# Patient Record
Sex: MALE | Race: WHITE | NOT HISPANIC OR LATINO
[De-identification: names, ages, dates, MRNs, and addresses within clinical notes are randomized per-mention and may not be internally consistent; named-entity substitution may affect disease eponyms.]

---

## 2020-11-03 ENCOUNTER — RESULT REVIEW (OUTPATIENT)
Age: 75
End: 2020-11-03

## 2022-09-22 PROBLEM — Z00.00 ENCOUNTER FOR PREVENTIVE HEALTH EXAMINATION: Status: ACTIVE | Noted: 2022-09-22

## 2022-09-27 ENCOUNTER — APPOINTMENT (OUTPATIENT)
Dept: COLORECTAL SURGERY | Facility: CLINIC | Age: 77
End: 2022-09-27

## 2022-09-27 DIAGNOSIS — C18.7 MALIGNANT NEOPLASM OF SIGMOID COLON: ICD-10-CM

## 2022-09-27 DIAGNOSIS — E66.01 MORBID (SEVERE) OBESITY DUE TO EXCESS CALORIES: ICD-10-CM

## 2022-09-27 DIAGNOSIS — K51.212: ICD-10-CM

## 2022-09-27 PROCEDURE — 99204 OFFICE O/P NEW MOD 45 MIN: CPT

## 2022-09-27 NOTE — CONSULT LETTER
[Dear  ___] : Dear  [unfilled], [Consult Letter:] : I had the pleasure of evaluating your patient, [unfilled]. [Please see my note below.] : Please see my note below. [Consult Closing:] : Thank you very much for allowing me to participate in the care of this patient.  If you have any questions, please do not hesitate to contact me. [Sincerely,] : Sincerely, [FreeTextEntry3] : Eduin Nguyen M.D. FACS, FASCRS

## 2022-09-27 NOTE — REVIEW OF SYSTEMS
[Feeling Poorly] : feeling poorly [Feeling Tired] : feeling tired [As Noted in HPI] : as noted in HPI [Abdominal Pain] : abdominal pain [Constipation] : constipation [Melena] : melbart [Negative] : Heme/Lymph

## 2022-09-27 NOTE — ASSESSMENT
[FreeTextEntry1] : 77-year-old morbidly obese male with ulcerative colitis and sigmoid colon cancer with lymphadenopathy.  Discussed with the patient his options including total proctocolectomy with ileostomy versus total proctocolectomy with J-pouch reconstruction and diverting ileostomy.  Patient wishes to proceed with J-pouch procedure.  Risk and benefits of the surgery have been discussed which include bleeding, infection, sepsis, multiorgan failure, inadvertent injury including hollow viscus, solid organ, ureter neurovascular and neurological structures, DVT PE, heart attack, stroke, hernias, recurrence of tumor, leak of anastomosis, need for stoma, major changes in bowel habits including increased frequency and incontinence and death.

## 2022-09-27 NOTE — HISTORY OF PRESENT ILLNESS
[FreeTextEntry1] : Consultation requested by Dr. Gen Grant for ulcerative colitis and recent diagnosis of sigmoid colon cancer.  77-year-old male morbidly obese with longstanding history of ulcerative colitis recently underwent colonoscopy due to bowel habit changes and bleeding found to have a near obstructing tumor stricture partial colon obstruction and cancer of the sigmoid colon.  Symptoms of obstruction have been increasingly worsened.

## 2022-09-27 NOTE — DATA REVIEWED
[FreeTextEntry1] : Colonoscopy demonstrates a stricture at 25 cm near obstructing tumor\par Pathology demonstrates adenocarcinoma\par CT scan demonstrates 10 cm strictured area of sigmoid colon, enlarged pericolonic lymph nodes

## 2022-09-27 NOTE — PHYSICAL EXAM
[Abdomen Masses] : No abdominal masses [Abdomen Tenderness] : ~T No ~M abdominal tenderness [JVD] : no jugular venous distention  [Normal Breath Sounds] : Normal breath sounds [Normal Heart Sounds] : normal heart sounds [Normal Rate and Rhythm] : normal rate and rhythm [Alert] : alert [Oriented to Person] : oriented to person [Oriented to Place] : oriented to place [Oriented to Time] : oriented to time [Calm] : calm [de-identified] : Morbidly obese [de-identified] : Looks well in no distress, of stated age. [de-identified] : Moves all 4 extremities appropriately with 5 over 5 strength

## 2022-09-29 ENCOUNTER — RESULT REVIEW (OUTPATIENT)
Age: 77
End: 2022-09-29

## 2022-09-29 ENCOUNTER — OUTPATIENT (OUTPATIENT)
Dept: OUTPATIENT SERVICES | Facility: HOSPITAL | Age: 77
LOS: 1 days | End: 2022-09-29
Payer: MEDICARE

## 2022-09-29 VITALS
HEIGHT: 71 IN | RESPIRATION RATE: 16 BRPM | TEMPERATURE: 98 F | SYSTOLIC BLOOD PRESSURE: 127 MMHG | OXYGEN SATURATION: 96 % | HEART RATE: 73 BPM | DIASTOLIC BLOOD PRESSURE: 71 MMHG | WEIGHT: 278 LBS

## 2022-09-29 DIAGNOSIS — Z01.818 ENCOUNTER FOR OTHER PREPROCEDURAL EXAMINATION: ICD-10-CM

## 2022-09-29 DIAGNOSIS — Z98.890 OTHER SPECIFIED POSTPROCEDURAL STATES: Chronic | ICD-10-CM

## 2022-09-29 DIAGNOSIS — K51.90 ULCERATIVE COLITIS, UNSPECIFIED, WITHOUT COMPLICATIONS: ICD-10-CM

## 2022-09-29 DIAGNOSIS — Z98.49 CATARACT EXTRACTION STATUS, UNSPECIFIED EYE: Chronic | ICD-10-CM

## 2022-09-29 DIAGNOSIS — Z96.653 PRESENCE OF ARTIFICIAL KNEE JOINT, BILATERAL: Chronic | ICD-10-CM

## 2022-09-29 DIAGNOSIS — Z95.0 PRESENCE OF CARDIAC PACEMAKER: Chronic | ICD-10-CM

## 2022-09-29 LAB
A1C WITH ESTIMATED AVERAGE GLUCOSE RESULT: 6.9 % — HIGH (ref 4–5.6)
ALBUMIN SERPL ELPH-MCNC: 3.5 G/DL — SIGNIFICANT CHANGE UP (ref 3.3–5)
ALP SERPL-CCNC: 59 U/L — SIGNIFICANT CHANGE UP (ref 40–120)
ALT FLD-CCNC: 38 U/L — SIGNIFICANT CHANGE UP (ref 12–78)
ANION GAP SERPL CALC-SCNC: 6 MMOL/L — SIGNIFICANT CHANGE UP (ref 5–17)
APTT BLD: 30.4 SEC — SIGNIFICANT CHANGE UP (ref 27.5–35.5)
AST SERPL-CCNC: 19 U/L — SIGNIFICANT CHANGE UP (ref 15–37)
BASOPHILS # BLD AUTO: 0.02 K/UL — SIGNIFICANT CHANGE UP (ref 0–0.2)
BASOPHILS NFR BLD AUTO: 0.4 % — SIGNIFICANT CHANGE UP (ref 0–2)
BILIRUB DIRECT SERPL-MCNC: 0.1 MG/DL — SIGNIFICANT CHANGE UP (ref 0–0.3)
BILIRUB SERPL-MCNC: 0.5 MG/DL — SIGNIFICANT CHANGE UP (ref 0.2–1.2)
BUN SERPL-MCNC: 16 MG/DL — SIGNIFICANT CHANGE UP (ref 7–23)
CALCIUM SERPL-MCNC: 9.5 MG/DL — SIGNIFICANT CHANGE UP (ref 8.5–10.1)
CEA SERPL-MCNC: 4.6 NG/ML — HIGH (ref 0–3.8)
CHLORIDE SERPL-SCNC: 102 MMOL/L — SIGNIFICANT CHANGE UP (ref 96–108)
CO2 SERPL-SCNC: 31 MMOL/L — SIGNIFICANT CHANGE UP (ref 22–31)
CREAT SERPL-MCNC: 0.92 MG/DL — SIGNIFICANT CHANGE UP (ref 0.5–1.3)
EGFR: 87 ML/MIN/1.73M2 — SIGNIFICANT CHANGE UP
EOSINOPHIL # BLD AUTO: 0 K/UL — SIGNIFICANT CHANGE UP (ref 0–0.5)
EOSINOPHIL NFR BLD AUTO: 0 % — SIGNIFICANT CHANGE UP (ref 0–6)
ESTIMATED AVERAGE GLUCOSE: 151 MG/DL — HIGH (ref 68–114)
GLUCOSE SERPL-MCNC: 174 MG/DL — HIGH (ref 70–99)
HCT VFR BLD CALC: 39.4 % — SIGNIFICANT CHANGE UP (ref 39–50)
HGB BLD-MCNC: 12.1 G/DL — LOW (ref 13–17)
IMM GRANULOCYTES NFR BLD AUTO: 1.1 % — HIGH (ref 0–0.9)
INR BLD: 1.06 RATIO — SIGNIFICANT CHANGE UP (ref 0.88–1.16)
LYMPHOCYTES # BLD AUTO: 0.6 K/UL — LOW (ref 1–3.3)
LYMPHOCYTES # BLD AUTO: 13.2 % — SIGNIFICANT CHANGE UP (ref 13–44)
MCHC RBC-ENTMCNC: 27.1 PG — SIGNIFICANT CHANGE UP (ref 27–34)
MCHC RBC-ENTMCNC: 30.7 GM/DL — LOW (ref 32–36)
MCV RBC AUTO: 88.3 FL — SIGNIFICANT CHANGE UP (ref 80–100)
MONOCYTES # BLD AUTO: 0.17 K/UL — SIGNIFICANT CHANGE UP (ref 0–0.9)
MONOCYTES NFR BLD AUTO: 3.7 % — SIGNIFICANT CHANGE UP (ref 2–14)
NEUTROPHILS # BLD AUTO: 3.7 K/UL — SIGNIFICANT CHANGE UP (ref 1.8–7.4)
NEUTROPHILS NFR BLD AUTO: 81.6 % — HIGH (ref 43–77)
PLATELET # BLD AUTO: 193 K/UL — SIGNIFICANT CHANGE UP (ref 150–400)
POTASSIUM SERPL-MCNC: 4.9 MMOL/L — SIGNIFICANT CHANGE UP (ref 3.5–5.3)
POTASSIUM SERPL-SCNC: 4.9 MMOL/L — SIGNIFICANT CHANGE UP (ref 3.5–5.3)
PROT SERPL-MCNC: 7 GM/DL — SIGNIFICANT CHANGE UP (ref 6–8.3)
PROTHROM AB SERPL-ACNC: 12.3 SEC — SIGNIFICANT CHANGE UP (ref 10.5–13.4)
RBC # BLD: 4.46 M/UL — SIGNIFICANT CHANGE UP (ref 4.2–5.8)
RBC # FLD: 19.6 % — HIGH (ref 10.3–14.5)
SODIUM SERPL-SCNC: 139 MMOL/L — SIGNIFICANT CHANGE UP (ref 135–145)
WBC # BLD: 4.54 K/UL — SIGNIFICANT CHANGE UP (ref 3.8–10.5)
WBC # FLD AUTO: 4.54 K/UL — SIGNIFICANT CHANGE UP (ref 3.8–10.5)

## 2022-09-29 PROCEDURE — 80053 COMPREHEN METABOLIC PANEL: CPT

## 2022-09-29 PROCEDURE — 93010 ELECTROCARDIOGRAM REPORT: CPT

## 2022-09-29 PROCEDURE — 99214 OFFICE O/P EST MOD 30 MIN: CPT | Mod: 25

## 2022-09-29 PROCEDURE — 71046 X-RAY EXAM CHEST 2 VIEWS: CPT | Mod: 26

## 2022-09-29 PROCEDURE — 82248 BILIRUBIN DIRECT: CPT

## 2022-09-29 PROCEDURE — 71046 X-RAY EXAM CHEST 2 VIEWS: CPT

## 2022-09-29 PROCEDURE — 85730 THROMBOPLASTIN TIME PARTIAL: CPT

## 2022-09-29 PROCEDURE — 36415 COLL VENOUS BLD VENIPUNCTURE: CPT

## 2022-09-29 PROCEDURE — 86850 RBC ANTIBODY SCREEN: CPT

## 2022-09-29 PROCEDURE — 86920 COMPATIBILITY TEST SPIN: CPT

## 2022-09-29 PROCEDURE — 85025 COMPLETE CBC W/AUTO DIFF WBC: CPT

## 2022-09-29 PROCEDURE — 83036 HEMOGLOBIN GLYCOSYLATED A1C: CPT

## 2022-09-29 PROCEDURE — 86901 BLOOD TYPING SEROLOGIC RH(D): CPT

## 2022-09-29 PROCEDURE — 86900 BLOOD TYPING SEROLOGIC ABO: CPT

## 2022-09-29 PROCEDURE — 93005 ELECTROCARDIOGRAM TRACING: CPT

## 2022-09-29 PROCEDURE — 85610 PROTHROMBIN TIME: CPT

## 2022-09-29 PROCEDURE — 82378 CARCINOEMBRYONIC ANTIGEN: CPT

## 2022-09-29 NOTE — H&P PST ADULT - DENTITION
Hourly rounds completed throughout this shift. Pt resting in bed; denies needs at this time. Will continue to monitor and report to oncoming night shift nurse. denies loose teeth or dentures/normal

## 2022-09-29 NOTE — H&P PST ADULT - NSICDXPASTMEDICALHX_GEN_ALL_CORE_FT
PAST MEDICAL HISTORY:  Atrial fibrillation     BPH (benign prostatic hyperplasia)     Colon cancer     COPD (chronic obstructive pulmonary disease)     COVID-19 virus infection     HLD (hyperlipidemia)     HTN (hypertension)     Obesity     Obstructive sleep apnea     Ulcerative colitis      PAST MEDICAL HISTORY:  Atrial fibrillation     BPH (benign prostatic hyperplasia)     Colon cancer     COPD (chronic obstructive pulmonary disease)     COVID-19 virus infection     History of pneumonia     HLD (hyperlipidemia)     HTN (hypertension)     Obesity     Obstructive sleep apnea     Ulcerative colitis

## 2022-09-29 NOTE — H&P PST ADULT - NSICDXFAMILYHX_GEN_ALL_CORE_FT
FAMILY HISTORY:  Father  Still living? Unknown  Family history of cardiomyopathy, Age at diagnosis: Age Unknown

## 2022-09-29 NOTE — H&P PST ADULT - ASSESSMENT
77 year old male diagnosed with ulcerative colitis and colon cancer; Colon cancer found on routine colonoscopy; Pt said he has occasional blood in stool due to UC denies change in bowel habits; presents to PST for planned open total proctocolectomy with ileal anal j pouch and ileostomy       Plan:  1. PST instructions given ; NPO status/  instructions to be given by ASU   2. Pt instructed to take following meds on day of surgery: spiriva albuterol prn; amlodipine metoprolol prednisone  3. Pt instructed to take routine evening medications unless indicated   4. Stop NSAIDS ( Aspirin Alev Motrin Mobic Diclofenac), herbal supplements , MVI , Vitamin fish oil 7 days prior to surgery  unless   directed by surgeon or cardiologist;   5. Medical Optimization  with Dr Barnhart and cardio Dr Hendricks   6. EZ wash instructions given & mupirocin instructions given  7. Labs EKG CXR as per surgeon request   8. Pt instructed to self quarantine after Covid test   9. Covid Testing scheduled Pt notified and aware  10. Pt denies covid symptoms shortness of breath fever cough       CAPRINI SCORE [CLOT]    AGE RELATED RISK FACTORS                                                       MOBILITY RELATED FACTORS  [ ] Age 41-60 years                                            (1 Point)                  [ ] Bed rest                                                        (1 Point)  [ ] Age: 61-74 years                                           (2 Points)                 [ ] Plaster cast                                                   (2 Points)  [x ] Age= 75 years                                              (3 Points)                 [ ] Bed bound for more than 72 hours                 (2 Points)    DISEASE RELATED RISK FACTORS                                               GENDER SPECIFIC FACTORS  [x ] Edema in the lower extremities                       (1 Point)                  [ ] Pregnancy                                                     (1 Point)  [ ] Varicose veins                                               (1 Point)                  [ ] Post-partum < 6 weeks                                   (1 Point)             [x ] BMI > 25 Kg/m2                                            (1 Point)                  [ ] Hormonal therapy  or oral contraception          (1 Point)                 [ ] Sepsis (in the previous month)                        (1 Point)                  [ ] History of pregnancy complications                 (1 point)  [ ] Pneumonia or serious lung disease                                               [ ] Unexplained or recurrent                     (1 Point)           (in the previous month)                               (1 Point)  [ ] Abnormal pulmonary function test                     (1 Point)                 SURGERY RELATED RISK FACTORS  [ ] Acute myocardial infarction                              (1 Point)                 [ ]  Section                                             (1 Point)  [ ] Congestive heart failure (in the previous month)  (1 Point)               [ ] Minor surgery                                                  (1 Point)   [x ] Inflammatory bowel disease                             (1 Point)                 [ ] Arthroscopic surgery                                        (2 Points)  [ ] Central venous access                                      (2 Points)                [ x] General surgery lasting more than 45 minutes   (2 Points)       [ ] Stroke (in the previous month)                          (5 Points)               [ ] Elective arthroplasty                                         (5 Points)            ( x) presents and past malignancy                           (2 points)                                                                                                         HEMATOLOGY RELATED FACTORS                                                 TRAUMA RELATED RISK FACTORS  [ ] Prior episodes of VTE                                     (3 Points)                 [ ] Fracture of the hip, pelvis, or leg                       (5 Points)  [ ] Positive family history for VTE                         (3 Points)                 [ ] Acute spinal cord injury (in the previous month)  (5 Points)  [ ] Prothrombin 82028 A                                     (3 Points)                 [ ] Paralysis  (less than 1 month)                             (5 Points)  [ ] Factor V Leiden                                             (3 Points)                  [ ] Multiple Trauma within 1 month                        (5 Points)  [ ] Lupus anticoagulants                                     (3 Points)                                                           [ ] Anticardiolipin antibodies                               (3 Points)                                                       [ ] High homocysteine in the blood                      (3 Points)                                             [ ] Other congenital or acquired thrombophilia      (3 Points)                                                [ ] Heparin induced thrombocytopenia                  (3 Points)                                          Total Score [  10       ]    The Caprini score indicates that this patient is at high risk for a VTE event (score 6 or greater). Surgical patients in this group will benefit from both pharmacologic prophylaxis and intermittent compression devices.  The surgical team will determine the balance between VTE risk and bleeding risk, and other clinical considerations

## 2022-09-29 NOTE — H&P PST ADULT - HEALTH CARE MAINTENANCE
Pt denies travel out of [facility] for the past 14 days Pt denies  travel internationally for the past 21 days

## 2022-09-29 NOTE — H&P PST ADULT - NSICDXPASTSURGICALHX_GEN_ALL_CORE_FT
PAST SURGICAL HISTORY:  H/O colonoscopy     H/O cystoscopy urethral stricture    H/O hernia repair     History of cataract surgery     History of hydrocelectomy     History of total knee replacement, bilateral     S/P placement of cardiac pacemaker

## 2022-09-29 NOTE — H&P PST ADULT - HISTORY OF PRESENT ILLNESS
77 year old male diagnosed with ulcerative colitis and colon cancer; Colon cancer found on routine colonoscopy; Pt said he has occasional blood in stool due to UC denies change in bowel habits; presents to PST for planned open total proctocolectomy with ileal anal j pouch and ileostomy

## 2022-09-30 DIAGNOSIS — K51.90 ULCERATIVE COLITIS, UNSPECIFIED, WITHOUT COMPLICATIONS: ICD-10-CM

## 2022-09-30 DIAGNOSIS — Z01.818 ENCOUNTER FOR OTHER PREPROCEDURAL EXAMINATION: ICD-10-CM

## 2022-10-02 RX ORDER — SODIUM CHLORIDE 9 MG/ML
3 INJECTION INTRAMUSCULAR; INTRAVENOUS; SUBCUTANEOUS EVERY 8 HOURS
Refills: 0 | Status: DISCONTINUED | OUTPATIENT
Start: 2022-10-06 | End: 2022-11-02

## 2022-10-03 ENCOUNTER — RX CHANGE (OUTPATIENT)
Age: 77
End: 2022-10-03

## 2022-10-06 ENCOUNTER — INPATIENT (INPATIENT)
Facility: HOSPITAL | Age: 77
LOS: 26 days | Discharge: HOME HEALTH | DRG: 329 | End: 2022-11-02
Attending: INTERNAL MEDICINE | Admitting: COLON & RECTAL SURGERY
Payer: MEDICARE

## 2022-10-06 ENCOUNTER — TRANSCRIPTION ENCOUNTER (OUTPATIENT)
Age: 77
End: 2022-10-06

## 2022-10-06 ENCOUNTER — RESULT REVIEW (OUTPATIENT)
Age: 77
End: 2022-10-06

## 2022-10-06 ENCOUNTER — APPOINTMENT (OUTPATIENT)
Dept: COLORECTAL SURGERY | Facility: HOSPITAL | Age: 77
End: 2022-10-06

## 2022-10-06 VITALS
RESPIRATION RATE: 18 BRPM | HEIGHT: 71 IN | SYSTOLIC BLOOD PRESSURE: 107 MMHG | OXYGEN SATURATION: 95 % | WEIGHT: 278 LBS | HEART RATE: 52 BPM | TEMPERATURE: 97 F | DIASTOLIC BLOOD PRESSURE: 63 MMHG

## 2022-10-06 DIAGNOSIS — Z98.890 OTHER SPECIFIED POSTPROCEDURAL STATES: Chronic | ICD-10-CM

## 2022-10-06 DIAGNOSIS — K51.90 ULCERATIVE COLITIS, UNSPECIFIED, WITHOUT COMPLICATIONS: ICD-10-CM

## 2022-10-06 DIAGNOSIS — Z95.0 PRESENCE OF CARDIAC PACEMAKER: Chronic | ICD-10-CM

## 2022-10-06 DIAGNOSIS — Z98.49 CATARACT EXTRACTION STATUS, UNSPECIFIED EYE: Chronic | ICD-10-CM

## 2022-10-06 DIAGNOSIS — C18.7 MALIGNANT NEOPLASM OF SIGMOID COLON: ICD-10-CM

## 2022-10-06 DIAGNOSIS — Z96.653 PRESENCE OF ARTIFICIAL KNEE JOINT, BILATERAL: Chronic | ICD-10-CM

## 2022-10-06 LAB
ANION GAP SERPL CALC-SCNC: 8 MMOL/L — SIGNIFICANT CHANGE UP (ref 5–17)
BUN SERPL-MCNC: 19 MG/DL — SIGNIFICANT CHANGE UP (ref 7–23)
CALCIUM SERPL-MCNC: 8.4 MG/DL — LOW (ref 8.5–10.1)
CHLORIDE SERPL-SCNC: 105 MMOL/L — SIGNIFICANT CHANGE UP (ref 96–108)
CO2 SERPL-SCNC: 25 MMOL/L — SIGNIFICANT CHANGE UP (ref 22–31)
CREAT SERPL-MCNC: 1.49 MG/DL — HIGH (ref 0.5–1.3)
EGFR: 49 ML/MIN/1.73M2 — LOW
GLUCOSE SERPL-MCNC: 231 MG/DL — HIGH (ref 70–99)
HCT VFR BLD CALC: 40.5 % — SIGNIFICANT CHANGE UP (ref 39–50)
HGB BLD-MCNC: 12.6 G/DL — LOW (ref 13–17)
LACTATE SERPL-SCNC: 2.7 MMOL/L — HIGH (ref 0.7–2)
MCHC RBC-ENTMCNC: 27.3 PG — SIGNIFICANT CHANGE UP (ref 27–34)
MCHC RBC-ENTMCNC: 31.1 GM/DL — LOW (ref 32–36)
MCV RBC AUTO: 87.7 FL — SIGNIFICANT CHANGE UP (ref 80–100)
PLATELET # BLD AUTO: 186 K/UL — SIGNIFICANT CHANGE UP (ref 150–400)
POTASSIUM SERPL-MCNC: 4.7 MMOL/L — SIGNIFICANT CHANGE UP (ref 3.5–5.3)
POTASSIUM SERPL-SCNC: 4.7 MMOL/L — SIGNIFICANT CHANGE UP (ref 3.5–5.3)
RBC # BLD: 4.62 M/UL — SIGNIFICANT CHANGE UP (ref 4.2–5.8)
RBC # FLD: 19.4 % — HIGH (ref 10.3–14.5)
SODIUM SERPL-SCNC: 138 MMOL/L — SIGNIFICANT CHANGE UP (ref 135–145)
WBC # BLD: 10.3 K/UL — SIGNIFICANT CHANGE UP (ref 3.8–10.5)
WBC # FLD AUTO: 10.3 K/UL — SIGNIFICANT CHANGE UP (ref 3.8–10.5)

## 2022-10-06 PROCEDURE — 97530 THERAPEUTIC ACTIVITIES: CPT | Mod: GP

## 2022-10-06 PROCEDURE — 88309 TISSUE EXAM BY PATHOLOGIST: CPT | Mod: 26

## 2022-10-06 PROCEDURE — 94640 AIRWAY INHALATION TREATMENT: CPT

## 2022-10-06 PROCEDURE — C1889: CPT

## 2022-10-06 PROCEDURE — 94660 CPAP INITIATION&MGMT: CPT

## 2022-10-06 PROCEDURE — 94760 N-INVAS EAR/PLS OXIMETRY 1: CPT

## 2022-10-06 PROCEDURE — C1769: CPT

## 2022-10-06 PROCEDURE — 49406 IMAGE CATH FLUID PERI/RETRO: CPT

## 2022-10-06 PROCEDURE — 87040 BLOOD CULTURE FOR BACTERIA: CPT

## 2022-10-06 PROCEDURE — 82607 VITAMIN B-12: CPT

## 2022-10-06 PROCEDURE — 82570 ASSAY OF URINE CREATININE: CPT

## 2022-10-06 PROCEDURE — 80048 BASIC METABOLIC PNL TOTAL CA: CPT

## 2022-10-06 PROCEDURE — 76770 US EXAM ABDO BACK WALL COMP: CPT

## 2022-10-06 PROCEDURE — 85018 HEMOGLOBIN: CPT

## 2022-10-06 PROCEDURE — 84478 ASSAY OF TRIGLYCERIDES: CPT

## 2022-10-06 PROCEDURE — 86901 BLOOD TYPING SEROLOGIC RH(D): CPT

## 2022-10-06 PROCEDURE — 83540 ASSAY OF IRON: CPT

## 2022-10-06 PROCEDURE — 87070 CULTURE OTHR SPECIMN AEROBIC: CPT

## 2022-10-06 PROCEDURE — 84300 ASSAY OF URINE SODIUM: CPT

## 2022-10-06 PROCEDURE — 83935 ASSAY OF URINE OSMOLALITY: CPT

## 2022-10-06 PROCEDURE — C9399: CPT

## 2022-10-06 PROCEDURE — 81001 URINALYSIS AUTO W/SCOPE: CPT

## 2022-10-06 PROCEDURE — 83550 IRON BINDING TEST: CPT

## 2022-10-06 PROCEDURE — 85730 THROMBOPLASTIN TIME PARTIAL: CPT

## 2022-10-06 PROCEDURE — 86850 RBC ANTIBODY SCREEN: CPT

## 2022-10-06 PROCEDURE — 85610 PROTHROMBIN TIME: CPT

## 2022-10-06 PROCEDURE — 84443 ASSAY THYROID STIM HORMONE: CPT

## 2022-10-06 PROCEDURE — 84540 ASSAY OF URINE/UREA-N: CPT

## 2022-10-06 PROCEDURE — 82272 OCCULT BLD FECES 1-3 TESTS: CPT

## 2022-10-06 PROCEDURE — 93306 TTE W/DOPPLER COMPLETE: CPT

## 2022-10-06 PROCEDURE — 88307 TISSUE EXAM BY PATHOLOGIST: CPT | Mod: 26

## 2022-10-06 PROCEDURE — 84156 ASSAY OF PROTEIN URINE: CPT

## 2022-10-06 PROCEDURE — 93005 ELECTROCARDIOGRAM TRACING: CPT

## 2022-10-06 PROCEDURE — 84133 ASSAY OF URINE POTASSIUM: CPT

## 2022-10-06 PROCEDURE — 84484 ASSAY OF TROPONIN QUANT: CPT

## 2022-10-06 PROCEDURE — 88305 TISSUE EXAM BY PATHOLOGIST: CPT | Mod: 26

## 2022-10-06 PROCEDURE — 87635 SARS-COV-2 COVID-19 AMP PRB: CPT

## 2022-10-06 PROCEDURE — 84100 ASSAY OF PHOSPHORUS: CPT

## 2022-10-06 PROCEDURE — 85027 COMPLETE CBC AUTOMATED: CPT

## 2022-10-06 PROCEDURE — C9113: CPT

## 2022-10-06 PROCEDURE — 71045 X-RAY EXAM CHEST 1 VIEW: CPT

## 2022-10-06 PROCEDURE — 71045 X-RAY EXAM CHEST 1 VIEW: CPT | Mod: 26

## 2022-10-06 PROCEDURE — 83880 ASSAY OF NATRIURETIC PEPTIDE: CPT

## 2022-10-06 PROCEDURE — 74176 CT ABD & PELVIS W/O CONTRAST: CPT

## 2022-10-06 PROCEDURE — 82746 ASSAY OF FOLIC ACID SERUM: CPT

## 2022-10-06 PROCEDURE — 36415 COLL VENOUS BLD VENIPUNCTURE: CPT

## 2022-10-06 PROCEDURE — 82306 VITAMIN D 25 HYDROXY: CPT

## 2022-10-06 PROCEDURE — 36430 TRANSFUSION BLD/BLD COMPNT: CPT

## 2022-10-06 PROCEDURE — 82040 ASSAY OF SERUM ALBUMIN: CPT

## 2022-10-06 PROCEDURE — 85025 COMPLETE CBC W/AUTO DIFF WBC: CPT

## 2022-10-06 PROCEDURE — 85014 HEMATOCRIT: CPT

## 2022-10-06 PROCEDURE — 74018 RADEX ABDOMEN 1 VIEW: CPT

## 2022-10-06 PROCEDURE — 97116 GAIT TRAINING THERAPY: CPT | Mod: GP

## 2022-10-06 PROCEDURE — 88305 TISSUE EXAM BY PATHOLOGIST: CPT

## 2022-10-06 PROCEDURE — 86923 COMPATIBILITY TEST ELECTRIC: CPT

## 2022-10-06 PROCEDURE — 82803 BLOOD GASES ANY COMBINATION: CPT

## 2022-10-06 PROCEDURE — 93010 ELECTROCARDIOGRAM REPORT: CPT

## 2022-10-06 PROCEDURE — 82728 ASSAY OF FERRITIN: CPT

## 2022-10-06 PROCEDURE — 88307 TISSUE EXAM BY PATHOLOGIST: CPT

## 2022-10-06 PROCEDURE — 36600 WITHDRAWAL OF ARTERIAL BLOOD: CPT

## 2022-10-06 PROCEDURE — U0003: CPT

## 2022-10-06 PROCEDURE — 83605 ASSAY OF LACTIC ACID: CPT

## 2022-10-06 PROCEDURE — 82550 ASSAY OF CK (CPK): CPT

## 2022-10-06 PROCEDURE — P9016: CPT

## 2022-10-06 PROCEDURE — 86140 C-REACTIVE PROTEIN: CPT

## 2022-10-06 PROCEDURE — C1729: CPT

## 2022-10-06 PROCEDURE — 82962 GLUCOSE BLOOD TEST: CPT

## 2022-10-06 PROCEDURE — 83735 ASSAY OF MAGNESIUM: CPT

## 2022-10-06 PROCEDURE — 80053 COMPREHEN METABOLIC PANEL: CPT

## 2022-10-06 PROCEDURE — U0005: CPT

## 2022-10-06 PROCEDURE — 86900 BLOOD TYPING SEROLOGIC ABO: CPT

## 2022-10-06 PROCEDURE — 80069 RENAL FUNCTION PANEL: CPT

## 2022-10-06 PROCEDURE — 85045 AUTOMATED RETICULOCYTE COUNT: CPT

## 2022-10-06 PROCEDURE — 88309 TISSUE EXAM BY PATHOLOGIST: CPT

## 2022-10-06 PROCEDURE — C1765: CPT

## 2022-10-06 PROCEDURE — 44158 COLECTOMY W/NEO-RECTUM POUCH: CPT

## 2022-10-06 RX ORDER — ATORVASTATIN CALCIUM 80 MG/1
1 TABLET, FILM COATED ORAL
Qty: 0 | Refills: 0 | DISCHARGE

## 2022-10-06 RX ORDER — APIXABAN 2.5 MG/1
1 TABLET, FILM COATED ORAL
Qty: 0 | Refills: 0 | DISCHARGE

## 2022-10-06 RX ORDER — ATORVASTATIN CALCIUM 80 MG/1
20 TABLET, FILM COATED ORAL AT BEDTIME
Refills: 0 | Status: DISCONTINUED | OUTPATIENT
Start: 2022-10-06 | End: 2022-11-02

## 2022-10-06 RX ORDER — HEPARIN SODIUM 5000 [USP'U]/ML
5000 INJECTION INTRAVENOUS; SUBCUTANEOUS EVERY 8 HOURS
Refills: 0 | Status: DISCONTINUED | OUTPATIENT
Start: 2022-10-06 | End: 2022-10-08

## 2022-10-06 RX ORDER — CEFOTETAN DISODIUM 1 G
VIAL (EA) INJECTION
Refills: 0 | Status: DISCONTINUED | OUTPATIENT
Start: 2022-10-06 | End: 2022-10-07

## 2022-10-06 RX ORDER — METOPROLOL TARTRATE 50 MG
25 TABLET ORAL DAILY
Refills: 0 | Status: DISCONTINUED | OUTPATIENT
Start: 2022-10-06 | End: 2022-10-06

## 2022-10-06 RX ORDER — DEXTROSE 50 % IN WATER 50 %
15 SYRINGE (ML) INTRAVENOUS ONCE
Refills: 0 | Status: DISCONTINUED | OUTPATIENT
Start: 2022-10-06 | End: 2022-11-02

## 2022-10-06 RX ORDER — ALVIMOPAN 12 MG/1
12 CAPSULE ORAL ONCE
Refills: 0 | Status: COMPLETED | OUTPATIENT
Start: 2022-10-06 | End: 2022-10-06

## 2022-10-06 RX ORDER — CEFOTETAN DISODIUM 1 G
1 VIAL (EA) INJECTION EVERY 12 HOURS
Refills: 0 | Status: DISCONTINUED | OUTPATIENT
Start: 2022-10-07 | End: 2022-10-07

## 2022-10-06 RX ORDER — INSULIN LISPRO 100/ML
VIAL (ML) SUBCUTANEOUS EVERY 6 HOURS
Refills: 0 | Status: DISCONTINUED | OUTPATIENT
Start: 2022-10-06 | End: 2022-10-10

## 2022-10-06 RX ORDER — NALOXONE HYDROCHLORIDE 4 MG/.1ML
0.1 SPRAY NASAL
Refills: 0 | Status: DISCONTINUED | OUTPATIENT
Start: 2022-10-06 | End: 2022-11-02

## 2022-10-06 RX ORDER — TIOTROPIUM BROMIDE 18 UG/1
2 CAPSULE ORAL; RESPIRATORY (INHALATION)
Qty: 0 | Refills: 0 | DISCHARGE

## 2022-10-06 RX ORDER — TIOTROPIUM BROMIDE 18 UG/1
1 CAPSULE ORAL; RESPIRATORY (INHALATION) DAILY
Refills: 0 | Status: DISCONTINUED | OUTPATIENT
Start: 2022-10-06 | End: 2022-11-02

## 2022-10-06 RX ORDER — DEXTROSE 50 % IN WATER 50 %
25 SYRINGE (ML) INTRAVENOUS ONCE
Refills: 0 | Status: DISCONTINUED | OUTPATIENT
Start: 2022-10-06 | End: 2022-11-02

## 2022-10-06 RX ORDER — CEFOTETAN DISODIUM 1 G
1 VIAL (EA) INJECTION ONCE
Refills: 0 | Status: COMPLETED | OUTPATIENT
Start: 2022-10-06 | End: 2022-10-06

## 2022-10-06 RX ORDER — CEFOTETAN DISODIUM 1 G
2 VIAL (EA) INJECTION EVERY 12 HOURS
Refills: 0 | Status: DISCONTINUED | OUTPATIENT
Start: 2022-10-06 | End: 2022-10-07

## 2022-10-06 RX ORDER — INFLUENZA VIRUS VACCINE 15; 15; 15; 15 UG/.5ML; UG/.5ML; UG/.5ML; UG/.5ML
0.7 SUSPENSION INTRAMUSCULAR ONCE
Refills: 0 | Status: COMPLETED | OUTPATIENT
Start: 2022-10-06 | End: 2022-10-06

## 2022-10-06 RX ORDER — ALBUTEROL 90 UG/1
2 AEROSOL, METERED ORAL EVERY 6 HOURS
Refills: 0 | Status: DISCONTINUED | OUTPATIENT
Start: 2022-10-06 | End: 2022-11-02

## 2022-10-06 RX ORDER — AMLODIPINE BESYLATE 2.5 MG/1
2.5 TABLET ORAL DAILY
Refills: 0 | Status: DISCONTINUED | OUTPATIENT
Start: 2022-10-06 | End: 2022-10-06

## 2022-10-06 RX ORDER — SODIUM CHLORIDE 9 MG/ML
500 INJECTION, SOLUTION INTRAVENOUS ONCE
Refills: 0 | Status: COMPLETED | OUTPATIENT
Start: 2022-10-06 | End: 2022-10-06

## 2022-10-06 RX ORDER — HYDROMORPHONE HYDROCHLORIDE 2 MG/ML
30 INJECTION INTRAMUSCULAR; INTRAVENOUS; SUBCUTANEOUS
Refills: 0 | Status: DISCONTINUED | OUTPATIENT
Start: 2022-10-06 | End: 2022-10-08

## 2022-10-06 RX ORDER — ONDANSETRON 8 MG/1
4 TABLET, FILM COATED ORAL EVERY 6 HOURS
Refills: 0 | Status: DISCONTINUED | OUTPATIENT
Start: 2022-10-06 | End: 2022-10-07

## 2022-10-06 RX ORDER — SODIUM CHLORIDE 9 MG/ML
1000 INJECTION, SOLUTION INTRAVENOUS
Refills: 0 | Status: DISCONTINUED | OUTPATIENT
Start: 2022-10-06 | End: 2022-10-06

## 2022-10-06 RX ORDER — ALBUTEROL 90 UG/1
2 AEROSOL, METERED ORAL
Qty: 0 | Refills: 0 | DISCHARGE

## 2022-10-06 RX ORDER — GLUCAGON INJECTION, SOLUTION 0.5 MG/.1ML
1 INJECTION, SOLUTION SUBCUTANEOUS ONCE
Refills: 0 | Status: DISCONTINUED | OUTPATIENT
Start: 2022-10-06 | End: 2022-11-02

## 2022-10-06 RX ORDER — ONDANSETRON 8 MG/1
4 TABLET, FILM COATED ORAL EVERY 6 HOURS
Refills: 0 | Status: DISCONTINUED | OUTPATIENT
Start: 2022-10-06 | End: 2022-11-02

## 2022-10-06 RX ORDER — SODIUM CHLORIDE 9 MG/ML
1000 INJECTION, SOLUTION INTRAVENOUS
Refills: 0 | Status: DISCONTINUED | OUTPATIENT
Start: 2022-10-06 | End: 2022-10-07

## 2022-10-06 RX ORDER — HYDROCORTISONE 20 MG
100 TABLET ORAL EVERY 8 HOURS
Refills: 0 | Status: DISCONTINUED | OUTPATIENT
Start: 2022-10-06 | End: 2022-10-09

## 2022-10-06 RX ORDER — PHENYLEPHRINE HYDROCHLORIDE 10 MG/ML
0.1 INJECTION INTRAVENOUS
Qty: 40 | Refills: 0 | Status: DISCONTINUED | OUTPATIENT
Start: 2022-10-06 | End: 2022-10-09

## 2022-10-06 RX ORDER — HEPARIN SODIUM 5000 [USP'U]/ML
5000 INJECTION INTRAVENOUS; SUBCUTANEOUS ONCE
Refills: 0 | Status: COMPLETED | OUTPATIENT
Start: 2022-10-06 | End: 2022-10-06

## 2022-10-06 RX ORDER — HYDROMORPHONE HYDROCHLORIDE 2 MG/ML
0.5 INJECTION INTRAMUSCULAR; INTRAVENOUS; SUBCUTANEOUS
Refills: 0 | Status: DISCONTINUED | OUTPATIENT
Start: 2022-10-06 | End: 2022-10-08

## 2022-10-06 RX ORDER — DEXTROSE 50 % IN WATER 50 %
12.5 SYRINGE (ML) INTRAVENOUS ONCE
Refills: 0 | Status: DISCONTINUED | OUTPATIENT
Start: 2022-10-06 | End: 2022-11-02

## 2022-10-06 RX ADMIN — SODIUM CHLORIDE 1000 MILLILITER(S): 9 INJECTION, SOLUTION INTRAVENOUS at 18:17

## 2022-10-06 RX ADMIN — HEPARIN SODIUM 5000 UNIT(S): 5000 INJECTION INTRAVENOUS; SUBCUTANEOUS at 09:12

## 2022-10-06 RX ADMIN — SODIUM CHLORIDE 75 MILLILITER(S): 9 INJECTION, SOLUTION INTRAVENOUS at 15:43

## 2022-10-06 RX ADMIN — ALVIMOPAN 12 MILLIGRAM(S): 12 CAPSULE ORAL at 09:12

## 2022-10-06 RX ADMIN — PHENYLEPHRINE HYDROCHLORIDE 4.73 MICROGRAM(S)/KG/MIN: 10 INJECTION INTRAVENOUS at 15:31

## 2022-10-06 RX ADMIN — HYDROMORPHONE HYDROCHLORIDE 30 MILLILITER(S): 2 INJECTION INTRAMUSCULAR; INTRAVENOUS; SUBCUTANEOUS at 15:41

## 2022-10-06 RX ADMIN — Medication 100 GRAM(S): at 21:42

## 2022-10-06 RX ADMIN — Medication 4: at 23:17

## 2022-10-06 RX ADMIN — Medication 100 MILLIGRAM(S): at 23:16

## 2022-10-06 RX ADMIN — SODIUM CHLORIDE 3 MILLILITER(S): 9 INJECTION INTRAMUSCULAR; INTRAVENOUS; SUBCUTANEOUS at 21:52

## 2022-10-06 RX ADMIN — SODIUM CHLORIDE 1000 MILLILITER(S): 9 INJECTION, SOLUTION INTRAVENOUS at 23:19

## 2022-10-06 NOTE — BRIEF OPERATIVE NOTE - SPECIMENS
ascending, transverse, descending, sigmoid colon, rectum, lymph nodes, terminal ileum, ileoanal donuts

## 2022-10-06 NOTE — PATIENT PROFILE ADULT - FALL HARM RISK - HARM RISK INTERVENTIONS

## 2022-10-06 NOTE — CONSULT NOTE ADULT - ASSESSMENT
A:    77M  HD # 1  FULL CODE    Here for:    1. Post op hypotension  2. Colon CA  3. Hyperglycemia    This patient requires critical care for support of one or more vital organ systems with a high probability of imminent or life threatening deterioration in his/her condition    P:    POD # 0 s/p --->    RCP-IPAA (restorative proctocolectomy with ileal pouch anal anastomosis), creation of diverting ileostomy    Post op hypotension; ?anesthesia v hypovolemia v sepsis (stool spillage noted on op report)    s/p 3200cc IVF in OR  ? Hx of CHF; however per anesthesia sheet, pre op TTE pEF, mild AS; ?maybe Hx HFpEF    IVF  Abx  Pressors  TTE  Cx's, LA  CRS bundle  Entereg per protocol  NPO    POCUS when in ICU to better assess fluid status    Dispo: Accept to critical care under CRS svc. Medical co mgmt per ICU. IVF, abx, cultures, sepsis bundle, CRS bundle.     Discussed with Dr Armstrong  Discussed with surgical team    TOTAL CRITICAL CARE TIME: 38 minutes  (EXCLUSIVE of any non bundled procedures)    Note: This time spent INCLUDES time spent directly as this patient's bedside with evaluation, review of chart including review of laboratory and imaging studies, interpretation of vital signs and cardiac output measurements, any necessary ventilator management, and time spent discussing plan of care with patient and family, including goals of care discussion.

## 2022-10-07 DIAGNOSIS — C18.9 MALIGNANT NEOPLASM OF COLON, UNSPECIFIED: ICD-10-CM

## 2022-10-07 DIAGNOSIS — I25.10 ATHEROSCLEROTIC HEART DISEASE OF NATIVE CORONARY ARTERY WITHOUT ANGINA PECTORIS: ICD-10-CM

## 2022-10-07 DIAGNOSIS — Z95.0 PRESENCE OF CARDIAC PACEMAKER: ICD-10-CM

## 2022-10-07 DIAGNOSIS — J44.9 CHRONIC OBSTRUCTIVE PULMONARY DISEASE, UNSPECIFIED: ICD-10-CM

## 2022-10-07 DIAGNOSIS — I48.91 UNSPECIFIED ATRIAL FIBRILLATION: ICD-10-CM

## 2022-10-07 DIAGNOSIS — I10 ESSENTIAL (PRIMARY) HYPERTENSION: ICD-10-CM

## 2022-10-07 LAB
ALBUMIN SERPL ELPH-MCNC: 2.3 G/DL — LOW (ref 3.3–5)
ALP SERPL-CCNC: 43 U/L — SIGNIFICANT CHANGE UP (ref 40–120)
ALT FLD-CCNC: 30 U/L — SIGNIFICANT CHANGE UP (ref 12–78)
ANION GAP SERPL CALC-SCNC: 5 MMOL/L — SIGNIFICANT CHANGE UP (ref 5–17)
ANION GAP SERPL CALC-SCNC: 7 MMOL/L — SIGNIFICANT CHANGE UP (ref 5–17)
AST SERPL-CCNC: 19 U/L — SIGNIFICANT CHANGE UP (ref 15–37)
BILIRUB SERPL-MCNC: 0.6 MG/DL — SIGNIFICANT CHANGE UP (ref 0.2–1.2)
BUN SERPL-MCNC: 26 MG/DL — HIGH (ref 7–23)
BUN SERPL-MCNC: 28 MG/DL — HIGH (ref 7–23)
CALCIUM SERPL-MCNC: 8 MG/DL — LOW (ref 8.5–10.1)
CALCIUM SERPL-MCNC: 8.1 MG/DL — LOW (ref 8.5–10.1)
CHLORIDE SERPL-SCNC: 105 MMOL/L — SIGNIFICANT CHANGE UP (ref 96–108)
CHLORIDE SERPL-SCNC: 106 MMOL/L — SIGNIFICANT CHANGE UP (ref 96–108)
CO2 SERPL-SCNC: 28 MMOL/L — SIGNIFICANT CHANGE UP (ref 22–31)
CO2 SERPL-SCNC: 29 MMOL/L — SIGNIFICANT CHANGE UP (ref 22–31)
CREAT SERPL-MCNC: 2.36 MG/DL — HIGH (ref 0.5–1.3)
CREAT SERPL-MCNC: 2.62 MG/DL — HIGH (ref 0.5–1.3)
EGFR: 25 ML/MIN/1.73M2 — LOW
EGFR: 28 ML/MIN/1.73M2 — LOW
GLUCOSE SERPL-MCNC: 172 MG/DL — HIGH (ref 70–99)
GLUCOSE SERPL-MCNC: 215 MG/DL — HIGH (ref 70–99)
HCT VFR BLD CALC: 35.6 % — LOW (ref 39–50)
HGB BLD-MCNC: 11 G/DL — LOW (ref 13–17)
MAGNESIUM SERPL-MCNC: 1.6 MG/DL — SIGNIFICANT CHANGE UP (ref 1.6–2.6)
MCHC RBC-ENTMCNC: 27.3 PG — SIGNIFICANT CHANGE UP (ref 27–34)
MCHC RBC-ENTMCNC: 30.9 GM/DL — LOW (ref 32–36)
MCV RBC AUTO: 88.3 FL — SIGNIFICANT CHANGE UP (ref 80–100)
PHOSPHATE SERPL-MCNC: 5.9 MG/DL — HIGH (ref 2.5–4.5)
PLATELET # BLD AUTO: 159 K/UL — SIGNIFICANT CHANGE UP (ref 150–400)
POTASSIUM SERPL-MCNC: 5.3 MMOL/L — SIGNIFICANT CHANGE UP (ref 3.5–5.3)
POTASSIUM SERPL-MCNC: 5.8 MMOL/L — HIGH (ref 3.5–5.3)
POTASSIUM SERPL-SCNC: 5.3 MMOL/L — SIGNIFICANT CHANGE UP (ref 3.5–5.3)
POTASSIUM SERPL-SCNC: 5.8 MMOL/L — HIGH (ref 3.5–5.3)
PROT SERPL-MCNC: 5 GM/DL — LOW (ref 6–8.3)
RBC # BLD: 4.03 M/UL — LOW (ref 4.2–5.8)
RBC # FLD: 19.4 % — HIGH (ref 10.3–14.5)
SODIUM SERPL-SCNC: 140 MMOL/L — SIGNIFICANT CHANGE UP (ref 135–145)
SODIUM SERPL-SCNC: 140 MMOL/L — SIGNIFICANT CHANGE UP (ref 135–145)
WBC # BLD: 11.6 K/UL — HIGH (ref 3.8–10.5)
WBC # FLD AUTO: 11.6 K/UL — HIGH (ref 3.8–10.5)

## 2022-10-07 PROCEDURE — 71045 X-RAY EXAM CHEST 1 VIEW: CPT | Mod: 26

## 2022-10-07 PROCEDURE — 99291 CRITICAL CARE FIRST HOUR: CPT

## 2022-10-07 RX ORDER — PIPERACILLIN AND TAZOBACTAM 4; .5 G/20ML; G/20ML
3.38 INJECTION, POWDER, LYOPHILIZED, FOR SOLUTION INTRAVENOUS ONCE
Refills: 0 | Status: COMPLETED | OUTPATIENT
Start: 2022-10-07 | End: 2022-10-07

## 2022-10-07 RX ORDER — SODIUM CHLORIDE 9 MG/ML
1000 INJECTION INTRAMUSCULAR; INTRAVENOUS; SUBCUTANEOUS
Refills: 0 | Status: DISCONTINUED | OUTPATIENT
Start: 2022-10-07 | End: 2022-10-09

## 2022-10-07 RX ORDER — SODIUM CHLORIDE 9 MG/ML
500 INJECTION INTRAMUSCULAR; INTRAVENOUS; SUBCUTANEOUS ONCE
Refills: 0 | Status: COMPLETED | OUTPATIENT
Start: 2022-10-07 | End: 2022-10-07

## 2022-10-07 RX ORDER — PIPERACILLIN AND TAZOBACTAM 4; .5 G/20ML; G/20ML
3.38 INJECTION, POWDER, LYOPHILIZED, FOR SOLUTION INTRAVENOUS EVERY 8 HOURS
Refills: 0 | Status: DISCONTINUED | OUTPATIENT
Start: 2022-10-08 | End: 2022-10-14

## 2022-10-07 RX ORDER — SODIUM CHLORIDE 5 G/100ML
100 INJECTION, SOLUTION INTRAVENOUS
Refills: 0 | Status: COMPLETED | OUTPATIENT
Start: 2022-10-07 | End: 2022-10-07

## 2022-10-07 RX ORDER — ACETAMINOPHEN 500 MG
1000 TABLET ORAL ONCE
Refills: 0 | Status: COMPLETED | OUTPATIENT
Start: 2022-10-07 | End: 2022-10-07

## 2022-10-07 RX ORDER — SODIUM CHLORIDE 9 MG/ML
1000 INJECTION, SOLUTION INTRAVENOUS
Refills: 0 | Status: DISCONTINUED | OUTPATIENT
Start: 2022-10-07 | End: 2022-10-07

## 2022-10-07 RX ORDER — CHLORHEXIDINE GLUCONATE 213 G/1000ML
1 SOLUTION TOPICAL
Refills: 0 | Status: DISCONTINUED | OUTPATIENT
Start: 2022-10-07 | End: 2022-11-02

## 2022-10-07 RX ORDER — PIPERACILLIN AND TAZOBACTAM 4; .5 G/20ML; G/20ML
3.38 INJECTION, POWDER, LYOPHILIZED, FOR SOLUTION INTRAVENOUS ONCE
Refills: 0 | Status: COMPLETED | OUTPATIENT
Start: 2022-10-08 | End: 2022-10-07

## 2022-10-07 RX ADMIN — SODIUM CHLORIDE 3 MILLILITER(S): 9 INJECTION INTRAMUSCULAR; INTRAVENOUS; SUBCUTANEOUS at 06:00

## 2022-10-07 RX ADMIN — CHLORHEXIDINE GLUCONATE 1 APPLICATION(S): 213 SOLUTION TOPICAL at 10:48

## 2022-10-07 RX ADMIN — SODIUM CHLORIDE 999 MILLILITER(S): 9 INJECTION, SOLUTION INTRAVENOUS at 03:07

## 2022-10-07 RX ADMIN — Medication 2: at 12:04

## 2022-10-07 RX ADMIN — ATORVASTATIN CALCIUM 20 MILLIGRAM(S): 80 TABLET, FILM COATED ORAL at 21:28

## 2022-10-07 RX ADMIN — HEPARIN SODIUM 5000 UNIT(S): 5000 INJECTION INTRAVENOUS; SUBCUTANEOUS at 14:57

## 2022-10-07 RX ADMIN — PIPERACILLIN AND TAZOBACTAM 25 GRAM(S): 4; .5 INJECTION, POWDER, LYOPHILIZED, FOR SOLUTION INTRAVENOUS at 23:21

## 2022-10-07 RX ADMIN — SODIUM CHLORIDE 100 MILLILITER(S): 9 INJECTION INTRAMUSCULAR; INTRAVENOUS; SUBCUTANEOUS at 09:42

## 2022-10-07 RX ADMIN — HEPARIN SODIUM 5000 UNIT(S): 5000 INJECTION INTRAVENOUS; SUBCUTANEOUS at 21:29

## 2022-10-07 RX ADMIN — PIPERACILLIN AND TAZOBACTAM 25 GRAM(S): 4; .5 INJECTION, POWDER, LYOPHILIZED, FOR SOLUTION INTRAVENOUS at 17:05

## 2022-10-07 RX ADMIN — PHENYLEPHRINE HYDROCHLORIDE 4.73 MICROGRAM(S)/KG/MIN: 10 INJECTION INTRAVENOUS at 14:30

## 2022-10-07 RX ADMIN — TIOTROPIUM BROMIDE 1 CAPSULE(S): 18 CAPSULE ORAL; RESPIRATORY (INHALATION) at 11:20

## 2022-10-07 RX ADMIN — Medication 100 GRAM(S): at 07:04

## 2022-10-07 RX ADMIN — Medication 2: at 17:23

## 2022-10-07 RX ADMIN — SODIUM CHLORIDE 100 MILLILITER(S): 9 INJECTION INTRAMUSCULAR; INTRAVENOUS; SUBCUTANEOUS at 15:56

## 2022-10-07 RX ADMIN — HEPARIN SODIUM 5000 UNIT(S): 5000 INJECTION INTRAVENOUS; SUBCUTANEOUS at 06:08

## 2022-10-07 RX ADMIN — SODIUM CHLORIDE 3 MILLILITER(S): 9 INJECTION INTRAMUSCULAR; INTRAVENOUS; SUBCUTANEOUS at 21:30

## 2022-10-07 RX ADMIN — Medication 100 MILLIGRAM(S): at 06:08

## 2022-10-07 RX ADMIN — SODIUM CHLORIDE 75 MILLILITER(S): 9 INJECTION, SOLUTION INTRAVENOUS at 07:04

## 2022-10-07 RX ADMIN — SODIUM CHLORIDE 3 MILLILITER(S): 9 INJECTION INTRAMUSCULAR; INTRAVENOUS; SUBCUTANEOUS at 15:08

## 2022-10-07 RX ADMIN — PHENYLEPHRINE HYDROCHLORIDE 4.73 MICROGRAM(S)/KG/MIN: 10 INJECTION INTRAVENOUS at 21:29

## 2022-10-07 RX ADMIN — SODIUM CHLORIDE 1000 MILLILITER(S): 9 INJECTION INTRAMUSCULAR; INTRAVENOUS; SUBCUTANEOUS at 15:20

## 2022-10-07 RX ADMIN — Medication 100 MILLIGRAM(S): at 21:28

## 2022-10-07 RX ADMIN — Medication 4: at 06:07

## 2022-10-07 RX ADMIN — Medication 100 MILLIGRAM(S): at 14:57

## 2022-10-07 RX ADMIN — SODIUM CHLORIDE 999 MILLILITER(S): 5 INJECTION, SOLUTION INTRAVENOUS at 01:00

## 2022-10-07 RX ADMIN — PIPERACILLIN AND TAZOBACTAM 200 GRAM(S): 4; .5 INJECTION, POWDER, LYOPHILIZED, FOR SOLUTION INTRAVENOUS at 14:44

## 2022-10-07 NOTE — PHYSICAL THERAPY INITIAL EVALUATION ADULT - PERTINENT HX OF CURRENT PROBLEM, REHAB EVAL
pt w/ ulcerative colitis and colon cancer.  POD#1  Open restorative proctocolectomy with IPAA, diverting loop ileostomy, rectal mass noted.    There was fecal spillage peritonitis. long case -5 hrs. Post op;  1 Hypoxemia due to LLL atelectasis. Improved with NIPPPV. now on NRB  2. Septic shock - titrating phenylephrine to a MAP of 65, additional crystalloid given, now off pressors  3. Oliguric JOSE

## 2022-10-07 NOTE — DIETITIAN INITIAL EVALUATION ADULT - OTHER INFO
76yo male with PMH significant for AF, HTN, UC, Colon CA, COPD, obesity; admitted for proctocolectomy with J-pouch creation, creation of diverting loop ileostomy, insertion of biologic implant on 10/6/22.    *pt reports normal wt of 177#.  Current wt per bedscale on 10/7/22 of 253#.  likely inaccurate reported prior wt.  NFPE revealed no muscle/fat wasting.  IBW: 172#.

## 2022-10-07 NOTE — PROGRESS NOTE ADULT - SUBJECTIVE AND OBJECTIVE BOX
HPI: Patient is a 77 male S/P RCP-IPAA (restorative proctocolectomy with ileal pouch anal anastomosis), diverting loop ileostomy.  Patient seen in the ICU.  Patient has been on 100% NRB today for a L sided Pneumonia.  He also had to be resumed on pressors.  Patient remains in JOSE and oliguric.                PAST MEDICAL & SURGICAL HISTORY:  Ulcerative colitis      Colon cancer      Obstructive sleep apnea      Obesity      HTN (hypertension)      Atrial fibrillation      HLD (hyperlipidemia)      BPH (benign prostatic hyperplasia)      COPD (chronic obstructive pulmonary disease)      COVID-19 virus infection      History of pneumonia      H/O hernia repair      History of hydrocelectomy      H/O colonoscopy      H/O cystoscopy  urethral stricture      History of total knee replacement, bilateral      S/P placement of cardiac pacemaker      History of cataract surgery          FAMILY HISTORY:  Family history of cardiomyopathy (Father)        Social Hx:    Allergies    ACE inhibitors (Swelling)    Intolerances            ICU Vital Signs Last 24 Hrs  T(C): 37.3 (07 Oct 2022 14:00), Max: 37.3 (07 Oct 2022 14:00)  T(F): 99.1 (07 Oct 2022 14:00), Max: 99.1 (07 Oct 2022 14:00)  HR: 99 (07 Oct 2022 15:30) (76 - 117)  BP: 124/88 (07 Oct 2022 15:30) (52/36 - 129/98)  BP(mean): 96 (07 Oct 2022 15:30) (39 - 105)  ABP: --  ABP(mean): --  RR: 15 (07 Oct 2022 15:30) (9 - 25)  SpO2: 100% (07 Oct 2022 15:30) (90% - 100%)    O2 Parameters below as of 07 Oct 2022 14:30  Patient On (Oxygen Delivery Method): mask, nonrebreather    O2 Concentration (%): 100            I&O's Summary    06 Oct 2022 07:01  -  07 Oct 2022 07:00  --------------------------------------------------------  IN: 6647 mL / OUT: 595 mL / NET: 6052 mL    07 Oct 2022 07:01  -  07 Oct 2022 15:46  --------------------------------------------------------  IN: 0 mL / OUT: 160 mL / NET: -160 mL                              11.0   11.60 )-----------( 159      ( 07 Oct 2022 05:53 )             35.6       10-07    140  |  106  |  28<H>  ----------------------------<  172<H>  5.3   |  29  |  2.62<H>    Ca    8.0<L>      07 Oct 2022 11:46  Phos  5.9     10-07  Mg     1.6     10-07    TPro  5.0<L>  /  Alb  2.3<L>  /  TBili  0.6  /  DBili  x   /  AST  19  /  ALT  30  /  AlkPhos  43  10-07                    MEDICATIONS  (STANDING):  atorvastatin 20 milliGRAM(s) Oral at bedtime  chlorhexidine 4% Liquid 1 Application(s) Topical <User Schedule>  dextrose 50% Injectable 25 Gram(s) IV Push once  dextrose 50% Injectable 12.5 Gram(s) IV Push once  dextrose 50% Injectable 25 Gram(s) IV Push once  dextrose Oral Gel 15 Gram(s) Oral once  glucagon  Injectable 1 milliGRAM(s) IntraMuscular once  heparin   Injectable 5000 Unit(s) SubCutaneous every 8 hours  hydrocortisone sodium succinate Injectable 100 milliGRAM(s) IV Push every 8 hours  HYDROmorphone PCA (1 mG/mL) 30 milliLiter(s) PCA Continuous PCA Continuous  influenza  Vaccine (HIGH DOSE) 0.7 milliLiter(s) IntraMuscular once  insulin lispro (ADMELOG) corrective regimen sliding scale   SubCutaneous every 6 hours  phenylephrine    Infusion 0.1 MICROgram(s)/kG/Min (4.73 mL/Hr) IV Continuous <Continuous>  piperacillin/tazobactam IVPB.- 3.375 Gram(s) IV Intermittent once  sodium chloride 0.9% Bolus 500 milliLiter(s) IV Bolus once  sodium chloride 0.9% lock flush 3 milliLiter(s) IV Push every 8 hours  sodium chloride 0.9%. 1000 milliLiter(s) (100 mL/Hr) IV Continuous <Continuous>  tiotropium 18 MICROgram(s) Capsule 1 Capsule(s) Inhalation daily    MEDICATIONS  (PRN):  ALBUTerol    90 MICROgram(s) HFA Inhaler 2 Puff(s) Inhalation every 6 hours PRN Shortness of Breath and/or Wheezing  HYDROmorphone PCA (1 mG/mL) Rescue Clinician Bolus 0.5 milliGRAM(s) IV Push every 15 minutes PRN for Pain Scale GREATER THAN 6  naloxone Injectable 0.1 milliGRAM(s) IV Push every 3 minutes PRN For ANY of the following changes in patient status:  A. RR LESS THAN 10 breaths per minute, B. Oxygen saturation LESS THAN 90%, C. Sedation score of 6  ondansetron Injectable 4 milliGRAM(s) IV Push every 6 hours PRN Nausea and/or Vomiting      DVT Prophylaxis: [facility]    Advanced Directives:  Discussed with:    Visit Information: 30 min    ** Time is exclusive of billed procedures and/or teaching and/or routine family updates.

## 2022-10-07 NOTE — SBIRT NOTE ADULT - NSSBIRTUNABLESCROTHER_GEN_A_CORE
SBIRT score=3; pt well under threshold of guidelines for alcohol consumption; no problematic nor near problematic use noted.

## 2022-10-07 NOTE — DIETITIAN INITIAL EVALUATION ADULT - PERTINENT LABORATORY DATA
10-07    140  |  106  |  28<H>  ----------------------------<  172<H>  5.3   |  29  |  2.62<H>    Ca    8.0<L>      07 Oct 2022 11:46  Phos  5.9     10-07  Mg     1.6     10-07    TPro  5.0<L>  /  Alb  2.3<L>  /  TBili  0.6  /  DBili  x   /  AST  19  /  ALT  30  /  AlkPhos  43  10-07  POCT Blood Glucose.: 178 mg/dL (10-07-22 @ 11:45)  A1C with Estimated Average Glucose Result: 6.9 % (09-29-22 @ 14:21)

## 2022-10-07 NOTE — PHYSICAL THERAPY INITIAL EVALUATION ADULT - DIAGNOSIS, PT EVAL
s/p restorative proctocolectomy with IPAA, diverting loop ileostomy, septic shock, peritonitis, rectosigmoid mass

## 2022-10-07 NOTE — PHYSICAL THERAPY INITIAL EVALUATION ADULT - GENERAL OBSERVATIONS, REHAB EVAL
pt rec'd 3/4 lying in bed in ICU, SCDs, angelina, VINNY x 2, pervena, IV, PCA, NRB mask. pt cooperative, c/o feeling "lousy", using PCA, little confused

## 2022-10-07 NOTE — PROGRESS NOTE ADULT - SUBJECTIVE AND OBJECTIVE BOX
Patient seen at the bedside, resting comfortable. Patient denies new complains. Not passing gas, pain well controlled, off pressors, currently on non rebreather mask  No acute events overnight.  Patient denies nausea, vomiting, fever or chills.     Vitals:  T(C): 36.9 (10-07 @ 00:00), Max: 37 (10-06 @ 20:00)  HR: 90 (10-07 @ 05:35) (52 - 112)  BP: 97/61 (10-07 @ 04:00) (62/30 - 129/98)  RR: 16 (10-07 @ 04:00) (9 - 25)  SpO2: 93% (10-07 @ 05:35) (91% - 100%)    10-06 @ 07:01  -  10-07 @ 07:00  --------------------------------------------------------  IN:    Lactated Ringers: 279 mL    Lactated Ringers: 1000 mL    Lactated Ringers Bolus: 1000 mL    Other (mL): 3200 mL    Phenylephrine: 43 mL    sodium chloride 3%: 100 mL  Total IN: 5622 mL    OUT:    Bulb (mL): 0 mL    Bulb (mL): 205 mL    Indwelling Catheter - Urethral (mL): 200 mL    Other (mL): 100 mL  Total OUT: 505 mL    Total NET: 5117 mL    Physical Exam:  General: AAOx3, morbidly obese male, NAD, on non rebreather  Chest: Normal respiratory effort  Heart: RRR  Abdomen: Soft, ND, appropriately tender, midline prevena in place, ostomy patent with liquid content, but no stool, no masses  Neuro/Psych: No localized deficits. Normal speech, normal tone  Skin: Normal, no rashes, no lesions noted.   Extremities: Warm, well perfused, no edema, Pulses intact    10-07 @ 05:53                    11.0  CBC: 11.60>)-------(<159                     35.6                 140 | 105 | 26    CMP:  ----------------------< 215               5.8 | 28 | 2.36                      Ca:8.1  Phos:5.9  M.6               0.6|      |19        LFTs:  ------|43|-----             -|      |-  10-06 @ 16:10                    12.6  CBC: 10.30>)-------(<186                     40.5                 138 | 105 | 19    CMP:  ----------------------< 231               4.7 | 25 | 1.49                      Ca:8.4  Phos:-  Mg:-               -|      |-        LFTs:  ------|-|-----             -|      |-      Current Inpatient Medications:  ALBUTerol    90 MICROgram(s) HFA Inhaler 2 Puff(s) Inhalation every 6 hours PRN  atorvastatin 20 milliGRAM(s) Oral at bedtime  cefoTEtan  IVPB 2 Gram(s) IV Intermittent every 12 hours  cefoTEtan  IVPB      cefoTEtan  IVPB 1 Gram(s) IV Intermittent every 12 hours  chlorhexidine 4% Liquid 1 Application(s) Topical <User Schedule>  dextrose 50% Injectable 25 Gram(s) IV Push once  dextrose 50% Injectable 12.5 Gram(s) IV Push once  dextrose 50% Injectable 25 Gram(s) IV Push once  dextrose Oral Gel 15 Gram(s) Oral once  glucagon  Injectable 1 milliGRAM(s) IntraMuscular once  heparin   Injectable 5000 Unit(s) SubCutaneous every 8 hours  hydrocortisone sodium succinate Injectable 100 milliGRAM(s) IV Push every 8 hours  HYDROmorphone PCA (1 mG/mL) 30 milliLiter(s) PCA Continuous PCA Continuous  HYDROmorphone PCA (1 mG/mL) Rescue Clinician Bolus 0.5 milliGRAM(s) IV Push every 15 minutes PRN  influenza  Vaccine (HIGH DOSE) 0.7 milliLiter(s) IntraMuscular once  insulin lispro (ADMELOG) corrective regimen sliding scale   SubCutaneous every 6 hours  lactated ringers. 1000 milliLiter(s) (999 mL/Hr) IV Continuous <Continuous>  lactated ringers. 1000 milliLiter(s) (75 mL/Hr) IV Continuous <Continuous>  naloxone Injectable 0.1 milliGRAM(s) IV Push every 3 minutes PRN  ondansetron Injectable 4 milliGRAM(s) IV Push every 6 hours PRN  ondansetron Injectable 4 milliGRAM(s) IV Push every 6 hours PRN  phenylephrine    Infusion 0.1 MICROgram(s)/kG/Min (4.73 mL/Hr) IV Continuous <Continuous>  sodium chloride 0.9% lock flush 3 milliLiter(s) IV Push every 8 hours  tiotropium 18 MICROgram(s) Capsule 1 Capsule(s) Inhalation daily

## 2022-10-07 NOTE — PROGRESS NOTE ADULT - SUBJECTIVE AND OBJECTIVE BOX
77hx  a fib on apixaban  HTN HLD obesity FLORA COPD ex smoker  ulcerative colitis and colon cancer.    POD#1 restorative proctocolectomy with IPAA, diverting loop ileostomy rectal mass noted.      There was fecal spillage peritonitis.    Post op;  1 Hpoxemia due to LLL atelectasis   Improved with NIPPPV  2. Septic shock  titrating phenylephrine to a MAP of 65 additional crystalloid given   3. Oliguric JOSE due to ATN    He was given stress steroids as he has been on prednisone for the past few weeks.  Cefotetan which has good coverage for bowel content peritonitis.    BP improving with steroids              77hx  a fib on apixaban  HTN HLD obesity FLORA COPD ex smoker  ulcerative colitis and colon cancer.    POD#1  Open restorative proctocolectomy with IPAA, diverting loop ileostomy rectal mass noted.      There was fecal spillage peritonitis.    Long case 5 hrs  Only 3.2 L of crystalloid in the OR    Post op;  1 Hypoxemia due to LLL atelectasis   Improved with NIPPPV  2. Septic shock  titrating phenylephrine to a MAP of 65 additional crystalloid given   3. Oliguric JOSE due to ATN possible pre renal component.      He was given stress steroids as he has been on prednisone for the past few weeks.  Cefotetan which has good coverage for bowel content peritonitis.    BP improving with steroids   Will give additional fluid     CCT 32

## 2022-10-07 NOTE — PROGRESS NOTE ADULT - ATTENDING COMMENTS
Seen and examined.  Continues on NRB. Echo performed to eval EF and CXR performed to eval COPD.  UOP improving.  Ostomy with small amounts of succus.   Drains with serosang output.  AFVSS  NAD  prevena in place, ostomy healthy, soft, approp tender, moderate distention  Labs reviewed  A/P -   Keep NPO.  NRB per ICU.   Cont IV resuscitation, anticipate insensible losses from long surgery with open abdomen.  Keep alfaro in place.  Abx for stool spillage.

## 2022-10-07 NOTE — DIETITIAN INITIAL EVALUATION ADULT - ADD RECOMMEND
1) if unable to resume PO diet within 5 days, consider PN and re-consult RD for rec's 2) monitor length NPO, advancement/tolerance nutr source 3) vitamin D 25OH level and supplement prn 4) add MVI with minerals daily to ensure 100% RDI met 5) daily wt checks to track/trend changes

## 2022-10-07 NOTE — PHYSICAL THERAPY INITIAL EVALUATION ADULT - ACTIVE RANGE OF MOTION EXAMINATION, REHAB EVAL
except L shld flex at least 90/bilateral upper extremity Active ROM was WFL (within functional limits)/bilateral  lower extremity Active ROM was WFL (within functional limits)

## 2022-10-07 NOTE — PROGRESS NOTE ADULT - ASSESSMENT
77hx  a fib on apixaban  HTN HLD obesity FLORA COPD ex smoker  ulcerative colitis and colon cancer.  POD#1  Open restorative proctocolectomy with IPAA, diverting loop ileostomy rectal mass noted.    There was fecal spillage peritonitis.    Long case 5 hrs  Only 3.2 L of crystalloid in the OR    Post op;  1 Hypoxemia due to LLL atelectasis   Improved with NIPPPV  2. Septic shock  titrating phenylephrine to a MAP of 65 additional crystalloid given, currently off pressors  3. Oliguric JOSE due to ATN possible pre renal component.      Plan:    Pain control  Monitor bowel function   Serial abdominal exams  NPO with gum  Medical management by ICU team    Case discussed with Colorectal team

## 2022-10-07 NOTE — CONSULT NOTE ADULT - ASSESSMENT
10/7/22:  Pt with above history including AF, ASHD and PPM with HTN and hyperlipidemia.  He tolerated his colon resection so far with some SOB.  His pre-op evaluation showed a prior inferior/inferolateral MT with LVEF on echo but no ischemia on nuclear stress test.  PPM appears to be functioning normally and no new cardiac testing indicated at this time.  With his borderline LV and RV dysfunction, would be careful with fluid overload.  COPD also an issue but he appears stable from a cardiac standpoint so far.  Continue supportive, post-op care and ultimately follow up with his outpt cardiologist as needed.  Dr Celine Barrett will be covering me over the weekend if needed.  Will otherwise follow intermittently prn.

## 2022-10-07 NOTE — DIETITIAN INITIAL EVALUATION ADULT - PERTINENT MEDS FT
MEDICATIONS  (STANDING):  atorvastatin 20 milliGRAM(s) Oral at bedtime  cefoTEtan  IVPB      cefoTEtan  IVPB 1 Gram(s) IV Intermittent every 12 hours  chlorhexidine 4% Liquid 1 Application(s) Topical <User Schedule>  dextrose 50% Injectable 25 Gram(s) IV Push once  dextrose 50% Injectable 12.5 Gram(s) IV Push once  dextrose 50% Injectable 25 Gram(s) IV Push once  dextrose Oral Gel 15 Gram(s) Oral once  glucagon  Injectable 1 milliGRAM(s) IntraMuscular once  heparin   Injectable 5000 Unit(s) SubCutaneous every 8 hours  hydrocortisone sodium succinate Injectable 100 milliGRAM(s) IV Push every 8 hours  HYDROmorphone PCA (1 mG/mL) 30 milliLiter(s) PCA Continuous PCA Continuous  influenza  Vaccine (HIGH DOSE) 0.7 milliLiter(s) IntraMuscular once  insulin lispro (ADMELOG) corrective regimen sliding scale   SubCutaneous every 6 hours  phenylephrine    Infusion 0.1 MICROgram(s)/kG/Min (4.73 mL/Hr) IV Continuous <Continuous>  sodium chloride 0.9% lock flush 3 milliLiter(s) IV Push every 8 hours  sodium chloride 0.9%. 1000 milliLiter(s) (100 mL/Hr) IV Continuous <Continuous>  tiotropium 18 MICROgram(s) Capsule 1 Capsule(s) Inhalation daily    MEDICATIONS  (PRN):  ALBUTerol    90 MICROgram(s) HFA Inhaler 2 Puff(s) Inhalation every 6 hours PRN Shortness of Breath and/or Wheezing  HYDROmorphone PCA (1 mG/mL) Rescue Clinician Bolus 0.5 milliGRAM(s) IV Push every 15 minutes PRN for Pain Scale GREATER THAN 6  naloxone Injectable 0.1 milliGRAM(s) IV Push every 3 minutes PRN For ANY of the following changes in patient status:  A. RR LESS THAN 10 breaths per minute, B. Oxygen saturation LESS THAN 90%, C. Sedation score of 6  ondansetron Injectable 4 milliGRAM(s) IV Push every 6 hours PRN Nausea and/or Vomiting

## 2022-10-07 NOTE — PHYSICAL THERAPY INITIAL EVALUATION ADULT - PRECAUTIONS/LIMITATIONS, REHAB EVAL
amb as sujatha order on hold-confirmed w/ nsg hold oob today/fall precautions/oxygen therapy device and L/min/surgical precautions

## 2022-10-07 NOTE — PROGRESS NOTE ADULT - ASSESSMENT
A/P: 77 male S/P RCP-IPAA (restorative proctocolectomy with ileal pouch anal anastomosis), diverting loop ileostomy with Acute Hypoxic Respiratory Failure from a L sided Pneumonia and JOSE likely from ATN  FLORA  Obesity  AFIB  COPD    Plan:  ICU    PULM: L sided pneumonia.  Changed antibiotics to Zosyn to cover aspiration and that will cover intraabdominal spillage  Wean Fio2.  NIV QHD for FLORA.  Right side down for better oxygenation    Cardio: Hypotensive from septic shock.  Wean pressors.  Hold AC at this time.  D/W Surg.  Will reassess on 10/8    Renal: In JOSE likely from ATN.  Continue IVF, Bolus now.  Hyperkalemia improved.  if not improving with hydration will call Renal    GI: NPO    DVT prophylaxis with SQH    D/W Patient and Surgery

## 2022-10-08 LAB
ANION GAP SERPL CALC-SCNC: 4 MMOL/L — LOW (ref 5–17)
APTT BLD: 27.4 SEC — LOW (ref 27.5–35.5)
BUN SERPL-MCNC: 29 MG/DL — HIGH (ref 7–23)
CALCIUM SERPL-MCNC: 8.8 MG/DL — SIGNIFICANT CHANGE UP (ref 8.5–10.1)
CHLORIDE SERPL-SCNC: 108 MMOL/L — SIGNIFICANT CHANGE UP (ref 96–108)
CO2 SERPL-SCNC: 31 MMOL/L — SIGNIFICANT CHANGE UP (ref 22–31)
CREAT SERPL-MCNC: 1.84 MG/DL — HIGH (ref 0.5–1.3)
EGFR: 38 ML/MIN/1.73M2 — LOW
GLUCOSE SERPL-MCNC: 147 MG/DL — HIGH (ref 70–99)
HCT VFR BLD CALC: 30.9 % — LOW (ref 39–50)
HGB BLD-MCNC: 9.6 G/DL — LOW (ref 13–17)
INR BLD: 1.29 RATIO — HIGH (ref 0.88–1.16)
MAGNESIUM SERPL-MCNC: 2.3 MG/DL — SIGNIFICANT CHANGE UP (ref 1.6–2.6)
MCHC RBC-ENTMCNC: 27.7 PG — SIGNIFICANT CHANGE UP (ref 27–34)
MCHC RBC-ENTMCNC: 31.1 GM/DL — LOW (ref 32–36)
MCV RBC AUTO: 89 FL — SIGNIFICANT CHANGE UP (ref 80–100)
PHOSPHATE SERPL-MCNC: 3.5 MG/DL — SIGNIFICANT CHANGE UP (ref 2.5–4.5)
PLATELET # BLD AUTO: 158 K/UL — SIGNIFICANT CHANGE UP (ref 150–400)
POTASSIUM SERPL-MCNC: 4.9 MMOL/L — SIGNIFICANT CHANGE UP (ref 3.5–5.3)
POTASSIUM SERPL-SCNC: 4.9 MMOL/L — SIGNIFICANT CHANGE UP (ref 3.5–5.3)
PROTHROM AB SERPL-ACNC: 15 SEC — HIGH (ref 10.5–13.4)
RBC # BLD: 3.47 M/UL — LOW (ref 4.2–5.8)
RBC # FLD: 19.7 % — HIGH (ref 10.3–14.5)
SODIUM SERPL-SCNC: 143 MMOL/L — SIGNIFICANT CHANGE UP (ref 135–145)
WBC # BLD: 11.09 K/UL — HIGH (ref 3.8–10.5)
WBC # FLD AUTO: 11.09 K/UL — HIGH (ref 3.8–10.5)

## 2022-10-08 PROCEDURE — 99291 CRITICAL CARE FIRST HOUR: CPT

## 2022-10-08 RX ORDER — ACETAMINOPHEN 500 MG
1000 TABLET ORAL ONCE
Refills: 0 | Status: COMPLETED | OUTPATIENT
Start: 2022-10-08 | End: 2022-10-08

## 2022-10-08 RX ORDER — MORPHINE SULFATE 50 MG/1
2 CAPSULE, EXTENDED RELEASE ORAL EVERY 4 HOURS
Refills: 0 | Status: DISCONTINUED | OUTPATIENT
Start: 2022-10-08 | End: 2022-10-15

## 2022-10-08 RX ORDER — HYDROMORPHONE HYDROCHLORIDE 2 MG/ML
0.5 INJECTION INTRAMUSCULAR; INTRAVENOUS; SUBCUTANEOUS EVERY 6 HOURS
Refills: 0 | Status: DISCONTINUED | OUTPATIENT
Start: 2022-10-08 | End: 2022-10-13

## 2022-10-08 RX ORDER — ACETAMINOPHEN 500 MG
1000 TABLET ORAL ONCE
Refills: 0 | Status: DISCONTINUED | OUTPATIENT
Start: 2022-10-08 | End: 2022-11-02

## 2022-10-08 RX ORDER — HEPARIN SODIUM 5000 [USP'U]/ML
INJECTION INTRAVENOUS; SUBCUTANEOUS
Qty: 25000 | Refills: 0 | Status: DISCONTINUED | OUTPATIENT
Start: 2022-10-08 | End: 2022-10-09

## 2022-10-08 RX ADMIN — TIOTROPIUM BROMIDE 1 CAPSULE(S): 18 CAPSULE ORAL; RESPIRATORY (INHALATION) at 10:34

## 2022-10-08 RX ADMIN — HYDROMORPHONE HYDROCHLORIDE 0.5 MILLIGRAM(S): 2 INJECTION INTRAMUSCULAR; INTRAVENOUS; SUBCUTANEOUS at 16:00

## 2022-10-08 RX ADMIN — HEPARIN SODIUM 5000 UNIT(S): 5000 INJECTION INTRAVENOUS; SUBCUTANEOUS at 13:08

## 2022-10-08 RX ADMIN — PIPERACILLIN AND TAZOBACTAM 25 GRAM(S): 4; .5 INJECTION, POWDER, LYOPHILIZED, FOR SOLUTION INTRAVENOUS at 21:33

## 2022-10-08 RX ADMIN — SODIUM CHLORIDE 3 MILLILITER(S): 9 INJECTION INTRAMUSCULAR; INTRAVENOUS; SUBCUTANEOUS at 13:12

## 2022-10-08 RX ADMIN — HYDROMORPHONE HYDROCHLORIDE 0.5 MILLIGRAM(S): 2 INJECTION INTRAMUSCULAR; INTRAVENOUS; SUBCUTANEOUS at 15:43

## 2022-10-08 RX ADMIN — Medication 1000 MILLIGRAM(S): at 00:45

## 2022-10-08 RX ADMIN — Medication 2: at 06:44

## 2022-10-08 RX ADMIN — MORPHINE SULFATE 2 MILLIGRAM(S): 50 CAPSULE, EXTENDED RELEASE ORAL at 12:54

## 2022-10-08 RX ADMIN — HEPARIN SODIUM 2300 UNIT(S)/HR: 5000 INJECTION INTRAVENOUS; SUBCUTANEOUS at 17:24

## 2022-10-08 RX ADMIN — Medication 4: at 17:28

## 2022-10-08 RX ADMIN — SODIUM CHLORIDE 100 MILLILITER(S): 9 INJECTION INTRAMUSCULAR; INTRAVENOUS; SUBCUTANEOUS at 15:44

## 2022-10-08 RX ADMIN — Medication 100 MILLIGRAM(S): at 13:09

## 2022-10-08 RX ADMIN — HEPARIN SODIUM 5000 UNIT(S): 5000 INJECTION INTRAVENOUS; SUBCUTANEOUS at 06:43

## 2022-10-08 RX ADMIN — SODIUM CHLORIDE 3 MILLILITER(S): 9 INJECTION INTRAMUSCULAR; INTRAVENOUS; SUBCUTANEOUS at 21:45

## 2022-10-08 RX ADMIN — Medication 2: at 12:10

## 2022-10-08 RX ADMIN — Medication 400 MILLIGRAM(S): at 00:14

## 2022-10-08 RX ADMIN — Medication 100 MILLIGRAM(S): at 21:33

## 2022-10-08 RX ADMIN — CHLORHEXIDINE GLUCONATE 1 APPLICATION(S): 213 SOLUTION TOPICAL at 06:44

## 2022-10-08 RX ADMIN — Medication 1000 MILLIGRAM(S): at 07:30

## 2022-10-08 RX ADMIN — MORPHINE SULFATE 2 MILLIGRAM(S): 50 CAPSULE, EXTENDED RELEASE ORAL at 13:53

## 2022-10-08 RX ADMIN — PIPERACILLIN AND TAZOBACTAM 25 GRAM(S): 4; .5 INJECTION, POWDER, LYOPHILIZED, FOR SOLUTION INTRAVENOUS at 06:43

## 2022-10-08 RX ADMIN — PIPERACILLIN AND TAZOBACTAM 25 GRAM(S): 4; .5 INJECTION, POWDER, LYOPHILIZED, FOR SOLUTION INTRAVENOUS at 13:08

## 2022-10-08 RX ADMIN — Medication 400 MILLIGRAM(S): at 07:10

## 2022-10-08 RX ADMIN — Medication 100 MILLIGRAM(S): at 06:43

## 2022-10-08 RX ADMIN — SODIUM CHLORIDE 3 MILLILITER(S): 9 INJECTION INTRAMUSCULAR; INTRAVENOUS; SUBCUTANEOUS at 07:51

## 2022-10-08 RX ADMIN — ATORVASTATIN CALCIUM 20 MILLIGRAM(S): 80 TABLET, FILM COATED ORAL at 20:24

## 2022-10-08 NOTE — PROGRESS NOTE ADULT - SUBJECTIVE AND OBJECTIVE BOX
Patient seen at the bedside, resting comfortable. Patient denies new complains. Not passing gas, pain well controlled, off pressors, currently on non rebreather mask  No acute events overnight.  Patient denies nausea, vomiting, fever or chills.     Physical Exam:  General: AAOx3, morbidly obese male, NAD, on non rebreather  Chest: Normal respiratory effort  Heart: RRR  Abdomen: Soft, ND, appropriately tender, midline prevena lost suction, removed, ostomy patent with liquid content, but no stool, no masses  Neuro/Psych: No localized deficits. Normal speech, normal tone  Skin: Normal, no rashes, no lesions noted.   Extremities: Warm, well perfused, no edema, Pulses intact      Vitals:  T(C): 37 (10-08 @ 04:00), Max: 37.3 (10-07 @ 14:00)  HR: 77 (10-08 @ 07:) (66 - 117)  BP: 125/69 (10-08 @ 07:00) (52/36 - 130/68)  RR: 25 (10-08 @ 07:00) (9 - 28)  SpO2: 96% (10-08 @ 07:00) (88% - 100%)    10-07 @ 07:01  -  10-08 @ 07:00  --------------------------------------------------------  IN:    IV PiggyBack: 300 mL    Phenylephrine: 387.8 mL    sodium chloride 0.9%: 3711 mL    Sodium Chloride 0.9% Bolus: 500 mL  Total IN: 4898.8 mL    OUT:    Bulb (mL): 32 mL    Bulb (mL): 22 mL    Ileostomy (mL): 400 mL    Indwelling Catheter - Urethral (mL): 1050 mL  Total OUT: 1504 mL    Total NET: 3394.8 mL        10-08 @ 06:03                    9.6  CBC: 11.09>)-------(<158                     30.9                 143 | 108 | 29    CMP:  ----------------------< 147               4.9 | 31 | 1.84                      Ca:8.8  Phos:3.5  M.3               -|      |-        LFTs:  ------|-|-----             -|      |-  10-07 @ 11:46                    -  CBC: ->)-------(<-                     -                 140 | 106 | 28    CMP:  ----------------------< 172               5.3 | 29 | 2.62                      Ca:8.0  Phos:-  Mg:-               -|      |-        LFTs:  ------|-|-----             -|      |-  10-07 @ 05:53                    11.0  CBC: 11.60>)-------(<159                     35.6                 140 | 105 | 26    CMP:  ----------------------< 215               5.8 | 28 | 2.36                      Ca:8.1  Phos:5.9  M.6               0.6|      |19        LFTs:  ------|43|-----             -|      |-      Culture - Blood (collected 10-06-22 @ 17:26)  Source: .Blood None  Preliminary Report (10-08-22 @ 01:02):    No growth to date.    Culture - Blood (collected 10-06-22 @ 17:25)  Source: .Blood None  Preliminary Report (10-08-22 @ 01:02):    No growth to date.      Current Inpatient Medications:  ALBUTerol    90 MICROgram(s) HFA Inhaler 2 Puff(s) Inhalation every 6 hours PRN  atorvastatin 20 milliGRAM(s) Oral at bedtime  chlorhexidine 4% Liquid 1 Application(s) Topical <User Schedule>  dextrose 50% Injectable 25 Gram(s) IV Push once  dextrose 50% Injectable 12.5 Gram(s) IV Push once  dextrose 50% Injectable 25 Gram(s) IV Push once  dextrose Oral Gel 15 Gram(s) Oral once  glucagon  Injectable 1 milliGRAM(s) IntraMuscular once  heparin   Injectable 5000 Unit(s) SubCutaneous every 8 hours  hydrocortisone sodium succinate Injectable 100 milliGRAM(s) IV Push every 8 hours  HYDROmorphone PCA (1 mG/mL) 30 milliLiter(s) PCA Continuous PCA Continuous  HYDROmorphone PCA (1 mG/mL) Rescue Clinician Bolus 0.5 milliGRAM(s) IV Push every 15 minutes PRN  influenza  Vaccine (HIGH DOSE) 0.7 milliLiter(s) IntraMuscular once  insulin lispro (ADMELOG) corrective regimen sliding scale   SubCutaneous every 6 hours  naloxone Injectable 0.1 milliGRAM(s) IV Push every 3 minutes PRN  ondansetron Injectable 4 milliGRAM(s) IV Push every 6 hours PRN  phenylephrine    Infusion 0.1 MICROgram(s)/kG/Min (4.73 mL/Hr) IV Continuous <Continuous>  piperacillin/tazobactam IVPB.. 3.375 Gram(s) IV Intermittent every 8 hours  sodium chloride 0.9% lock flush 3 milliLiter(s) IV Push every 8 hours  sodium chloride 0.9%. 1000 milliLiter(s) (100 mL/Hr) IV Continuous <Continuous>  tiotropium 18 MICROgram(s) Capsule 1 Capsule(s) Inhalation daily

## 2022-10-08 NOTE — PROGRESS NOTE ADULT - ATTENDING COMMENTS
Patient seen and examined. He is s/p proctocolectomy with IPAA and diverting loop ileostomy for colon cancer in the setting of ulcerative colitis. He is now weaned off pressors. Having ileostomy output but leaking so unclear accurate output. He feels hungry. No fevers or chills. Abdomen soft and protuberant. Expected melania incisional tenderness. Incision clean and intact with staples. Healthy ileostomy, bridge removed. Needs a convex wafer to minimize spillage. Continue antibiotics. Start liquid diet. Ambulate and IS. Plan for alfaro removal tomorrow.

## 2022-10-08 NOTE — PROGRESS NOTE ADULT - ASSESSMENT
77hx  a fib on apixaban  HTN HLD obesity FLORA COPD ex smoker  ulcerative colitis and colon cancer.  POD#2  Open restorative proctocolectomy with IPAA, diverting loop ileostomy rectal mass noted.    There was fecal spillage peritonitis.    Post op;  1 Hypoxemia due to LLL atelectasis   Improved with NIPPPV  2. Septic shock  titrating phenylephrine to a MAP of 65 additional crystalloid given, currently off pressors  3. Oliguric JOSE due to ATN possible pre renal component.      BP dropped top MAP of 55, phenylphrine r/s, MAP 67 in am. , UOP improved approx 50cc/h,m. BIPAP discontinued but has been on non rebreather sat 92%.   Clinically, mild abd tenderness, comfortable. Abd soft, mildly distended, mild tenderness to palpation. Prevena lost suction and ostomy bag leaked inside it, it was removed at 5 am and dry dressing applied. Ostomy is patent, small amount of gas with large amount of succus.  wbc 11.09 from 11.6, H/H 9.6/30.9 from 11/35.6.K 4.9, JOSE improving creat: 1.84 from 2.36.   JP1 2l, JP2 2cc serosang.   Currently on zosyn for L sided pneumonia and intraabd spillage.  Cardiology : c/w supportive care, follow up as outpt.       Plan:    Pain control  Monitor bowel function   Serial abdominal exams  NPO with gum  Medical management by ICU team    Case discussed with Colorectal team

## 2022-10-08 NOTE — PROGRESS NOTE ADULT - ASSESSMENT
A/P: 77 male S/P RCP-IPAA (restorative proctocolectomy with ileal pouch anal anastomosis), diverting loop ileostomy with Acute Hypoxic Respiratory Failure from a L sided Pneumonia and JOSE likely from ATN  FLORA  Obesity  AFIB  COPD    Plan:  ICU    PULM: L sided pneumonia.  Continue Zosyn to cover aspiration and that will cover intraabdominal spillage  Wean Fio2.  NIV QHS for FLORA.      Cardio: Hypotensive from septic shock.  Wean pressors.  Hold AC at this time.  D/W Surg.  Will reassess on 10/8    Renal: In JOSE likely from ATN is improving.  Continue IVF.  Hyperkalemia improved.    GI: NPO    DVT prophylaxis with SQH    D/W Patient and Surgery

## 2022-10-08 NOTE — PROGRESS NOTE ADULT - SUBJECTIVE AND OBJECTIVE BOX
HPI: Patient is a 77 male S/P RCP-IPAA (restorative proctocolectomy with ileal pouch anal anastomosis), diverting loop ileostomy.  Patient seen in the ICU.  Patient has been on 100% NRB today for a L sided Pneumonia.  He also had to be resumed on pressors.  Patient remains in JOSE and oliguric.      10/8: On VM O2, pressors over night         PAST MEDICAL & SURGICAL HISTORY:  Ulcerative colitis      Colon cancer      Obstructive sleep apnea      Obesity      HTN (hypertension)      Atrial fibrillation      HLD (hyperlipidemia)      BPH (benign prostatic hyperplasia)      COPD (chronic obstructive pulmonary disease)      COVID-19 virus infection      History of pneumonia      H/O hernia repair      History of hydrocelectomy      H/O colonoscopy      H/O cystoscopy  urethral stricture      History of total knee replacement, bilateral      S/P placement of cardiac pacemaker      History of cataract surgery          FAMILY HISTORY:  Family history of cardiomyopathy (Father)        Social Hx:    Allergies    ACE inhibitors (Swelling)    Intolerances            ICU Vital Signs Last 24 Hrs  T(C): 37 (08 Oct 2022 04:00), Max: 37.3 (07 Oct 2022 14:00)  T(F): 98.6 (08 Oct 2022 04:00), Max: 99.1 (07 Oct 2022 14:00)  HR: 66 (08 Oct 2022 10:00) (66 - 117)  BP: 95/56 (08 Oct 2022 10:00) (52/36 - 130/68)  BP(mean): 65 (08 Oct 2022 10:00) (39 - 101)  ABP: --  ABP(mean): --  RR: 28 (08 Oct 2022 10:00) (9 - 29)  SpO2: 94% (08 Oct 2022 10:00) (88% - 100%)    O2 Parameters below as of 08 Oct 2022 04:00  Patient On (Oxygen Delivery Method): BiPAP/CPAP    O2 Concentration (%): 60            I&O's Summary    07 Oct 2022 07:01  -  08 Oct 2022 07:00  --------------------------------------------------------  IN: 4898.8 mL / OUT: 1504 mL / NET: 3394.8 mL    08 Oct 2022 07:01  -  08 Oct 2022 13:21  --------------------------------------------------------  IN: 225 mL / OUT: 50 mL / NET: 175 mL                              9.6    11.09 )-----------( 158      ( 08 Oct 2022 06:03 )             30.9       10-08    143  |  108  |  29<H>  ----------------------------<  147<H>  4.9   |  31  |  1.84<H>    Ca    8.8      08 Oct 2022 06:03  Phos  3.5     10-08  Mg     2.3     10-08    TPro  5.0<L>  /  Alb  2.3<L>  /  TBili  0.6  /  DBili  x   /  AST  19  /  ALT  30  /  AlkPhos  43  10-07                    MEDICATIONS  (STANDING):  acetaminophen   IVPB .. 1000 milliGRAM(s) IV Intermittent once  atorvastatin 20 milliGRAM(s) Oral at bedtime  chlorhexidine 4% Liquid 1 Application(s) Topical <User Schedule>  dextrose 50% Injectable 25 Gram(s) IV Push once  dextrose 50% Injectable 12.5 Gram(s) IV Push once  dextrose 50% Injectable 25 Gram(s) IV Push once  dextrose Oral Gel 15 Gram(s) Oral once  glucagon  Injectable 1 milliGRAM(s) IntraMuscular once  heparin   Injectable 5000 Unit(s) SubCutaneous every 8 hours  hydrocortisone sodium succinate Injectable 100 milliGRAM(s) IV Push every 8 hours  influenza  Vaccine (HIGH DOSE) 0.7 milliLiter(s) IntraMuscular once  insulin lispro (ADMELOG) corrective regimen sliding scale   SubCutaneous every 6 hours  phenylephrine    Infusion 0.1 MICROgram(s)/kG/Min (4.73 mL/Hr) IV Continuous <Continuous>  piperacillin/tazobactam IVPB.. 3.375 Gram(s) IV Intermittent every 8 hours  sodium chloride 0.9% lock flush 3 milliLiter(s) IV Push every 8 hours  sodium chloride 0.9%. 1000 milliLiter(s) (100 mL/Hr) IV Continuous <Continuous>  tiotropium 18 MICROgram(s) Capsule 1 Capsule(s) Inhalation daily    MEDICATIONS  (PRN):  ALBUTerol    90 MICROgram(s) HFA Inhaler 2 Puff(s) Inhalation every 6 hours PRN Shortness of Breath and/or Wheezing  HYDROmorphone  Injectable 0.5 milliGRAM(s) IV Push every 6 hours PRN Severe Pain (7 - 10)  morphine  - Injectable 2 milliGRAM(s) IV Push every 4 hours PRN Moderate Pain (4 - 6)  naloxone Injectable 0.1 milliGRAM(s) IV Push every 3 minutes PRN For ANY of the following changes in patient status:  A. RR LESS THAN 10 breaths per minute, B. Oxygen saturation LESS THAN 90%, C. Sedation score of 6  ondansetron Injectable 4 milliGRAM(s) IV Push every 6 hours PRN Nausea and/or Vomiting      DVT Prophylaxis: [facility]    Advanced Directives:  Discussed with:    Visit Information: 30 min    ** Time is exclusive of billed procedures and/or teaching and/or routine family updates.

## 2022-10-09 LAB
ALBUMIN SERPL ELPH-MCNC: 2.1 G/DL — LOW (ref 3.3–5)
ANION GAP SERPL CALC-SCNC: 5 MMOL/L — SIGNIFICANT CHANGE UP (ref 5–17)
APTT BLD: 60 SEC — HIGH (ref 27.5–35.5)
APTT BLD: 60.9 SEC — HIGH (ref 27.5–35.5)
BUN SERPL-MCNC: 29 MG/DL — HIGH (ref 7–23)
CALCIUM SERPL-MCNC: 8.6 MG/DL — SIGNIFICANT CHANGE UP (ref 8.5–10.1)
CHLORIDE SERPL-SCNC: 103 MMOL/L — SIGNIFICANT CHANGE UP (ref 96–108)
CO2 SERPL-SCNC: 32 MMOL/L — HIGH (ref 22–31)
CREAT SERPL-MCNC: 1.44 MG/DL — HIGH (ref 0.5–1.3)
EGFR: 51 ML/MIN/1.73M2 — LOW
GLUCOSE SERPL-MCNC: 180 MG/DL — HIGH (ref 70–99)
HCT VFR BLD CALC: 24 % — LOW (ref 39–50)
HCT VFR BLD CALC: 29.6 % — LOW (ref 39–50)
HCT VFR BLD CALC: 29.7 % — LOW (ref 39–50)
HGB BLD-MCNC: 7.4 G/DL — LOW (ref 13–17)
HGB BLD-MCNC: 8.9 G/DL — LOW (ref 13–17)
HGB BLD-MCNC: 8.9 G/DL — LOW (ref 13–17)
MAGNESIUM SERPL-MCNC: 2.4 MG/DL — SIGNIFICANT CHANGE UP (ref 1.6–2.6)
MCHC RBC-ENTMCNC: 26.7 PG — LOW (ref 27–34)
MCHC RBC-ENTMCNC: 26.8 PG — LOW (ref 27–34)
MCHC RBC-ENTMCNC: 30 GM/DL — LOW (ref 32–36)
MCHC RBC-ENTMCNC: 30.1 GM/DL — LOW (ref 32–36)
MCV RBC AUTO: 89.2 FL — SIGNIFICANT CHANGE UP (ref 80–100)
MCV RBC AUTO: 89.2 FL — SIGNIFICANT CHANGE UP (ref 80–100)
PHOSPHATE SERPL-MCNC: 2.8 MG/DL — SIGNIFICANT CHANGE UP (ref 2.5–4.5)
PLATELET # BLD AUTO: 149 K/UL — LOW (ref 150–400)
PLATELET # BLD AUTO: 161 K/UL — SIGNIFICANT CHANGE UP (ref 150–400)
POTASSIUM SERPL-MCNC: 4.2 MMOL/L — SIGNIFICANT CHANGE UP (ref 3.5–5.3)
POTASSIUM SERPL-SCNC: 4.2 MMOL/L — SIGNIFICANT CHANGE UP (ref 3.5–5.3)
RBC # BLD: 3.32 M/UL — LOW (ref 4.2–5.8)
RBC # BLD: 3.33 M/UL — LOW (ref 4.2–5.8)
RBC # FLD: 19.5 % — HIGH (ref 10.3–14.5)
RBC # FLD: 19.6 % — HIGH (ref 10.3–14.5)
SODIUM SERPL-SCNC: 140 MMOL/L — SIGNIFICANT CHANGE UP (ref 135–145)
WBC # BLD: 11.45 K/UL — HIGH (ref 3.8–10.5)
WBC # BLD: 12.16 K/UL — HIGH (ref 3.8–10.5)
WBC # FLD AUTO: 11.45 K/UL — HIGH (ref 3.8–10.5)
WBC # FLD AUTO: 12.16 K/UL — HIGH (ref 3.8–10.5)

## 2022-10-09 PROCEDURE — 71045 X-RAY EXAM CHEST 1 VIEW: CPT | Mod: 26

## 2022-10-09 PROCEDURE — 99291 CRITICAL CARE FIRST HOUR: CPT

## 2022-10-09 RX ORDER — HYDROCORTISONE 20 MG
50 TABLET ORAL EVERY 8 HOURS
Refills: 0 | Status: DISCONTINUED | OUTPATIENT
Start: 2022-10-09 | End: 2022-10-11

## 2022-10-09 RX ORDER — FUROSEMIDE 40 MG
20 TABLET ORAL ONCE
Refills: 0 | Status: COMPLETED | OUTPATIENT
Start: 2022-10-09 | End: 2022-10-09

## 2022-10-09 RX ORDER — LOPERAMIDE HCL 2 MG
2 TABLET ORAL
Refills: 0 | Status: DISCONTINUED | OUTPATIENT
Start: 2022-10-09 | End: 2022-10-10

## 2022-10-09 RX ORDER — IPRATROPIUM/ALBUTEROL SULFATE 18-103MCG
3 AEROSOL WITH ADAPTER (GRAM) INHALATION ONCE
Refills: 0 | Status: COMPLETED | OUTPATIENT
Start: 2022-10-09 | End: 2022-10-09

## 2022-10-09 RX ORDER — PANTOPRAZOLE SODIUM 20 MG/1
40 TABLET, DELAYED RELEASE ORAL EVERY 12 HOURS
Refills: 0 | Status: DISCONTINUED | OUTPATIENT
Start: 2022-10-09 | End: 2022-10-28

## 2022-10-09 RX ORDER — IPRATROPIUM/ALBUTEROL SULFATE 18-103MCG
3 AEROSOL WITH ADAPTER (GRAM) INHALATION EVERY 6 HOURS
Refills: 0 | Status: DISCONTINUED | OUTPATIENT
Start: 2022-10-09 | End: 2022-11-02

## 2022-10-09 RX ADMIN — HEPARIN SODIUM 2300 UNIT(S)/HR: 5000 INJECTION INTRAVENOUS; SUBCUTANEOUS at 09:13

## 2022-10-09 RX ADMIN — Medication 6: at 17:33

## 2022-10-09 RX ADMIN — Medication 4: at 05:34

## 2022-10-09 RX ADMIN — TIOTROPIUM BROMIDE 1 CAPSULE(S): 18 CAPSULE ORAL; RESPIRATORY (INHALATION) at 08:38

## 2022-10-09 RX ADMIN — HYDROMORPHONE HYDROCHLORIDE 0.5 MILLIGRAM(S): 2 INJECTION INTRAMUSCULAR; INTRAVENOUS; SUBCUTANEOUS at 03:36

## 2022-10-09 RX ADMIN — PIPERACILLIN AND TAZOBACTAM 25 GRAM(S): 4; .5 INJECTION, POWDER, LYOPHILIZED, FOR SOLUTION INTRAVENOUS at 22:37

## 2022-10-09 RX ADMIN — HEPARIN SODIUM 2300 UNIT(S)/HR: 5000 INJECTION INTRAVENOUS; SUBCUTANEOUS at 03:12

## 2022-10-09 RX ADMIN — MORPHINE SULFATE 2 MILLIGRAM(S): 50 CAPSULE, EXTENDED RELEASE ORAL at 05:26

## 2022-10-09 RX ADMIN — HEPARIN SODIUM 2300 UNIT(S)/HR: 5000 INJECTION INTRAVENOUS; SUBCUTANEOUS at 07:08

## 2022-10-09 RX ADMIN — Medication 100 MILLIGRAM(S): at 05:26

## 2022-10-09 RX ADMIN — Medication 50 MILLIGRAM(S): at 22:08

## 2022-10-09 RX ADMIN — HYDROMORPHONE HYDROCHLORIDE 0.5 MILLIGRAM(S): 2 INJECTION INTRAMUSCULAR; INTRAVENOUS; SUBCUTANEOUS at 22:55

## 2022-10-09 RX ADMIN — Medication 4: at 12:30

## 2022-10-09 RX ADMIN — Medication 3 MILLILITER(S): at 03:47

## 2022-10-09 RX ADMIN — SODIUM CHLORIDE 3 MILLILITER(S): 9 INJECTION INTRAMUSCULAR; INTRAVENOUS; SUBCUTANEOUS at 22:00

## 2022-10-09 RX ADMIN — PANTOPRAZOLE SODIUM 40 MILLIGRAM(S): 20 TABLET, DELAYED RELEASE ORAL at 22:07

## 2022-10-09 RX ADMIN — HYDROMORPHONE HYDROCHLORIDE 0.5 MILLIGRAM(S): 2 INJECTION INTRAMUSCULAR; INTRAVENOUS; SUBCUTANEOUS at 22:49

## 2022-10-09 RX ADMIN — HEPARIN SODIUM 2300 UNIT(S)/HR: 5000 INJECTION INTRAVENOUS; SUBCUTANEOUS at 14:27

## 2022-10-09 RX ADMIN — HYDROMORPHONE HYDROCHLORIDE 0.5 MILLIGRAM(S): 2 INJECTION INTRAMUSCULAR; INTRAVENOUS; SUBCUTANEOUS at 14:36

## 2022-10-09 RX ADMIN — HEPARIN SODIUM 2300 UNIT(S)/HR: 5000 INJECTION INTRAVENOUS; SUBCUTANEOUS at 01:14

## 2022-10-09 RX ADMIN — CHLORHEXIDINE GLUCONATE 1 APPLICATION(S): 213 SOLUTION TOPICAL at 05:34

## 2022-10-09 RX ADMIN — SODIUM CHLORIDE 3 MILLILITER(S): 9 INJECTION INTRAMUSCULAR; INTRAVENOUS; SUBCUTANEOUS at 05:13

## 2022-10-09 RX ADMIN — SODIUM CHLORIDE 100 MILLILITER(S): 9 INJECTION INTRAMUSCULAR; INTRAVENOUS; SUBCUTANEOUS at 00:09

## 2022-10-09 RX ADMIN — ONDANSETRON 4 MILLIGRAM(S): 8 TABLET, FILM COATED ORAL at 13:33

## 2022-10-09 RX ADMIN — Medication 20 MILLIGRAM(S): at 23:07

## 2022-10-09 RX ADMIN — MORPHINE SULFATE 2 MILLIGRAM(S): 50 CAPSULE, EXTENDED RELEASE ORAL at 01:13

## 2022-10-09 RX ADMIN — Medication 2 MILLIGRAM(S): at 12:29

## 2022-10-09 RX ADMIN — SODIUM CHLORIDE 3 MILLILITER(S): 9 INJECTION INTRAMUSCULAR; INTRAVENOUS; SUBCUTANEOUS at 14:00

## 2022-10-09 RX ADMIN — MORPHINE SULFATE 2 MILLIGRAM(S): 50 CAPSULE, EXTENDED RELEASE ORAL at 06:24

## 2022-10-09 RX ADMIN — HYDROMORPHONE HYDROCHLORIDE 0.5 MILLIGRAM(S): 2 INJECTION INTRAMUSCULAR; INTRAVENOUS; SUBCUTANEOUS at 03:15

## 2022-10-09 RX ADMIN — PIPERACILLIN AND TAZOBACTAM 25 GRAM(S): 4; .5 INJECTION, POWDER, LYOPHILIZED, FOR SOLUTION INTRAVENOUS at 13:59

## 2022-10-09 RX ADMIN — Medication 50 MILLIGRAM(S): at 13:33

## 2022-10-09 RX ADMIN — PIPERACILLIN AND TAZOBACTAM 25 GRAM(S): 4; .5 INJECTION, POWDER, LYOPHILIZED, FOR SOLUTION INTRAVENOUS at 05:26

## 2022-10-09 RX ADMIN — HYDROMORPHONE HYDROCHLORIDE 0.5 MILLIGRAM(S): 2 INJECTION INTRAMUSCULAR; INTRAVENOUS; SUBCUTANEOUS at 14:05

## 2022-10-09 RX ADMIN — MORPHINE SULFATE 2 MILLIGRAM(S): 50 CAPSULE, EXTENDED RELEASE ORAL at 00:56

## 2022-10-09 RX ADMIN — Medication 20 MILLIGRAM(S): at 09:13

## 2022-10-09 NOTE — PROGRESS NOTE ADULT - SUBJECTIVE AND OBJECTIVE BOX
Patient seen at the bedside, resting comfortable. Patient denies new complains. Passing gas, ostomy functional, pain well controlled, off pressors, currently on non rebreather mask. No acute events overnight. Patient denies nausea, vomiting, fever or chills.     Physical Exam:  General: AAOx3, morbidly obese male, NAD, on non rebreather  Chest: Normal respiratory effort  Heart: RRR  Abdomen: Soft, ND, appropriately tender, ostomy patent.  Neuro/Psych: No localized deficits. Normal speech, normal tone  Skin: Normal, no rashes, no lesions noted.   Extremities: Warm, well perfused, no edema, Pulses intact      MEDICATIONS  (STANDING):  acetaminophen   IVPB .. 1000 milliGRAM(s) IV Intermittent once  atorvastatin 20 milliGRAM(s) Oral at bedtime  chlorhexidine 4% Liquid 1 Application(s) Topical <User Schedule>  dextrose 50% Injectable 25 Gram(s) IV Push once  dextrose 50% Injectable 12.5 Gram(s) IV Push once  dextrose 50% Injectable 25 Gram(s) IV Push once  dextrose Oral Gel 15 Gram(s) Oral once  furosemide   Injectable 20 milliGRAM(s) IV Push once  glucagon  Injectable 1 milliGRAM(s) IntraMuscular once  heparin  Infusion.  Unit(s)/Hr (23 mL/Hr) IV Continuous <Continuous>  hydrocortisone sodium succinate Injectable 50 milliGRAM(s) IV Push every 8 hours  influenza  Vaccine (HIGH DOSE) 0.7 milliLiter(s) IntraMuscular once  insulin lispro (ADMELOG) corrective regimen sliding scale   SubCutaneous every 6 hours  piperacillin/tazobactam IVPB.. 3.375 Gram(s) IV Intermittent every 8 hours  sodium chloride 0.9% lock flush 3 milliLiter(s) IV Push every 8 hours  tiotropium 18 MICROgram(s) Capsule 1 Capsule(s) Inhalation daily    MEDICATIONS  (PRN):  ALBUTerol    90 MICROgram(s) HFA Inhaler 2 Puff(s) Inhalation every 6 hours PRN Shortness of Breath and/or Wheezing  albuterol/ipratropium for Nebulization 3 milliLiter(s) Nebulizer every 6 hours PRN Shortness of Breath and/or Wheezing  HYDROmorphone  Injectable 0.5 milliGRAM(s) IV Push every 6 hours PRN Severe Pain (7 - 10)  morphine  - Injectable 2 milliGRAM(s) IV Push every 4 hours PRN Moderate Pain (4 - 6)  naloxone Injectable 0.1 milliGRAM(s) IV Push every 3 minutes PRN For ANY of the following changes in patient status:  A. RR LESS THAN 10 breaths per minute, B. Oxygen saturation LESS THAN 90%, C. Sedation score of 6  ondansetron Injectable 4 milliGRAM(s) IV Push every 6 hours PRN Nausea and/or Vomiting      PAST MEDICAL & SURGICAL HISTORY:  Ulcerative colitis      Colon cancer      Obstructive sleep apnea      Obesity      HTN (hypertension)      Atrial fibrillation      HLD (hyperlipidemia)      BPH (benign prostatic hyperplasia)      COPD (chronic obstructive pulmonary disease)      COVID-19 virus infection      History of pneumonia      H/O hernia repair      History of hydrocelectomy      H/O colonoscopy      H/O cystoscopy  urethral stricture      History of total knee replacement, bilateral      S/P placement of cardiac pacemaker      History of cataract surgery          Vital Signs Last 24 Hrs  T(C): 36.1 (09 Oct 2022 08:00), Max: 36.8 (08 Oct 2022 21:00)  T(F): 97 (09 Oct 2022 08:00), Max: 98.2 (08 Oct 2022 21:00)  HR: 102 (09 Oct 2022 08:00) (64 - 110)  BP: 102/64 (09 Oct 2022 08:00) (95/56 - 142/65)  BP(mean): 71 (09 Oct 2022 08:00) (60 - 101)  RR: 25 (09 Oct 2022 08:00) (14 - 29)  SpO2: 99% (09 Oct 2022 08:00) (85% - 100%)    Parameters below as of 09 Oct 2022 08:00  Patient On (Oxygen Delivery Method): mask, Venturi    O2 Concentration (%): 50    I&O's Summary    08 Oct 2022 07:01  -  09 Oct 2022 07:00  --------------------------------------------------------  IN: 2366 mL / OUT: 3885 mL / NET: -1519 mL    09 Oct 2022 07:01  -  09 Oct 2022 08:58  --------------------------------------------------------  IN: 0 mL / OUT: 40 mL / NET: -40 mL                              8.9    12.16 )-----------( 161      ( 09 Oct 2022 04:04 )             29.6     10-09    140  |  103  |  29<H>  ----------------------------<  180<H>  4.2   |  32<H>  |  1.44<H>    Ca    8.6      09 Oct 2022 04:04  Phos  2.8     10-09  Mg     2.4     10-09    TPro  x   /  Alb  2.1<L>  /  TBili  x   /  DBili  x   /  AST  x   /  ALT  x   /  AlkPhos  x   10-09          Culture - Blood (collected 10-06-22 @ 17:26)  Source: .Blood None  Preliminary Report (10-08-22 @ 01:02):    No growth to date.    Culture - Blood (collected 10-06-22 @ 17:25)  Source: .Blood None  Preliminary Report (10-08-22 @ 01:02):    No growth to date.             Patient seen at the bedside, resting comfortable. Patient denies new complains. Passing gas, ostomy functional, pain well controlled, off pressors, currently on non rebreather mask. No acute events overnight. Patient denies nausea, vomiting, fever or chills.     Physical Exam:  General: AAOx3, morbidly obese male, NAD, on non rebreather  Chest: Normal respiratory effort  Heart: RRR  Abdomen: Soft, ND, appropriately tender, ostomy patent.  Neuro/Psych: No localized deficits. Normal speech, normal tone  Skin: Normal, no rashes, no lesions noted.   Extremities: Warm, well perfused, no edema, Pulses intact      MEDICATIONS  (STANDING):  acetaminophen   IVPB .. 1000 milliGRAM(s) IV Intermittent once  atorvastatin 20 milliGRAM(s) Oral at bedtime  chlorhexidine 4% Liquid 1 Application(s) Topical <User Schedule>  dextrose 50% Injectable 25 Gram(s) IV Push once  dextrose 50% Injectable 12.5 Gram(s) IV Push once  dextrose 50% Injectable 25 Gram(s) IV Push once  dextrose Oral Gel 15 Gram(s) Oral once  furosemide   Injectable 20 milliGRAM(s) IV Push once  glucagon  Injectable 1 milliGRAM(s) IntraMuscular once  heparin  Infusion.  Unit(s)/Hr (23 mL/Hr) IV Continuous <Continuous>  hydrocortisone sodium succinate Injectable 50 milliGRAM(s) IV Push every 8 hours  influenza  Vaccine (HIGH DOSE) 0.7 milliLiter(s) IntraMuscular once  insulin lispro (ADMELOG) corrective regimen sliding scale   SubCutaneous every 6 hours  piperacillin/tazobactam IVPB.. 3.375 Gram(s) IV Intermittent every 8 hours  sodium chloride 0.9% lock flush 3 milliLiter(s) IV Push every 8 hours  tiotropium 18 MICROgram(s) Capsule 1 Capsule(s) Inhalation daily    MEDICATIONS  (PRN):  ALBUTerol    90 MICROgram(s) HFA Inhaler 2 Puff(s) Inhalation every 6 hours PRN Shortness of Breath and/or Wheezing  albuterol/ipratropium for Nebulization 3 milliLiter(s) Nebulizer every 6 hours PRN Shortness of Breath and/or Wheezing  HYDROmorphone  Injectable 0.5 milliGRAM(s) IV Push every 6 hours PRN Severe Pain (7 - 10)  morphine  - Injectable 2 milliGRAM(s) IV Push every 4 hours PRN Moderate Pain (4 - 6)  naloxone Injectable 0.1 milliGRAM(s) IV Push every 3 minutes PRN For ANY of the following changes in patient status:  A. RR LESS THAN 10 breaths per minute, B. Oxygen saturation LESS THAN 90%, C. Sedation score of 6  ondansetron Injectable 4 milliGRAM(s) IV Push every 6 hours PRN Nausea and/or Vomiting      PAST MEDICAL & SURGICAL HISTORY:  Ulcerative colitis  Colon cancer  Obstructive sleep apnea  Obesity  HTN (hypertension)  Atrial fibrillation  HLD (hyperlipidemia)  BPH (benign prostatic hyperplasia)  COPD (chronic obstructive pulmonary disease)  COVID-19 virus infection  History of pneumonia  H/O hernia repair  History of hydrocelectomy  H/O colonoscopy  H/O cystoscopy  urethral stricture  History of total knee replacement, bilateral  S/P placement of cardiac pacemaker  History of cataract surgery    Vital Signs Last 24 Hrs  T(C): 36.1 (09 Oct 2022 08:00), Max: 36.8 (08 Oct 2022 21:00)  T(F): 97 (09 Oct 2022 08:00), Max: 98.2 (08 Oct 2022 21:00)  HR: 102 (09 Oct 2022 08:00) (64 - 110)  BP: 102/64 (09 Oct 2022 08:00) (95/56 - 142/65)  BP(mean): 71 (09 Oct 2022 08:00) (60 - 101)  RR: 25 (09 Oct 2022 08:00) (14 - 29)  SpO2: 99% (09 Oct 2022 08:00) (85% - 100%)    Parameters below as of 09 Oct 2022 08:00  Patient On (Oxygen Delivery Method): mask, Venturi    O2 Concentration (%): 50    I&O's Summary    08 Oct 2022 07:01  -  09 Oct 2022 07:00  --------------------------------------------------------  IN: 2366 mL / OUT: 3885 mL / NET: -1519 mL    09 Oct 2022 07:01  -  09 Oct 2022 08:58  --------------------------------------------------------  IN: 0 mL / OUT: 40 mL / NET: -40 mL                              8.9    12.16 )-----------( 161      ( 09 Oct 2022 04:04 )             29.6     10-09    140  |  103  |  29<H>  ----------------------------<  180<H>  4.2   |  32<H>  |  1.44<H>    Ca    8.6      09 Oct 2022 04:04  Phos  2.8     10-09  Mg     2.4     10-09    TPro  x   /  Alb  2.1<L>  /  TBili  x   /  DBili  x   /  AST  x   /  ALT  x   /  AlkPhos  x   10-09    Culture - Blood (collected 10-06-22 @ 17:26)  Source: .Blood None  Preliminary Report (10-08-22 @ 01:02):    No growth to date.    Culture - Blood (collected 10-06-22 @ 17:25)  Source: .Blood None  Preliminary Report (10-08-22 @ 01:02):    No growth to date.

## 2022-10-09 NOTE — PROGRESS NOTE ADULT - ASSESSMENT
A/P: 77 male S/P RCP-IPAA (restorative proctocolectomy with ileal pouch anal anastomosis), diverting loop ileostomy with Acute Hypoxic Respiratory Failure from a L sided Pneumonia and JOSE likely from ATN  FLORA  Obesity  AFIB  COPD    Plan:  ICU    PULM: L sided pneumonia.  Continue Zosyn to cover aspiration and that will cover intraabdominal spillage  Wean Fio2.  NIV QHS for FLORA.  Monitor Dark Sputum.  Does not appear to be blood.  Seems like he aspirated but he denies it    Cardio: Hypotensive improved, off pressors, D/C IVF, lasix x 1.  Continue UFH    Renal: In JOSE likely from ATN is improving.  D/C IVF    GI: Clears    DVT prophylaxis with UFH    D/W Patient and Surgery     Transfer to the SDU

## 2022-10-09 NOTE — PROGRESS NOTE ADULT - SUBJECTIVE AND OBJECTIVE BOX
HPI: Patient is a 77 male S/P RCP-IPAA (restorative proctocolectomy with ileal pouch anal anastomosis), diverting loop ileostomy.  Patient seen in the ICU.  Patient has been on 100% NRB today for a L sided Pneumonia.  He also had to be resumed on pressors.  Patient remains in JOSE and oliguric.      10/8: On VM O2, pressors over night  10/9: VM O2/NC O2.  Off Pressors, coughing up dark sputum now, CXR reviewed and L side is improving, he denies vomiting or aspiratirng      PAST MEDICAL & SURGICAL HISTORY:  Ulcerative colitis      Colon cancer      Obstructive sleep apnea      Obesity      HTN (hypertension)      Atrial fibrillation      HLD (hyperlipidemia)      BPH (benign prostatic hyperplasia)      COPD (chronic obstructive pulmonary disease)      COVID-19 virus infection      History of pneumonia      H/O hernia repair      History of hydrocelectomy      H/O colonoscopy      H/O cystoscopy  urethral stricture      History of total knee replacement, bilateral      S/P placement of cardiac pacemaker      History of cataract surgery          FAMILY HISTORY:  Family history of cardiomyopathy (Father)        Social Hx:    Allergies    ACE inhibitors (Swelling)    Intolerances            ICU Vital Signs Last 24 Hrs  T(C): 36.1 (09 Oct 2022 08:00), Max: 36.8 (08 Oct 2022 21:00)  T(F): 97 (09 Oct 2022 08:00), Max: 98.2 (08 Oct 2022 21:00)  HR: 82 (09 Oct 2022 10:00) (64 - 110)  BP: 105/59 (09 Oct 2022 10:00) (97/72 - 142/65)  BP(mean): 67 (09 Oct 2022 10:00) (60 - 101)  ABP: --  ABP(mean): --  RR: 25 (09 Oct 2022 10:00) (14 - 29)  SpO2: 90% (09 Oct 2022 10:00) (85% - 100%)    O2 Parameters below as of 09 Oct 2022 10:00  Patient On (Oxygen Delivery Method): mask, Venturi    O2 Concentration (%): 50            I&O's Summary    08 Oct 2022 07:01  -  09 Oct 2022 07:00  --------------------------------------------------------  IN: 2366 mL / OUT: 3885 mL / NET: -1519 mL    09 Oct 2022 07:01  -  09 Oct 2022 11:00  --------------------------------------------------------  IN: 0 mL / OUT: 40 mL / NET: -40 mL                              8.9    12.16 )-----------( 161      ( 09 Oct 2022 04:04 )             29.6       10-09    140  |  103  |  29<H>  ----------------------------<  180<H>  4.2   |  32<H>  |  1.44<H>    Ca    8.6      09 Oct 2022 04:04  Phos  2.8     10-09  Mg     2.4     10-09    TPro  x   /  Alb  2.1<L>  /  TBili  x   /  DBili  x   /  AST  x   /  ALT  x   /  AlkPhos  x   10-09                    MEDICATIONS  (STANDING):  acetaminophen   IVPB .. 1000 milliGRAM(s) IV Intermittent once  atorvastatin 20 milliGRAM(s) Oral at bedtime  chlorhexidine 4% Liquid 1 Application(s) Topical <User Schedule>  dextrose 50% Injectable 25 Gram(s) IV Push once  dextrose 50% Injectable 12.5 Gram(s) IV Push once  dextrose 50% Injectable 25 Gram(s) IV Push once  dextrose Oral Gel 15 Gram(s) Oral once  glucagon  Injectable 1 milliGRAM(s) IntraMuscular once  heparin  Infusion.  Unit(s)/Hr (23 mL/Hr) IV Continuous <Continuous>  hydrocortisone sodium succinate Injectable 50 milliGRAM(s) IV Push every 8 hours  influenza  Vaccine (HIGH DOSE) 0.7 milliLiter(s) IntraMuscular once  insulin lispro (ADMELOG) corrective regimen sliding scale   SubCutaneous every 6 hours  piperacillin/tazobactam IVPB.. 3.375 Gram(s) IV Intermittent every 8 hours  sodium chloride 0.9% lock flush 3 milliLiter(s) IV Push every 8 hours  tiotropium 18 MICROgram(s) Capsule 1 Capsule(s) Inhalation daily    MEDICATIONS  (PRN):  ALBUTerol    90 MICROgram(s) HFA Inhaler 2 Puff(s) Inhalation every 6 hours PRN Shortness of Breath and/or Wheezing  albuterol/ipratropium for Nebulization 3 milliLiter(s) Nebulizer every 6 hours PRN Shortness of Breath and/or Wheezing  HYDROmorphone  Injectable 0.5 milliGRAM(s) IV Push every 6 hours PRN Severe Pain (7 - 10)  morphine  - Injectable 2 milliGRAM(s) IV Push every 4 hours PRN Moderate Pain (4 - 6)  naloxone Injectable 0.1 milliGRAM(s) IV Push every 3 minutes PRN For ANY of the following changes in patient status:  A. RR LESS THAN 10 breaths per minute, B. Oxygen saturation LESS THAN 90%, C. Sedation score of 6  ondansetron Injectable 4 milliGRAM(s) IV Push every 6 hours PRN Nausea and/or Vomiting      DVT Prophylaxis: UFH    Advanced Directives:  Discussed with:    Visit Information:    ** Time is exclusive of billed procedures and/or teaching and/or routine family updates.

## 2022-10-09 NOTE — PROGRESS NOTE ADULT - ASSESSMENT
77hx  a fib on apixaban  HTN HLD obesity FLORA COPD ex smoker  ulcerative colitis and colon cancer.  POD#2  Open restorative proctocolectomy with IPAA, diverting loop ileostomy rectal mass noted.    There was fecal spillage peritonitis.    Post op;  1 Hypoxemia due to LLL atelectasis   Improved with NIPPPV  2. Septic shock  titrating phenylephrine to a MAP of 65 additional crystalloid given, currently off pressors  3. Oliguric JOSE due to ATN possible pre renal component.      BP dropped top MAP of 55, phenylphrine r/s, MAP 67 in am. , UOP improved approx 50cc/h,m. BIPAP discontinued but has been on non rebreather sat 92%.   Clinically, mild abd tenderness, comfortable. Abd soft, mildly distended, mild tenderness to palpation. Prevena lost suction and ostomy bag leaked inside it, it was removed at 5 am and dry dressing applied. Ostomy is functional.   Currently on zosyn for L sided pneumonia and intraabd spillage.  Cardiology : c/w supportive care, follow up as outpt.       Plan:  Replete ostomy output cautiously   wean off O2  ambulation   physical therapy  Pain control  Monitor bowel function   Serial abdominal exams  ADAT  Medical management by ICU team    Case discussed with Colorectal team  77hx  a fib on apixaban  HTN HLD obesity FLORA COPD ex smoker  ulcerative colitis and colon cancer.  POD#3  Open restorative proctocolectomy with IPAA, diverting loop ileostomy rectal mass noted.    There was fecal spillage peritonitis.    Post op;  1 Hypoxemia due to LLL atelectasis   Improved with NIPPPV  2. Septic shock  titrating phenylephrine to a MAP of 65 additional crystalloid given, currently off pressors  3. Oliguric JOSE due to ATN possible pre renal component.      BP dropped top MAP of 55, phenylphrine r/s, MAP 67 in am. , UOP improved approx 50cc/h,m. BIPAP discontinued but has been on non rebreather sat 92%.   Clinically, mild abd tenderness, comfortable. Abd soft, mildly distended, mild tenderness to palpation. Prevena lost suction and ostomy bag leaked inside it, it was removed at 5 am and dry dressing applied. Ostomy is functional.   Currently on zosyn for L sided pneumonia and intraabd spillage.  Cardiology : c/w supportive care, follow up as outpt.       Plan:  Replete ostomy output cautiously   wean off O2  ambulation   physical therapy  Pain control  Monitor bowel function   Serial abdominal exams  ADAT  Medical management by ICU team    Case discussed with Colorectal team

## 2022-10-09 NOTE — PROGRESS NOTE ADULT - ATTENDING COMMENTS
Patient seen and examined. He has not major complaints today. On non rebreather and has audible crackles. Tolerated liquid diet although not much of an appetite. Ileostomy output high at 2600. Started on heparin gtt yesterday in lieu of Eliquis. No further leaking from ostomy appliance. Incision clean and intact and drains are SS in nature. Will give full liquids and start imodium given stoma output. Needs to do chest PT and IS. Also needs to get out of bed. Can had eliquis instead of heparin gtt from CRS standpoint. Continue antibiotics. Zhao removal. On steroids, ? taper plan

## 2022-10-10 LAB
ANION GAP SERPL CALC-SCNC: 6 MMOL/L — SIGNIFICANT CHANGE UP (ref 5–17)
ANION GAP SERPL CALC-SCNC: 6 MMOL/L — SIGNIFICANT CHANGE UP (ref 5–17)
APTT BLD: 23.1 SEC — LOW (ref 27.5–35.5)
BUN SERPL-MCNC: 77 MG/DL — HIGH (ref 7–23)
BUN SERPL-MCNC: 83 MG/DL — HIGH (ref 7–23)
CALCIUM SERPL-MCNC: 8.5 MG/DL — SIGNIFICANT CHANGE UP (ref 8.5–10.1)
CALCIUM SERPL-MCNC: 8.7 MG/DL — SIGNIFICANT CHANGE UP (ref 8.5–10.1)
CHLORIDE SERPL-SCNC: 103 MMOL/L — SIGNIFICANT CHANGE UP (ref 96–108)
CHLORIDE SERPL-SCNC: 103 MMOL/L — SIGNIFICANT CHANGE UP (ref 96–108)
CO2 SERPL-SCNC: 28 MMOL/L — SIGNIFICANT CHANGE UP (ref 22–31)
CO2 SERPL-SCNC: 29 MMOL/L — SIGNIFICANT CHANGE UP (ref 22–31)
CREAT SERPL-MCNC: 2.39 MG/DL — HIGH (ref 0.5–1.3)
CREAT SERPL-MCNC: 2.54 MG/DL — HIGH (ref 0.5–1.3)
EGFR: 26 ML/MIN/1.73M2 — LOW
EGFR: 28 ML/MIN/1.73M2 — LOW
GLUCOSE SERPL-MCNC: 148 MG/DL — HIGH (ref 70–99)
GLUCOSE SERPL-MCNC: 170 MG/DL — HIGH (ref 70–99)
HCT VFR BLD CALC: 23.3 % — LOW (ref 39–50)
HCT VFR BLD CALC: 26 % — LOW (ref 39–50)
HCT VFR BLD CALC: 26 % — LOW (ref 39–50)
HCT VFR BLD CALC: 26.8 % — LOW (ref 39–50)
HGB BLD-MCNC: 7.1 G/DL — LOW (ref 13–17)
HGB BLD-MCNC: 8.1 G/DL — LOW (ref 13–17)
HGB BLD-MCNC: 8.2 G/DL — LOW (ref 13–17)
HGB BLD-MCNC: 8.4 G/DL — LOW (ref 13–17)
MAGNESIUM SERPL-MCNC: 2.4 MG/DL — SIGNIFICANT CHANGE UP (ref 1.6–2.6)
MCHC RBC-ENTMCNC: 26.7 PG — LOW (ref 27–34)
MCHC RBC-ENTMCNC: 27 PG — SIGNIFICANT CHANGE UP (ref 27–34)
MCHC RBC-ENTMCNC: 27.5 PG — SIGNIFICANT CHANGE UP (ref 27–34)
MCHC RBC-ENTMCNC: 27.9 PG — SIGNIFICANT CHANGE UP (ref 27–34)
MCHC RBC-ENTMCNC: 30.5 GM/DL — LOW (ref 32–36)
MCHC RBC-ENTMCNC: 30.6 GM/DL — LOW (ref 32–36)
MCHC RBC-ENTMCNC: 31.2 GM/DL — LOW (ref 32–36)
MCHC RBC-ENTMCNC: 32.3 GM/DL — SIGNIFICANT CHANGE UP (ref 32–36)
MCV RBC AUTO: 86.4 FL — SIGNIFICANT CHANGE UP (ref 80–100)
MCV RBC AUTO: 86.7 FL — SIGNIFICANT CHANGE UP (ref 80–100)
MCV RBC AUTO: 87.3 FL — SIGNIFICANT CHANGE UP (ref 80–100)
MCV RBC AUTO: 90.3 FL — SIGNIFICANT CHANGE UP (ref 80–100)
PHOSPHATE SERPL-MCNC: 2.8 MG/DL — SIGNIFICANT CHANGE UP (ref 2.5–4.5)
PLATELET # BLD AUTO: 160 K/UL — SIGNIFICANT CHANGE UP (ref 150–400)
PLATELET # BLD AUTO: 163 K/UL — SIGNIFICANT CHANGE UP (ref 150–400)
PLATELET # BLD AUTO: 171 K/UL — SIGNIFICANT CHANGE UP (ref 150–400)
PLATELET # BLD AUTO: 184 K/UL — SIGNIFICANT CHANGE UP (ref 150–400)
POTASSIUM SERPL-MCNC: 4.5 MMOL/L — SIGNIFICANT CHANGE UP (ref 3.5–5.3)
POTASSIUM SERPL-MCNC: 4.6 MMOL/L — SIGNIFICANT CHANGE UP (ref 3.5–5.3)
POTASSIUM SERPL-SCNC: 4.5 MMOL/L — SIGNIFICANT CHANGE UP (ref 3.5–5.3)
POTASSIUM SERPL-SCNC: 4.6 MMOL/L — SIGNIFICANT CHANGE UP (ref 3.5–5.3)
RBC # BLD: 2.58 M/UL — LOW (ref 4.2–5.8)
RBC # BLD: 3 M/UL — LOW (ref 4.2–5.8)
RBC # BLD: 3.01 M/UL — LOW (ref 4.2–5.8)
RBC # BLD: 3.07 M/UL — LOW (ref 4.2–5.8)
RBC # FLD: 18.3 % — HIGH (ref 10.3–14.5)
RBC # FLD: 18.9 % — HIGH (ref 10.3–14.5)
RBC # FLD: 19.3 % — HIGH (ref 10.3–14.5)
RBC # FLD: 19.5 % — HIGH (ref 10.3–14.5)
SODIUM SERPL-SCNC: 137 MMOL/L — SIGNIFICANT CHANGE UP (ref 135–145)
SODIUM SERPL-SCNC: 138 MMOL/L — SIGNIFICANT CHANGE UP (ref 135–145)
WBC # BLD: 10.02 K/UL — SIGNIFICANT CHANGE UP (ref 3.8–10.5)
WBC # BLD: 10.57 K/UL — HIGH (ref 3.8–10.5)
WBC # BLD: 9.05 K/UL — SIGNIFICANT CHANGE UP (ref 3.8–10.5)
WBC # BLD: 9.57 K/UL — SIGNIFICANT CHANGE UP (ref 3.8–10.5)
WBC # FLD AUTO: 10.02 K/UL — SIGNIFICANT CHANGE UP (ref 3.8–10.5)
WBC # FLD AUTO: 10.57 K/UL — HIGH (ref 3.8–10.5)
WBC # FLD AUTO: 9.05 K/UL — SIGNIFICANT CHANGE UP (ref 3.8–10.5)
WBC # FLD AUTO: 9.57 K/UL — SIGNIFICANT CHANGE UP (ref 3.8–10.5)

## 2022-10-10 PROCEDURE — 71045 X-RAY EXAM CHEST 1 VIEW: CPT | Mod: 26

## 2022-10-10 PROCEDURE — 99291 CRITICAL CARE FIRST HOUR: CPT

## 2022-10-10 RX ORDER — SODIUM CHLORIDE 9 MG/ML
1000 INJECTION INTRAMUSCULAR; INTRAVENOUS; SUBCUTANEOUS
Refills: 0 | Status: DISCONTINUED | OUTPATIENT
Start: 2022-10-10 | End: 2022-10-10

## 2022-10-10 RX ORDER — LOPERAMIDE HCL 2 MG
2 TABLET ORAL THREE TIMES A DAY
Refills: 0 | Status: DISCONTINUED | OUTPATIENT
Start: 2022-10-10 | End: 2022-10-11

## 2022-10-10 RX ORDER — BUDESONIDE AND FORMOTEROL FUMARATE DIHYDRATE 160; 4.5 UG/1; UG/1
2 AEROSOL RESPIRATORY (INHALATION)
Refills: 0 | Status: DISCONTINUED | OUTPATIENT
Start: 2022-10-10 | End: 2022-11-02

## 2022-10-10 RX ORDER — ACETAMINOPHEN 500 MG
1000 TABLET ORAL ONCE
Refills: 0 | Status: COMPLETED | OUTPATIENT
Start: 2022-10-10 | End: 2022-10-10

## 2022-10-10 RX ORDER — PSYLLIUM SEED (WITH DEXTROSE)
1 POWDER (GRAM) ORAL DAILY
Refills: 0 | Status: DISCONTINUED | OUTPATIENT
Start: 2022-10-10 | End: 2022-10-12

## 2022-10-10 RX ORDER — SODIUM CHLORIDE 9 MG/ML
1000 INJECTION, SOLUTION INTRAVENOUS
Refills: 0 | Status: DISCONTINUED | OUTPATIENT
Start: 2022-10-10 | End: 2022-10-12

## 2022-10-10 RX ORDER — INSULIN LISPRO 100/ML
VIAL (ML) SUBCUTANEOUS
Refills: 0 | Status: DISCONTINUED | OUTPATIENT
Start: 2022-10-10 | End: 2022-10-29

## 2022-10-10 RX ADMIN — BUDESONIDE AND FORMOTEROL FUMARATE DIHYDRATE 2 PUFF(S): 160; 4.5 AEROSOL RESPIRATORY (INHALATION) at 21:18

## 2022-10-10 RX ADMIN — CHLORHEXIDINE GLUCONATE 1 APPLICATION(S): 213 SOLUTION TOPICAL at 06:04

## 2022-10-10 RX ADMIN — MORPHINE SULFATE 2 MILLIGRAM(S): 50 CAPSULE, EXTENDED RELEASE ORAL at 18:15

## 2022-10-10 RX ADMIN — ATORVASTATIN CALCIUM 20 MILLIGRAM(S): 80 TABLET, FILM COATED ORAL at 22:11

## 2022-10-10 RX ADMIN — PIPERACILLIN AND TAZOBACTAM 25 GRAM(S): 4; .5 INJECTION, POWDER, LYOPHILIZED, FOR SOLUTION INTRAVENOUS at 13:48

## 2022-10-10 RX ADMIN — Medication 2 MILLIGRAM(S): at 13:48

## 2022-10-10 RX ADMIN — ATORVASTATIN CALCIUM 20 MILLIGRAM(S): 80 TABLET, FILM COATED ORAL at 03:03

## 2022-10-10 RX ADMIN — SODIUM CHLORIDE 3 MILLILITER(S): 9 INJECTION INTRAMUSCULAR; INTRAVENOUS; SUBCUTANEOUS at 13:38

## 2022-10-10 RX ADMIN — PIPERACILLIN AND TAZOBACTAM 25 GRAM(S): 4; .5 INJECTION, POWDER, LYOPHILIZED, FOR SOLUTION INTRAVENOUS at 06:03

## 2022-10-10 RX ADMIN — Medication 50 MILLIGRAM(S): at 22:10

## 2022-10-10 RX ADMIN — Medication 400 MILLIGRAM(S): at 07:54

## 2022-10-10 RX ADMIN — Medication 50 MILLIGRAM(S): at 13:48

## 2022-10-10 RX ADMIN — Medication 2: at 22:33

## 2022-10-10 RX ADMIN — ONDANSETRON 4 MILLIGRAM(S): 8 TABLET, FILM COATED ORAL at 13:48

## 2022-10-10 RX ADMIN — Medication 2 MILLIGRAM(S): at 22:11

## 2022-10-10 RX ADMIN — MORPHINE SULFATE 2 MILLIGRAM(S): 50 CAPSULE, EXTENDED RELEASE ORAL at 17:41

## 2022-10-10 RX ADMIN — TIOTROPIUM BROMIDE 1 CAPSULE(S): 18 CAPSULE ORAL; RESPIRATORY (INHALATION) at 11:50

## 2022-10-10 RX ADMIN — Medication 1000 MILLIGRAM(S): at 09:01

## 2022-10-10 RX ADMIN — PANTOPRAZOLE SODIUM 40 MILLIGRAM(S): 20 TABLET, DELAYED RELEASE ORAL at 12:01

## 2022-10-10 RX ADMIN — HYDROMORPHONE HYDROCHLORIDE 0.5 MILLIGRAM(S): 2 INJECTION INTRAMUSCULAR; INTRAVENOUS; SUBCUTANEOUS at 14:34

## 2022-10-10 RX ADMIN — Medication 2 MILLIGRAM(S): at 03:04

## 2022-10-10 RX ADMIN — Medication 1 PACKET(S): at 12:01

## 2022-10-10 RX ADMIN — Medication 2: at 06:02

## 2022-10-10 RX ADMIN — SODIUM CHLORIDE 50 MILLILITER(S): 9 INJECTION, SOLUTION INTRAVENOUS at 15:33

## 2022-10-10 RX ADMIN — Medication 2: at 14:28

## 2022-10-10 RX ADMIN — HYDROMORPHONE HYDROCHLORIDE 0.5 MILLIGRAM(S): 2 INJECTION INTRAMUSCULAR; INTRAVENOUS; SUBCUTANEOUS at 15:08

## 2022-10-10 RX ADMIN — Medication 4: at 01:23

## 2022-10-10 RX ADMIN — Medication 50 MILLIGRAM(S): at 06:03

## 2022-10-10 RX ADMIN — PIPERACILLIN AND TAZOBACTAM 25 GRAM(S): 4; .5 INJECTION, POWDER, LYOPHILIZED, FOR SOLUTION INTRAVENOUS at 22:10

## 2022-10-10 RX ADMIN — SODIUM CHLORIDE 3 MILLILITER(S): 9 INJECTION INTRAMUSCULAR; INTRAVENOUS; SUBCUTANEOUS at 09:01

## 2022-10-10 RX ADMIN — Medication 2: at 17:42

## 2022-10-10 RX ADMIN — PANTOPRAZOLE SODIUM 40 MILLIGRAM(S): 20 TABLET, DELAYED RELEASE ORAL at 22:14

## 2022-10-10 NOTE — PROGRESS NOTE ADULT - SUBJECTIVE AND OBJECTIVE BOX
Patient seen at the bedside, resting comfortable on nonrebreather oxygen. Patient received 2U PRBC overnight for profuse melena. Pt also has been having high output ileostomy, started on Imodium daily yesterday. Passing gas, ostomy functional, pain controlled, off pressors, currently on non rebreather mask. No further acute events overnight. Patient denies nausea, vomiting, fever or chills.     MEDICATIONS  (STANDING):  acetaminophen   IVPB .. 1000 milliGRAM(s) IV Intermittent once  atorvastatin 20 milliGRAM(s) Oral at bedtime  budesonide 160 MICROgram(s)/formoterol 4.5 MICROgram(s) Inhaler 2 Puff(s) Inhalation two times a day  chlorhexidine 4% Liquid 1 Application(s) Topical <User Schedule>  dextrose 50% Injectable 25 Gram(s) IV Push once  dextrose 50% Injectable 12.5 Gram(s) IV Push once  dextrose 50% Injectable 25 Gram(s) IV Push once  dextrose Oral Gel 15 Gram(s) Oral once  glucagon  Injectable 1 milliGRAM(s) IntraMuscular once  hydrocortisone sodium succinate Injectable 50 milliGRAM(s) IV Push every 8 hours  influenza  Vaccine (HIGH DOSE) 0.7 milliLiter(s) IntraMuscular once  insulin lispro (ADMELOG) corrective regimen sliding scale   SubCutaneous every 6 hours  loperamide 2 milliGRAM(s) Oral three times a day  pantoprazole  Injectable 40 milliGRAM(s) IV Push every 12 hours  piperacillin/tazobactam IVPB.. 3.375 Gram(s) IV Intermittent every 8 hours  psyllium Powder 1 Packet(s) Oral daily  sodium chloride 0.9% lock flush 3 milliLiter(s) IV Push every 8 hours  tiotropium 18 MICROgram(s) Capsule 1 Capsule(s) Inhalation daily    MEDICATIONS  (PRN):  ALBUTerol    90 MICROgram(s) HFA Inhaler 2 Puff(s) Inhalation every 6 hours PRN Shortness of Breath and/or Wheezing  albuterol/ipratropium for Nebulization 3 milliLiter(s) Nebulizer every 6 hours PRN Shortness of Breath and/or Wheezing  HYDROmorphone  Injectable 0.5 milliGRAM(s) IV Push every 6 hours PRN Severe Pain (7 - 10)  morphine  - Injectable 2 milliGRAM(s) IV Push every 4 hours PRN Moderate Pain (4 - 6)  naloxone Injectable 0.1 milliGRAM(s) IV Push every 3 minutes PRN For ANY of the following changes in patient status:  A. RR LESS THAN 10 breaths per minute, B. Oxygen saturation LESS THAN 90%, C. Sedation score of 6  ondansetron Injectable 4 milliGRAM(s) IV Push every 6 hours PRN Nausea and/or Vomiting      Vital Signs Last 24 Hrs  T(C): 37.2 (10 Oct 2022 06:00), Max: 37.7 (10 Oct 2022 02:45)  T(F): 98.9 (10 Oct 2022 06:00), Max: 99.8 (10 Oct 2022 02:45)  HR: 89 (10 Oct 2022 09:00) (60 - 115)  BP: 97/47 (10 Oct 2022 09:00) (65/31 - 149/77)  BP(mean): 56 (10 Oct 2022 09:00) (37 - 93)  RR: 17 (10 Oct 2022 09:00) (15 - 28)  SpO2: 94% (10 Oct 2022 09:00) (84% - 100%)    Parameters below as of 10 Oct 2022 08:00  Patient On (Oxygen Delivery Method): mask, Venturi    O2 Concentration (%): 50    Physical Exam:  General: AAOx3, morbidly obese male, NAD, on non rebreather  Chest: Normal respiratory effort  Heart: RRR  Abdomen: Soft, ND, appropriately tender, ostomy patent.  Neuro/Psych: No localized deficits. Normal speech, normal tone  Skin: Normal, no rashes, no lesions noted.   Extremities: Warm, well perfused, no edema, Pulses intact    I&O's Detail    09 Oct 2022 07:01  -  10 Oct 2022 07:00  --------------------------------------------------------  IN:    IV PiggyBack: 200 mL    Oral Fluid: 360 mL    PRBCs (Packed Red Blood Cells): 578 mL  Total IN: 1138 mL    OUT:    Bulb (mL): 45 mL    Bulb (mL): 140 mL    Emesis (mL): 100 mL    Ileostomy (mL): 400 mL    Indwelling Catheter - Urethral (mL): 850 mL    Intermittent Catheterization - Urethral (mL): 50 mL  Total OUT: 1585 mL    Total NET: -447 mL      LABS:                        8.1    9.05  )-----------( 171      ( 10 Oct 2022 06:46 )             26.0     10 Oct 2022 06:46    138    |  103    |  77     ----------------------------<  148    4.6     |  29     |  2.54     Ca    8.5        10 Oct 2022 06:46  Phos  2.8       10 Oct 2022 06:46  Mg     2.4       10 Oct 2022 06:46      PT/INR - ( 08 Oct 2022 16:46 )   PT: 15.0 sec;   INR: 1.29 ratio         PTT - ( 10 Oct 2022 06:46 )  PTT:23.1 sec

## 2022-10-10 NOTE — PROGRESS NOTE ADULT - NS ATTEND AMEND GEN_ALL_CORE FT
Agree with above     S/p proctocolectomy, ileoanal anastomosis, loop ileostomy 10/6   L sided pna, peritonitis from fecal spillage   JOSE from ATN   Acute anemia    Plan:   Off iv heparin   Received PRBC 10/9  High ileostomy output, worsening Cr - add IVF x 12 hrs   Continue Zosyn   OOB   Nocturnal NIV   Clears per surgery   AAOx3  Abd soft, mild RLQ tenderness   2 VINNY's on L, ileostomy on R     Family updated at bedside Agree with above     S/p proctocolectomy, ileoanal anastomosis, loop ileostomy 10/6   L sided pna, peritonitis from fecal spillage   JOSE from ATN   Acute anemia    Plan:   Off iv heparin   PRBC 10/10  High ileostomy output, worsening Cr - add IVF x 12 hrs   Continue Zosyn   OOB   Nocturnal NIV   Clears per surgery   AAOx3  Abd soft, mild RLQ tenderness   2 VINNY's on L, ileostomy on R     Family updated at bedside

## 2022-10-10 NOTE — PROGRESS NOTE ADULT - SUBJECTIVE AND OBJECTIVE BOX
Patient is a 77y old  Male who presents with a chief complaint of Malignant neoplasm of sigmoid colon  Ulcerative colitis without complications  (07 Oct 2022 12:41)    BRIEF HOSPITAL COURSE: 78yo male with PMHx Afib on eliquis, HTN, HLD, COPD, FLORA, obestity, ulcerative colitis, recent dx colon cancer presents for elective total proctocolectomy wiht ileal pouch anal anastamosis.      s/p Open restorative proctocolectomy with ileal pouch anal anastamosis and diverting loop ileostomy   Intra op  EBL 250cc, received 3200cc IVF  There was humberto fecal spillage from transverse colon    Hospital course c/b:  septic shock requiring pressors  LLL PNA suspected aspiration event  JOSE      10/10 Pt was on full liquids yesterday but has emesis, made NPO. now adv to clears. also with drop in Hgb 8.9 -> 7.1. ileostomy appearing coffee ground? 2u prbc ordered by surgery.    PAST MEDICAL & SURGICAL HISTORY:  Ulcerative colitis  Colon cancer  Obstructive sleep apnea  Obesity  HTN (hypertension)  Atrial fibrillation  HLD (hyperlipidemia)  BPH (benign prostatic hyperplasia)  COPD (chronic obstructive pulmonary disease)  COVID-19 virus infection  History of pneumonia  H/O hernia repair  History of hydrocelectomy  H/O colonoscopy  H/O cystoscopy  urethral stricture  History of total knee replacement, bilateral  S/P placement of cardiac pacemaker  History of cataract surgery      Medications:  piperacillin/tazobactam IVPB.. 3.375 Gram(s) IV Intermittent every 8 hours  ALBUTerol    90 MICROgram(s) HFA Inhaler 2 Puff(s) Inhalation every 6 hours PRN  albuterol/ipratropium for Nebulization 3 milliLiter(s) Nebulizer every 6 hours PRN  budesonide 160 MICROgram(s)/formoterol 4.5 MICROgram(s) Inhaler 2 Puff(s) Inhalation two times a day  tiotropium 18 MICROgram(s) Capsule 1 Capsule(s) Inhalation daily  acetaminophen   IVPB .. 1000 milliGRAM(s) IV Intermittent once  HYDROmorphone  Injectable 0.5 milliGRAM(s) IV Push every 6 hours PRN  morphine  - Injectable 2 milliGRAM(s) IV Push every 4 hours PRN  ondansetron Injectable 4 milliGRAM(s) IV Push every 6 hours PRN  loperamide 2 milliGRAM(s) Oral three times a day  pantoprazole  Injectable 40 milliGRAM(s) IV Push every 12 hours  psyllium Powder 1 Packet(s) Oral daily  atorvastatin 20 milliGRAM(s) Oral at bedtime  dextrose 50% Injectable 25 Gram(s) IV Push once  dextrose 50% Injectable 12.5 Gram(s) IV Push once  dextrose 50% Injectable 25 Gram(s) IV Push once  dextrose Oral Gel 15 Gram(s) Oral once  glucagon  Injectable 1 milliGRAM(s) IntraMuscular once  hydrocortisone sodium succinate Injectable 50 milliGRAM(s) IV Push every 8 hours  insulin lispro (ADMELOG) corrective regimen sliding scale   SubCutaneous every 6 hours  sodium chloride 0.9% lock flush 3 milliLiter(s) IV Push every 8 hours  influenza  Vaccine (HIGH DOSE) 0.7 milliLiter(s) IntraMuscular once  chlorhexidine 4% Liquid 1 Application(s) Topical <User Schedule>  naloxone Injectable 0.1 milliGRAM(s) IV Push every 3 minutes PRN      ICU Vital Signs Last 24 Hrs  T(C): 37.2 (10 Oct 2022 06:00), Max: 37.7 (10 Oct 2022 02:45)  T(F): 98.9 (10 Oct 2022 06:00), Max: 99.8 (10 Oct 2022 02:45)  HR: 89 (10 Oct 2022 09:00) (60 - 115)  BP: 97/47 (10 Oct 2022 09:00) (65/31 - 149/77)  BP(mean): 56 (10 Oct 2022 09:00) (37 - 93)  RR: 17 (10 Oct 2022 09:00) (15 - 28)  SpO2: 94% (10 Oct 2022 09:00) (84% - 100%)    O2 Parameters below as of 10 Oct 2022 08:00  Patient On (Oxygen Delivery Method): mask, Venturi    O2 Concentration (%): 50      I&O's Detail    09 Oct 2022 07:01  -  10 Oct 2022 07:00  --------------------------------------------------------  IN:    IV PiggyBack: 200 mL    Oral Fluid: 360 mL    PRBCs (Packed Red Blood Cells): 578 mL  Total IN: 1138 mL    OUT:    Bulb (mL): 45 mL    Bulb (mL): 140 mL    Emesis (mL): 100 mL    Ileostomy (mL): 400 mL    Indwelling Catheter - Urethral (mL): 850 mL    Intermittent Catheterization - Urethral (mL): 50 mL  Total OUT: 1585 mL    Total NET: -447 mL      LABS:                        8.1    9.05  )-----------( 171      ( 10 Oct 2022 06:46 )             26.0     10-10    138  |  103  |  77<H>  ----------------------------<  148<H>  4.6   |  29  |  2.54<H>    Ca    8.5      10 Oct 2022 06:46  Phos  2.8     10-10  Mg     2.4     10-10    TPro  x   /  Alb  2.1<L>  /  TBili  x   /  DBili  x   /  AST  x   /  ALT  x   /  AlkPhos  x   10-09          CAPILLARY BLOOD GLUCOSE      POCT Blood Glucose.: 158 mg/dL (10 Oct 2022 05:50)    PT/INR - ( 08 Oct 2022 16:46 )   PT: 15.0 sec;   INR: 1.29 ratio         PTT - ( 10 Oct 2022 06:46 )  PTT:23.1 sec    CULTURES:  Culture Results:   No growth to date. (10-06 @ 17:26)  Culture Results:   No growth to date. (10-06 @ 17:25)      Physical Examination:    General: No acute distress.      HEENT: Pupils equal, reactive to light.  Symmetric.    PULM: Clear to auscultation bilaterally, no significant sputum production    NECK: Supple, no lymphadenopathy, trachea midline    CVS: Regular rate and rhythm, no murmurs, rubs, or gallops    ABD: Soft, nondistended, nontender, normoactive bowel sounds, no masses    EXT: No edema, nontender    SKIN: Warm and well perfused, no rashes noted.    NEURO: Alert, oriented, interactive, nonfocal    DEVICES:     RADIOLOGY: ***    CRITICAL CARE TIME SPENT: ***   Patient is a 77y old  Male who presents with a chief complaint of Malignant neoplasm of sigmoid colon  Ulcerative colitis without complications  (07 Oct 2022 12:41)    BRIEF HOSPITAL COURSE: 76yo male with PMHx Afib on eliquis, HTN, HLD, COPD, FLORA, obestity, ulcerative colitis, recent dx colon cancer presents for elective total proctocolectomy wiht ileal pouch anal anastamosis.      s/p Open restorative proctocolectomy with ileal pouch anal anastamosis and diverting loop ileostomy   Intra op  humberto fecal spillage from transverse colon,   EBL 250cc, received 3200cc IVF  There was    Hospital course c/b:  septic shock requiring pressors  LLL PNA suspected aspiration event  JOSE    10/10 Pt was on full liquids yesterday but has emesis, made NPO. now adv to clears. also with drop in Hgb 8.9 -> 7.1. ileostomy appearing coffee ground? 2u prbc ordered by surgery. states SOB has improved, abd pain well controlled on current regimen. pt only c/o mild frontal HA.    PAST MEDICAL & SURGICAL HISTORY:  Ulcerative colitis  Colon cancer  Obstructive sleep apnea  Obesity  HTN (hypertension)  Atrial fibrillation  HLD (hyperlipidemia)  BPH (benign prostatic hyperplasia)  COPD (chronic obstructive pulmonary disease)  COVID-19 virus infection  History of pneumonia  H/O hernia repair  History of hydrocelectomy  H/O colonoscopy  H/O cystoscopy  urethral stricture  History of total knee replacement, bilateral  S/P placement of cardiac pacemaker  History of cataract surgery      Medications:  piperacillin/tazobactam IVPB.. 3.375 Gram(s) IV Intermittent every 8 hours  ALBUTerol    90 MICROgram(s) HFA Inhaler 2 Puff(s) Inhalation every 6 hours PRN  albuterol/ipratropium for Nebulization 3 milliLiter(s) Nebulizer every 6 hours PRN  budesonide 160 MICROgram(s)/formoterol 4.5 MICROgram(s) Inhaler 2 Puff(s) Inhalation two times a day  tiotropium 18 MICROgram(s) Capsule 1 Capsule(s) Inhalation daily  acetaminophen   IVPB .. 1000 milliGRAM(s) IV Intermittent once  HYDROmorphone  Injectable 0.5 milliGRAM(s) IV Push every 6 hours PRN  morphine  - Injectable 2 milliGRAM(s) IV Push every 4 hours PRN  ondansetron Injectable 4 milliGRAM(s) IV Push every 6 hours PRN  loperamide 2 milliGRAM(s) Oral three times a day  pantoprazole  Injectable 40 milliGRAM(s) IV Push every 12 hours  psyllium Powder 1 Packet(s) Oral daily  atorvastatin 20 milliGRAM(s) Oral at bedtime  dextrose 50% Injectable 25 Gram(s) IV Push once  dextrose 50% Injectable 12.5 Gram(s) IV Push once  dextrose 50% Injectable 25 Gram(s) IV Push once  dextrose Oral Gel 15 Gram(s) Oral once  glucagon  Injectable 1 milliGRAM(s) IntraMuscular once  hydrocortisone sodium succinate Injectable 50 milliGRAM(s) IV Push every 8 hours  insulin lispro (ADMELOG) corrective regimen sliding scale   SubCutaneous every 6 hours  sodium chloride 0.9% lock flush 3 milliLiter(s) IV Push every 8 hours  influenza  Vaccine (HIGH DOSE) 0.7 milliLiter(s) IntraMuscular once  chlorhexidine 4% Liquid 1 Application(s) Topical <User Schedule>  naloxone Injectable 0.1 milliGRAM(s) IV Push every 3 minutes PRN      ICU Vital Signs Last 24 Hrs  T(C): 37.2 (10 Oct 2022 06:00), Max: 37.7 (10 Oct 2022 02:45)  T(F): 98.9 (10 Oct 2022 06:00), Max: 99.8 (10 Oct 2022 02:45)  HR: 89 (10 Oct 2022 09:00) (60 - 115)  BP: 97/47 (10 Oct 2022 09:00) (65/31 - 149/77)  BP(mean): 56 (10 Oct 2022 09:00) (37 - 93)  RR: 17 (10 Oct 2022 09:00) (15 - 28)  SpO2: 94% (10 Oct 2022 09:00) (84% - 100%)    O2 Parameters below as of 10 Oct 2022 08:00  Patient On (Oxygen Delivery Method): mask, Venturi    O2 Concentration (%): 50      I&O's Detail    09 Oct 2022 07:01  -  10 Oct 2022 07:00  --------------------------------------------------------  IN:    IV PiggyBack: 200 mL    Oral Fluid: 360 mL    PRBCs (Packed Red Blood Cells): 578 mL  Total IN: 1138 mL    OUT:    Bulb (mL): 45 mL    Bulb (mL): 140 mL    Emesis (mL): 100 mL    Ileostomy (mL): 400 mL    Indwelling Catheter - Urethral (mL): 850 mL    Intermittent Catheterization - Urethral (mL): 50 mL  Total OUT: 1585 mL    Total NET: -447 mL      LABS:                        8.1    9.05  )-----------( 171      ( 10 Oct 2022 06:46 )             26.0     10-10    138  |  103  |  77<H>  ----------------------------<  148<H>  4.6   |  29  |  2.54<H>    Ca    8.5      10 Oct 2022 06:46  Phos  2.8     10-10  Mg     2.4     10-10    TPro  x   /  Alb  2.1<L>  /  TBili  x   /  DBili  x   /  AST  x   /  ALT  x   /  AlkPhos  x   10-09      CAPILLARY BLOOD GLUCOSE  POCT Blood Glucose.: 158 mg/dL (10 Oct 2022 05:50)  PT/INR - ( 08 Oct 2022 16:46 )   PT: 15.0 sec;   INR: 1.29 ratio   PTT - ( 10 Oct 2022 06:46 )  PTT:23.1 sec    CULTURES:  Culture Results:   No growth to date. (10-06 @ 17:26)  Culture Results:   No growth to date. (10-06 @ 17:25)      Physical Examination:  General: No acute resp distress.  awake and alert  HEENT: Pupils equal, reactive to light.  Symmetric.  PULM:  course breath sounds b/l. no wheezing  NECK: Supple, no lymphadenopathy, trachea midline  CVS: irregular, no murmurs  ABD: Soft, nondistended, nontender, normoactive bowel sounds, + ostomy with very dark brown/black stool, melena. JPx 2 left abd- serosanguinous   : + alfaro, urine output minimal clear  EXT: No LE edema b/l, calf nontender  SKIN: Warm and well perfused, no rashes noted.  NEURO: Alert, orientedx 3, interactive, nonfocal    DEVICES:   + alfaro  + VINNY x 2 on left side abdomen- serosanguinous    RADIOLOGY:   < from: TTE Echo Complete w/ Contrast w/ Doppler (10.07.22 @ 10:28) >   Summary     Moderate mitral annular calcification is present.   The mitral valve leaflets appear thickened.   EA reversal of the mitral inflow consistent with reduced compliance of   the   left ventricle.   The aortic valve appears mildly calcified. Valve opening seems to be   restricted.   Peak and mean transaortic gradients are 25 and 14 mmHg respectively; this   finding is consistent with mild aortic stenosis.   The tricuspid valve leaflets appear mildly thickened and/or calcified,   but   open well.   Moderate (2+) tricuspid valve regurgitation is present.   Mild pulmonary hypertension.   Mild concentric left ventricular hypertrophy is present.   Left ventricle systolic function moderately decreased paradoxical septal   motion ,apical wall , anterior wall , apical lateral hypokinesis in the   presence of a cardiac arrhythmia.   Lumason was used to better define the endocardial border.   Visual estimation of left ventricle ejection fraction is 40-45 %.   Normal appearing right atrium.   A device wire is seen in the RV and RA.   Normal appearing right ventricle structure and function.    < end of copied text >     Patient is a 77y old  Male who presents with a chief complaint of Malignant neoplasm of sigmoid colon  Ulcerative colitis without complications  (07 Oct 2022 12:41)    BRIEF HOSPITAL COURSE: 78yo male with PMHx Afib on eliquis, HTN, HLD, COPD, FLORA, obestity, ulcerative colitis, recent dx colon cancer presents for elective total proctocolectomy wiht ileal pouch anal anastamosis.      s/p Open restorative proctocolectomy with ileal pouch anal anastamosis and diverting loop ileostomy   Intra op  humberto fecal spillage from transverse colon,   EBL 250cc, received 3200cc IVF      Hospital course c/b:  septic shock requiring pressors  LLL PNA suspected aspiration event  JOSE    10/10 Pt was on full liquids yesterday but has emesis, made NPO. now adv to clears. also with drop in Hgb 8.9 -> 7.1. ileostomy appearing coffee ground? 2u prbc ordered by surgery. states SOB has improved, abd pain well controlled on current regimen. pt only c/o mild frontal HA.    PAST MEDICAL & SURGICAL HISTORY:  Ulcerative colitis  Colon cancer  Obstructive sleep apnea  Obesity  HTN (hypertension)  Atrial fibrillation  HLD (hyperlipidemia)  BPH (benign prostatic hyperplasia)  COPD (chronic obstructive pulmonary disease)  COVID-19 virus infection  History of pneumonia  H/O hernia repair  History of hydrocelectomy  H/O colonoscopy  H/O cystoscopy  urethral stricture  History of total knee replacement, bilateral  S/P placement of cardiac pacemaker  History of cataract surgery      Medications:  piperacillin/tazobactam IVPB.. 3.375 Gram(s) IV Intermittent every 8 hours  ALBUTerol    90 MICROgram(s) HFA Inhaler 2 Puff(s) Inhalation every 6 hours PRN  albuterol/ipratropium for Nebulization 3 milliLiter(s) Nebulizer every 6 hours PRN  budesonide 160 MICROgram(s)/formoterol 4.5 MICROgram(s) Inhaler 2 Puff(s) Inhalation two times a day  tiotropium 18 MICROgram(s) Capsule 1 Capsule(s) Inhalation daily  acetaminophen   IVPB .. 1000 milliGRAM(s) IV Intermittent once  HYDROmorphone  Injectable 0.5 milliGRAM(s) IV Push every 6 hours PRN  morphine  - Injectable 2 milliGRAM(s) IV Push every 4 hours PRN  ondansetron Injectable 4 milliGRAM(s) IV Push every 6 hours PRN  loperamide 2 milliGRAM(s) Oral three times a day  pantoprazole  Injectable 40 milliGRAM(s) IV Push every 12 hours  psyllium Powder 1 Packet(s) Oral daily  atorvastatin 20 milliGRAM(s) Oral at bedtime  dextrose 50% Injectable 25 Gram(s) IV Push once  dextrose 50% Injectable 12.5 Gram(s) IV Push once  dextrose 50% Injectable 25 Gram(s) IV Push once  dextrose Oral Gel 15 Gram(s) Oral once  glucagon  Injectable 1 milliGRAM(s) IntraMuscular once  hydrocortisone sodium succinate Injectable 50 milliGRAM(s) IV Push every 8 hours  insulin lispro (ADMELOG) corrective regimen sliding scale   SubCutaneous every 6 hours  sodium chloride 0.9% lock flush 3 milliLiter(s) IV Push every 8 hours  influenza  Vaccine (HIGH DOSE) 0.7 milliLiter(s) IntraMuscular once  chlorhexidine 4% Liquid 1 Application(s) Topical <User Schedule>  naloxone Injectable 0.1 milliGRAM(s) IV Push every 3 minutes PRN      ICU Vital Signs Last 24 Hrs  T(C): 37.2 (10 Oct 2022 06:00), Max: 37.7 (10 Oct 2022 02:45)  T(F): 98.9 (10 Oct 2022 06:00), Max: 99.8 (10 Oct 2022 02:45)  HR: 89 (10 Oct 2022 09:00) (60 - 115)  BP: 97/47 (10 Oct 2022 09:00) (65/31 - 149/77)  BP(mean): 56 (10 Oct 2022 09:00) (37 - 93)  RR: 17 (10 Oct 2022 09:00) (15 - 28)  SpO2: 94% (10 Oct 2022 09:00) (84% - 100%)    O2 Parameters below as of 10 Oct 2022 08:00  Patient On (Oxygen Delivery Method): mask, Venturi    O2 Concentration (%): 50      I&O's Detail    09 Oct 2022 07:01  -  10 Oct 2022 07:00  --------------------------------------------------------  IN:    IV PiggyBack: 200 mL    Oral Fluid: 360 mL    PRBCs (Packed Red Blood Cells): 578 mL  Total IN: 1138 mL    OUT:    Bulb (mL): 45 mL    Bulb (mL): 140 mL    Emesis (mL): 100 mL    Ileostomy (mL): 400 mL    Indwelling Catheter - Urethral (mL): 850 mL    Intermittent Catheterization - Urethral (mL): 50 mL  Total OUT: 1585 mL    Total NET: -447 mL      LABS:                        8.1    9.05  )-----------( 171      ( 10 Oct 2022 06:46 )             26.0     10-10    138  |  103  |  77<H>  ----------------------------<  148<H>  4.6   |  29  |  2.54<H>    Ca    8.5      10 Oct 2022 06:46  Phos  2.8     10-10  Mg     2.4     10-10    TPro  x   /  Alb  2.1<L>  /  TBili  x   /  DBili  x   /  AST  x   /  ALT  x   /  AlkPhos  x   10-09      CAPILLARY BLOOD GLUCOSE  POCT Blood Glucose.: 158 mg/dL (10 Oct 2022 05:50)  PT/INR - ( 08 Oct 2022 16:46 )   PT: 15.0 sec;   INR: 1.29 ratio   PTT - ( 10 Oct 2022 06:46 )  PTT:23.1 sec    CULTURES:  Culture Results:   No growth to date. (10-06 @ 17:26)  Culture Results:   No growth to date. (10-06 @ 17:25)      Physical Examination:  General: No acute resp distress.  awake and alert  HEENT: Pupils equal, reactive to light.  Symmetric.  PULM:  course breath sounds b/l. no wheezing  NECK: Supple, no lymphadenopathy, trachea midline  CVS: irregular, no murmurs  ABD: Soft, nondistended, nontender, normoactive bowel sounds, + ostomy with very dark brown/black stool, melena. JPx 2 left abd- serosanguinous   : + alfaro, urine output minimal clear  EXT: No LE edema b/l, calf nontender  SKIN: Warm and well perfused, no rashes noted.  NEURO: Alert, orientedx 3, interactive, nonfocal    DEVICES:   + alfaro  + VINNY x 2 on left side abdomen- serosanguinous    RADIOLOGY:   < from: TTE Echo Complete w/ Contrast w/ Doppler (10.07.22 @ 10:28) >   Summary     Moderate mitral annular calcification is present.   The mitral valve leaflets appear thickened.   EA reversal of the mitral inflow consistent with reduced compliance of   the   left ventricle.   The aortic valve appears mildly calcified. Valve opening seems to be   restricted.   Peak and mean transaortic gradients are 25 and 14 mmHg respectively; this   finding is consistent with mild aortic stenosis.   The tricuspid valve leaflets appear mildly thickened and/or calcified,   but   open well.   Moderate (2+) tricuspid valve regurgitation is present.   Mild pulmonary hypertension.   Mild concentric left ventricular hypertrophy is present.   Left ventricle systolic function moderately decreased paradoxical septal   motion ,apical wall , anterior wall , apical lateral hypokinesis in the   presence of a cardiac arrhythmia.   Lumason was used to better define the endocardial border.   Visual estimation of left ventricle ejection fraction is 40-45 %.   Normal appearing right atrium.   A device wire is seen in the RV and RA.   Normal appearing right ventricle structure and function.    < end of copied text >

## 2022-10-10 NOTE — PROGRESS NOTE ADULT - ASSESSMENT
77hx  a fib on apixaban  HTN HLD obesity FLORA COPD ex smoker  ulcerative colitis and colon cancer.  POD#3  Open restorative proctocolectomy with IPAA, diverting loop ileostomy rectal mass noted.    There was fecal spillage peritonitis.    Post op;  1 Hypoxemia due to LLL atelectasis   Improved with NIPPPV  2. Septic shock  titrating phenylephrine to a MAP of 65 additional crystalloid given, currently off pressors  3. Oliguric JOSE due to ATN possible pre renal component.      BP dropped top MAP of 55, phenylphrine r/s, MAP 67 in am. , UOP improved approx 50cc/h,m. BIPAP discontinued but has been on non rebreather sat 92%.   Clinically, mild abd tenderness, comfortable. Abd soft, mildly distended, mild tenderness to palpation. Prevena lost suction and ostomy bag leaked inside it, it was removed at 5 am and dry dressing applied. Ostomy is functional.   Currently on zosyn for L sided pneumonia and intraabd spillage.  Cardiology : c/w supportive care, follow up as outpt.   H/H dropped, current melena in stoma bag, s/p 2 U PRBC, started on PPI/imodium      Plan:  hold Hep drip  trend H/H, transfuse prn  if melena persist, consult GI  replete ostomy output cautiously   wean off O2  ambulation   physical therapy  pain control  monitor bowel function   serial abdominal exams  ADAT  Medical management by ICU team    Case discussed with Colorectal team

## 2022-10-10 NOTE — PROGRESS NOTE ADULT - ATTENDING COMMENTS
Patient seen and examined at bedside this morning. No complaints this morning, He is NPO, denies N/V, abdomen is soft, minimal distention, appropriately tender, ostomy with coffee ground output. No crackles appreciated at bedside today,     I&O's Detail    09 Oct 2022 07:01  -  10 Oct 2022 07:00  --------------------------------------------------------  IN:    IV PiggyBack: 200 mL    Oral Fluid: 360 mL    PRBCs (Packed Red Blood Cells): 578 mL  Total IN: 1138 mL    OUT:    Bulb (mL): 45 mL    Bulb (mL): 140 mL    Emesis (mL): 100 mL    Ileostomy (mL): 400 mL    Indwelling Catheter - Urethral (mL): 850 mL    Intermittent Catheterization - Urethral (mL): 50 mL  Total OUT: 1585 mL    Total NET: -447 mL    Vital Signs Last 24 Hrs  T(C): 37.2 (10 Oct 2022 06:00), Max: 37.7 (10 Oct 2022 02:45)  T(F): 98.9 (10 Oct 2022 06:00), Max: 99.8 (10 Oct 2022 02:45)  HR: 89 (10 Oct 2022 09:00) (60 - 115)  BP: 97/47 (10 Oct 2022 09:00) (65/31 - 149/77)  BP(mean): 56 (10 Oct 2022 09:00) (37 - 93)  RR: 17 (10 Oct 2022 09:00) (15 - 28)  SpO2: 94% (10 Oct 2022 09:00) (84% - 100%)    Parameters below as of 10 Oct 2022 08:00  Patient On (Oxygen Delivery Method): mask, Venturi    O2 Concentration (%): 50                          8.1    9.05  )-----------( 171      ( 10 Oct 2022 06:46 )             26.0     10-10    138  |  103  |  77<H>  ----------------------------<  148<H>  4.6   |  29  |  2.54<H>    Ca    8.5      10 Oct 2022 06:46  Phos  2.8     10-10  Mg     2.4     10-10    TPro  x   /  Alb  2.1<L>  /  TBili  x   /  DBili  x   /  AST  x   /  ALT  x   /  AlkPhos  x   10-09    A/P  Ensure adequate pain control  Resume clear liquids  Imodium and Metamucil to augment stoma output  Judicious IV fluids, Cr trending up  Failed TOV - F/u Urology consultation  Continue to monitor hemodynamic status  Supplemental O2 as needed  Continue to trend Hgb, GI consultation for UGI if Hgb continues to trend down  Hold AC until repeat H&H stable  Chest PT, IS use 10/hr  Daily physical therapy with OOB to chair and ambulation  Steroid taper  Continue close supportive care during recovery

## 2022-10-10 NOTE — PROGRESS NOTE ADULT - ASSESSMENT
ASSESSMENT  78yo male with PMHx Afib on eliquis, HTN, HLD, COPD, FLORA, obestity, ulcerative colitis, recent dx colon cancer presents for elective total proctocolectomy wiht ileal pouch anal anastamosis.     s/p Open restorative proctocolectomy with ileal pouch anal anastamosis (IPAA) and diverting loop ileostomy   Intra op findings include humberto fecal spillage from transverse colon and rectosigmoid mass noted  EBL 250cc, received 3200cc IVF  POD#4  + VINNY x 2 left abd    Hospital course c/b:  septic shock requiring pressors, now off  LLL PNA suspected aspiration event  JOSE    PLAN  Neuro: mentation intact, pain control with dilaudid and morphine  CV: hypotensive overnight (lowest 79/49) BP soft but stable this am. HR 60-80s afib paced. home metoprolol held d/t hypotension. heparin gtt dcd last night. TTE 10/7 LVEF 40-45%. diastolic dysfxn, mod TR. s/p IV lasix 20 x 1 with transfusion.  Pulm: on ventimask sating 96-97%, titrate to maintain O2 88-92%. cont bipap qhs for FLORA. repeat CXR today. cont IV zosyn for LLL PNA d/t aspiration event. cont spiriva and alb inh. added symbicort. incentive spirometer  GI: adv to clear liquids as per surgery. IV PPI. ileostomy output dark brown, coffee ground appearing. will consult GI if Hgb continues to drop, d/w surgery  Nephro: JOSE worsening today, suspect in setting of persistent hypotension overnight and anemia. Pt to receive 2u pRBC as per surgery. Will monitor off IV fluids for now unless pt unable to tolerate clears. Recheck BMP after transfusions. + alfaro in place, UOP decreased.   Endo: a1c 6.9. ISS, change BGMs ac hs. FS goal <180. cont iv solucortef 50q8hr. take prednisone 4mg /day at home.   ID: cont IV zosyn #4. aspiration precautions,   Heme: hep gtt dcd last night due to drop Hgb. cont SCDs. 2u pRBC ordered. f/u rpt cbc 1300.   PT eval    Will discuss with Dr. Soriano     ASSESSMENT  78yo male with PMHx Afib on eliquis, HTN, HLD, COPD, FLORA, obestity, ulcerative colitis, recent dx colon cancer presents for elective total proctocolectomy wiht ileal pouch anal anastamosis.     s/p Open restorative proctocolectomy with ileal pouch anal anastamosis (IPAA) and diverting loop ileostomy   Intra op findings include humberto fecal spillage from transverse colon and rectosigmoid mass noted  EBL 250cc, received 3200cc IVF  POD#4  + VINNY x 2 left abd    Hospital course c/b:  septic shock requiring pressors, now off  LLL PNA suspected aspiration event  JOSE    PLAN  Neuro: mentation intact, pain control with dilaudid and morphine  CV: hypotensive overnight (lowest 79/49) BP soft but stable this am. HR 60-80s afib paced. home metoprolol held d/t hypotension. heparin gtt dcd last night. TTE 10/7 LVEF 40-45%. diastolic dysfxn, mod TR. s/p IV lasix 20 x 1 with transfusion.  Pulm: on ventimask sating 96-97%, titrate to maintain O2 88-92%. cont bipap qhs for FLORA. repeat CXR today. cont IV zosyn for LLL PNA d/t aspiration event. cont spiriva and alb inh. added symbicort. incentive spirometer  GI: adv to clear liquids as per surgery. IV PPI. ileostomy output dark brown, coffee ground appearing. will consult GI if Hgb continues to drop, d/w surgery  Nephro: JOSE worsening today, suspect in setting of persistent hypotension overnight and anemia. Pt to receive 2u pRBC as per surgery. Will monitor off IV fluids for now unless pt unable to tolerate clears. Recheck BMP after transfusions. + alfaro in place, UOP decreased.   Endo: a1c 6.9. ISS, change BGMs ac hs. FS goal <180. cont iv solucortef 50q8hr. take prednisone 4mg /day at home.   ID: cont IV zosyn #4. aspiration precautions,   Heme: hep gtt dcd last night due to drop Hgb. cont SCDs. 2u pRBC ordered. f/u rpt cbc 1300.   PT eval    Dispo: SICU. f/u rpt cbc s/p 2u pRBC. IV PPI bid. monitor UOP and trend Cr. clear liquid diet. Titrate O2 as tolerated.     Will discuss with Dr. Soriano     ASSESSMENT  78yo male with PMHx Afib on eliquis, HTN, HLD, COPD, FLORA, obestity, ulcerative colitis, recent dx colon cancer presents for elective total proctocolectomy wiht ileal pouch anal anastamosis.     s/p Open restorative proctocolectomy with ileal pouch anal anastamosis (IPAA) and diverting loop ileostomy   Intra op findings include humberto fecal spillage from transverse colon and rectosigmoid mass noted  EBL 250cc, received 3200cc IVF  POD#4  + VINNY x 2 left abd    Hospital course c/b:  septic shock requiring pressors, now off  LLL PNA suspected aspiration event  JOSE  Anemia 2/2 post op surgery vs UGIB?    PLAN  Neuro: mentation intact, pain control with dilaudid and morphine  CV: hypotensive overnight (lowest 79/49) BP soft but stable this am. HR 60-80s afib paced. home metoprolol held d/t hypotension. heparin gtt dcd last night. TTE 10/7 LVEF 40-45%. diastolic dysfxn, mod TR. s/p IV lasix 20 x 1 with transfusion.  Pulm: on ventimask sating 96-97%, titrate to maintain O2 88-92%. cont bipap qhs for FLORA. repeat CXR today. cont IV zosyn for LLL PNA d/t aspiration event. cont spiriva and alb inh. added symbicort. incentive spirometer  GI: adv to clear liquids as per surgery. IV PPI bid started yesterday for possible UGIB? suspect in setting of steroid use and heparin with no PPI. ileostomy output dark brown, coffee ground appearing. will consult GI if Hgb continues to drop, d/w surgery.   Nephro: JOSE worsening today, suspect in setting of persistent hypotension overnight and anemia. Pt to receive 2u pRBC as per surgery. Will monitor off IV fluids for now unless pt unable to tolerate clears. Recheck BMP after transfusions. + alfaro in place, UOP decreased.   Endo: a1c 6.9. ISS, change BGMs ac hs. FS goal <180. cont iv solucortef 50q8hr. take prednisone 4mg /day at home.   ID: cont IV zosyn #4. aspiration precautions,   Heme: hep gtt dcd last night due to drop Hgb. cont SCDs. 2u pRBC ordered last night. f/u rpt cbc 1300.   PT eval    Dispo: SICU. f/u rpt cbc s/p 2u pRBC. IV PPI bid. monitor UOP and trend Cr. clear liquid diet. Titrate O2 as tolerated.     Will discuss with Dr. Soriano     ASSESSMENT  76yo male with PMHx Afib on eliquis, HTN, HLD, COPD, FLORA, obestity, ulcerative colitis, recent dx colon cancer presents for elective total proctocolectomy wiht ileal pouch anal anastamosis.     s/p Open restorative proctocolectomy with ileal pouch anal anastamosis (IPAA) and diverting loop ileostomy   Intra op findings include humberto fecal spillage from transverse colon and rectosigmoid mass noted  EBL 250cc, received 3200cc IVF  POD#4  + VINNY x 2 left abd    Hospital course c/b:  septic shock requiring pressors, now off  LLL PNA suspected aspiration event  JOSE  Anemia 2/2 post op surgery vs UGIB?    PLAN  Neuro: mentation intact, pain control with dilaudid and morphine  CV: hypotensive overnight (systolic BP 65-80s) BP soft but stable this am. HR 60-80s afib paced. home metoprolol held d/t hypotension. heparin gtt dcd last night. TTE 10/7 LVEF 40-45%. diastolic dysfxn, mod TR. s/p IV lasix 20 x 2 yesterday.  Pulm: on ventimask sating 96-97%, titrate to maintain O2 88-92%. cont bipap qhs for FLORA. repeat CXR today revealing some inc pulm vasc congestion. cont IV zosyn for LLL PNA d/t aspiration event. cont spiriva and alb inh. added symbicort. incentive spirometer  GI: adv to clear liquids as per surgery. IV PPI bid started yesterday for possible UGIB? possibly in setting of steroid use and heparin with no PPI. ileostomy output dark brown, coffee ground appearing. will consult GI if Hgb continues to drop, d/w surgery.   Nephro: JOSE worsening today, suspect in setting of persistent hypotension overnight and anemia. Pt s/p 2u pRBC. Recheck BMP after transfusions. + alfaro in place, monitor I & Os. Will start gentle IV fluids if Cr worsens.  Endo: a1c 6.9. ISS, change BGMs ac hs. FS goal <180. cont iv solucortef 50q8hr. takes prednisone 4mg /day at home.   ID: cont IV zosyn #4. aspiration precautions,   Heme: hep gtt dcd last night due to drop Hgb. cont SCDs. 2u pRBC ordered last night. f/u rpt cbc 1300.   PT eval    Dispo: SICU. f/u rpt cbc s/p 2u pRBC. IV PPI bid. monitor UOP and trend Cr. clear liquid diet. Titrate O2 as tolerated.     Will discuss with Dr. Soriano

## 2022-10-11 LAB
ALBUMIN SERPL ELPH-MCNC: 2 G/DL — LOW (ref 3.3–5)
ALP SERPL-CCNC: 42 U/L — SIGNIFICANT CHANGE UP (ref 40–120)
ALT FLD-CCNC: 17 U/L — SIGNIFICANT CHANGE UP (ref 12–78)
ANION GAP SERPL CALC-SCNC: 7 MMOL/L — SIGNIFICANT CHANGE UP (ref 5–17)
AST SERPL-CCNC: 13 U/L — LOW (ref 15–37)
BILIRUB SERPL-MCNC: 0.5 MG/DL — SIGNIFICANT CHANGE UP (ref 0.2–1.2)
BUN SERPL-MCNC: 70 MG/DL — HIGH (ref 7–23)
CALCIUM SERPL-MCNC: 8.7 MG/DL — SIGNIFICANT CHANGE UP (ref 8.5–10.1)
CHLORIDE SERPL-SCNC: 101 MMOL/L — SIGNIFICANT CHANGE UP (ref 96–108)
CO2 SERPL-SCNC: 27 MMOL/L — SIGNIFICANT CHANGE UP (ref 22–31)
CREAT SERPL-MCNC: 1.93 MG/DL — HIGH (ref 0.5–1.3)
EGFR: 36 ML/MIN/1.73M2 — LOW
GLUCOSE SERPL-MCNC: 151 MG/DL — HIGH (ref 70–99)
HCT VFR BLD CALC: 24.3 % — LOW (ref 39–50)
HCT VFR BLD CALC: 24.7 % — LOW (ref 39–50)
HCT VFR BLD CALC: 26.3 % — LOW (ref 39–50)
HGB BLD-MCNC: 7.7 G/DL — LOW (ref 13–17)
HGB BLD-MCNC: 7.8 G/DL — LOW (ref 13–17)
HGB BLD-MCNC: 8.2 G/DL — LOW (ref 13–17)
MAGNESIUM SERPL-MCNC: 2.4 MG/DL — SIGNIFICANT CHANGE UP (ref 1.6–2.6)
MCHC RBC-ENTMCNC: 27.2 PG — SIGNIFICANT CHANGE UP (ref 27–34)
MCHC RBC-ENTMCNC: 27.3 PG — SIGNIFICANT CHANGE UP (ref 27–34)
MCHC RBC-ENTMCNC: 27.3 PG — SIGNIFICANT CHANGE UP (ref 27–34)
MCHC RBC-ENTMCNC: 31.2 GM/DL — LOW (ref 32–36)
MCHC RBC-ENTMCNC: 31.6 GM/DL — LOW (ref 32–36)
MCHC RBC-ENTMCNC: 31.7 GM/DL — LOW (ref 32–36)
MCV RBC AUTO: 86.2 FL — SIGNIFICANT CHANGE UP (ref 80–100)
MCV RBC AUTO: 86.4 FL — SIGNIFICANT CHANGE UP (ref 80–100)
MCV RBC AUTO: 87.4 FL — SIGNIFICANT CHANGE UP (ref 80–100)
PHOSPHATE SERPL-MCNC: 3.3 MG/DL — SIGNIFICANT CHANGE UP (ref 2.5–4.5)
PLATELET # BLD AUTO: 159 K/UL — SIGNIFICANT CHANGE UP (ref 150–400)
PLATELET # BLD AUTO: 172 K/UL — SIGNIFICANT CHANGE UP (ref 150–400)
PLATELET # BLD AUTO: 181 K/UL — SIGNIFICANT CHANGE UP (ref 150–400)
POTASSIUM SERPL-MCNC: 4.5 MMOL/L — SIGNIFICANT CHANGE UP (ref 3.5–5.3)
POTASSIUM SERPL-SCNC: 4.5 MMOL/L — SIGNIFICANT CHANGE UP (ref 3.5–5.3)
PROT SERPL-MCNC: 5.7 GM/DL — LOW (ref 6–8.3)
RBC # BLD: 2.82 M/UL — LOW (ref 4.2–5.8)
RBC # BLD: 2.86 M/UL — LOW (ref 4.2–5.8)
RBC # BLD: 3.01 M/UL — LOW (ref 4.2–5.8)
RBC # FLD: 18.6 % — HIGH (ref 10.3–14.5)
RBC # FLD: 18.8 % — HIGH (ref 10.3–14.5)
RBC # FLD: 19 % — HIGH (ref 10.3–14.5)
SARS-COV-2 RNA SPEC QL NAA+PROBE: SIGNIFICANT CHANGE UP
SODIUM SERPL-SCNC: 135 MMOL/L — SIGNIFICANT CHANGE UP (ref 135–145)
WBC # BLD: 9.08 K/UL — SIGNIFICANT CHANGE UP (ref 3.8–10.5)
WBC # BLD: 9.46 K/UL — SIGNIFICANT CHANGE UP (ref 3.8–10.5)
WBC # BLD: 9.47 K/UL — SIGNIFICANT CHANGE UP (ref 3.8–10.5)
WBC # FLD AUTO: 9.08 K/UL — SIGNIFICANT CHANGE UP (ref 3.8–10.5)
WBC # FLD AUTO: 9.46 K/UL — SIGNIFICANT CHANGE UP (ref 3.8–10.5)
WBC # FLD AUTO: 9.47 K/UL — SIGNIFICANT CHANGE UP (ref 3.8–10.5)

## 2022-10-11 PROCEDURE — 99233 SBSQ HOSP IP/OBS HIGH 50: CPT

## 2022-10-11 PROCEDURE — 99222 1ST HOSP IP/OBS MODERATE 55: CPT

## 2022-10-11 RX ORDER — TAMSULOSIN HYDROCHLORIDE 0.4 MG/1
0.8 CAPSULE ORAL AT BEDTIME
Refills: 0 | Status: DISCONTINUED | OUTPATIENT
Start: 2022-10-11 | End: 2022-11-02

## 2022-10-11 RX ORDER — LOPERAMIDE HCL 2 MG
4 TABLET ORAL THREE TIMES A DAY
Refills: 0 | Status: DISCONTINUED | OUTPATIENT
Start: 2022-10-11 | End: 2022-10-12

## 2022-10-11 RX ORDER — ACETAMINOPHEN 500 MG
1000 TABLET ORAL ONCE
Refills: 0 | Status: COMPLETED | OUTPATIENT
Start: 2022-10-11 | End: 2022-10-11

## 2022-10-11 RX ORDER — ACETAMINOPHEN 500 MG
650 TABLET ORAL EVERY 6 HOURS
Refills: 0 | Status: DISCONTINUED | OUTPATIENT
Start: 2022-10-11 | End: 2022-11-02

## 2022-10-11 RX ORDER — SODIUM CHLORIDE 9 MG/ML
1000 INJECTION, SOLUTION INTRAVENOUS
Refills: 0 | Status: DISCONTINUED | OUTPATIENT
Start: 2022-10-11 | End: 2022-10-12

## 2022-10-11 RX ORDER — TAMSULOSIN HYDROCHLORIDE 0.4 MG/1
0.4 CAPSULE ORAL AT BEDTIME
Refills: 0 | Status: DISCONTINUED | OUTPATIENT
Start: 2022-10-11 | End: 2022-10-11

## 2022-10-11 RX ORDER — FINASTERIDE 5 MG/1
5 TABLET, FILM COATED ORAL DAILY
Refills: 0 | Status: DISCONTINUED | OUTPATIENT
Start: 2022-10-11 | End: 2022-11-02

## 2022-10-11 RX ORDER — HYDROCORTISONE 20 MG
50 TABLET ORAL DAILY
Refills: 0 | Status: DISCONTINUED | OUTPATIENT
Start: 2022-10-11 | End: 2022-10-14

## 2022-10-11 RX ADMIN — Medication 4 MILLIGRAM(S): at 14:23

## 2022-10-11 RX ADMIN — SODIUM CHLORIDE 3 MILLILITER(S): 9 INJECTION INTRAMUSCULAR; INTRAVENOUS; SUBCUTANEOUS at 05:48

## 2022-10-11 RX ADMIN — HYDROMORPHONE HYDROCHLORIDE 0.5 MILLIGRAM(S): 2 INJECTION INTRAMUSCULAR; INTRAVENOUS; SUBCUTANEOUS at 08:03

## 2022-10-11 RX ADMIN — Medication 1000 MILLIGRAM(S): at 20:22

## 2022-10-11 RX ADMIN — PANTOPRAZOLE SODIUM 40 MILLIGRAM(S): 20 TABLET, DELAYED RELEASE ORAL at 10:13

## 2022-10-11 RX ADMIN — SODIUM CHLORIDE 3 MILLILITER(S): 9 INJECTION INTRAMUSCULAR; INTRAVENOUS; SUBCUTANEOUS at 22:00

## 2022-10-11 RX ADMIN — Medication 50 MILLIGRAM(S): at 05:37

## 2022-10-11 RX ADMIN — Medication 400 MILLIGRAM(S): at 14:22

## 2022-10-11 RX ADMIN — PIPERACILLIN AND TAZOBACTAM 25 GRAM(S): 4; .5 INJECTION, POWDER, LYOPHILIZED, FOR SOLUTION INTRAVENOUS at 05:36

## 2022-10-11 RX ADMIN — SODIUM CHLORIDE 50 MILLILITER(S): 9 INJECTION, SOLUTION INTRAVENOUS at 10:25

## 2022-10-11 RX ADMIN — SODIUM CHLORIDE 3 MILLILITER(S): 9 INJECTION INTRAMUSCULAR; INTRAVENOUS; SUBCUTANEOUS at 01:32

## 2022-10-11 RX ADMIN — TAMSULOSIN HYDROCHLORIDE 0.8 MILLIGRAM(S): 0.4 CAPSULE ORAL at 21:59

## 2022-10-11 RX ADMIN — CHLORHEXIDINE GLUCONATE 1 APPLICATION(S): 213 SOLUTION TOPICAL at 05:38

## 2022-10-11 RX ADMIN — PIPERACILLIN AND TAZOBACTAM 25 GRAM(S): 4; .5 INJECTION, POWDER, LYOPHILIZED, FOR SOLUTION INTRAVENOUS at 22:14

## 2022-10-11 RX ADMIN — Medication 650 MILLIGRAM(S): at 06:04

## 2022-10-11 RX ADMIN — ATORVASTATIN CALCIUM 20 MILLIGRAM(S): 80 TABLET, FILM COATED ORAL at 21:59

## 2022-10-11 RX ADMIN — Medication 4 MILLIGRAM(S): at 21:59

## 2022-10-11 RX ADMIN — PIPERACILLIN AND TAZOBACTAM 25 GRAM(S): 4; .5 INJECTION, POWDER, LYOPHILIZED, FOR SOLUTION INTRAVENOUS at 14:22

## 2022-10-11 RX ADMIN — TIOTROPIUM BROMIDE 1 CAPSULE(S): 18 CAPSULE ORAL; RESPIRATORY (INHALATION) at 11:46

## 2022-10-11 RX ADMIN — BUDESONIDE AND FORMOTEROL FUMARATE DIHYDRATE 2 PUFF(S): 160; 4.5 AEROSOL RESPIRATORY (INHALATION) at 11:47

## 2022-10-11 RX ADMIN — PANTOPRAZOLE SODIUM 40 MILLIGRAM(S): 20 TABLET, DELAYED RELEASE ORAL at 21:59

## 2022-10-11 RX ADMIN — Medication 2: at 08:07

## 2022-10-11 RX ADMIN — SODIUM CHLORIDE 3 MILLILITER(S): 9 INJECTION INTRAMUSCULAR; INTRAVENOUS; SUBCUTANEOUS at 14:00

## 2022-10-11 RX ADMIN — FINASTERIDE 5 MILLIGRAM(S): 5 TABLET, FILM COATED ORAL at 16:56

## 2022-10-11 RX ADMIN — Medication 2 MILLIGRAM(S): at 05:36

## 2022-10-11 RX ADMIN — BUDESONIDE AND FORMOTEROL FUMARATE DIHYDRATE 2 PUFF(S): 160; 4.5 AEROSOL RESPIRATORY (INHALATION) at 22:21

## 2022-10-11 RX ADMIN — Medication 2: at 11:33

## 2022-10-11 RX ADMIN — Medication 2: at 22:16

## 2022-10-11 RX ADMIN — Medication 400 MILLIGRAM(S): at 20:07

## 2022-10-11 RX ADMIN — Medication 1000 MILLIGRAM(S): at 14:56

## 2022-10-11 RX ADMIN — HYDROMORPHONE HYDROCHLORIDE 0.5 MILLIGRAM(S): 2 INJECTION INTRAMUSCULAR; INTRAVENOUS; SUBCUTANEOUS at 08:15

## 2022-10-11 RX ADMIN — Medication 2: at 16:42

## 2022-10-11 RX ADMIN — Medication 50 MILLIGRAM(S): at 16:56

## 2022-10-11 NOTE — PROGRESS NOTE ADULT - SUBJECTIVE AND OBJECTIVE BOX
Patient seen at the bedside, resting comfortable on nonrebreather oxygen. Pt continues to have some melena from ostomy even off the heparin drip. Pt has been having lower output ileostomy since started on Imodium TID yesterday. Passing gas, ostomy functional, pain controlled, off pressors, currently on non rebreather mask. No further acute events overnight. Patient denies nausea, vomiting, fever or chills.     MEDICATIONS  (STANDING):  acetaminophen   IVPB .. 1000 milliGRAM(s) IV Intermittent once  atorvastatin 20 milliGRAM(s) Oral at bedtime  budesonide 160 MICROgram(s)/formoterol 4.5 MICROgram(s) Inhaler 2 Puff(s) Inhalation two times a day  chlorhexidine 4% Liquid 1 Application(s) Topical <User Schedule>  dextrose 50% Injectable 25 Gram(s) IV Push once  dextrose 50% Injectable 12.5 Gram(s) IV Push once  dextrose 50% Injectable 25 Gram(s) IV Push once  dextrose Oral Gel 15 Gram(s) Oral once  glucagon  Injectable 1 milliGRAM(s) IntraMuscular once  hydrocortisone sodium succinate Injectable 50 milliGRAM(s) IV Push every 8 hours  influenza  Vaccine (HIGH DOSE) 0.7 milliLiter(s) IntraMuscular once  insulin lispro (ADMELOG) corrective regimen sliding scale   SubCutaneous every 6 hours  loperamide 2 milliGRAM(s) Oral three times a day  pantoprazole  Injectable 40 milliGRAM(s) IV Push every 12 hours  piperacillin/tazobactam IVPB.. 3.375 Gram(s) IV Intermittent every 8 hours  psyllium Powder 1 Packet(s) Oral daily  sodium chloride 0.9% lock flush 3 milliLiter(s) IV Push every 8 hours  tiotropium 18 MICROgram(s) Capsule 1 Capsule(s) Inhalation daily    MEDICATIONS  (PRN):  ALBUTerol    90 MICROgram(s) HFA Inhaler 2 Puff(s) Inhalation every 6 hours PRN Shortness of Breath and/or Wheezing  albuterol/ipratropium for Nebulization 3 milliLiter(s) Nebulizer every 6 hours PRN Shortness of Breath and/or Wheezing  HYDROmorphone  Injectable 0.5 milliGRAM(s) IV Push every 6 hours PRN Severe Pain (7 - 10)  morphine  - Injectable 2 milliGRAM(s) IV Push every 4 hours PRN Moderate Pain (4 - 6)  naloxone Injectable 0.1 milliGRAM(s) IV Push every 3 minutes PRN For ANY of the following changes in patient status:  A. RR LESS THAN 10 breaths per minute, B. Oxygen saturation LESS THAN 90%, C. Sedation score of 6  ondansetron Injectable 4 milliGRAM(s) IV Push every 6 hours PRN Nausea and/or Vomiting    Vital Signs Last 24 Hrs  T(C): 37 (11 Oct 2022 06:00), Max: 37 (11 Oct 2022 02:00)  T(F): 98.6 (11 Oct 2022 06:00), Max: 98.6 (11 Oct 2022 02:00)  HR: 63 (11 Oct 2022 07:00) (60 - 102)  BP: 116/58 (11 Oct 2022 07:00) (87/60 - 133/79)  BP(mean): 72 (11 Oct 2022 07:00) (47 - 87)  RR: 20 (11 Oct 2022 07:00) (13 - 22)  SpO2: 95% (11 Oct 2022 07:00) (88% - 100%)    Parameters below as of 11 Oct 2022 06:00  Patient On (Oxygen Delivery Method): mask, Venturi    Physical Exam:  General: AAOx3, morbidly obese male, NAD, on non rebreather  Chest: Normal respiratory effort  Heart: RRR  Abdomen: Soft, ND, appropriately tender, ostomy patent, functional.  Neuro/Psych: No localized deficits. Normal speech, normal tone  Skin: Normal, no rashes, no lesions noted.   Extremities: Warm, well perfused, no edema, Pulses intact    I&O's Detail    10 Oct 2022 07:01  -  11 Oct 2022 07:00  --------------------------------------------------------  IN:    IV PiggyBack: 100 mL    Lactated Ringers: 607 mL    Oral Fluid: 240 mL  Total IN: 947 mL    OUT:    Bulb (mL): 15 mL    Bulb (mL): 20 mL    Ileostomy (mL): 850 mL    Indwelling Catheter - Urethral (mL): 950 mL  Total OUT: 1835 mL    Total NET: -888 mL      LABS:                        7.8    9.08  )-----------( 181      ( 11 Oct 2022 05:24 )             24.7     11 Oct 2022 05:24    135    |  101    |  70     ----------------------------<  151    4.5     |  27     |  1.93     Ca    8.7        11 Oct 2022 05:24  Phos  3.3       11 Oct 2022 05:24  Mg     2.4       11 Oct 2022 05:24    TPro  5.7    /  Alb  2.0    /  TBili  0.5    /  DBili  x      /  AST  13     /  ALT  17     /  AlkPhos  42     11 Oct 2022 05:24    PTT - ( 10 Oct 2022 06:46 )  PTT:23.1 sec

## 2022-10-11 NOTE — PROGRESS NOTE ADULT - ASSESSMENT
77hx  a fib on apixaban  HTN HLD obesity FLORA COPD ex smoker  ulcerative colitis and colon cancer.  POD#4  Open restorative proctocolectomy with IPAA, diverting loop ileostomy rectal mass noted.    There was fecal spillage peritonitis.    Post op;  1 Hypoxemia due to LLL atelectasis Improved with NIPPPV  2. Septic shock  titrating phenylephrine to a MAP of 65 additional crystalloid given, currently off pressors  3. Oliguric JOSE due to ATN possible pre renal component.    4. UGIB/ Melena, likely 2/2 Hep GGT (held)    BP dropped top MAP of 55, phenylphrine r/s, MAP 67 in am. , UOP improved approx 50cc/h,m. BIPAP discontinued but has been on non rebreather sat 92%.   Clinically, mild abd tenderness, comfortable. Abd soft, mildly distended, mild tenderness to palpation. Prevena lost suction and ostomy bag leaked inside it, it was removed at 5 am and dry dressing applied. Ostomy is functional.   Currently on zosyn for L sided pneumonia and intraabd spillage.  Cardiology : c/w supportive care, follow up as outpt.   H/H dropped, current melena in stoma bag, s/p 2 U PRBC, 10/10, started on PPI/imodium      Plan:  c/i hold Hep drip  trend H/H, transfuse prn  if melena persist, consult GI  replete ostomy output cautiously   wean off O2  ambulation   physical therapy  pain control  monitor bowel function   serial abdominal exams  ADAT, FLD today  Medical management by ICU team    Case discussed with Colorectal team  77hx  a fib on apixaban  HTN HLD obesity FLORA COPD ex smoker  ulcerative colitis and colon cancer.  POD#4  Open restorative proctocolectomy with IPAA, diverting loop ileostomy rectal mass noted.    There was fecal spillage peritonitis.    Post op;  1 Hypoxemia due to LLL atelectasis Improved with NIPPPV  2. Septic shock  titrating phenylephrine to a MAP of 65 additional crystalloid given, currently off pressors  3. Oliguric JOSE due to ATN possible pre renal component.    4. UGIB/ Melena, likely 2/2 Hep GGT (held)    BP dropped top MAP of 55, phenylphrine r/s, MAP 67 in am. , UOP improved approx 50cc/h,m. BIPAP discontinued but has been on non rebreather sat 92%.   Clinically, mild abd tenderness, comfortable. Abd soft, mildly distended, mild tenderness to palpation. Prevena lost suction and ostomy bag leaked inside it, it was removed at 5 am and dry dressing applied. Ostomy is functional.   Currently on zosyn for L sided pneumonia and intraabd spillage.  Cardiology : c/w supportive care, follow up as outpt.   H/H dropped, current melena in stoma bag, s/p 2 U PRBC, 10/10, started on PPI/imodium      Plan:  c/i hold Hep drip  please increase loperamide to 4mg TID  trend H/H, transfuse prn  if melena persist, consult GI  replete ostomy output cautiously   wean off O2  ambulation   physical therapy  pain control  monitor bowel function   serial abdominal exams  ADAT, FLD today  Medical management by ICU team    Case discussed with Colorectal team

## 2022-10-11 NOTE — PROGRESS NOTE ADULT - ASSESSMENT
ASSESSMENT  76yo male with PMHx Afib on eliquis, HTN, HLD, COPD, FLORA, obestity, ulcerative colitis, recent dx colon cancer presents for elective total proctocolectomy wiht ileal pouch anal anastamosis.     s/p Open restorative proctocolectomy with ileal pouch anal anastamosis (IPAA) and diverting loop ileostomy   Intra op findings include humberto fecal spillage from transverse colon and rectosigmoid mass noted  EBL 250cc, received 3200cc IVF  POD#4  + VINNY x 2 left abd    Hospital course c/b:  septic shock requiring pressors, now off  LLL PNA suspected aspiration event  JOSE  Anemia 2/2 possible UGIB?    PLAN  Neuro: mentation intact, pain control with dilaudid and morphine  CV: BP improved HR 60-80s afib paced. hold heparin gtt. TTE 10/7 LVEF 40-45%. diastolic dysfxn, mod TR.  Pulm: on 5L NC sating 95-96%, titrate to maintain O2 88-92%. cont bipap qhs for FLORA. CXR revealing some inc pulm vasc congestion. cont IV zosyn for LLL PNA d/t aspiration event. cont spiriva and alb inh. added symbicort. incentive spirometer  GI: adv to full liquids as per surgery. IV PPI bid started yesterday for possible UGIB? ileostomy output dark brown, coffee ground appearing.   Nephro: JOSE improved with IV fluid. suspect in setting of persistent hypotension overnight and anemia. + alfaro in place, monitor I & Os. cont gentle IV fluids to replace ileostomy loss.  Endo: a1c 6.9. ISS, BGMs ac hs. FS goal <180. cont iv solucortef 50q8hr, begin to taper. takes prednisone 4mg /day at home.   ID: cont IV zosyn #5. aspiration precautions,   Heme: hep gtt dcd  cont SCDs. f/u rpt cbc 1200.   PT eval    Dispo: SICU. f/u rpt cbc 1200. IV PPI bid. trend Cr. full liquid diet. Titrate O2 as tolerated, may need a dose lasix.    Will discuss with Dr. Soriano     ASSESSMENT  76yo male with PMHx Afib on eliquis, HTN, HLD, COPD, FLORA, obestity, ulcerative colitis, recent dx colon cancer presents for elective total proctocolectomy wiht ileal pouch anal anastamosis.     s/p Open restorative proctocolectomy with ileal pouch anal anastamosis (IPAA) and diverting loop ileostomy   Intra op findings include humberto fecal spillage from transverse colon and rectosigmoid mass noted  EBL 250cc, received 3200cc IVF  POD#5  + VINNY x 2 left abd    Hospital course c/b:  septic shock requiring pressors, now off  LLL PNA due to aspiration event  JOSE- suspect pre-renal  Anemia 2/2 possible UGIB, gastritis?    PLAN  Neuro: mentation intact, pain control with dilaudid and morphine  CV: BP improved HR 60-80s afib paced. hold heparin gtt. TTE 10/7 LVEF 40-45%. diastolic dysfxn, mod TR.  Pulm: on 5L NC sating 95-96%, titrate to maintain O2 88-92%. cont bipap qhs for FLORA. CXR revealing some inc pulm vasc congestion. cont IV zosyn for LLL PNA d/t aspiration event. cont spiriva, alb inh, asymbicort. pt doesnt want mucinex for cough.  GI: adv to full liquids as per surgery. IV PPI bid. ileostomy output dark brown, coffee ground appearing.   Nephro: JOSE improved with IV fluid. + alfaro in place, added tamsulosin. urology consulted for urinary retention, failed TOV. Pt sees Dr. Gaspar urology but hasnt seen in in a while.  monitor I & Os. cont gentle IV fluids to replace ileostomy loss.  Endo: a1c 6.9. ISS, BGMs ac hs. FS goal <180. cont iv solucortef 50q8hr, begin to taper. takes prednisone 4mg /day at home.   ID: cont IV zosyn #5. aspiration precautions, monitor temps  Heme: hep gtt dcd  cont SCDs. f/u rpt cbc 1200.   PT eval    Dispo: SICU. rpt cbc hgb 8.2. IV PPI bid. trend Cr. full liquid diet. Titrate O2 as tolerated, may need a dose lasix.    Will discuss with Dr. Soriano

## 2022-10-11 NOTE — PROGRESS NOTE ADULT - NS ATTEND AMEND GEN_ALL_CORE FT
Patient seen and examined  Feels and looks better  Resp status seems improved  Renal fn improving   H/H stable, will consider restarting heparin in am   High ileostomy output, increase Imodium per sx   Continue low dose IVF to replace lost volume   Diet advanced to full liquid per surgery   NIV qhs   OOB

## 2022-10-11 NOTE — CONSULT NOTE ADULT - ASSESSMENT
78 yo male s/p proctocolectomy, ileoanal anastomosis, loop ileostomy 10/6. Urology consulted for pt with urinary retention requiring alfaro placement and failed trial of void with subsequent alfaro placement.   Recommend  - Keep alfaro in place  - Start Flomax 0.8 mg and Finasteride 5mg QD  - Trend creat  - D/C home with alfaro to leg bag  - Outpatient follow up for trial of void    Case discussed with Dr. Lamar

## 2022-10-11 NOTE — PROGRESS NOTE ADULT - ATTENDING COMMENTS
Patient denies any significant complaints.  Ostomy is functioning.  Mild episode of nausea yesterday, denies this morning.  Pain controlled.  Reports that he has been out of bed.  On exam abdomen is soft but mildly distended, no rebound/guarding.  Ostomy in place, pink in color, with black output in appliance, 1600cc over last 24 hours, very loose/liquid.  Incision C/D/I without erythema or discharge.  Labs with resolved reactive leukocytosis, Cr improving.  Hgb slightly downward trending, 7.8 from 8.2.    -- Full liquid diet  -- Increase Imodium to 4mg with TID with meals.  Continue fiber.  Would hold off on adding other antimotility agents until he is on solid foods.  -- Ostomy care and education  -- Zhao - urinary retention,  consult  -- Encourage ambulation, OOB  -- Wean O2 requirements as able  -- Appreciate ICU care

## 2022-10-11 NOTE — PROGRESS NOTE ADULT - SUBJECTIVE AND OBJECTIVE BOX
Patient is a 77y old  Male who presents with a chief complaint of Malignant neoplasm of sigmoid colon  Ulcerative colitis without complications  (07 Oct 2022 12:41)    BRIEF HOSPITAL COURSE: 76yo male with PMHx Afib on eliquis, HTN, HLD, COPD, FLORA, obestity, ulcerative colitis, recent dx colon cancer presents for elective total proctocolectomy wiht ileal pouch anal anastamosis.      s/p Open restorative proctocolectomy with ileal pouch anal anastamosis and diverting loop ileostomy   Intra op  humberto fecal spillage from transverse colon,   EBL 250cc, received 3200cc IVF      Hospital course c/b:  septic shock requiring pressors  LLL PNA suspected aspiration event  JOSE    10/10 Pt was on full liquids yesterday but has emesis, made NPO. now adv to clears. also with drop in Hgb 8.9 -> 7.1. ileostomy appearing coffee ground? 2u prbc ordered by surgery. states SOB has improved, abd pain well controlled on current regimen. pt only c/o mild frontal HA.  10/11 Pt states he didnt sleep well and cannot expectorate mucous. on 5L nc sating 95-96%, but desats with laying flat and off supplemental O2. Hgb slightly down trending. ileostomy output still high and dark brown/black liquid. UOP better    PAST MEDICAL & SURGICAL HISTORY:  Ulcerative colitis  Colon cancer  Obstructive sleep apnea  Obesity  HTN (hypertension)  Atrial fibrillation  HLD (hyperlipidemia)  BPH (benign prostatic hyperplasia)  COPD (chronic obstructive pulmonary disease)  COVID-19 virus infection  History of pneumonia  H/O hernia repair  History of hydrocelectomy  H/O colonoscopy  H/O cystoscopy  urethral stricture  History of total knee replacement, bilateral  S/P placement of cardiac pacemaker  History of cataract surgery      Medications:  piperacillin/tazobactam IVPB.. 3.375 Gram(s) IV Intermittent every 8 hours  ALBUTerol    90 MICROgram(s) HFA Inhaler 2 Puff(s) Inhalation every 6 hours PRN  albuterol/ipratropium for Nebulization 3 milliLiter(s) Nebulizer every 6 hours PRN  budesonide 160 MICROgram(s)/formoterol 4.5 MICROgram(s) Inhaler 2 Puff(s) Inhalation two times a day  tiotropium 18 MICROgram(s) Capsule 1 Capsule(s) Inhalation daily  acetaminophen   IVPB .. 1000 milliGRAM(s) IV Intermittent once  HYDROmorphone  Injectable 0.5 milliGRAM(s) IV Push every 6 hours PRN  morphine  - Injectable 2 milliGRAM(s) IV Push every 4 hours PRN  ondansetron Injectable 4 milliGRAM(s) IV Push every 6 hours PRN  loperamide 2 milliGRAM(s) Oral three times a day  pantoprazole  Injectable 40 milliGRAM(s) IV Push every 12 hours  psyllium Powder 1 Packet(s) Oral daily  atorvastatin 20 milliGRAM(s) Oral at bedtime  dextrose 50% Injectable 25 Gram(s) IV Push once  dextrose 50% Injectable 12.5 Gram(s) IV Push once  dextrose 50% Injectable 25 Gram(s) IV Push once  dextrose Oral Gel 15 Gram(s) Oral once  glucagon  Injectable 1 milliGRAM(s) IntraMuscular once  hydrocortisone sodium succinate Injectable 50 milliGRAM(s) IV Push every 8 hours  insulin lispro (ADMELOG) corrective regimen sliding scale   SubCutaneous every 6 hours  sodium chloride 0.9% lock flush 3 milliLiter(s) IV Push every 8 hours  influenza  Vaccine (HIGH DOSE) 0.7 milliLiter(s) IntraMuscular once  chlorhexidine 4% Liquid 1 Application(s) Topical <User Schedule>  naloxone Injectable 0.1 milliGRAM(s) IV Push every 3 minutes PRN    Vital Signs Last 24 Hrs  T(C): 37 (11 Oct 2022 06:00), Max: 37 (11 Oct 2022 02:00)  T(F): 98.6 (11 Oct 2022 06:00), Max: 98.6 (11 Oct 2022 02:00)  HR: 59 (11 Oct 2022 08:00) (59 - 102)  BP: 123/54 (11 Oct 2022 08:00) (87/60 - 133/79)  BP(mean): 71 (11 Oct 2022 08:00) (47 - 87)  RR: 18 (11 Oct 2022 08:00) (13 - 22)  SpO2: 91% (11 Oct 2022 08:00) (88% - 100%)    Parameters below as of 11 Oct 2022 08:00  Patient On (Oxygen Delivery Method): nasal cannula  O2 Flow (L/min): 5      I&O's Detail    10 Oct 2022 07:01  -  11 Oct 2022 07:00  --------------------------------------------------------  IN:    IV PiggyBack: 100 mL    Lactated Ringers: 607 mL    Oral Fluid: 240 mL  Total IN: 947 mL    OUT:    Bulb (mL): 15 mL    Bulb (mL): 20 mL    Ileostomy (mL): 850 mL    Indwelling Catheter - Urethral (mL): 950 mL  Total OUT: 1835 mL    Total NET: -888 mL        LABS:                  7.8    9.08  )-----------( 181      ( 11 Oct 2022 05:24 )             24.7     10-11    135  |  101  |  70<H>  ----------------------------<  151<H>  4.5   |  27  |  1.93<H>    Ca    8.7      11 Oct 2022 05:24  Phos  3.3     10-11  Mg     2.4     10-11    TPro  5.7<L>  /  Alb  2.0<L>  /  TBili  0.5  /  DBili  x   /  AST  13<L>  /  ALT  17  /  AlkPhos  42  10-11    LIVER FUNCTIONS - ( 11 Oct 2022 05:24 )  Alb: 2.0 g/dL / Pro: 5.7 gm/dL / ALK PHOS: 42 U/L / ALT: 17 U/L / AST: 13 U/L / GGT: x           PTT - ( 10 Oct 2022 06:46 )  PTT:23.1 sec      Physical Examination:  General: No acute resp distress.  awake and alert  HEENT: Pupils equal, reactive to light.  Symmetric.  PULM:  course breath sounds and rhonchi b/l. no wheezing  NECK: Supple, no lymphadenopathy, trachea midline  CVS: irregular, no murmurs  ABD: Soft, nondistended, nontender, normoactive bowel sounds, + ostomy with very dark brown/black liquid output. JPx 2 left abd- serosanguinous   : + alfaro, urine output clear  EXT: No LE edema b/l, calf nontender  SKIN: Warm and well perfused, no rashes noted.  NEURO: Alert, orientedx 3, interactive, nonfocal    DEVICES:   + alfaro  + VINNY x 2 on left side abdomen- serosanguinous  + ileostomy    RADIOLOGY:   < from: TTE Echo Complete w/ Contrast w/ Doppler (10.07.22 @ 10:28) >   Summary     Moderate mitral annular calcification is present.   The mitral valve leaflets appear thickened.   EA reversal of the mitral inflow consistent with reduced compliance of   the   left ventricle.   The aortic valve appears mildly calcified. Valve opening seems to be   restricted.   Peak and mean transaortic gradients are 25 and 14 mmHg respectively; this   finding is consistent with mild aortic stenosis.   The tricuspid valve leaflets appear mildly thickened and/or calcified,   but   open well.   Moderate (2+) tricuspid valve regurgitation is present.   Mild pulmonary hypertension.   Mild concentric left ventricular hypertrophy is present.   Left ventricle systolic function moderately decreased paradoxical septal   motion ,apical wall , anterior wall , apical lateral hypokinesis in the   presence of a cardiac arrhythmia.   Lumason was used to better define the endocardial border.   Visual estimation of left ventricle ejection fraction is 40-45 %.   Normal appearing right atrium.   A device wire is seen in the RV and RA.   Normal appearing right ventricle structure and function.    < end of copied text >     Patient is a 77y old  Male who presents with a chief complaint of Malignant neoplasm of sigmoid colon  Ulcerative colitis without complications  (07 Oct 2022 12:41)    BRIEF HOSPITAL COURSE: 76yo male with PMHx Afib on eliquis, HTN, HLD, COPD, FLORA, obestity, ulcerative colitis, recent dx colon cancer presents for elective total proctocolectomy wiht ileal pouch anal anastamosis.      s/p Open restorative proctocolectomy with ileal pouch anal anastamosis and diverting loop ileostomy   Intra op  humberto fecal spillage from transverse colon,   EBL 250cc, received 3200cc IVF    Hospital course c/b:  septic shock requiring pressors  LLL PNA suspected aspiration event  JOSE    10/10 Pt was on full liquids yesterday but has emesis, made NPO. now adv to clears. also with drop in Hgb 8.9 -> 7.1. ileostomy appearing coffee ground? 2u prbc ordered by surgery. states SOB has improved, abd pain well controlled on current regimen. pt only c/o mild frontal HA.  10/11 Pt states he didnt sleep well and cannot expectorate mucous. on 5L nc sating 95-96%, but desats with laying flat and off supplemental O2. Hgb slightly down trending. ileostomy output still high and dark brown/black liquid. UOP better    PAST MEDICAL & SURGICAL HISTORY:  Ulcerative colitis  Colon cancer  Obstructive sleep apnea  Obesity  HTN (hypertension)  Atrial fibrillation  HLD (hyperlipidemia)  BPH (benign prostatic hyperplasia)  COPD (chronic obstructive pulmonary disease)  COVID-19 virus infection  History of pneumonia  H/O hernia repair  History of hydrocelectomy  H/O colonoscopy  H/O cystoscopy  urethral stricture  History of total knee replacement, bilateral  S/P placement of cardiac pacemaker  History of cataract surgery      Medications:  piperacillin/tazobactam IVPB.. 3.375 Gram(s) IV Intermittent every 8 hours  ALBUTerol    90 MICROgram(s) HFA Inhaler 2 Puff(s) Inhalation every 6 hours PRN  albuterol/ipratropium for Nebulization 3 milliLiter(s) Nebulizer every 6 hours PRN  budesonide 160 MICROgram(s)/formoterol 4.5 MICROgram(s) Inhaler 2 Puff(s) Inhalation two times a day  tiotropium 18 MICROgram(s) Capsule 1 Capsule(s) Inhalation daily  acetaminophen   IVPB .. 1000 milliGRAM(s) IV Intermittent once  HYDROmorphone  Injectable 0.5 milliGRAM(s) IV Push every 6 hours PRN  morphine  - Injectable 2 milliGRAM(s) IV Push every 4 hours PRN  ondansetron Injectable 4 milliGRAM(s) IV Push every 6 hours PRN  loperamide 2 milliGRAM(s) Oral three times a day  pantoprazole  Injectable 40 milliGRAM(s) IV Push every 12 hours  psyllium Powder 1 Packet(s) Oral daily  atorvastatin 20 milliGRAM(s) Oral at bedtime  dextrose 50% Injectable 25 Gram(s) IV Push once  dextrose 50% Injectable 12.5 Gram(s) IV Push once  dextrose 50% Injectable 25 Gram(s) IV Push once  dextrose Oral Gel 15 Gram(s) Oral once  glucagon  Injectable 1 milliGRAM(s) IntraMuscular once  hydrocortisone sodium succinate Injectable 50 milliGRAM(s) IV Push every 8 hours  insulin lispro (ADMELOG) corrective regimen sliding scale   SubCutaneous every 6 hours  sodium chloride 0.9% lock flush 3 milliLiter(s) IV Push every 8 hours  influenza  Vaccine (HIGH DOSE) 0.7 milliLiter(s) IntraMuscular once  chlorhexidine 4% Liquid 1 Application(s) Topical <User Schedule>  naloxone Injectable 0.1 milliGRAM(s) IV Push every 3 minutes PRN    Vital Signs Last 24 Hrs  T(C): 37 (11 Oct 2022 06:00), Max: 37 (11 Oct 2022 02:00)  T(F): 98.6 (11 Oct 2022 06:00), Max: 98.6 (11 Oct 2022 02:00)  HR: 59 (11 Oct 2022 08:00) (59 - 102)  BP: 123/54 (11 Oct 2022 08:00) (87/60 - 133/79)  BP(mean): 71 (11 Oct 2022 08:00) (47 - 87)  RR: 18 (11 Oct 2022 08:00) (13 - 22)  SpO2: 91% (11 Oct 2022 08:00) (88% - 100%)    Parameters below as of 11 Oct 2022 08:00  Patient On (Oxygen Delivery Method): nasal cannula  O2 Flow (L/min): 5      I&O's Detail    10 Oct 2022 07:01  -  11 Oct 2022 07:00  --------------------------------------------------------  IN:    IV PiggyBack: 100 mL    Lactated Ringers: 607 mL    Oral Fluid: 240 mL  Total IN: 947 mL    OUT:    Bulb (mL): 15 mL    Bulb (mL): 20 mL    Ileostomy (mL): 850 mL    Indwelling Catheter - Urethral (mL): 950 mL  Total OUT: 1835 mL    Total NET: -888 mL        LABS:                  7.8    9.08  )-----------( 181      ( 11 Oct 2022 05:24 )             24.7     10-11    135  |  101  |  70<H>  ----------------------------<  151<H>  4.5   |  27  |  1.93<H>    Ca    8.7      11 Oct 2022 05:24  Phos  3.3     10-11  Mg     2.4     10-11    TPro  5.7<L>  /  Alb  2.0<L>  /  TBili  0.5  /  DBili  x   /  AST  13<L>  /  ALT  17  /  AlkPhos  42  10-11    LIVER FUNCTIONS - ( 11 Oct 2022 05:24 )  Alb: 2.0 g/dL / Pro: 5.7 gm/dL / ALK PHOS: 42 U/L / ALT: 17 U/L / AST: 13 U/L / GGT: x           PTT - ( 10 Oct 2022 06:46 )  PTT:23.1 sec      Physical Examination:  General: No acute resp distress.  awake and alert  HEENT: Pupils equal, reactive to light.  Symmetric.  PULM:  course breath sounds and rhonchi b/l. no wheezing  NECK: Supple, no lymphadenopathy, trachea midline  CVS: irregular, no murmurs  ABD: Soft, nondistended, nontender, normoactive bowel sounds, + ostomy with very dark brown/black liquid output. JPx 2 left abd- serosanguinous   : + alfaro, urine output clear  EXT: No LE edema b/l, calf nontender  SKIN: Warm and well perfused, no rashes noted.  NEURO: Alert, orientedx 3, interactive, nonfocal    DEVICES:   + alfaro  + VINNY x 2 on left side abdomen- serosanguinous  + ileostomy    RADIOLOGY:   < from: TTE Echo Complete w/ Contrast w/ Doppler (10.07.22 @ 10:28) >   Summary     Moderate mitral annular calcification is present.   The mitral valve leaflets appear thickened.   EA reversal of the mitral inflow consistent with reduced compliance of   the   left ventricle.   The aortic valve appears mildly calcified. Valve opening seems to be   restricted.   Peak and mean transaortic gradients are 25 and 14 mmHg respectively; this   finding is consistent with mild aortic stenosis.   The tricuspid valve leaflets appear mildly thickened and/or calcified,   but   open well.   Moderate (2+) tricuspid valve regurgitation is present.   Mild pulmonary hypertension.   Mild concentric left ventricular hypertrophy is present.   Left ventricle systolic function moderately decreased paradoxical septal   motion ,apical wall , anterior wall , apical lateral hypokinesis in the   presence of a cardiac arrhythmia.   Lumason was used to better define the endocardial border.   Visual estimation of left ventricle ejection fraction is 40-45 %.   Normal appearing right atrium.   A device wire is seen in the RV and RA.   Normal appearing right ventricle structure and function.    < end of copied text >

## 2022-10-12 LAB
ALBUMIN SERPL ELPH-MCNC: 2 G/DL — LOW (ref 3.3–5)
ALP SERPL-CCNC: 43 U/L — SIGNIFICANT CHANGE UP (ref 40–120)
ALT FLD-CCNC: 17 U/L — SIGNIFICANT CHANGE UP (ref 12–78)
ANION GAP SERPL CALC-SCNC: 5 MMOL/L — SIGNIFICANT CHANGE UP (ref 5–17)
AST SERPL-CCNC: 13 U/L — LOW (ref 15–37)
BASOPHILS # BLD AUTO: 0 K/UL — SIGNIFICANT CHANGE UP (ref 0–0.2)
BASOPHILS NFR BLD AUTO: 0 % — SIGNIFICANT CHANGE UP (ref 0–2)
BILIRUB SERPL-MCNC: 0.3 MG/DL — SIGNIFICANT CHANGE UP (ref 0.2–1.2)
BUN SERPL-MCNC: 55 MG/DL — HIGH (ref 7–23)
CALCIUM SERPL-MCNC: 8.7 MG/DL — SIGNIFICANT CHANGE UP (ref 8.5–10.1)
CHLORIDE SERPL-SCNC: 101 MMOL/L — SIGNIFICANT CHANGE UP (ref 96–108)
CO2 SERPL-SCNC: 28 MMOL/L — SIGNIFICANT CHANGE UP (ref 22–31)
CREAT SERPL-MCNC: 1.47 MG/DL — HIGH (ref 0.5–1.3)
CULTURE RESULTS: SIGNIFICANT CHANGE UP
CULTURE RESULTS: SIGNIFICANT CHANGE UP
EGFR: 49 ML/MIN/1.73M2 — LOW
EOSINOPHIL # BLD AUTO: 0 K/UL — SIGNIFICANT CHANGE UP (ref 0–0.5)
EOSINOPHIL NFR BLD AUTO: 0 % — SIGNIFICANT CHANGE UP (ref 0–6)
GLUCOSE SERPL-MCNC: 150 MG/DL — HIGH (ref 70–99)
HCT VFR BLD CALC: 23 % — LOW (ref 39–50)
HCT VFR BLD CALC: 26.2 % — LOW (ref 39–50)
HCT VFR BLD CALC: 31.7 % — LOW (ref 39–50)
HGB BLD-MCNC: 7.3 G/DL — LOW (ref 13–17)
HGB BLD-MCNC: 8.4 G/DL — LOW (ref 13–17)
HGB BLD-MCNC: 9.3 G/DL — LOW (ref 13–17)
LYMPHOCYTES # BLD AUTO: 0.88 K/UL — LOW (ref 1–3.3)
LYMPHOCYTES # BLD AUTO: 11 % — LOW (ref 13–44)
MAGNESIUM SERPL-MCNC: 2.2 MG/DL — SIGNIFICANT CHANGE UP (ref 1.6–2.6)
MCHC RBC-ENTMCNC: 27.1 PG — SIGNIFICANT CHANGE UP (ref 27–34)
MCHC RBC-ENTMCNC: 27.5 PG — SIGNIFICANT CHANGE UP (ref 27–34)
MCHC RBC-ENTMCNC: 27.8 PG — SIGNIFICANT CHANGE UP (ref 27–34)
MCHC RBC-ENTMCNC: 29.3 GM/DL — LOW (ref 32–36)
MCHC RBC-ENTMCNC: 31.7 GM/DL — LOW (ref 32–36)
MCHC RBC-ENTMCNC: 32.1 GM/DL — SIGNIFICANT CHANGE UP (ref 32–36)
MCV RBC AUTO: 85.5 FL — SIGNIFICANT CHANGE UP (ref 80–100)
MCV RBC AUTO: 86.8 FL — SIGNIFICANT CHANGE UP (ref 80–100)
MCV RBC AUTO: 93.8 FL — SIGNIFICANT CHANGE UP (ref 80–100)
MONOCYTES # BLD AUTO: 0.4 K/UL — SIGNIFICANT CHANGE UP (ref 0–0.9)
MONOCYTES NFR BLD AUTO: 5 % — SIGNIFICANT CHANGE UP (ref 2–14)
NEUTROPHILS # BLD AUTO: 6.75 K/UL — SIGNIFICANT CHANGE UP (ref 1.8–7.4)
NEUTROPHILS NFR BLD AUTO: 84 % — HIGH (ref 43–77)
NRBC # BLD: SIGNIFICANT CHANGE UP /100 WBCS (ref 0–0)
OB PNL STL: NEGATIVE — SIGNIFICANT CHANGE UP
PHOSPHATE SERPL-MCNC: 2.9 MG/DL — SIGNIFICANT CHANGE UP (ref 2.5–4.5)
PLATELET # BLD AUTO: 153 K/UL — SIGNIFICANT CHANGE UP (ref 150–400)
PLATELET # BLD AUTO: 156 K/UL — SIGNIFICANT CHANGE UP (ref 150–400)
PLATELET # BLD AUTO: 171 K/UL — SIGNIFICANT CHANGE UP (ref 150–400)
POTASSIUM SERPL-MCNC: 3.9 MMOL/L — SIGNIFICANT CHANGE UP (ref 3.5–5.3)
POTASSIUM SERPL-SCNC: 3.9 MMOL/L — SIGNIFICANT CHANGE UP (ref 3.5–5.3)
PROT SERPL-MCNC: 5.5 GM/DL — LOW (ref 6–8.3)
RBC # BLD: 2.69 M/UL — LOW (ref 4.2–5.8)
RBC # BLD: 3.02 M/UL — LOW (ref 4.2–5.8)
RBC # BLD: 3.38 M/UL — LOW (ref 4.2–5.8)
RBC # FLD: 18.2 % — HIGH (ref 10.3–14.5)
RBC # FLD: 18.3 % — HIGH (ref 10.3–14.5)
RBC # FLD: 18.6 % — HIGH (ref 10.3–14.5)
SODIUM SERPL-SCNC: 134 MMOL/L — LOW (ref 135–145)
SPECIMEN SOURCE: SIGNIFICANT CHANGE UP
SPECIMEN SOURCE: SIGNIFICANT CHANGE UP
WBC # BLD: 10.39 K/UL — SIGNIFICANT CHANGE UP (ref 3.8–10.5)
WBC # BLD: 8.04 K/UL — SIGNIFICANT CHANGE UP (ref 3.8–10.5)
WBC # BLD: 9.29 K/UL — SIGNIFICANT CHANGE UP (ref 3.8–10.5)
WBC # FLD AUTO: 10.39 K/UL — SIGNIFICANT CHANGE UP (ref 3.8–10.5)
WBC # FLD AUTO: 8.04 K/UL — SIGNIFICANT CHANGE UP (ref 3.8–10.5)
WBC # FLD AUTO: 9.29 K/UL — SIGNIFICANT CHANGE UP (ref 3.8–10.5)

## 2022-10-12 PROCEDURE — 99233 SBSQ HOSP IP/OBS HIGH 50: CPT

## 2022-10-12 RX ORDER — LOPERAMIDE HCL 2 MG
4 TABLET ORAL
Refills: 0 | Status: DISCONTINUED | OUTPATIENT
Start: 2022-10-12 | End: 2022-10-16

## 2022-10-12 RX ORDER — ACETAMINOPHEN 500 MG
1000 TABLET ORAL ONCE
Refills: 0 | Status: COMPLETED | OUTPATIENT
Start: 2022-10-12 | End: 2022-10-12

## 2022-10-12 RX ORDER — FUROSEMIDE 40 MG
20 TABLET ORAL ONCE
Refills: 0 | Status: COMPLETED | OUTPATIENT
Start: 2022-10-12 | End: 2022-10-12

## 2022-10-12 RX ORDER — PSYLLIUM SEED (WITH DEXTROSE)
1 POWDER (GRAM) ORAL
Refills: 0 | Status: DISCONTINUED | OUTPATIENT
Start: 2022-10-12 | End: 2022-10-16

## 2022-10-12 RX ADMIN — Medication 2: at 08:01

## 2022-10-12 RX ADMIN — SODIUM CHLORIDE 3 MILLILITER(S): 9 INJECTION INTRAMUSCULAR; INTRAVENOUS; SUBCUTANEOUS at 05:11

## 2022-10-12 RX ADMIN — Medication 1000 MILLIGRAM(S): at 23:08

## 2022-10-12 RX ADMIN — Medication 1000 MILLIGRAM(S): at 05:39

## 2022-10-12 RX ADMIN — PANTOPRAZOLE SODIUM 40 MILLIGRAM(S): 20 TABLET, DELAYED RELEASE ORAL at 21:29

## 2022-10-12 RX ADMIN — Medication 4 MILLIGRAM(S): at 12:39

## 2022-10-12 RX ADMIN — FINASTERIDE 5 MILLIGRAM(S): 5 TABLET, FILM COATED ORAL at 10:50

## 2022-10-12 RX ADMIN — Medication 400 MILLIGRAM(S): at 05:24

## 2022-10-12 RX ADMIN — BUDESONIDE AND FORMOTEROL FUMARATE DIHYDRATE 2 PUFF(S): 160; 4.5 AEROSOL RESPIRATORY (INHALATION) at 10:44

## 2022-10-12 RX ADMIN — ATORVASTATIN CALCIUM 20 MILLIGRAM(S): 80 TABLET, FILM COATED ORAL at 21:30

## 2022-10-12 RX ADMIN — PIPERACILLIN AND TAZOBACTAM 25 GRAM(S): 4; .5 INJECTION, POWDER, LYOPHILIZED, FOR SOLUTION INTRAVENOUS at 15:00

## 2022-10-12 RX ADMIN — SODIUM CHLORIDE 3 MILLILITER(S): 9 INJECTION INTRAMUSCULAR; INTRAVENOUS; SUBCUTANEOUS at 22:30

## 2022-10-12 RX ADMIN — PIPERACILLIN AND TAZOBACTAM 25 GRAM(S): 4; .5 INJECTION, POWDER, LYOPHILIZED, FOR SOLUTION INTRAVENOUS at 21:30

## 2022-10-12 RX ADMIN — TIOTROPIUM BROMIDE 1 CAPSULE(S): 18 CAPSULE ORAL; RESPIRATORY (INHALATION) at 10:44

## 2022-10-12 RX ADMIN — CHLORHEXIDINE GLUCONATE 1 APPLICATION(S): 213 SOLUTION TOPICAL at 05:00

## 2022-10-12 RX ADMIN — MORPHINE SULFATE 2 MILLIGRAM(S): 50 CAPSULE, EXTENDED RELEASE ORAL at 22:41

## 2022-10-12 RX ADMIN — PANTOPRAZOLE SODIUM 40 MILLIGRAM(S): 20 TABLET, DELAYED RELEASE ORAL at 10:50

## 2022-10-12 RX ADMIN — SODIUM CHLORIDE 3 MILLILITER(S): 9 INJECTION INTRAMUSCULAR; INTRAVENOUS; SUBCUTANEOUS at 13:57

## 2022-10-12 RX ADMIN — Medication 50 MILLIGRAM(S): at 04:59

## 2022-10-12 RX ADMIN — Medication 1 PACKET(S): at 10:50

## 2022-10-12 RX ADMIN — PIPERACILLIN AND TAZOBACTAM 25 GRAM(S): 4; .5 INJECTION, POWDER, LYOPHILIZED, FOR SOLUTION INTRAVENOUS at 05:00

## 2022-10-12 RX ADMIN — TAMSULOSIN HYDROCHLORIDE 0.8 MILLIGRAM(S): 0.4 CAPSULE ORAL at 21:30

## 2022-10-12 RX ADMIN — Medication 4 MILLIGRAM(S): at 17:45

## 2022-10-12 RX ADMIN — Medication 20 MILLIGRAM(S): at 17:45

## 2022-10-12 RX ADMIN — HYDROMORPHONE HYDROCHLORIDE 0.5 MILLIGRAM(S): 2 INJECTION INTRAMUSCULAR; INTRAVENOUS; SUBCUTANEOUS at 16:30

## 2022-10-12 RX ADMIN — HYDROMORPHONE HYDROCHLORIDE 0.5 MILLIGRAM(S): 2 INJECTION INTRAMUSCULAR; INTRAVENOUS; SUBCUTANEOUS at 15:53

## 2022-10-12 RX ADMIN — ONDANSETRON 4 MILLIGRAM(S): 8 TABLET, FILM COATED ORAL at 04:59

## 2022-10-12 RX ADMIN — Medication 4 MILLIGRAM(S): at 04:59

## 2022-10-12 RX ADMIN — Medication 400 MILLIGRAM(S): at 22:53

## 2022-10-12 RX ADMIN — Medication 2: at 21:29

## 2022-10-12 RX ADMIN — Medication 4: at 12:39

## 2022-10-12 RX ADMIN — MORPHINE SULFATE 2 MILLIGRAM(S): 50 CAPSULE, EXTENDED RELEASE ORAL at 22:56

## 2022-10-12 NOTE — PROGRESS NOTE ADULT - ATTENDING COMMENTS
Seen and examined.  No complaints.  High ileostomy output.  AFVSS  NAD  incision CDI, soft, approp tender, no apparent distention  Drains with minimal serosang  Labs reviewed  A/P -   Plan as above.  Advance diet, monitor ostomy output.  PT/OT.

## 2022-10-12 NOTE — PROGRESS NOTE ADULT - ASSESSMENT
77hx  a fib on apixaban  HTN HLD obesity FLORA COPD ex smoker  ulcerative colitis and colon cancer.  POD#4  Open restorative proctocolectomy with IPAA, diverting loop ileostomy rectal mass noted.    There was fecal spillage peritonitis.  H/H slowly trending down after transfusion  tolerating diet  respiratory more stable, nasal cannula this morning. Fluctuates between ventimask and nasal cannula, BiPAP/CPAP at night  high ostomy output    Plan:  please increase loperamide to 4mg TID  trend H/H, transfuse prn - 1 unit today  if melena persist, consult GI - H/H downtrending but ostomy output less tarry/black  replete ostomy output cautiously   wean off O2  ambulation/PT  pain control  monitor bowel function   serial abdominal exams  ADAT - possible LRD  Medical management by ICU team    Case discussed with Colorectal team

## 2022-10-12 NOTE — PROGRESS NOTE ADULT - SUBJECTIVE AND OBJECTIVE BOX
Patient is a 77y old  Male who presents with a chief complaint of Malignant neoplasm of sigmoid colon  Ulcerative colitis without complications  (07 Oct 2022 12:41)    BRIEF HOSPITAL COURSE: 78yo male with PMHx Afib on eliquis, HTN, HLD, COPD, FLORA, obestity, ulcerative colitis, recent dx colon cancer presents for elective total proctocolectomy wiht ileal pouch anal anastamosis.      s/p Open restorative proctocolectomy with ileal pouch anal anastamosis and diverting loop ileostomy   Intra op  humberto fecal spillage from transverse colon,   EBL 250cc, received 3200cc IVF    Hospital course c/b:  septic shock requiring pressors  LLL PNA suspected aspiration event  JOSE    10/10 Pt was on full liquids yesterday but has emesis, made NPO. now adv to clears. also with drop in Hgb 8.9 -> 7.1. ileostomy appearing coffee ground? 2u prbc ordered by surgery. states SOB has improved, abd pain well controlled on current regimen. pt only c/o mild frontal HA.  10/11 Pt states he didnt sleep well and cannot expectorate mucous. on 5L nc sating 95-96%, but desats with laying flat and off supplemental O2. Hgb slightly down trending. ileostomy output still high and dark brown/black liquid. UOP better    PAST MEDICAL & SURGICAL HISTORY:  Ulcerative colitis  Colon cancer  Obstructive sleep apnea  Obesity  HTN (hypertension)  Atrial fibrillation  HLD (hyperlipidemia)  BPH (benign prostatic hyperplasia)  COPD (chronic obstructive pulmonary disease)  COVID-19 virus infection  History of pneumonia  H/O hernia repair  History of hydrocelectomy  H/O colonoscopy  H/O cystoscopy  urethral stricture  History of total knee replacement, bilateral  S/P placement of cardiac pacemaker  History of cataract surgery      Medications:  piperacillin/tazobactam IVPB.. 3.375 Gram(s) IV Intermittent every 8 hours  ALBUTerol    90 MICROgram(s) HFA Inhaler 2 Puff(s) Inhalation every 6 hours PRN  albuterol/ipratropium for Nebulization 3 milliLiter(s) Nebulizer every 6 hours PRN  budesonide 160 MICROgram(s)/formoterol 4.5 MICROgram(s) Inhaler 2 Puff(s) Inhalation two times a day  tiotropium 18 MICROgram(s) Capsule 1 Capsule(s) Inhalation daily  acetaminophen   IVPB .. 1000 milliGRAM(s) IV Intermittent once  HYDROmorphone  Injectable 0.5 milliGRAM(s) IV Push every 6 hours PRN  morphine  - Injectable 2 milliGRAM(s) IV Push every 4 hours PRN  ondansetron Injectable 4 milliGRAM(s) IV Push every 6 hours PRN  loperamide 2 milliGRAM(s) Oral three times a day  pantoprazole  Injectable 40 milliGRAM(s) IV Push every 12 hours  psyllium Powder 1 Packet(s) Oral daily  atorvastatin 20 milliGRAM(s) Oral at bedtime  dextrose 50% Injectable 25 Gram(s) IV Push once  dextrose 50% Injectable 12.5 Gram(s) IV Push once  dextrose 50% Injectable 25 Gram(s) IV Push once  dextrose Oral Gel 15 Gram(s) Oral once  glucagon  Injectable 1 milliGRAM(s) IntraMuscular once  hydrocortisone sodium succinate Injectable 50 milliGRAM(s) IV Push every 8 hours  insulin lispro (ADMELOG) corrective regimen sliding scale   SubCutaneous every 6 hours  sodium chloride 0.9% lock flush 3 milliLiter(s) IV Push every 8 hours  influenza  Vaccine (HIGH DOSE) 0.7 milliLiter(s) IntraMuscular once  chlorhexidine 4% Liquid 1 Application(s) Topical <User Schedule>  naloxone Injectable 0.1 milliGRAM(s) IV Push every 3 minutes PRN    Vital Signs Last 24 Hrs  T(C): 37 (11 Oct 2022 06:00), Max: 37 (11 Oct 2022 02:00)  T(F): 98.6 (11 Oct 2022 06:00), Max: 98.6 (11 Oct 2022 02:00)  HR: 59 (11 Oct 2022 08:00) (59 - 102)  BP: 123/54 (11 Oct 2022 08:00) (87/60 - 133/79)  BP(mean): 71 (11 Oct 2022 08:00) (47 - 87)  RR: 18 (11 Oct 2022 08:00) (13 - 22)  SpO2: 91% (11 Oct 2022 08:00) (88% - 100%)    Parameters below as of 11 Oct 2022 08:00  Patient On (Oxygen Delivery Method): nasal cannula  O2 Flow (L/min): 5      I&O's Detail    10 Oct 2022 07:01  -  11 Oct 2022 07:00  --------------------------------------------------------  IN:    IV PiggyBack: 100 mL    Lactated Ringers: 607 mL    Oral Fluid: 240 mL  Total IN: 947 mL    OUT:    Bulb (mL): 15 mL    Bulb (mL): 20 mL    Ileostomy (mL): 850 mL    Indwelling Catheter - Urethral (mL): 950 mL  Total OUT: 1835 mL    Total NET: -888 mL        LABS:                  7.8    9.08  )-----------( 181      ( 11 Oct 2022 05:24 )             24.7     10-11    135  |  101  |  70<H>  ----------------------------<  151<H>  4.5   |  27  |  1.93<H>    Ca    8.7      11 Oct 2022 05:24  Phos  3.3     10-11  Mg     2.4     10-11    TPro  5.7<L>  /  Alb  2.0<L>  /  TBili  0.5  /  DBili  x   /  AST  13<L>  /  ALT  17  /  AlkPhos  42  10-11    LIVER FUNCTIONS - ( 11 Oct 2022 05:24 )  Alb: 2.0 g/dL / Pro: 5.7 gm/dL / ALK PHOS: 42 U/L / ALT: 17 U/L / AST: 13 U/L / GGT: x           PTT - ( 10 Oct 2022 06:46 )  PTT:23.1 sec      Physical Examination:  General: No acute resp distress.  awake and alert  HEENT: Pupils equal, reactive to light.  Symmetric.  PULM:  course breath sounds and rhonchi b/l. no wheezing  NECK: Supple, no lymphadenopathy, trachea midline  CVS: irregular, no murmurs  ABD: Soft, nondistended, nontender, normoactive bowel sounds, + ostomy with very dark brown/black liquid output. JPx 2 left abd- serosanguinous   : + alfaro, urine output clear  EXT: No LE edema b/l, calf nontender  SKIN: Warm and well perfused, no rashes noted.  NEURO: Alert, orientedx 3, interactive, nonfocal    DEVICES:   + alfaro  + VINNY x 2 on left side abdomen- serosanguinous  + ileostomy    RADIOLOGY:   < from: TTE Echo Complete w/ Contrast w/ Doppler (10.07.22 @ 10:28) >   Summary     Moderate mitral annular calcification is present.   The mitral valve leaflets appear thickened.   EA reversal of the mitral inflow consistent with reduced compliance of   the   left ventricle.   The aortic valve appears mildly calcified. Valve opening seems to be   restricted.   Peak and mean transaortic gradients are 25 and 14 mmHg respectively; this   finding is consistent with mild aortic stenosis.   The tricuspid valve leaflets appear mildly thickened and/or calcified,   but   open well.   Moderate (2+) tricuspid valve regurgitation is present.   Mild pulmonary hypertension.   Mild concentric left ventricular hypertrophy is present.   Left ventricle systolic function moderately decreased paradoxical septal   motion ,apical wall , anterior wall , apical lateral hypokinesis in the   presence of a cardiac arrhythmia.   Lumason was used to better define the endocardial border.   Visual estimation of left ventricle ejection fraction is 40-45 %.   Normal appearing right atrium.   A device wire is seen in the RV and RA.   Normal appearing right ventricle structure and function.    < end of copied text >

## 2022-10-12 NOTE — PROGRESS NOTE ADULT - SUBJECTIVE AND OBJECTIVE BOX
SURGERY DAILY PROGRESS NOTE:     Subjective:  Patient seen and examined this AM at bedside. No acute events overnight and patient resting comfortably. Patient offers no active complaints other than mild abd pain. Tolerating full liq, w/o n/v. high ostomy output. Denies fever/chills, shortness of breath, chest pain. VS reviewed    Objective:    MEDICATIONS  (STANDING):  acetaminophen   IVPB .. 1000 milliGRAM(s) IV Intermittent once  atorvastatin 20 milliGRAM(s) Oral at bedtime  budesonide 160 MICROgram(s)/formoterol 4.5 MICROgram(s) Inhaler 2 Puff(s) Inhalation two times a day  chlorhexidine 4% Liquid 1 Application(s) Topical <User Schedule>  dextrose 50% Injectable 25 Gram(s) IV Push once  dextrose 50% Injectable 12.5 Gram(s) IV Push once  dextrose 50% Injectable 25 Gram(s) IV Push once  dextrose Oral Gel 15 Gram(s) Oral once  finasteride 5 milliGRAM(s) Oral daily  glucagon  Injectable 1 milliGRAM(s) IntraMuscular once  hydrocortisone sodium succinate Injectable 50 milliGRAM(s) IV Push daily  influenza  Vaccine (HIGH DOSE) 0.7 milliLiter(s) IntraMuscular once  insulin lispro (ADMELOG) corrective regimen sliding scale   SubCutaneous Before meals and at bedtime  lactated ringers. 1000 milliLiter(s) (50 mL/Hr) IV Continuous <Continuous>  lactated ringers. 1000 milliLiter(s) (50 mL/Hr) IV Continuous <Continuous>  loperamide 4 milliGRAM(s) Oral three times a day  pantoprazole  Injectable 40 milliGRAM(s) IV Push every 12 hours  piperacillin/tazobactam IVPB.. 3.375 Gram(s) IV Intermittent every 8 hours  psyllium Powder 1 Packet(s) Oral two times a day  sodium chloride 0.9% lock flush 3 milliLiter(s) IV Push every 8 hours  tamsulosin 0.8 milliGRAM(s) Oral at bedtime  tiotropium 18 MICROgram(s) Capsule 1 Capsule(s) Inhalation daily    MEDICATIONS  (PRN):  acetaminophen     Tablet .. 650 milliGRAM(s) Oral every 6 hours PRN Mild Pain (1 - 3)  ALBUTerol    90 MICROgram(s) HFA Inhaler 2 Puff(s) Inhalation every 6 hours PRN Shortness of Breath and/or Wheezing  albuterol/ipratropium for Nebulization 3 milliLiter(s) Nebulizer every 6 hours PRN Shortness of Breath and/or Wheezing  HYDROmorphone  Injectable 0.5 milliGRAM(s) IV Push every 6 hours PRN Severe Pain (7 - 10)  morphine  - Injectable 2 milliGRAM(s) IV Push every 4 hours PRN Moderate Pain (4 - 6)  naloxone Injectable 0.1 milliGRAM(s) IV Push every 3 minutes PRN For ANY of the following changes in patient status:  A. RR LESS THAN 10 breaths per minute, B. Oxygen saturation LESS THAN 90%, C. Sedation score of 6  ondansetron Injectable 4 milliGRAM(s) IV Push every 6 hours PRN Nausea and/or Vomiting      Vital Signs Last 24 Hrs  T(C): 36.7 (12 Oct 2022 06:00), Max: 36.7 (12 Oct 2022 06:00)  T(F): 98 (12 Oct 2022 06:00), Max: 98 (12 Oct 2022 06:00)  HR: 75 (12 Oct 2022 07:00) (60 - 84)  BP: 100/44 (12 Oct 2022 07:00) (88/50 - 151/59)  BP(mean): 59 (12 Oct 2022 07:00) (59 - 92)  RR: 18 (12 Oct 2022 07:00) (15 - 21)  SpO2: 93% (12 Oct 2022 07:00) (88% - 100%)    Parameters below as of 12 Oct 2022 07:00  Patient On (Oxygen Delivery Method): nasal cannula  O2 Flow (L/min): 5        PHYSICAL EXAM   GENERAL: NAD, AOx3, well developed  HEAD: Atraumatic, normocephalic  EYES: EOMI, PERRLA, conjunctiva and sclera clear  ENT: moist mucous membrane  NECK: supple, No JVD, midline trachea  CHEST/LUNG: unlabored respirations on nasal cannula  Heart: S1, S2 normal  ABDOMEN: soft, nondistended, mild discomfort to palpation. Ostomy more thickened but still high output, more green color  NERVOUS SYSTEM: AOx3, speech clear, no neuro-deficits  MSK: full ROM, no deformities  SKIN: warm to touch, no rash or lesions      I&O's Detail    11 Oct 2022 07:01  -  12 Oct 2022 07:00  --------------------------------------------------------  IN:    IV PiggyBack: 400 mL    Lactated Ringers: 590 mL  Total IN: 990 mL    OUT:    Bulb (mL): 15 mL    Bulb (mL): 0 mL    Ileostomy (mL): 1750 mL    Indwelling Catheter - Urethral (mL): 1900 mL  Total OUT: 3665 mL    Total NET: -2675 mL          Daily     Daily     LABS:                        7.3    8.04  )-----------( 156      ( 12 Oct 2022 05:30 )             23.0     10-12    134<L>  |  101  |  55<H>  ----------------------------<  150<H>  3.9   |  28  |  1.47<H>    Ca    8.7      12 Oct 2022 05:30  Phos  2.9     10-12  Mg     2.2     10-12    TPro  5.5<L>  /  Alb  2.0<L>  /  TBili  0.3  /  DBili  x   /  AST  13<L>  /  ALT  17  /  AlkPhos  43  10-12          RADIOLOGY & ADDITIONAL STUDIES:

## 2022-10-12 NOTE — PROGRESS NOTE ADULT - ASSESSMENT
ASSESSMENT  78yo male with PMHx Afib on eliquis, HTN, HLD, COPD, FLORA, obestity, ulcerative colitis, recent dx colon cancer presents for elective total proctocolectomy wiht ileal pouch anal anastamosis.     s/p Open restorative proctocolectomy with ileal pouch anal anastamosis (IPAA) and diverting loop ileostomy   Intra op findings include humberto fecal spillage from transverse colon and rectosigmoid mass noted  EBL 250cc, received 3200cc IVF  POD#6  + VINNY x 2 left abd    Hospital course c/b:  septic shock requiring pressors, now off  LLL PNA due to aspiration event  JOSE- suspect pre-renal  Anemia 2/2 possible UGIB, gastritis?    PLAN  Neuro: mentation intact, pain control with dilaudid and morphine  CV: BP stable HR 60-80s afib paced. cont hold heparin gtt. TTE 10/7 LVEF 40-45%. diastolic dysfxn, mod TR.  Pulm: on 5L NC sating 95-96%, titrate to maintain O2 88-92%. cont bipap qhs for FLORA. CXR revealing some inc pulm vasc congestion. cont IV zosyn for LLL PNA d/t aspiration event. cont spiriva, alb inh, asymbicort. pt doesnt want mucinex for cough.  GI: adv to full liquids as per surgery. IV PPI bid. ileostomy output dark brown, coffee ground appearing.   Nephro: JOSE improved with IV fluid. + alfaro in place, added tamsulosin. urology consulted for urinary retention, failed TOV. Pt sees Dr. Gaspar urology but hasnt seen in in a while.  monitor I & Os. cont gentle IV fluids to replace ileostomy loss.  Endo: a1c 6.9. ISS, BGMs ac hs. FS goal <180. IV solucortef 25 qd today. resume home dose prednisone 4mg /day tm  ID: cont IV zosyn #6. monitor temps  Heme: hep gtt dcd  cont SCDs. f/u rpt cbc 1200.   PT eval    Dispo: SICU. Hgb trending down this am. 1u pRBC today. check occult blood. IV PPI bid. full liquid diet. IV lasix with transfusion    Will discuss with Dr. Soriano

## 2022-10-13 ENCOUNTER — TRANSCRIPTION ENCOUNTER (OUTPATIENT)
Age: 77
End: 2022-10-13

## 2022-10-13 LAB
ALBUMIN SERPL ELPH-MCNC: 2 G/DL — LOW (ref 3.3–5)
ALP SERPL-CCNC: 48 U/L — SIGNIFICANT CHANGE UP (ref 40–120)
ALT FLD-CCNC: 20 U/L — SIGNIFICANT CHANGE UP (ref 12–78)
ANION GAP SERPL CALC-SCNC: 5 MMOL/L — SIGNIFICANT CHANGE UP (ref 5–17)
APTT BLD: 25.7 SEC — LOW (ref 27.5–35.5)
APTT BLD: 27.9 SEC — SIGNIFICANT CHANGE UP (ref 27.5–35.5)
AST SERPL-CCNC: 16 U/L — SIGNIFICANT CHANGE UP (ref 15–37)
BASE EXCESS BLDA CALC-SCNC: 3 MMOL/L — SIGNIFICANT CHANGE UP (ref -2–3)
BASOPHILS # BLD AUTO: 0.02 K/UL — SIGNIFICANT CHANGE UP (ref 0–0.2)
BASOPHILS NFR BLD AUTO: 0.2 % — SIGNIFICANT CHANGE UP (ref 0–2)
BILIRUB SERPL-MCNC: 0.4 MG/DL — SIGNIFICANT CHANGE UP (ref 0.2–1.2)
BLOOD GAS COMMENTS ARTERIAL: SIGNIFICANT CHANGE UP
BUN SERPL-MCNC: 36 MG/DL — HIGH (ref 7–23)
CALCIUM SERPL-MCNC: 8.6 MG/DL — SIGNIFICANT CHANGE UP (ref 8.5–10.1)
CHLORIDE SERPL-SCNC: 99 MMOL/L — SIGNIFICANT CHANGE UP (ref 96–108)
CO2 BLDA-SCNC: 30 MMOL/L — HIGH (ref 19–24)
CO2 SERPL-SCNC: 30 MMOL/L — SIGNIFICANT CHANGE UP (ref 22–31)
CREAT SERPL-MCNC: 1.33 MG/DL — HIGH (ref 0.5–1.3)
EGFR: 56 ML/MIN/1.73M2 — LOW
EOSINOPHIL # BLD AUTO: 0.06 K/UL — SIGNIFICANT CHANGE UP (ref 0–0.5)
EOSINOPHIL NFR BLD AUTO: 0.6 % — SIGNIFICANT CHANGE UP (ref 0–6)
GAS PNL BLDA: SIGNIFICANT CHANGE UP
GLUCOSE SERPL-MCNC: 152 MG/DL — HIGH (ref 70–99)
HCO3 BLDA-SCNC: 29 MMOL/L — HIGH (ref 21–28)
HCT VFR BLD CALC: 24.8 % — LOW (ref 39–50)
HCT VFR BLD CALC: 25.6 % — LOW (ref 39–50)
HCT VFR BLD CALC: 25.8 % — LOW (ref 39–50)
HGB BLD-MCNC: 7.8 G/DL — LOW (ref 13–17)
HGB BLD-MCNC: 8 G/DL — LOW (ref 13–17)
HGB BLD-MCNC: 8.1 G/DL — LOW (ref 13–17)
IMM GRANULOCYTES NFR BLD AUTO: 4.7 % — HIGH (ref 0–0.9)
LYMPHOCYTES # BLD AUTO: 0.86 K/UL — LOW (ref 1–3.3)
LYMPHOCYTES # BLD AUTO: 8.9 % — LOW (ref 13–44)
MAGNESIUM SERPL-MCNC: 2.1 MG/DL — SIGNIFICANT CHANGE UP (ref 1.6–2.6)
MCHC RBC-ENTMCNC: 27 PG — SIGNIFICANT CHANGE UP (ref 27–34)
MCHC RBC-ENTMCNC: 27.5 PG — SIGNIFICANT CHANGE UP (ref 27–34)
MCHC RBC-ENTMCNC: 27.7 PG — SIGNIFICANT CHANGE UP (ref 27–34)
MCHC RBC-ENTMCNC: 31.3 GM/DL — LOW (ref 32–36)
MCHC RBC-ENTMCNC: 31.4 GM/DL — LOW (ref 32–36)
MCHC RBC-ENTMCNC: 31.5 GM/DL — LOW (ref 32–36)
MCV RBC AUTO: 86.5 FL — SIGNIFICANT CHANGE UP (ref 80–100)
MCV RBC AUTO: 87.5 FL — SIGNIFICANT CHANGE UP (ref 80–100)
MCV RBC AUTO: 87.9 FL — SIGNIFICANT CHANGE UP (ref 80–100)
MONOCYTES # BLD AUTO: 0.48 K/UL — SIGNIFICANT CHANGE UP (ref 0–0.9)
MONOCYTES NFR BLD AUTO: 5 % — SIGNIFICANT CHANGE UP (ref 2–14)
NEUTROPHILS # BLD AUTO: 7.79 K/UL — HIGH (ref 1.8–7.4)
NEUTROPHILS NFR BLD AUTO: 80.6 % — HIGH (ref 43–77)
PCO2 BLDA: 49 MMHG — HIGH (ref 35–48)
PH BLDA: 7.38 — SIGNIFICANT CHANGE UP (ref 7.35–7.45)
PHOSPHATE SERPL-MCNC: 2.8 MG/DL — SIGNIFICANT CHANGE UP (ref 2.5–4.5)
PLATELET # BLD AUTO: 180 K/UL — SIGNIFICANT CHANGE UP (ref 150–400)
PLATELET # BLD AUTO: 187 K/UL — SIGNIFICANT CHANGE UP (ref 150–400)
PLATELET # BLD AUTO: 194 K/UL — SIGNIFICANT CHANGE UP (ref 150–400)
PO2 BLDA: 74 MMHG — LOW (ref 83–108)
POTASSIUM SERPL-MCNC: 4.1 MMOL/L — SIGNIFICANT CHANGE UP (ref 3.5–5.3)
POTASSIUM SERPL-SCNC: 4.1 MMOL/L — SIGNIFICANT CHANGE UP (ref 3.5–5.3)
PROT SERPL-MCNC: 5.8 GM/DL — LOW (ref 6–8.3)
RBC # BLD: 2.82 M/UL — LOW (ref 4.2–5.8)
RBC # BLD: 2.95 M/UL — LOW (ref 4.2–5.8)
RBC # BLD: 2.96 M/UL — LOW (ref 4.2–5.8)
RBC # FLD: 18.2 % — HIGH (ref 10.3–14.5)
RBC # FLD: 18.3 % — HIGH (ref 10.3–14.5)
RBC # FLD: 18.4 % — HIGH (ref 10.3–14.5)
SAO2 % BLDA: 95 % — SIGNIFICANT CHANGE UP
SODIUM SERPL-SCNC: 134 MMOL/L — LOW (ref 135–145)
WBC # BLD: 10.48 K/UL — SIGNIFICANT CHANGE UP (ref 3.8–10.5)
WBC # BLD: 9.57 K/UL — SIGNIFICANT CHANGE UP (ref 3.8–10.5)
WBC # BLD: 9.66 K/UL — SIGNIFICANT CHANGE UP (ref 3.8–10.5)
WBC # FLD AUTO: 10.48 K/UL — SIGNIFICANT CHANGE UP (ref 3.8–10.5)
WBC # FLD AUTO: 9.57 K/UL — SIGNIFICANT CHANGE UP (ref 3.8–10.5)
WBC # FLD AUTO: 9.66 K/UL — SIGNIFICANT CHANGE UP (ref 3.8–10.5)

## 2022-10-13 PROCEDURE — 99233 SBSQ HOSP IP/OBS HIGH 50: CPT

## 2022-10-13 RX ORDER — HEPARIN SODIUM 5000 [USP'U]/ML
INJECTION INTRAVENOUS; SUBCUTANEOUS
Qty: 25000 | Refills: 0 | Status: DISCONTINUED | OUTPATIENT
Start: 2022-10-13 | End: 2022-10-14

## 2022-10-13 RX ORDER — ACETAMINOPHEN 500 MG
1000 TABLET ORAL ONCE
Refills: 0 | Status: COMPLETED | OUTPATIENT
Start: 2022-10-13 | End: 2022-10-13

## 2022-10-13 RX ORDER — MIDODRINE HYDROCHLORIDE 2.5 MG/1
10 TABLET ORAL ONCE
Refills: 0 | Status: COMPLETED | OUTPATIENT
Start: 2022-10-13 | End: 2022-10-13

## 2022-10-13 RX ORDER — MIDODRINE HYDROCHLORIDE 2.5 MG/1
10 TABLET ORAL EVERY 8 HOURS
Refills: 0 | Status: DISCONTINUED | OUTPATIENT
Start: 2022-10-13 | End: 2022-10-15

## 2022-10-13 RX ORDER — HEPARIN SODIUM 5000 [USP'U]/ML
6000 INJECTION INTRAVENOUS; SUBCUTANEOUS EVERY 6 HOURS
Refills: 0 | Status: DISCONTINUED | OUTPATIENT
Start: 2022-10-13 | End: 2022-10-14

## 2022-10-13 RX ORDER — MIDODRINE HYDROCHLORIDE 2.5 MG/1
10 TABLET ORAL EVERY 8 HOURS
Refills: 0 | Status: DISCONTINUED | OUTPATIENT
Start: 2022-10-13 | End: 2022-10-13

## 2022-10-13 RX ORDER — QUETIAPINE FUMARATE 200 MG/1
25 TABLET, FILM COATED ORAL ONCE
Refills: 0 | Status: COMPLETED | OUTPATIENT
Start: 2022-10-13 | End: 2022-10-13

## 2022-10-13 RX ADMIN — HEPARIN SODIUM 6000 UNIT(S): 5000 INJECTION INTRAVENOUS; SUBCUTANEOUS at 17:58

## 2022-10-13 RX ADMIN — Medication 4 MILLIGRAM(S): at 12:23

## 2022-10-13 RX ADMIN — Medication 1000 MILLIGRAM(S): at 07:00

## 2022-10-13 RX ADMIN — BUDESONIDE AND FORMOTEROL FUMARATE DIHYDRATE 2 PUFF(S): 160; 4.5 AEROSOL RESPIRATORY (INHALATION) at 10:00

## 2022-10-13 RX ADMIN — PIPERACILLIN AND TAZOBACTAM 25 GRAM(S): 4; .5 INJECTION, POWDER, LYOPHILIZED, FOR SOLUTION INTRAVENOUS at 14:29

## 2022-10-13 RX ADMIN — PIPERACILLIN AND TAZOBACTAM 25 GRAM(S): 4; .5 INJECTION, POWDER, LYOPHILIZED, FOR SOLUTION INTRAVENOUS at 21:33

## 2022-10-13 RX ADMIN — Medication 4 MILLIGRAM(S): at 00:11

## 2022-10-13 RX ADMIN — HEPARIN SODIUM 1300 UNIT(S)/HR: 5000 INJECTION INTRAVENOUS; SUBCUTANEOUS at 17:54

## 2022-10-13 RX ADMIN — SODIUM CHLORIDE 3 MILLILITER(S): 9 INJECTION INTRAMUSCULAR; INTRAVENOUS; SUBCUTANEOUS at 14:32

## 2022-10-13 RX ADMIN — MORPHINE SULFATE 2 MILLIGRAM(S): 50 CAPSULE, EXTENDED RELEASE ORAL at 11:04

## 2022-10-13 RX ADMIN — CHLORHEXIDINE GLUCONATE 1 APPLICATION(S): 213 SOLUTION TOPICAL at 05:33

## 2022-10-13 RX ADMIN — Medication 2: at 23:35

## 2022-10-13 RX ADMIN — SODIUM CHLORIDE 3 MILLILITER(S): 9 INJECTION INTRAMUSCULAR; INTRAVENOUS; SUBCUTANEOUS at 20:37

## 2022-10-13 RX ADMIN — Medication 4 MILLIGRAM(S): at 17:20

## 2022-10-13 RX ADMIN — Medication 50 MILLIGRAM(S): at 05:32

## 2022-10-13 RX ADMIN — Medication 4: at 12:23

## 2022-10-13 RX ADMIN — MIDODRINE HYDROCHLORIDE 10 MILLIGRAM(S): 2.5 TABLET ORAL at 16:14

## 2022-10-13 RX ADMIN — MORPHINE SULFATE 2 MILLIGRAM(S): 50 CAPSULE, EXTENDED RELEASE ORAL at 15:09

## 2022-10-13 RX ADMIN — PANTOPRAZOLE SODIUM 40 MILLIGRAM(S): 20 TABLET, DELAYED RELEASE ORAL at 10:24

## 2022-10-13 RX ADMIN — TAMSULOSIN HYDROCHLORIDE 0.8 MILLIGRAM(S): 0.4 CAPSULE ORAL at 21:32

## 2022-10-13 RX ADMIN — Medication 2: at 07:59

## 2022-10-13 RX ADMIN — Medication 4 MILLIGRAM(S): at 23:36

## 2022-10-13 RX ADMIN — ATORVASTATIN CALCIUM 20 MILLIGRAM(S): 80 TABLET, FILM COATED ORAL at 21:32

## 2022-10-13 RX ADMIN — MORPHINE SULFATE 2 MILLIGRAM(S): 50 CAPSULE, EXTENDED RELEASE ORAL at 11:20

## 2022-10-13 RX ADMIN — PIPERACILLIN AND TAZOBACTAM 25 GRAM(S): 4; .5 INJECTION, POWDER, LYOPHILIZED, FOR SOLUTION INTRAVENOUS at 05:32

## 2022-10-13 RX ADMIN — HEPARIN SODIUM 1300 UNIT(S)/HR: 5000 INJECTION INTRAVENOUS; SUBCUTANEOUS at 19:09

## 2022-10-13 RX ADMIN — Medication 2: at 17:20

## 2022-10-13 RX ADMIN — Medication 400 MILLIGRAM(S): at 06:58

## 2022-10-13 RX ADMIN — BUDESONIDE AND FORMOTEROL FUMARATE DIHYDRATE 2 PUFF(S): 160; 4.5 AEROSOL RESPIRATORY (INHALATION) at 20:18

## 2022-10-13 RX ADMIN — SODIUM CHLORIDE 3 MILLILITER(S): 9 INJECTION INTRAMUSCULAR; INTRAVENOUS; SUBCUTANEOUS at 06:32

## 2022-10-13 RX ADMIN — FINASTERIDE 5 MILLIGRAM(S): 5 TABLET, FILM COATED ORAL at 10:24

## 2022-10-13 RX ADMIN — Medication 4 MILLIGRAM(S): at 05:32

## 2022-10-13 RX ADMIN — QUETIAPINE FUMARATE 25 MILLIGRAM(S): 200 TABLET, FILM COATED ORAL at 22:48

## 2022-10-13 RX ADMIN — MORPHINE SULFATE 2 MILLIGRAM(S): 50 CAPSULE, EXTENDED RELEASE ORAL at 15:25

## 2022-10-13 RX ADMIN — PANTOPRAZOLE SODIUM 40 MILLIGRAM(S): 20 TABLET, DELAYED RELEASE ORAL at 23:35

## 2022-10-13 RX ADMIN — TIOTROPIUM BROMIDE 1 CAPSULE(S): 18 CAPSULE ORAL; RESPIRATORY (INHALATION) at 09:59

## 2022-10-13 RX ADMIN — HEPARIN SODIUM 1000 UNIT(S)/HR: 5000 INJECTION INTRAVENOUS; SUBCUTANEOUS at 11:11

## 2022-10-13 RX ADMIN — MIDODRINE HYDROCHLORIDE 10 MILLIGRAM(S): 2.5 TABLET ORAL at 21:32

## 2022-10-13 NOTE — DISCHARGE NOTE PROVIDER - HOSPITAL COURSE
Patient was admitted under the colorectal surgery service. Patient was taken to the OR for a total proctocolectomy with J-pouch and diverting loop ileostomy. Patient tolerated the procedure well. Postoperative course was complicated by pressors and blood transfusion. PT was consulted for mobilization. VINNY drains were removed. Diet was advanced as tolerated. ICU and cardiology were on board and their recommendations were appreciated. Patient was admitted under the colorectal surgery service.  POD#18 s/p proctocolectomy, IPAA, DLI for UC/Sigmoid CA, s/p IR drain, S/p cystoscopy w/ stent placement and [place] tip alfaro. Hydronephrosis resolved, collection w/drain.    Patient tolerated the procedure well. Postoperative course was complicated by pressors and blood transfusion. PT was consulted for mobilization. VINNY drains were removed. Diet was advanced as tolerated. ICU and cardiology were on board and their recommendations were appreciated.   Patient was admitted under the colorectal surgery service.  s/p proctocolectomy, IPAA, DLI for UC/Sigmoid CA, s/p IR drain, S/p cystoscopy w/ stent placement and [place] tip alfaro  Pt with elevated ileostomy output necessitating several antidiarrheals, lomotil, imodium, tincture of opium, metamucil, questran-- on a standing basis, and not as needed.                                                                                                                 #Colon mass   -S/P RCP-IPAA (restorative proctocolectomy with ileal pouch anal anastomosis), diverting loop ileostomy 10/6  -C/B acute hypoxic respiratory failure due to pneumonia/Septic shock/JOSE/fecal spillage peritonitis   -Off pressors, given stress dose steroids, now tapering, on prednisone 4mg daily   -Completed zosyn 10 days for PNA  -S/p IR drainage of fluid collection on 10/19, actually restarted zosyn 10/20, (cultures have been negative >48hrs)- day #4, will discontinue   -Started PPN on 10/17, now tolerating clear liquids, if pt does not tolerate advancing diet, may need PICC to continue PPB - today is day #7   -Advance diet as per primary team - tolerating diet well, once tolerating regular diet, d/c PPN    #Normocytic Anemia, multifactorial   10/24 PRBCS ordered by primary team - will hold off on lasix pre-med due to slight elevation in Cr   Monitor closely while on Eliquis     #Mild Systolic 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%  #mild JOSE  patient just got IVFs  due to high ostomy output   PLEASE DC IVFS, worsening  pulm vascular congestion and Cr  will give lasix 20 IV q12h   at this time rpt Serum Cr, BNP and CXR in AM   discuss Card Dr Barrett, optimize meds - ACEI and BB as toelrated; low Na diet   suspect drop in CHF In the setting of septic shock but will need repeat ECHO in 6 weeks     #HTN/AFIB/PPM/HLD  -Continue Eliquis 5mg q12hrs   -Continue lipitor 20mg qhs  -Off antihypertensives here, bp stable  -On amlodipine 2.5mg, Metoprolol ER 25mg daily as BP allows     #COPD  -Stable  -Continue Prednisone 4mg daily, Symbicort, Spiriva Albuterol HFA PRN   - before dc check Pulse Ox on ambulation to see how hes doing and ensure safety while in Rehab      #Urinary retention  -Alfaro's in place  -Continue tamsulosin 0.8mg qhs, finasteride 5mg daily   -Urology consult appreciated: d/c home with alfaro's    VTE prophylaxis  -On eliquis     Dispo: Pulse Ox on ambulation before dc to rehab to ensure a safe discharge, is now OFF IVFs, is on IV LASIX, CXR in AM, H&H, while on eliquis, monitor lytes and BNP, Card follow-up   discussed with Surgery                       Patient was admitted under the colorectal surgery service.  s/p proctocolectomy, IPAA, DLI for UC/Sigmoid CA, s/p IR drain, S/p cystoscopy w/ stent placement and [place] tip alfaro  Pt with elevated ileostomy output necessitating several antidiarrheals, lomotil, imodium, tincture of opium, metamucil, questran-- on a standing basis, and not as needed.                                                                                                                 #Colon mass   -S/P RCP-IPAA (restorative proctocolectomy with ileal pouch anal anastomosis), diverting loop ileostomy 10/6  -C/B acute hypoxic respiratory failure due to pneumonia/Septic shock/JSOE/fecal spillage peritonitis   -Off pressors, given stress dose steroids, now tapering, on prednisone 4mg daily   -Completed zosyn 10 days for PNA  -S/p IR drainage of fluid collection on 10/19, actually restarted zosyn 10/20, (cultures have been negative >48hrs)- day #4, will discontinue   -Started PPN on 10/17, now tolerating clear liquids, if pt does not tolerate advancing diet, may need PICC to continue PPB - today is day #7   -Advance diet as per primary team - tolerating diet well, once tolerating regular diet, d/c PPN    #Normocytic Anemia, multifactorial   10/24 PRBCS ordered by primary team - will hold off on lasix pre-med due to slight elevation in Cr   Monitor closely while on Eliquis     #Mild Systolic 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%  #mild JOSE  patient just got IVFs  due to high ostomy output   PLEASE DC IVFS, worsening  pulm vascular congestion and Cr  will give lasix 20 IV q12h   at this time rpt Serum Cr, BNP and CXR in AM   discuss Card Dr Barrett, optimize meds - ACEI and BB as toelrated; low Na diet   suspect drop in CHF In the setting of septic shock but will need repeat ECHO in 6 weeks     #HTN/AFIB/PPM/HLD  -Continue Eliquis 5mg q12hrs   -Continue lipitor 20mg qhs  -Off antihypertensives here, bp stable  -On amlodipine 2.5mg, Metoprolol ER 25mg daily as BP allows     #COPD  -Stable  -Continue Prednisone 4mg daily, Symbicort, Spiriva Albuterol HFA PRN   - before dc check Pulse Ox on ambulation to see how hes doing and ensure safety while in Rehab      #Urinary retention  -Alfaro's in place  -Continue tamsulosin 0.8mg qhs, finasteride 5mg daily   -Urology consult appreciated: d/c home with alfaro's    VTE prophylaxis  -On eliquis     Dispo: Pulse Ox on ambulation before dc to rehab to ensure a safe discharge, is now OFF IVFs, is on IV LASIX, CXR in AM, H&H, while on eliquis, monitor lytes and BNP, Card follow-up                          Patient was admitted under the colorectal surgery service.  s/p proctocolectomy, IPAA, DLI for UC/Sigmoid CA, s/p IR drain, S/p cystoscopy w/ stent placement and [place] tip alfaro  Pt with elevated ileostomy output necessitating several antidiarrheals, lomotil, imodium, tincture of opium, metamucil, questran-- on a standing basis, and not as needed.                                                                                                                 #Colon mass   -S/P RCP-IPAA (restorative proctocolectomy with ileal pouch anal anastomosis), diverting loop ileostomy 10/6  -C/B acute hypoxic respiratory failure due to pneumonia/Septic shock/JOSE/fecal spillage peritonitis   -Off pressors, given stress dose steroids, now tapering, on prednisone 4mg daily   -Completed zosyn 10 days for PNA  -S/p IR drainage of fluid collection on 10/19, actually restarted zosyn 10/20, (cultures have been negative >48hrs)- day #4, will discontinue   -Started PPN on 10/17, now tolerating clear liquids, if pt does not tolerate advancing diet, may need PICC to continue PPB - today is day #7   -Advance diet as per primary team - tolerating diet well, once tolerating regular diet, d/c PPN    #Normocytic Anemia, multifactorial   10/24 PRBCS ordered by primary team - will hold off on lasix pre-med due to slight elevation in Cr   Monitor closely while on Eliquis     #Mild Systolic 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%  #mild JOSE  patient just got IVFs  due to high ostomy output   PLEASE DC IVFS, worsening  pulm vascular congestion and Cr  will give lasix 20 IV q12h   at this time rpt Serum Cr, BNP and CXR in AM   discuss Card Dr Barrett, optimize meds - ACEI and BB as toelrated; low Na diet   suspect drop in CHF In the setting of septic shock but will need repeat ECHO in 6 weeks     #HTN/AFIB/PPM/HLD  -Continue Eliquis 5mg q12hrs   -Continue lipitor 20mg qhs  -Off antihypertensives here, bp stable  -On amlodipine 2.5mg, Metoprolol ER 25mg daily as BP allows     #COPD  -Stable  -Continue Prednisone 4mg daily, Symbicort, Spiriva Albuterol HFA PRN   - before dc check Pulse Ox on ambulation to see how hes doing and ensure safety while in Rehab      #Urinary retention  -Alfaro's in place  -Continue tamsulosin 0.8mg qhs, finasteride 5mg daily   -Urology consult appreciated: d/c home with alfaro's  +uti on antifungal-fluconazole.    VTE prophylaxis  -On eliquis     Dispo: Pulse Ox on ambulation before dc to rehab to ensure a safe discharge, is now OFF IVFs, is on IV LASIX, CXR in AM, H&H, while on eliquis, monitor lytes and BNP, Card follow-up

## 2022-10-13 NOTE — DISCHARGE NOTE PROVIDER - CARE PROVIDER_API CALL
Eduin Nguyen; STEVEN)  ColonRectal Surgery; Surgery  [address] [place] [address]  Phone: [phone]  Fax: [phone]  Follow Up Time: 2 weeks    Pulmonologist,   Phone: (   )    -  Fax: (   )    -  Follow Up Time:     Cardiologist,   Phone: (   )    -  Fax: (   )    -  Follow Up Time:     PCP,   Phone: (   )    -  Fax: (   )    -  Follow Up Time:    Eduin Nguyen; STEVEN)  ColonRectal Surgery; [place]  [address] [place] [address]  Phone: [phone]  Fax: [phone]  Follow Up Time: 2 weeks    Pulmonologist,   Phone: (   )    -  Fax: (   )    -  Follow Up Time:     Cardiologist,   Phone: (   )    -  Fax: (   )    -  Follow Up Time:     PCP,   Phone: (   )    -  Fax: (   )    -  Follow Up Time:     Angelito Lamar)  Urology  [address] [place] [address], [address] [place] [address]  Phone: [phone]  Fax: [phone]  Follow Up Time: 2 weeks   Eduin Nguyen; STEVEN)  ColonRectal Surgery; Surgery  [address] [place] [address]  Phone: [phone]  Fax: [phone]  Follow Up Time: 2 weeks    Angelito Lamar)  Urology  [address] [facility], [address] [place] [address]  Phone: [phone]  Fax: [phone]  Follow Up Time: 2 weeks    Pulmonologist,   Phone: (   )    -  Fax: (   )    -  Follow Up Time:     Cardiologist,   Phone: (   )    -  Fax: (   )    -  Follow Up Time:     PCP,   Phone: (   )    -  Fax: (   )    -  Follow Up Time:     Celine Barrett (DO)  Cardiovascular Disease; Internal Medicine  [address] [facility] [address]  [place] [address]  Phone: [phone]  Fax: [phone]  Follow Up Time: 1 week

## 2022-10-13 NOTE — PROGRESS NOTE ADULT - NEGATIVE GASTROINTESTINAL SYMPTOMS
no nausea/no abdominal pain

## 2022-10-13 NOTE — PROGRESS NOTE ADULT - ATTENDING COMMENTS
Seen and examined.  Feels fine.  Now in afib. Some hypotension but mentating without issue.  Km po with reasonable ileostomy output.  Now on hep gtt for a fib.  AF  NAD  incision clean, stoma productive, soft, NTND  Labs reviewed  A/P -   D/C VINNY drains.  Cont po and anti-diarrheals.  Hep gtt for 24hrs per ICU service to ensure absence of bleeding while on AC, prior to restarting eliquis.  Ambulating without issue.   D/C in next 24-48 hrs with home health for alfaro and ostomy.

## 2022-10-13 NOTE — DISCHARGE NOTE PROVIDER - DETAILS OF MALNUTRITION DIAGNOSIS/DIAGNOSES
This patient has been assessed with a concern for Malnutrition and was treated during this hospitalization for the following Nutrition diagnosis/diagnoses:     -  10/17/2022: Severe protein-calorie malnutrition

## 2022-10-13 NOTE — PROGRESS NOTE ADULT - SUBJECTIVE AND OBJECTIVE BOX
Patient is a 77y old  Male who presents with a chief complaint of Malignant neoplasm of sigmoid colon  Ulcerative colitis without complications  (07 Oct 2022 12:41)    BRIEF HOSPITAL COURSE: 76yo male with PMHx Afib on eliquis, HTN, HLD, COPD, FLORA, obestity, ulcerative colitis, recent dx colon cancer presents for elective total proctocolectomy wiht ileal pouch anal anastamosis.      s/p Open restorative proctocolectomy with ileal pouch anal anastamosis and diverting loop ileostomy   Intra op  humberto fecal spillage from transverse colon,   EBL 250cc, received 3200cc IVF    Hospital course c/b:  septic shock requiring pressors  LLL PNA suspected aspiration event  JOSE    10/12 Pt seen this am, reports feeling well. refusing bipap overnight, but states he will try it with something to help with anxiety. VINNY removed today. Hgb trending down. BP soft. ileostomy output improving.     PAST MEDICAL & SURGICAL HISTORY:  Ulcerative colitis  Colon cancer  Obstructive sleep apnea  Obesity  HTN (hypertension)  Atrial fibrillation  HLD (hyperlipidemia)  BPH (benign prostatic hyperplasia)  COPD (chronic obstructive pulmonary disease)  COVID-19 virus infection  History of pneumonia  H/O hernia repair  History of hydrocelectomy  H/O colonoscopy  H/O cystoscopy  urethral stricture  History of total knee replacement, bilateral  S/P placement of cardiac pacemaker  History of cataract surgery      MEDICATIONS  (STANDING):  acetaminophen   IVPB .. 1000 milliGRAM(s) IV Intermittent once  atorvastatin 20 milliGRAM(s) Oral at bedtime  budesonide 160 MICROgram(s)/formoterol 4.5 MICROgram(s) Inhaler 2 Puff(s) Inhalation two times a day  chlorhexidine 4% Liquid 1 Application(s) Topical <User Schedule>  dextrose 50% Injectable 25 Gram(s) IV Push once  dextrose 50% Injectable 12.5 Gram(s) IV Push once  dextrose 50% Injectable 25 Gram(s) IV Push once  dextrose Oral Gel 15 Gram(s) Oral once  finasteride 5 milliGRAM(s) Oral daily  glucagon  Injectable 1 milliGRAM(s) IntraMuscular once  heparin  Infusion.  Unit(s)/Hr (10 mL/Hr) IV Continuous <Continuous>  hydrocortisone sodium succinate Injectable 50 milliGRAM(s) IV Push daily  influenza  Vaccine (HIGH DOSE) 0.7 milliLiter(s) IntraMuscular once  insulin lispro (ADMELOG) corrective regimen sliding scale   SubCutaneous Before meals and at bedtime  loperamide 4 milliGRAM(s) Oral four times a day  pantoprazole  Injectable 40 milliGRAM(s) IV Push every 12 hours  piperacillin/tazobactam IVPB.. 3.375 Gram(s) IV Intermittent every 8 hours  psyllium Powder 1 Packet(s) Oral two times a day  sodium chloride 0.9% lock flush 3 milliLiter(s) IV Push every 8 hours  tamsulosin 0.8 milliGRAM(s) Oral at bedtime  tiotropium 18 MICROgram(s) Capsule 1 Capsule(s) Inhalation daily      Vital Signs Last 24 Hrs  T(C): 36.7 (13 Oct 2022 10:00), Max: 37.3 (13 Oct 2022 04:00)  T(F): 98 (13 Oct 2022 10:00), Max: 99.2 (13 Oct 2022 04:00)  HR: 89 (13 Oct 2022 13:26) (60 - 99)  BP: 102/64 (13 Oct 2022 13:26) (80/65 - 114/54)  BP(mean): 73 (13 Oct 2022 13:26) (55 - 102)  RR: 14 (13 Oct 2022 13:26) (14 - 26)  SpO2: 96% (13 Oct 2022 13:26) (90% - 99%)    Parameters below as of 13 Oct 2022 13:26  Patient On (Oxygen Delivery Method): nasal cannula  O2 Flow (L/min): 4      I&O's Detail    12 Oct 2022 07:01  -  13 Oct 2022 07:00  --------------------------------------------------------  IN:    IV PiggyBack: 300 mL    PRBCs (Packed Red Blood Cells): 296 mL  Total IN: 596 mL    OUT:    Bulb (mL): 0 mL    Bulb (mL): 0 mL    Ileostomy (mL): 650 mL    Indwelling Catheter - Urethral (mL): 1450 mL  Total OUT: 2100 mL    Total NET: -1504 mL      13 Oct 2022 07:01  -  13 Oct 2022 13:49  --------------------------------------------------------  IN:    Oral Fluid: 320 mL  Total IN: 320 mL    OUT:  Total OUT: 0 mL    Total NET: 320 mL      LABS:                             8.0    10.48 )-----------( 187      ( 13 Oct 2022 12:50 )             25.6     10-13    134<L>  |  99  |  36<H>  ----------------------------<  152<H>  4.1   |  30  |  1.33<H>    Ca    8.6      13 Oct 2022 05:30  Phos  2.8     10-13  Mg     2.1     10-13    TPro  5.8<L>  /  Alb  2.0<L>  /  TBili  0.4  /  DBili  x   /  AST  16  /  ALT  20  /  AlkPhos  48  10-13    LIVER FUNCTIONS - ( 13 Oct 2022 05:30 )  Alb: 2.0 g/dL / Pro: 5.8 gm/dL / ALK PHOS: 48 U/L / ALT: 20 U/L / AST: 16 U/L / GGT: x           PTT - ( 13 Oct 2022 09:54 )  PTT:25.7 sec        Physical Examination:  General: No acute resp distress.  awake and alert  HEENT: Pupils equal, reactive to light.  Symmetric.  PULM:  course breath sounds and rhonchi b/l. no wheezing  NECK: Supple, no lymphadenopathy, trachea midline  CVS: irregular, no murmurs  ABD: Soft, nondistended, nontender, normoactive bowel sounds, + ostomy with very dark brown/black liquid output. JPx 2 left abd- serosanguinous   : + alfaro, urine output clear  EXT: No LE edema b/l, calf nontender  SKIN: Warm and well perfused, no rashes noted.  NEURO: Alert, orientedx 3, interactive, nonfocal    DEVICES:   + alfaro  + VINNY x 2 on left side abdomen- serosanguinous  + ileostomy    RADIOLOGY:   < from: TTE Echo Complete w/ Contrast w/ Doppler (10.07.22 @ 10:28) >   Summary     Moderate mitral annular calcification is present.   The mitral valve leaflets appear thickened.   EA reversal of the mitral inflow consistent with reduced compliance of   the   left ventricle.   The aortic valve appears mildly calcified. Valve opening seems to be   restricted.   Peak and mean transaortic gradients are 25 and 14 mmHg respectively; this   finding is consistent with mild aortic stenosis.   The tricuspid valve leaflets appear mildly thickened and/or calcified,   but   open well.   Moderate (2+) tricuspid valve regurgitation is present.   Mild pulmonary hypertension.   Mild concentric left ventricular hypertrophy is present.   Left ventricle systolic function moderately decreased paradoxical septal   motion ,apical wall , anterior wall , apical lateral hypokinesis in the   presence of a cardiac arrhythmia.   Lumason was used to better define the endocardial border.   Visual estimation of left ventricle ejection fraction is 40-45 %.   Normal appearing right atrium.   A device wire is seen in the RV and RA.   Normal appearing right ventricle structure and function.    < end of copied text >

## 2022-10-13 NOTE — PROGRESS NOTE ADULT - SUBJECTIVE AND OBJECTIVE BOX
Patient seen and examined this AM at bedside. No acute events overnight and patient resting comfortably. Patient offers no active complaints other than mild abd pain. Tolerating solid diet. Was transfued 1u PRBC yesterday. Denies fever/chills, shortness of breath, chest pain. VS reviewed    Objective:    MEDICATIONS  (STANDING):  acetaminophen   IVPB .. 1000 milliGRAM(s) IV Intermittent once  atorvastatin 20 milliGRAM(s) Oral at bedtime  budesonide 160 MICROgram(s)/formoterol 4.5 MICROgram(s) Inhaler 2 Puff(s) Inhalation two times a day  chlorhexidine 4% Liquid 1 Application(s) Topical <User Schedule>  dextrose 50% Injectable 25 Gram(s) IV Push once  dextrose 50% Injectable 12.5 Gram(s) IV Push once  dextrose 50% Injectable 25 Gram(s) IV Push once  dextrose Oral Gel 15 Gram(s) Oral once  finasteride 5 milliGRAM(s) Oral daily  glucagon  Injectable 1 milliGRAM(s) IntraMuscular once  hydrocortisone sodium succinate Injectable 50 milliGRAM(s) IV Push daily  influenza  Vaccine (HIGH DOSE) 0.7 milliLiter(s) IntraMuscular once  insulin lispro (ADMELOG) corrective regimen sliding scale   SubCutaneous Before meals and at bedtime  lactated ringers. 1000 milliLiter(s) (50 mL/Hr) IV Continuous <Continuous>  lactated ringers. 1000 milliLiter(s) (50 mL/Hr) IV Continuous <Continuous>  loperamide 4 milliGRAM(s) Oral three times a day  pantoprazole  Injectable 40 milliGRAM(s) IV Push every 12 hours  piperacillin/tazobactam IVPB.. 3.375 Gram(s) IV Intermittent every 8 hours  psyllium Powder 1 Packet(s) Oral two times a day  sodium chloride 0.9% lock flush 3 milliLiter(s) IV Push every 8 hours  tamsulosin 0.8 milliGRAM(s) Oral at bedtime  tiotropium 18 MICROgram(s) Capsule 1 Capsule(s) Inhalation daily    MEDICATIONS  (PRN):  acetaminophen     Tablet .. 650 milliGRAM(s) Oral every 6 hours PRN Mild Pain (1 - 3)  ALBUTerol    90 MICROgram(s) HFA Inhaler 2 Puff(s) Inhalation every 6 hours PRN Shortness of Breath and/or Wheezing  albuterol/ipratropium for Nebulization 3 milliLiter(s) Nebulizer every 6 hours PRN Shortness of Breath and/or Wheezing  HYDROmorphone  Injectable 0.5 milliGRAM(s) IV Push every 6 hours PRN Severe Pain (7 - 10)  morphine  - Injectable 2 milliGRAM(s) IV Push every 4 hours PRN Moderate Pain (4 - 6)  naloxone Injectable 0.1 milliGRAM(s) IV Push every 3 minutes PRN For ANY of the following changes in patient status:  A. RR LESS THAN 10 breaths per minute, B. Oxygen saturation LESS THAN 90%, C. Sedation score of 6  ondansetron Injectable 4 milliGRAM(s) IV Push every 6 hours PRN Nausea and/or Vomiting      Vital Signs Last 24 Hrs  T(C): 37.3 (13 Oct 2022 04:00), Max: 37.3 (13 Oct 2022 04:00)  T(F): 99.2 (13 Oct 2022 04:00), Max: 99.2 (13 Oct 2022 04:00)  HR: 62 (13 Oct 2022 08:00) (60 - 99)  BP: 101/47 (13 Oct 2022 08:00) (91/53 - 114/54)  BP(mean): 60 (13 Oct 2022 08:00) (56 - 102)  RR: 24 (13 Oct 2022 08:00) (15 - 25)  SpO2: 97% (13 Oct 2022 08:00) (90% - 99%)    Parameters below as of 13 Oct 2022 08:00  Patient On (Oxygen Delivery Method): nasal cannula  O2 Flow (L/min): 5        PHYSICAL EXAM   GENERAL: NAD, AOx3, well developed  HEAD: Atraumatic, normocephalic  EYES: EOMI, PERRLA, conjunctiva and sclera clear  ENT: moist mucous membrane  NECK: supple, No JVD, midline trachea  CHEST/LUNG: unlabored respirations on nasal cannula  Heart: S1, S2 normal  ABDOMEN: soft, nondistended, mild discomfort to palpation. Ostomy more thickened  NERVOUS SYSTEM: AOx3, speech clear, no neuro-deficits  MSK: full ROM, no deformities  SKIN: warm to touch, no rash or lesions      Daily     Daily     LABS:                        8.1    9.66  )-----------( 180      ( 13 Oct 2022 05:30 )             25.8   10-13    134<L>  |  99  |  36<H>  ----------------------------<  152<H>  4.1   |  30  |  1.33<H>    Ca    8.6      13 Oct 2022 05:30  Phos  2.8     10-13  Mg     2.1     10-13    TPro  5.8<L>  /  Alb  2.0<L>  /  TBili  0.4  /  DBili  x   /  AST  16  /  ALT  20  /  AlkPhos  48  10-13            RADIOLOGY & ADDITIONAL STUDIES:         Patient seen and examined this AM at bedside. No acute events overnight and patient resting comfortably. Patient offers no active complaints other than mild abd pain. Tolerating solid diet. Was transfued 1u PRBC yesterday. Denies fever/chills, shortness of breath, chest pain. VS reviewed    Objective:    MEDICATIONS  (STANDING):  acetaminophen   IVPB .. 1000 milliGRAM(s) IV Intermittent once  atorvastatin 20 milliGRAM(s) Oral at bedtime  budesonide 160 MICROgram(s)/formoterol 4.5 MICROgram(s) Inhaler 2 Puff(s) Inhalation two times a day  chlorhexidine 4% Liquid 1 Application(s) Topical <User Schedule>  dextrose 50% Injectable 25 Gram(s) IV Push once  dextrose 50% Injectable 12.5 Gram(s) IV Push once  dextrose 50% Injectable 25 Gram(s) IV Push once  dextrose Oral Gel 15 Gram(s) Oral once  finasteride 5 milliGRAM(s) Oral daily  glucagon  Injectable 1 milliGRAM(s) IntraMuscular once  hydrocortisone sodium succinate Injectable 50 milliGRAM(s) IV Push daily  influenza  Vaccine (HIGH DOSE) 0.7 milliLiter(s) IntraMuscular once  insulin lispro (ADMELOG) corrective regimen sliding scale   SubCutaneous Before meals and at bedtime  lactated ringers. 1000 milliLiter(s) (50 mL/Hr) IV Continuous <Continuous>  lactated ringers. 1000 milliLiter(s) (50 mL/Hr) IV Continuous <Continuous>  loperamide 4 milliGRAM(s) Oral three times a day  pantoprazole  Injectable 40 milliGRAM(s) IV Push every 12 hours  piperacillin/tazobactam IVPB.. 3.375 Gram(s) IV Intermittent every 8 hours  psyllium Powder 1 Packet(s) Oral two times a day  sodium chloride 0.9% lock flush 3 milliLiter(s) IV Push every 8 hours  tamsulosin 0.8 milliGRAM(s) Oral at bedtime  tiotropium 18 MICROgram(s) Capsule 1 Capsule(s) Inhalation daily    MEDICATIONS  (PRN):  acetaminophen     Tablet .. 650 milliGRAM(s) Oral every 6 hours PRN Mild Pain (1 - 3)  ALBUTerol    90 MICROgram(s) HFA Inhaler 2 Puff(s) Inhalation every 6 hours PRN Shortness of Breath and/or Wheezing  albuterol/ipratropium for Nebulization 3 milliLiter(s) Nebulizer every 6 hours PRN Shortness of Breath and/or Wheezing  HYDROmorphone  Injectable 0.5 milliGRAM(s) IV Push every 6 hours PRN Severe Pain (7 - 10)  morphine  - Injectable 2 milliGRAM(s) IV Push every 4 hours PRN Moderate Pain (4 - 6)  naloxone Injectable 0.1 milliGRAM(s) IV Push every 3 minutes PRN For ANY of the following changes in patient status:  A. RR LESS THAN 10 breaths per minute, B. Oxygen saturation LESS THAN 90%, C. Sedation score of 6  ondansetron Injectable 4 milliGRAM(s) IV Push every 6 hours PRN Nausea and/or Vomiting      Vital Signs Last 24 Hrs  T(C): 37.3 (13 Oct 2022 04:00), Max: 37.3 (13 Oct 2022 04:00)  T(F): 99.2 (13 Oct 2022 04:00), Max: 99.2 (13 Oct 2022 04:00)  HR: 62 (13 Oct 2022 08:00) (60 - 99)  BP: 101/47 (13 Oct 2022 08:00) (91/53 - 114/54)  BP(mean): 60 (13 Oct 2022 08:00) (56 - 102)  RR: 24 (13 Oct 2022 08:00) (15 - 25)  SpO2: 97% (13 Oct 2022 08:00) (90% - 99%)    Parameters below as of 13 Oct 2022 08:00  Patient On (Oxygen Delivery Method): nasal cannula  O2 Flow (L/min): 5        PHYSICAL EXAM   GENERAL: NAD, AOx3, well developed  HEAD: Atraumatic, normocephalic  EYES: EOMI, PERRLA, conjunctiva and sclera clear  ENT: moist mucous membrane  NECK: supple, No JVD, midline trachea  CHEST/LUNG: unlabored respirations on nasal cannula  Heart: S1, S2 normal  ABDOMEN: soft, nondistended, mild discomfort to palpation. Ostomy more thickened  NERVOUS SYSTEM: AOx3, speech clear, no neuro-deficits  MSK: full ROM, no deformities  SKIN: warm to touch, no rash or lesions      Daily     Daily     LABS:                        8.1    9.66  )-----------( 180      ( 13 Oct 2022 05:30 )             25.8   10-13    134<L>  |  99  |  36<H>  ----------------------------<  152<H>  4.1   |  30  |  1.33<H>    Ca    8.6      13 Oct 2022 05:30  Phos  2.8     10-13  Mg     2.1     10-13    TPro  5.8<L>  /  Alb  2.0<L>  /  TBili  0.4  /  DBili  x   /  AST  16  /  ALT  20  /  AlkPhos  48  10-13        I&O's Detail    12 Oct 2022 07:01  -  13 Oct 2022 07:00  --------------------------------------------------------  IN:    IV PiggyBack: 300 mL    PRBCs (Packed Red Blood Cells): 296 mL  Total IN: 596 mL    OUT:    Bulb (mL): 0 mL    Bulb (mL): 0 mL    Ileostomy (mL): 650 mL    Indwelling Catheter - Urethral (mL): 1450 mL  Total OUT: 2100 mL    Total NET: -1504 mL      13 Oct 2022 07:01  -  13 Oct 2022 09:43  --------------------------------------------------------  IN:    Oral Fluid: 320 mL  Total IN: 320 mL    OUT:  Total OUT: 0 mL    Total NET: 320 mL          RADIOLOGY & ADDITIONAL STUDIES:

## 2022-10-13 NOTE — DISCHARGE NOTE PROVIDER - NSDCCPCAREPLAN_GEN_ALL_CORE_FT
PRINCIPAL DISCHARGE DIAGNOSIS  Diagnosis: Colon cancer  Assessment and Plan of Treatment:       SECONDARY DISCHARGE DIAGNOSES  Diagnosis: Colon cancer  Assessment and Plan of Treatment:      PRINCIPAL DISCHARGE DIAGNOSIS  Diagnosis: Colon cancer  Assessment and Plan of Treatment:       SECONDARY DISCHARGE DIAGNOSES  Diagnosis: Colon cancer  Assessment and Plan of Treatment:     Diagnosis: CHF, acute  Assessment and Plan of Treatment: improving.  see DR Celine Barrett/Cardiologist.  Repeat ECHO in 4 weeks  Low Sodium diet.   Check daily weights.  If your weight is increasing or you have leg swelling, chest pain, trouble breathing, call your doctor right away or return to the ER.

## 2022-10-13 NOTE — PROGRESS NOTE ADULT - NS ATTEND AMEND GEN_ALL_CORE FT
Patient seen and examined   Looks and feels ok   FOBT from ileostomy fluid neg   H/H stable, will re-challenge with iv heparin today   If H/H drops may need CT A/P   Add midodrine for hypotension   Afebrile, does not appear infected   On 4L NC - wean if tolerates  BiPAP at night - not compliant with whole night   Surgery planning discharge tomorrow if stable Patient seen and examined   Looks and feels ok   FOBT from ileostomy fluid neg   H/H stable, will re-challenge with iv heparin today   If H/H drops may need CT A/P   Add midodrine for hypotension   Afebrile, on Zosyn day 7  On 4L NC - wean if tolerates  BiPAP at night - not compliant with whole night   Surgery planning discharge tomorrow if stable

## 2022-10-13 NOTE — DISCHARGE NOTE PROVIDER - CARE PROVIDERS DIRECT ADDRESSES
,[email or web address],DirectAddress_Unknown,DirectAddress_Unknown,DirectAddress_Unknown ,[email or web address],DirectAddress_Unknown,DirectAddress_Unknown,DirectAddress_Unknown,[email or web address],DirectAddress_Unknown,DirectAddress_Unknown,DirectAddress_Unknown,DirectAddress_Unknown

## 2022-10-13 NOTE — DISCHARGE NOTE PROVIDER - NSDCHHCONTACT_GEN_ALL_CORE_FT
As certified below, I, or a nurse practitioner or physician assistant working with me, had a face-to-face encounter that meets the physician face-to-face encounter requirements. No adenopathy or splenomegaly. No cervical or inguinal lymphadenopathy.

## 2022-10-13 NOTE — DISCHARGE NOTE PROVIDER - PROVIDER TOKENS
PROVIDER:[TOKEN:[[id number]],FOLLOWUP:[2 weeks]],FREE:[LAST:[Pulmonologist],PHONE:[(   )    -],FAX:[(   )    -]],FREE:[LAST:[Cardiologist],PHONE:[(   )    -],FAX:[(   )    -]],FREE:[LAST:[PCP],PHONE:[(   )    -],FAX:[(   )    -]] PROVIDER:[TOKEN:[[id number]],FOLLOWUP:[2 weeks]],FREE:[LAST:[Pulmonologist],PHONE:[(   )    -],FAX:[(   )    -]],FREE:[LAST:[Cardiologist],PHONE:[(   )    -],FAX:[(   )    -]],FREE:[LAST:[PCP],PHONE:[(   )    -],FAX:[(   )    -]],PROVIDER:[TOKEN:[[id number]],FOLLOWUP:[2 weeks]] PROVIDER:[TOKEN:[[id number]],FOLLOWUP:[2 weeks]],PROVIDER:[TOKEN:[[id number]],FOLLOWUP:[2 weeks]],FREE:[LAST:[Pulmonologist],PHONE:[(   )    -],FAX:[(   )    -]],FREE:[LAST:[Cardiologist],PHONE:[(   )    -],FAX:[(   )    -]],FREE:[LAST:[PCP],PHONE:[(   )    -],FAX:[(   )    -]],PROVIDER:[TOKEN:[[id number]],FOLLOWUP:[1 week]]

## 2022-10-13 NOTE — PROGRESS NOTE ADULT - ASSESSMENT
ASSESSMENT  78yo male with PMHx Afib on eliquis, HTN, HLD, COPD, FLORA, obestity, ulcerative colitis, recent dx colon cancer presents for elective total proctocolectomy wiht ileal pouch anal anastamosis.     s/p Open restorative proctocolectomy with ileal pouch anal anastamosis (IPAA) and diverting loop ileostomy   Intra op findings include humberto fecal spillage from transverse colon and rectosigmoid mass noted  EBL 250cc, received 3200cc IVF  POD#7  + VINNY x 2 left abd    Hospital course c/b:  septic shock requiring pressors, now off  LLL PNA due to aspiration event  JOSE- suspect pre-renal  Anemia 2/2 possible UGIB, gastritis?    PLAN  Neuro: mentation intact, pain control with dilaudid and morphine  CV: BP stable HR 60-80s afib paced. heparin gtt. TTE 10/7 LVEF 40-45%. diastolic dysfxn, mod TR.  Pulm: on 4L NC sating 95-96%, titrate to maintain O2 88-92%. cont bipap qhs for FLORA. cont IV zosyn for LLL PNA d/t aspiration event. cont spiriva, alb inh, asymbicort.   GI: adv to full liquids as per surgery. IV PPI bid. occult blood neg  Nephro: JOSE improved with IV fluid. + alfaro in place, tamsulosin and finasteride. monitor I & Os.   Endo: a1c 6.9. ISS, BGMs ac hs. FS goal <180. resume home dose prednisone 4mg /day tm. f/u with outpatient GI to taper off steroids  ID: cont IV zosyn #7. monitor temps  Heme: resumed heparin infusion today. Trend H/H cont SCDs.   PT eval    Dispo: SICU. Hgb trending down. rpt Hgb stable. Restart heparin and cont to trend H/H next cbc 2200. Taper steroid    Will discuss with Dr. Soriano

## 2022-10-13 NOTE — DISCHARGE NOTE PROVIDER - NSDCMRMEDTOKEN_GEN_ALL_CORE_FT
Albuterol (Eqv-ProAir HFA) 90 mcg/inh inhalation aerosol: 2 puff(s) inhaled every 6 hours, As Needed  amLODIPine 2.5 mg oral tablet: 1 tab(s) orally once a day  apixaban 5 mg oral tablet: 1 tab(s) orally 2 times a day- cardio for management   atorvastatin 20 mg oral tablet: 1 tab(s) orally once a day  balsalazide 750 mg oral capsule: 3 cap(s) orally 3 times a day  mercaptopurine 50 mg oral tablet: 2 tab(s) orally once a day  metoprolol succinate 25 mg oral tablet, extended release: 1 tab(s) orally once a day  metoprolol succinate 25 mg oral tablet, extended release: 1/2  tab(s) orally once a day PM   predniSONE 1 mg oral tablet: 4 tab(s) orally once a day  Spiriva Respimat 10 ACT 2.5 mcg/inh inhalation aerosol: 2 puff(s) inhaled once a day   acetaminophen 325 mg oral tablet: 2 tab(s) orally every 6 hours, As needed, Mild Pain (1 - 3)  Albuterol (Eqv-ProAir HFA) 90 mcg/inh inhalation aerosol: 2 puff(s) inhaled every 6 hours, As Needed  apixaban 5 mg oral tablet: 1 tab(s) orally 2 times a day- cardio for management   atorvastatin 20 mg oral tablet: 1 tab(s) orally once a day  finasteride 5 mg oral tablet: 1 tab(s) orally once a day  loperamide 2 mg oral capsule: 2 cap(s) orally 4 times a day  predniSONE 1 mg oral tablet: 4 tab(s) orally once a day  Spiriva Respimat 10 ACT 2.5 mcg/inh inhalation aerosol: 2 puff(s) inhaled once a day  tamsulosin 0.4 mg oral capsule: 2 cap(s) orally once a day (at bedtime)   acetaminophen 325 mg oral tablet: 2 tab(s) orally every 6 hours, As needed, Mild Pain (1 - 3)  Albuterol (Eqv-ProAir HFA) 90 mcg/inh inhalation aerosol: 2 puff(s) inhaled every 6 hours, As Needed  apixaban 5 mg oral tablet: 1 tab(s) orally 2 times a day- cardio for management   atorvastatin 20 mg oral tablet: 1 tab(s) orally once a day  cholecalciferol oral tablet: 2000 unit(s) orally once a day  cholestyramine 4 g/9 g oral powder for reconstitution: 1 packet(s) orally once a day  diphenoxylate-atropine 2.5 mg-0.025 mg oral tablet: 2 tab(s) orally 4 times a day  finasteride 5 mg oral tablet: 1 tab(s) orally once a day  loperamide 2 mg oral capsule: 2 cap(s) orally 4 times a day  melatonin 5 mg oral tablet: 1 tab(s) orally once a day (at bedtime)  opium (equivalent to morphine 10 mg/mL) (Opium Tincture) oral tincture: 6 milligram(s) orally 2 times a day  oxybutynin 5 mg oral tablet: 1 tab(s) orally once a day, As needed, Bladder spasms  oxyCODONE 10 mg oral tablet: 1 tab(s) orally every 4 hours, As needed, Severe Pain (7 - 10)  oxyCODONE 5 mg oral tablet: 1 tab(s) orally every 4 hours, As needed, Moderate Pain (4 - 6)  pantoprazole 40 mg oral delayed release tablet: 1 tab(s) orally every 12 hours  predniSONE 1 mg oral tablet: 4 tab(s) orally once a day  psyllium 3.4 g/7 g oral powder for reconstitution: 1 packet(s) orally 2 times a day  Spiriva Respimat 10 ACT 2.5 mcg/inh inhalation aerosol: 2 puff(s) inhaled once a day  tamsulosin 0.4 mg oral capsule: 2 cap(s) orally once a day (at bedtime)   acetaminophen 325 mg oral tablet: 2 tab(s) orally every 6 hours, As needed, Mild Pain (1 - 3)  Albuterol (Eqv-ProAir HFA) 90 mcg/inh inhalation aerosol: 2 puff(s) inhaled every 6 hours, As Needed  apixaban 5 mg oral tablet: 1 tab(s) orally 2 times a day- cardio for management   atorvastatin 20 mg oral tablet: 1 tab(s) orally once a day  cholecalciferol oral tablet: 2000 unit(s) orally once a day  cholestyramine 4 g/9 g oral powder for reconstitution: 1 packet(s) orally once a day  diphenoxylate-atropine 2.5 mg-0.025 mg oral tablet: 2 tab(s) orally 4 times a day  finasteride 5 mg oral tablet: 1 tab(s) orally once a day  loperamide 2 mg oral capsule: 2 cap(s) orally 4 times a day  melatonin 5 mg oral tablet: 1 tab(s) orally once a day (at bedtime)  opium (equivalent to morphine 10 mg/mL) (Opium Tincture) oral tincture: 6 milligram(s) orally 3 times a day  oxybutynin 5 mg oral tablet: 1 tab(s) orally once a day, As needed, Bladder spasms  oxyCODONE 10 mg oral tablet: 1 tab(s) orally every 4 hours, As needed, Severe Pain (7 - 10)  oxyCODONE 5 mg oral tablet: 1 tab(s) orally every 4 hours, As needed, Moderate Pain (4 - 6)  pantoprazole 40 mg oral delayed release tablet: 1 tab(s) orally every 12 hours  predniSONE 1 mg oral tablet: 4 tab(s) orally once a day  psyllium 3.4 g/7 g oral powder for reconstitution: 1 packet(s) orally 2 times a day  Spiriva Respimat 10 ACT 2.5 mcg/inh inhalation aerosol: 2 puff(s) inhaled once a day  tamsulosin 0.4 mg oral capsule: 2 cap(s) orally once a day (at bedtime)   acetaminophen 325 mg oral tablet: 2 tab(s) orally every 6 hours, As needed, Mild Pain (1 - 3)  Albuterol (Eqv-ProAir HFA) 90 mcg/inh inhalation aerosol: 2 puff(s) inhaled every 6 hours, As Needed  apixaban 5 mg oral tablet: 1 tab(s) orally 2 times a day- cardio for management   atorvastatin 20 mg oral tablet: 1 tab(s) orally once a day  cholecalciferol oral tablet: 2000 unit(s) orally once a day  cholestyramine 4 g/9 g oral powder for reconstitution: 1 packet(s) orally once a day  cholestyramine 4 g/9 g oral powder for reconstitution: 4 gram(s) orally once a day   diphenoxylate-atropine 2.5 mg-0.025 mg oral tablet: 2 tab(s) orally 4 times a day MDD:8 tabs  diphenoxylate-atropine 2.5 mg-0.025 mg oral tablet: 2 tab(s) orally 4 times a day  finasteride 5 mg oral tablet: 1 tab(s) orally once a day  finasteride 5 mg oral tablet: 1 tab(s) orally once a day  fluconazole 100 mg oral tablet: 1 tab(s) orally once a day  loperamide 2 mg oral capsule: 2 cap(s) orally 4 times a day   loperamide 2 mg oral capsule: 2 cap(s) orally 4 times a day  melatonin 5 mg oral tablet: 1 tab(s) orally once a day (at bedtime)  opium (equivalent to morphine 10 mg/mL) (Opium Tincture) oral tincture: 0.6 milliliter(s) orally 3 times a day MDD:1.8ml  oxybutynin 5 mg oral tablet: 1 tab(s) orally once a day, As needed, Bladder spasms  oxybutynin 5 mg oral tablet: 1 tab(s) orally once a day, As needed, Bladder spasms  oxycodone-acetaminophen 5 mg-325 mg oral tablet: 1 tab(s) orally every 6 hours, As Needed -for moderate pain MDD:004  pantoprazole 40 mg oral delayed release tablet: 1 tab(s) orally every 12 hours  predniSONE 1 mg oral tablet: 4 tab(s) orally once a day  psyllium 3.4 g/7 g oral powder for reconstitution: 1 packet(s) orally 2 times a day  Spiriva Respimat 10 ACT 2.5 mcg/inh inhalation aerosol: 2 puff(s) inhaled once a day  tamsulosin 0.4 mg oral capsule: 2 cap(s) orally once a day (at bedtime)  tamsulosin 0.4 mg oral capsule: 2 cap(s) orally once a day (at bedtime)

## 2022-10-13 NOTE — DISCHARGE NOTE PROVIDER - NPI NUMBER (FOR [employer] USE ONLY) :
[[id number]],[UNKNOWN],[UNKNOWN],[UNKNOWN] [[id number]],[UNKNOWN],[UNKNOWN],[UNKNOWN],[[id number]] [[id number]],[[id number]],[UNKNOWN],[UNKNOWN],[UNKNOWN],[[id number]]

## 2022-10-13 NOTE — PROGRESS NOTE ADULT - ASSESSMENT
77hx  a fib on apixaban  HTN HLD obesity FLORA COPD ex smoker  ulcerative colitis and colon cancer.  POD#7  Open restorative proctocolectomy with IPAA, diverting loop ileostomy rectal mass noted.    There was fecal spillage peritonitis.  H/H slowly trending down after transfusion  tolerating diet  respiratory more stable, nasal cannula this morning. Fluctuates between ventimask and nasal cannula, BiPAP/CPAP at night    Plan:  continue loperamide 4mg q6h, metamucil BID  trend H/H, transfuse prn  replete ostomy output cautiously   wean off O2  ambulation/PT  pain control  monitor bowel function   serial abdominal exams  continue with DASH diet  Medical management by ICU team    Plan discussed with Dr. Wagner

## 2022-10-13 NOTE — DISCHARGE NOTE PROVIDER - NSDCCPTREATMENT_GEN_ALL_CORE_FT
PRINCIPAL PROCEDURE  Procedure: Proctocolectomy, total, with J-pouch creation  Findings and Treatment:

## 2022-10-14 DIAGNOSIS — R33.9 RETENTION OF URINE, UNSPECIFIED: ICD-10-CM

## 2022-10-14 LAB
ANION GAP SERPL CALC-SCNC: 5 MMOL/L — SIGNIFICANT CHANGE UP (ref 5–17)
APTT BLD: 32.4 SEC — SIGNIFICANT CHANGE UP (ref 27.5–35.5)
APTT BLD: 43.8 SEC — HIGH (ref 27.5–35.5)
BUN SERPL-MCNC: 37 MG/DL — HIGH (ref 7–23)
CALCIUM SERPL-MCNC: 8.4 MG/DL — LOW (ref 8.5–10.1)
CHLORIDE SERPL-SCNC: 97 MMOL/L — SIGNIFICANT CHANGE UP (ref 96–108)
CO2 SERPL-SCNC: 29 MMOL/L — SIGNIFICANT CHANGE UP (ref 22–31)
CREAT SERPL-MCNC: 1.53 MG/DL — HIGH (ref 0.5–1.3)
EGFR: 47 ML/MIN/1.73M2 — LOW
GLUCOSE SERPL-MCNC: 139 MG/DL — HIGH (ref 70–99)
HCT VFR BLD CALC: 25 % — LOW (ref 39–50)
HCT VFR BLD CALC: 25.3 % — LOW (ref 39–50)
HGB BLD-MCNC: 7.8 G/DL — LOW (ref 13–17)
HGB BLD-MCNC: 8.1 G/DL — LOW (ref 13–17)
MAGNESIUM SERPL-MCNC: 1.9 MG/DL — SIGNIFICANT CHANGE UP (ref 1.6–2.6)
MCHC RBC-ENTMCNC: 27 PG — SIGNIFICANT CHANGE UP (ref 27–34)
MCHC RBC-ENTMCNC: 27.2 PG — SIGNIFICANT CHANGE UP (ref 27–34)
MCHC RBC-ENTMCNC: 31.2 GM/DL — LOW (ref 32–36)
MCHC RBC-ENTMCNC: 32 GM/DL — SIGNIFICANT CHANGE UP (ref 32–36)
MCV RBC AUTO: 84.9 FL — SIGNIFICANT CHANGE UP (ref 80–100)
MCV RBC AUTO: 86.5 FL — SIGNIFICANT CHANGE UP (ref 80–100)
PHOSPHATE SERPL-MCNC: 2.9 MG/DL — SIGNIFICANT CHANGE UP (ref 2.5–4.5)
PLATELET # BLD AUTO: 206 K/UL — SIGNIFICANT CHANGE UP (ref 150–400)
PLATELET # BLD AUTO: 236 K/UL — SIGNIFICANT CHANGE UP (ref 150–400)
POTASSIUM SERPL-MCNC: 4.1 MMOL/L — SIGNIFICANT CHANGE UP (ref 3.5–5.3)
POTASSIUM SERPL-SCNC: 4.1 MMOL/L — SIGNIFICANT CHANGE UP (ref 3.5–5.3)
RBC # BLD: 2.89 M/UL — LOW (ref 4.2–5.8)
RBC # BLD: 2.98 M/UL — LOW (ref 4.2–5.8)
RBC # FLD: 18.5 % — HIGH (ref 10.3–14.5)
RBC # FLD: 18.5 % — HIGH (ref 10.3–14.5)
SODIUM SERPL-SCNC: 131 MMOL/L — LOW (ref 135–145)
WBC # BLD: 10.19 K/UL — SIGNIFICANT CHANGE UP (ref 3.8–10.5)
WBC # BLD: 8.14 K/UL — SIGNIFICANT CHANGE UP (ref 3.8–10.5)
WBC # FLD AUTO: 10.19 K/UL — SIGNIFICANT CHANGE UP (ref 3.8–10.5)
WBC # FLD AUTO: 8.14 K/UL — SIGNIFICANT CHANGE UP (ref 3.8–10.5)

## 2022-10-14 PROCEDURE — 99233 SBSQ HOSP IP/OBS HIGH 50: CPT

## 2022-10-14 PROCEDURE — 71045 X-RAY EXAM CHEST 1 VIEW: CPT | Mod: 26

## 2022-10-14 PROCEDURE — 51702 INSERT TEMP BLADDER CATH: CPT

## 2022-10-14 RX ORDER — BALSALAZIDE DISODIUM 750 MG/1
3 CAPSULE ORAL
Qty: 0 | Refills: 0 | DISCHARGE

## 2022-10-14 RX ORDER — METOPROLOL TARTRATE 50 MG
1 TABLET ORAL
Qty: 0 | Refills: 0 | DISCHARGE

## 2022-10-14 RX ORDER — QUETIAPINE FUMARATE 200 MG/1
50 TABLET, FILM COATED ORAL AT BEDTIME
Refills: 0 | Status: DISCONTINUED | OUTPATIENT
Start: 2022-10-14 | End: 2022-10-20

## 2022-10-14 RX ORDER — AMLODIPINE BESYLATE 2.5 MG/1
1 TABLET ORAL
Qty: 0 | Refills: 0 | DISCHARGE

## 2022-10-14 RX ORDER — APIXABAN 2.5 MG/1
5 TABLET, FILM COATED ORAL EVERY 12 HOURS
Refills: 0 | Status: DISCONTINUED | OUTPATIENT
Start: 2022-10-14 | End: 2022-11-02

## 2022-10-14 RX ORDER — TAMSULOSIN HYDROCHLORIDE 0.4 MG/1
2 CAPSULE ORAL
Qty: 0 | Refills: 0 | DISCHARGE
Start: 2022-10-14

## 2022-10-14 RX ORDER — ACETAMINOPHEN 500 MG
2 TABLET ORAL
Qty: 0 | Refills: 0 | DISCHARGE
Start: 2022-10-14

## 2022-10-14 RX ORDER — SODIUM CHLORIDE 9 MG/ML
1000 INJECTION INTRAMUSCULAR; INTRAVENOUS; SUBCUTANEOUS
Refills: 0 | Status: DISCONTINUED | OUTPATIENT
Start: 2022-10-14 | End: 2022-10-14

## 2022-10-14 RX ORDER — MERCAPTOPURINE 50 MG/1
2 TABLET ORAL
Qty: 0 | Refills: 0 | DISCHARGE

## 2022-10-14 RX ORDER — FINASTERIDE 5 MG/1
1 TABLET, FILM COATED ORAL
Qty: 0 | Refills: 0 | DISCHARGE
Start: 2022-10-14

## 2022-10-14 RX ORDER — LOPERAMIDE HCL 2 MG
2 TABLET ORAL
Qty: 0 | Refills: 0 | DISCHARGE
Start: 2022-10-14

## 2022-10-14 RX ORDER — ACETAMINOPHEN 500 MG
1000 TABLET ORAL ONCE
Refills: 0 | Status: COMPLETED | OUTPATIENT
Start: 2022-10-14 | End: 2022-10-14

## 2022-10-14 RX ADMIN — Medication 4 MILLIGRAM(S): at 12:18

## 2022-10-14 RX ADMIN — TIOTROPIUM BROMIDE 1 CAPSULE(S): 18 CAPSULE ORAL; RESPIRATORY (INHALATION) at 08:56

## 2022-10-14 RX ADMIN — HEPARIN SODIUM 1600 UNIT(S)/HR: 5000 INJECTION INTRAVENOUS; SUBCUTANEOUS at 01:02

## 2022-10-14 RX ADMIN — PIPERACILLIN AND TAZOBACTAM 25 GRAM(S): 4; .5 INJECTION, POWDER, LYOPHILIZED, FOR SOLUTION INTRAVENOUS at 05:49

## 2022-10-14 RX ADMIN — Medication 2: at 08:31

## 2022-10-14 RX ADMIN — Medication 50 MILLIGRAM(S): at 05:48

## 2022-10-14 RX ADMIN — HEPARIN SODIUM 6000 UNIT(S): 5000 INJECTION INTRAVENOUS; SUBCUTANEOUS at 01:05

## 2022-10-14 RX ADMIN — QUETIAPINE FUMARATE 50 MILLIGRAM(S): 200 TABLET, FILM COATED ORAL at 20:34

## 2022-10-14 RX ADMIN — PANTOPRAZOLE SODIUM 40 MILLIGRAM(S): 20 TABLET, DELAYED RELEASE ORAL at 10:05

## 2022-10-14 RX ADMIN — Medication 2: at 21:32

## 2022-10-14 RX ADMIN — FINASTERIDE 5 MILLIGRAM(S): 5 TABLET, FILM COATED ORAL at 10:05

## 2022-10-14 RX ADMIN — Medication 4 MILLIGRAM(S): at 17:50

## 2022-10-14 RX ADMIN — Medication 400 MILLIGRAM(S): at 22:19

## 2022-10-14 RX ADMIN — Medication 2: at 12:15

## 2022-10-14 RX ADMIN — MORPHINE SULFATE 2 MILLIGRAM(S): 50 CAPSULE, EXTENDED RELEASE ORAL at 12:52

## 2022-10-14 RX ADMIN — MIDODRINE HYDROCHLORIDE 10 MILLIGRAM(S): 2.5 TABLET ORAL at 05:48

## 2022-10-14 RX ADMIN — MORPHINE SULFATE 2 MILLIGRAM(S): 50 CAPSULE, EXTENDED RELEASE ORAL at 12:09

## 2022-10-14 RX ADMIN — Medication 1 PACKET(S): at 10:05

## 2022-10-14 RX ADMIN — BUDESONIDE AND FORMOTEROL FUMARATE DIHYDRATE 2 PUFF(S): 160; 4.5 AEROSOL RESPIRATORY (INHALATION) at 08:56

## 2022-10-14 RX ADMIN — HEPARIN SODIUM 1800 UNIT(S)/HR: 5000 INJECTION INTRAVENOUS; SUBCUTANEOUS at 07:49

## 2022-10-14 RX ADMIN — SODIUM CHLORIDE 3 MILLILITER(S): 9 INJECTION INTRAMUSCULAR; INTRAVENOUS; SUBCUTANEOUS at 12:52

## 2022-10-14 RX ADMIN — CHLORHEXIDINE GLUCONATE 1 APPLICATION(S): 213 SOLUTION TOPICAL at 05:49

## 2022-10-14 RX ADMIN — MIDODRINE HYDROCHLORIDE 10 MILLIGRAM(S): 2.5 TABLET ORAL at 13:41

## 2022-10-14 RX ADMIN — Medication 1000 MILLIGRAM(S): at 23:00

## 2022-10-14 RX ADMIN — BUDESONIDE AND FORMOTEROL FUMARATE DIHYDRATE 2 PUFF(S): 160; 4.5 AEROSOL RESPIRATORY (INHALATION) at 21:32

## 2022-10-14 RX ADMIN — APIXABAN 5 MILLIGRAM(S): 2.5 TABLET, FILM COATED ORAL at 14:21

## 2022-10-14 RX ADMIN — SODIUM CHLORIDE 3 MILLILITER(S): 9 INJECTION INTRAMUSCULAR; INTRAVENOUS; SUBCUTANEOUS at 22:44

## 2022-10-14 RX ADMIN — ATORVASTATIN CALCIUM 20 MILLIGRAM(S): 80 TABLET, FILM COATED ORAL at 20:33

## 2022-10-14 RX ADMIN — Medication 2: at 16:58

## 2022-10-14 RX ADMIN — MIDODRINE HYDROCHLORIDE 10 MILLIGRAM(S): 2.5 TABLET ORAL at 20:34

## 2022-10-14 RX ADMIN — MORPHINE SULFATE 2 MILLIGRAM(S): 50 CAPSULE, EXTENDED RELEASE ORAL at 19:33

## 2022-10-14 RX ADMIN — SODIUM CHLORIDE 3 MILLILITER(S): 9 INJECTION INTRAMUSCULAR; INTRAVENOUS; SUBCUTANEOUS at 06:32

## 2022-10-14 RX ADMIN — MORPHINE SULFATE 2 MILLIGRAM(S): 50 CAPSULE, EXTENDED RELEASE ORAL at 20:00

## 2022-10-14 RX ADMIN — TAMSULOSIN HYDROCHLORIDE 0.8 MILLIGRAM(S): 0.4 CAPSULE ORAL at 20:34

## 2022-10-14 RX ADMIN — Medication 4 MILLIGRAM(S): at 20:33

## 2022-10-14 RX ADMIN — PANTOPRAZOLE SODIUM 40 MILLIGRAM(S): 20 TABLET, DELAYED RELEASE ORAL at 20:34

## 2022-10-14 RX ADMIN — Medication 4 MILLIGRAM(S): at 05:48

## 2022-10-14 NOTE — PROGRESS NOTE PEDS - SUBJECTIVE AND OBJECTIVE BOX
Called to see pt because every time pt walks the alfaro catheter is leaking. Otherwise no other complaints.      Vital Signs Last 24 Hrs  T(C): 36.7 (14 Oct 2022 14:00), Max: 36.9 (13 Oct 2022 22:00)  T(F): 98 (14 Oct 2022 14:00), Max: 98.5 (13 Oct 2022 22:00)  HR: 81 (14 Oct 2022 15:00) (60 - 98)  BP: 94/32 (14 Oct 2022 14:00) (85/42 - 115/57)  BP(mean): 42 (14 Oct 2022 14:00) (42 - 91)  RR: 21 (14 Oct 2022 15:00) (13 - 27)  SpO2: 100% (14 Oct 2022 15:00) (82% - 100%)    Parameters below as of 14 Oct 2022 15:00  Patient On (Oxygen Delivery Method): BiPAP/CPAP        I&O's Summary    13 Oct 2022 07:01  -  14 Oct 2022 07:00  --------------------------------------------------------  IN: 1823 mL / OUT: 3410 mL / NET: -1587 mL    14 Oct 2022 07:01  -  14 Oct 2022 15:38  --------------------------------------------------------  IN: 1068 mL / OUT: 500 mL / NET: 568 mL        Physical Exam  Gen: NAD, A&Ox3  Abd: Soft, NT, ND, no bladder palp  :  Tried irrigating catheter, but leaking around the catheter.         Advanced catheter but had some resistance, so deflated the balloon and took out the alfaro           Under sterile conditions, placed a 16Fr Coude catheter without any problems.  PVR- 200cc urine pink          Irrigated alfaro and had no leakage.                          8.1    10.19 )-----------( 236      ( 14 Oct 2022 12:15 )             25.3       10-14    131<L>  |  97  |  37<H>  ----------------------------<  139<H>  4.1   |  29  |  1.53<H>    Ca    8.4<L>      14 Oct 2022 05:42  Phos  2.9     10-14  Mg     1.9     10-14    TPro  5.8<L>  /  Alb  2.0<L>  /  TBili  0.4  /  DBili  x   /  AST  16  /  ALT  20  /  AlkPhos  48  10-13

## 2022-10-14 NOTE — PROGRESS NOTE ADULT - ATTENDING COMMENTS
Patient denies any acute complaints overnight.  Tolerating DASH diet.  No N/V.  Ostomy functioning - still quite liquid, 2200cc over last 24h.  Pain controlled.  On exam abdomen is soft, nondistended, appropriately tender near incision without rebound/guarding.  No erythema at incision, staples in place.  Ostomy pink, dark green output in appliance.  Labs - Hgb relatively stable since yesterday with Hgb 7.8.  Patient is hemodynamically stable and UOP 1200cc, denies dizziness, lightheadedness.    -- Continue diet  -- Ostomy care and education  -- Continue loperamide 4mg - adjust to with meals and at bedtime (four times daily).  Continue fiber.  Add Lomotil BID to start.  -- Multimodal pain control  -- Encourage ambulation  -- Do not currently suspect ongoing bleeding - continue heparin gtt  -- Can likely downgrade patient to step down later today

## 2022-10-14 NOTE — PROGRESS NOTE ADULT - SUBJECTIVE AND OBJECTIVE BOX
Patient seen and examined this AM at bedside. Pt was agitated overnight. Resting comfortably this morning. Patient offers no active complaints. VINNY drains x 2 were removed yesterday. Tolerating solid diet. Denies fever/chills, shortness of breath, chest pain. VS reviewed    Objective:    MEDICATIONS  (STANDING):  acetaminophen   IVPB .. 1000 milliGRAM(s) IV Intermittent once  atorvastatin 20 milliGRAM(s) Oral at bedtime  budesonide 160 MICROgram(s)/formoterol 4.5 MICROgram(s) Inhaler 2 Puff(s) Inhalation two times a day  chlorhexidine 4% Liquid 1 Application(s) Topical <User Schedule>  dextrose 50% Injectable 25 Gram(s) IV Push once  dextrose 50% Injectable 12.5 Gram(s) IV Push once  dextrose 50% Injectable 25 Gram(s) IV Push once  dextrose Oral Gel 15 Gram(s) Oral once  finasteride 5 milliGRAM(s) Oral daily  glucagon  Injectable 1 milliGRAM(s) IntraMuscular once  hydrocortisone sodium succinate Injectable 50 milliGRAM(s) IV Push daily  influenza  Vaccine (HIGH DOSE) 0.7 milliLiter(s) IntraMuscular once  insulin lispro (ADMELOG) corrective regimen sliding scale   SubCutaneous Before meals and at bedtime  lactated ringers. 1000 milliLiter(s) (50 mL/Hr) IV Continuous <Continuous>  lactated ringers. 1000 milliLiter(s) (50 mL/Hr) IV Continuous <Continuous>  loperamide 4 milliGRAM(s) Oral three times a day  pantoprazole  Injectable 40 milliGRAM(s) IV Push every 12 hours  piperacillin/tazobactam IVPB.. 3.375 Gram(s) IV Intermittent every 8 hours  psyllium Powder 1 Packet(s) Oral two times a day  sodium chloride 0.9% lock flush 3 milliLiter(s) IV Push every 8 hours  tamsulosin 0.8 milliGRAM(s) Oral at bedtime  tiotropium 18 MICROgram(s) Capsule 1 Capsule(s) Inhalation daily    MEDICATIONS  (PRN):  acetaminophen     Tablet .. 650 milliGRAM(s) Oral every 6 hours PRN Mild Pain (1 - 3)  ALBUTerol    90 MICROgram(s) HFA Inhaler 2 Puff(s) Inhalation every 6 hours PRN Shortness of Breath and/or Wheezing  albuterol/ipratropium for Nebulization 3 milliLiter(s) Nebulizer every 6 hours PRN Shortness of Breath and/or Wheezing  HYDROmorphone  Injectable 0.5 milliGRAM(s) IV Push every 6 hours PRN Severe Pain (7 - 10)  morphine  - Injectable 2 milliGRAM(s) IV Push every 4 hours PRN Moderate Pain (4 - 6)  naloxone Injectable 0.1 milliGRAM(s) IV Push every 3 minutes PRN For ANY of the following changes in patient status:  A. RR LESS THAN 10 breaths per minute, B. Oxygen saturation LESS THAN 90%, C. Sedation score of 6  ondansetron Injectable 4 milliGRAM(s) IV Push every 6 hours PRN Nausea and/or Vomiting    Vital Signs Last 24 Hrs  T(C): 36.9 (13 Oct 2022 22:00), Max: 36.9 (13 Oct 2022 14:00)  T(F): 98.5 (13 Oct 2022 22:00), Max: 98.5 (13 Oct 2022 14:00)  HR: 85 (14 Oct 2022 05:00) (60 - 98)  BP: 101/47 (14 Oct 2022 05:00) (80/65 - 111/70)  BP(mean): 59 (14 Oct 2022 05:00) (52 - 91)  RR: 22 (14 Oct 2022 05:00) (13 - 27)  SpO2: 100% (14 Oct 2022 05:00) (82% - 100%)    Parameters below as of 14 Oct 2022 05:00  Patient On (Oxygen Delivery Method): BiPAP/CPAP    O2 Concentration (%): 50    PHYSICAL EXAM   GENERAL: NAD, AOx3, well developed  CHEST/LUNG: unlabored respirations on nasal cannula  Heart: S1, S2 normal  ABDOMEN: soft, nondistended, mild discomfort to palpation. Ostomy more thickened      Daily     Daily     LABS:                        8.1    9.66  )-----------( 180      ( 13 Oct 2022 05:30 )             25.8   10-13    134<L>  |  99  |  36<H>  ----------------------------<  152<H>  4.1   |  30  |  1.33<H>    Ca    8.6      13 Oct 2022 05:30  Phos  2.8     10-13  Mg     2.1     10-13    TPro  5.8<L>  /  Alb  2.0<L>  /  TBili  0.4  /  DBili  x   /  AST  16  /  ALT  20  /  AlkPhos  48  10-13      I&O's Detail    12 Oct 2022 07:01  -  13 Oct 2022 07:00  --------------------------------------------------------  IN:    IV PiggyBack: 300 mL    PRBCs (Packed Red Blood Cells): 296 mL  Total IN: 596 mL    OUT:    Bulb (mL): 0 mL    Bulb (mL): 0 mL    Ileostomy (mL): 650 mL    Indwelling Catheter - Urethral (mL): 1450 mL  Total OUT: 2100 mL    Total NET: -1504 mL      13 Oct 2022 07:01  -  14 Oct 2022 05:45  --------------------------------------------------------  IN:    Heparin Infusion: 68 mL    IV PiggyBack: 100 mL    Oral Fluid: 1380 mL  Total IN: 1548 mL    OUT:    Bulb (mL): 5 mL    Bulb (mL): 5 mL    Ileostomy (mL): 1200 mL    Indwelling Catheter - Urethral (mL): 800 mL  Total OUT: 2010 mL    Total NET: -462 mL              RADIOLOGY & ADDITIONAL STUDIES:

## 2022-10-14 NOTE — PROGRESS NOTE ADULT - ASSESSMENT
77hx  a fib on apixaban  HTN HLD obesity FLORA COPD ex smoker  ulcerative colitis and colon cancer.  POD#8  Open restorative proctocolectomy with IPAA, diverting loop ileostomy     Plan:  continue loperamide 4mg q6h, metamucil BID  trend H/H, transfuse prn  replete ostomy output cautiously   wean off O2  ambulation/PT  pain control  monitor bowel function   serial abdominal exams  continue with DASH diet  SW/CM for dispo planning  Medical management by ICU team    Plan discussed with Dr. Nguyen

## 2022-10-14 NOTE — PROGRESS NOTE PEDS - ASSESSMENT
A/P: 77y Male with urinary retention and alfaro replacement    Leave alfaro in for now  Irrigate alfaro PRN  Do not remove or change alfaro without 1st calling Urology

## 2022-10-14 NOTE — PROGRESS NOTE ADULT - ASSESSMENT
A/P: 77 male S/P RCP-IPAA (restorative proctocolectomy with ileal pouch anal anastomosis), diverting loop ileostomy with Acute Hypoxic Respiratory Failure from a L sided Pneumonia and JOSE likely from ATN  FLORA  Obesity  AFIB  COPD    Plan:  ICU    PULM: Completed Antibiotics for L PNA.  Wean Fio2.  NIV QHS for FLORA.  ppear to be blood.  Seems like he aspirated but he denies it    Cardio:  Continue AC, H & H at noon, Possible GIB    Renal: In JOSE likely from ATN is improving again     GI: Po Diet, PPI BID, possible GIB.  No bleeding noted.  IF H & H drops may need to get a CT.  IF so will D/W Surgery    DVT prophylaxis with UFH

## 2022-10-14 NOTE — PROGRESS NOTE ADULT - SUBJECTIVE AND OBJECTIVE BOX
HPI: Patient is a 77 male S/P RCP-IPAA (restorative proctocolectomy with ileal pouch anal anastomosis), diverting loop ileostomy.  Patient seen in the ICU.  Patient has been on 100% NRB today for a L sided Pneumonia.  He also had to be resumed on pressors.  Patient remains in JOSE and oliguric.      10/8: On VM O2, pressors over night  10/9: VM O2/NC O2.  Off Pressors, coughing up dark sputum now, CXR reviewed and L side is improving, he denies vomiting or aspirating    10/14: Between 4 L NC and VM o2, CXR shows improvement sis i last saw the patient.           PAST MEDICAL & SURGICAL HISTORY:  Ulcerative colitis      Colon cancer      Obstructive sleep apnea      Obesity      HTN (hypertension)      Atrial fibrillation      HLD (hyperlipidemia)      BPH (benign prostatic hyperplasia)      COPD (chronic obstructive pulmonary disease)      COVID-19 virus infection      History of pneumonia      H/O hernia repair      History of hydrocelectomy      H/O colonoscopy      H/O cystoscopy  urethral stricture      History of total knee replacement, bilateral      S/P placement of cardiac pacemaker      History of cataract surgery          FAMILY HISTORY:  Family history of cardiomyopathy (Father)        Social Hx:    Allergies    ACE inhibitors (Swelling)    Intolerances            ICU Vital Signs Last 24 Hrs  T(C): 36.4 (14 Oct 2022 10:00), Max: 36.9 (13 Oct 2022 14:00)  T(F): 97.6 (14 Oct 2022 10:00), Max: 98.5 (13 Oct 2022 14:00)  HR: 85 (14 Oct 2022 11:00) (60 - 98)  BP: 115/57 (14 Oct 2022 11:00) (82/47 - 115/57)  BP(mean): 69 (14 Oct 2022 11:00) (52 - 91)  ABP: --  ABP(mean): --  RR: 23 (14 Oct 2022 11:00) (13 - 27)  SpO2: 95% (14 Oct 2022 11:00) (82% - 100%)    O2 Parameters below as of 14 Oct 2022 11:00  Patient On (Oxygen Delivery Method): nasal cannula  O2 Flow (L/min): 5              I&O's Summary    13 Oct 2022 07:01  -  14 Oct 2022 07:00  --------------------------------------------------------  IN: 1823 mL / OUT: 3410 mL / NET: -1587 mL    14 Oct 2022 07:01  -  14 Oct 2022 12:12  --------------------------------------------------------  IN: 480 mL / OUT: 0 mL / NET: 480 mL                              7.8    8.14  )-----------( 206      ( 14 Oct 2022 05:42 )             25.0       10-14    131<L>  |  97  |  37<H>  ----------------------------<  139<H>  4.1   |  29  |  1.53<H>    Ca    8.4<L>      14 Oct 2022 05:42  Phos  2.9     10-14  Mg     1.9     10-14    TPro  5.8<L>  /  Alb  2.0<L>  /  TBili  0.4  /  DBili  x   /  AST  16  /  ALT  20  /  AlkPhos  48  10-13            ABG - ( 13 Oct 2022 20:20 )  pH, Arterial: 7.38  pH, Blood: x     /  pCO2: 49    /  pO2: 74    / HCO3: 29    / Base Excess: 3.0   /  SaO2: 95                      MEDICATIONS  (STANDING):  acetaminophen   IVPB .. 1000 milliGRAM(s) IV Intermittent once  atorvastatin 20 milliGRAM(s) Oral at bedtime  budesonide 160 MICROgram(s)/formoterol 4.5 MICROgram(s) Inhaler 2 Puff(s) Inhalation two times a day  chlorhexidine 4% Liquid 1 Application(s) Topical <User Schedule>  dextrose 50% Injectable 25 Gram(s) IV Push once  dextrose 50% Injectable 12.5 Gram(s) IV Push once  dextrose 50% Injectable 25 Gram(s) IV Push once  dextrose Oral Gel 15 Gram(s) Oral once  finasteride 5 milliGRAM(s) Oral daily  glucagon  Injectable 1 milliGRAM(s) IntraMuscular once  heparin  Infusion.  Unit(s)/Hr (10 mL/Hr) IV Continuous <Continuous>  influenza  Vaccine (HIGH DOSE) 0.7 milliLiter(s) IntraMuscular once  insulin lispro (ADMELOG) corrective regimen sliding scale   SubCutaneous Before meals and at bedtime  loperamide 4 milliGRAM(s) Oral four times a day  midodrine 10 milliGRAM(s) Oral every 8 hours  pantoprazole  Injectable 40 milliGRAM(s) IV Push every 12 hours  psyllium Powder 1 Packet(s) Oral two times a day  sodium chloride 0.9% lock flush 3 milliLiter(s) IV Push every 8 hours  tamsulosin 0.8 milliGRAM(s) Oral at bedtime  tiotropium 18 MICROgram(s) Capsule 1 Capsule(s) Inhalation daily    MEDICATIONS  (PRN):  acetaminophen     Tablet .. 650 milliGRAM(s) Oral every 6 hours PRN Mild Pain (1 - 3)  ALBUTerol    90 MICROgram(s) HFA Inhaler 2 Puff(s) Inhalation every 6 hours PRN Shortness of Breath and/or Wheezing  albuterol/ipratropium for Nebulization 3 milliLiter(s) Nebulizer every 6 hours PRN Shortness of Breath and/or Wheezing  heparin   Injectable 6000 Unit(s) IV Push every 6 hours PRN For aPTT less than 40  morphine  - Injectable 2 milliGRAM(s) IV Push every 4 hours PRN Moderate Pain (4 - 6)  naloxone Injectable 0.1 milliGRAM(s) IV Push every 3 minutes PRN For ANY of the following changes in patient status:  A. RR LESS THAN 10 breaths per minute, B. Oxygen saturation LESS THAN 90%, C. Sedation score of 6  ondansetron Injectable 4 milliGRAM(s) IV Push every 6 hours PRN Nausea and/or Vomiting      DVT Prophylaxis: V    Advanced Directives:  Discussed with:    Visit Information:    ** Time is exclusive of billed procedures and/or teaching and/or routine family updates.

## 2022-10-15 LAB
ANION GAP SERPL CALC-SCNC: 6 MMOL/L — SIGNIFICANT CHANGE UP (ref 5–17)
BUN SERPL-MCNC: 45 MG/DL — HIGH (ref 7–23)
CALCIUM SERPL-MCNC: 8.9 MG/DL — SIGNIFICANT CHANGE UP (ref 8.5–10.1)
CHLORIDE SERPL-SCNC: 95 MMOL/L — LOW (ref 96–108)
CO2 SERPL-SCNC: 29 MMOL/L — SIGNIFICANT CHANGE UP (ref 22–31)
CREAT SERPL-MCNC: 2.16 MG/DL — HIGH (ref 0.5–1.3)
EGFR: 31 ML/MIN/1.73M2 — LOW
GLUCOSE SERPL-MCNC: 130 MG/DL — HIGH (ref 70–99)
HCT VFR BLD CALC: 29.3 % — LOW (ref 39–50)
HGB BLD-MCNC: 8.8 G/DL — LOW (ref 13–17)
MAGNESIUM SERPL-MCNC: 2.2 MG/DL — SIGNIFICANT CHANGE UP (ref 1.6–2.6)
MCHC RBC-ENTMCNC: 26.5 PG — LOW (ref 27–34)
MCHC RBC-ENTMCNC: 30 GM/DL — LOW (ref 32–36)
MCV RBC AUTO: 88.3 FL — SIGNIFICANT CHANGE UP (ref 80–100)
PHOSPHATE SERPL-MCNC: 3.9 MG/DL — SIGNIFICANT CHANGE UP (ref 2.5–4.5)
PLATELET # BLD AUTO: 263 K/UL — SIGNIFICANT CHANGE UP (ref 150–400)
POTASSIUM SERPL-MCNC: 4.5 MMOL/L — SIGNIFICANT CHANGE UP (ref 3.5–5.3)
POTASSIUM SERPL-SCNC: 4.5 MMOL/L — SIGNIFICANT CHANGE UP (ref 3.5–5.3)
RBC # BLD: 3.32 M/UL — LOW (ref 4.2–5.8)
RBC # FLD: 18.4 % — HIGH (ref 10.3–14.5)
SODIUM SERPL-SCNC: 130 MMOL/L — LOW (ref 135–145)
WBC # BLD: 8.33 K/UL — SIGNIFICANT CHANGE UP (ref 3.8–10.5)
WBC # FLD AUTO: 8.33 K/UL — SIGNIFICANT CHANGE UP (ref 3.8–10.5)

## 2022-10-15 PROCEDURE — 99233 SBSQ HOSP IP/OBS HIGH 50: CPT

## 2022-10-15 PROCEDURE — 71045 X-RAY EXAM CHEST 1 VIEW: CPT | Mod: 26

## 2022-10-15 PROCEDURE — 74018 RADEX ABDOMEN 1 VIEW: CPT | Mod: 26

## 2022-10-15 RX ORDER — MIDODRINE HYDROCHLORIDE 2.5 MG/1
5 TABLET ORAL EVERY 8 HOURS
Refills: 0 | Status: DISCONTINUED | OUTPATIENT
Start: 2022-10-15 | End: 2022-10-15

## 2022-10-15 RX ORDER — MIDODRINE HYDROCHLORIDE 2.5 MG/1
5 TABLET ORAL ONCE
Refills: 0 | Status: DISCONTINUED | OUTPATIENT
Start: 2022-10-15 | End: 2022-10-16

## 2022-10-15 RX ORDER — MIDODRINE HYDROCHLORIDE 2.5 MG/1
10 TABLET ORAL EVERY 8 HOURS
Refills: 0 | Status: DISCONTINUED | OUTPATIENT
Start: 2022-10-15 | End: 2022-10-16

## 2022-10-15 RX ORDER — SODIUM CHLORIDE 9 MG/ML
1000 INJECTION INTRAMUSCULAR; INTRAVENOUS; SUBCUTANEOUS ONCE
Refills: 0 | Status: COMPLETED | OUTPATIENT
Start: 2022-10-15 | End: 2022-10-15

## 2022-10-15 RX ORDER — SODIUM CHLORIDE 9 MG/ML
500 INJECTION, SOLUTION INTRAVENOUS ONCE
Refills: 0 | Status: COMPLETED | OUTPATIENT
Start: 2022-10-15 | End: 2022-10-15

## 2022-10-15 RX ORDER — ACETAMINOPHEN 500 MG
1000 TABLET ORAL ONCE
Refills: 0 | Status: COMPLETED | OUTPATIENT
Start: 2022-10-15 | End: 2022-10-15

## 2022-10-15 RX ADMIN — SODIUM CHLORIDE 3 MILLILITER(S): 9 INJECTION INTRAMUSCULAR; INTRAVENOUS; SUBCUTANEOUS at 06:48

## 2022-10-15 RX ADMIN — SODIUM CHLORIDE 3 MILLILITER(S): 9 INJECTION INTRAMUSCULAR; INTRAVENOUS; SUBCUTANEOUS at 16:54

## 2022-10-15 RX ADMIN — MIDODRINE HYDROCHLORIDE 5 MILLIGRAM(S): 2.5 TABLET ORAL at 13:04

## 2022-10-15 RX ADMIN — Medication 2: at 17:15

## 2022-10-15 RX ADMIN — BUDESONIDE AND FORMOTEROL FUMARATE DIHYDRATE 2 PUFF(S): 160; 4.5 AEROSOL RESPIRATORY (INHALATION) at 21:18

## 2022-10-15 RX ADMIN — Medication 1 PACKET(S): at 22:40

## 2022-10-15 RX ADMIN — Medication 4 MILLIGRAM(S): at 09:52

## 2022-10-15 RX ADMIN — CHLORHEXIDINE GLUCONATE 1 APPLICATION(S): 213 SOLUTION TOPICAL at 05:32

## 2022-10-15 RX ADMIN — BUDESONIDE AND FORMOTEROL FUMARATE DIHYDRATE 2 PUFF(S): 160; 4.5 AEROSOL RESPIRATORY (INHALATION) at 08:33

## 2022-10-15 RX ADMIN — APIXABAN 5 MILLIGRAM(S): 2.5 TABLET, FILM COATED ORAL at 01:56

## 2022-10-15 RX ADMIN — MIDODRINE HYDROCHLORIDE 10 MILLIGRAM(S): 2.5 TABLET ORAL at 05:31

## 2022-10-15 RX ADMIN — SODIUM CHLORIDE 500 MILLILITER(S): 9 INJECTION, SOLUTION INTRAVENOUS at 07:09

## 2022-10-15 RX ADMIN — ALBUTEROL 2 PUFF(S): 90 AEROSOL, METERED ORAL at 10:33

## 2022-10-15 RX ADMIN — PANTOPRAZOLE SODIUM 40 MILLIGRAM(S): 20 TABLET, DELAYED RELEASE ORAL at 22:40

## 2022-10-15 RX ADMIN — QUETIAPINE FUMARATE 50 MILLIGRAM(S): 200 TABLET, FILM COATED ORAL at 23:29

## 2022-10-15 RX ADMIN — TIOTROPIUM BROMIDE 1 CAPSULE(S): 18 CAPSULE ORAL; RESPIRATORY (INHALATION) at 08:33

## 2022-10-15 RX ADMIN — PANTOPRAZOLE SODIUM 40 MILLIGRAM(S): 20 TABLET, DELAYED RELEASE ORAL at 09:48

## 2022-10-15 RX ADMIN — Medication 4 MILLIGRAM(S): at 05:31

## 2022-10-15 RX ADMIN — MIDODRINE HYDROCHLORIDE 10 MILLIGRAM(S): 2.5 TABLET ORAL at 22:39

## 2022-10-15 RX ADMIN — Medication 4 MILLIGRAM(S): at 13:06

## 2022-10-15 RX ADMIN — ATORVASTATIN CALCIUM 20 MILLIGRAM(S): 80 TABLET, FILM COATED ORAL at 22:40

## 2022-10-15 RX ADMIN — APIXABAN 5 MILLIGRAM(S): 2.5 TABLET, FILM COATED ORAL at 13:04

## 2022-10-15 RX ADMIN — SODIUM CHLORIDE 3 MILLILITER(S): 9 INJECTION INTRAMUSCULAR; INTRAVENOUS; SUBCUTANEOUS at 22:57

## 2022-10-15 RX ADMIN — Medication 1 PACKET(S): at 09:49

## 2022-10-15 RX ADMIN — ALBUTEROL 2 PUFF(S): 90 AEROSOL, METERED ORAL at 21:11

## 2022-10-15 RX ADMIN — SODIUM CHLORIDE 1000 MILLILITER(S): 9 INJECTION INTRAMUSCULAR; INTRAVENOUS; SUBCUTANEOUS at 11:37

## 2022-10-15 RX ADMIN — ONDANSETRON 4 MILLIGRAM(S): 8 TABLET, FILM COATED ORAL at 15:44

## 2022-10-15 RX ADMIN — TAMSULOSIN HYDROCHLORIDE 0.8 MILLIGRAM(S): 0.4 CAPSULE ORAL at 22:39

## 2022-10-15 RX ADMIN — MORPHINE SULFATE 2 MILLIGRAM(S): 50 CAPSULE, EXTENDED RELEASE ORAL at 15:44

## 2022-10-15 RX ADMIN — FINASTERIDE 5 MILLIGRAM(S): 5 TABLET, FILM COATED ORAL at 09:49

## 2022-10-15 RX ADMIN — MORPHINE SULFATE 2 MILLIGRAM(S): 50 CAPSULE, EXTENDED RELEASE ORAL at 11:37

## 2022-10-15 RX ADMIN — Medication 400 MILLIGRAM(S): at 14:30

## 2022-10-15 NOTE — PROGRESS NOTE ADULT - ATTENDING COMMENTS
Patient denies seen and examined at bedside. Bolused 500cc overnight for decreased BP, MAP, also receiving midodrine. Tolerating DASH diet, no N/V. Abdomen is soft, nondistended, appropriately tender near incision without rebound/guarding.  No erythema at incision, staples in place.  Ostomy pink, dark green output in appliance, output is becoming thicker. Patient with crackles at bedside likely fluid overload.     I&O's Detail    14 Oct 2022 07:01  -  15 Oct 2022 07:00  --------------------------------------------------------  IN:    Heparin Infusion: 108 mL    IV PiggyBack: 500 mL    Oral Fluid: 1200 mL  Total IN: 1808 mL    OUT:    Ileostomy (mL): 1800 mL    Indwelling Catheter - Urethral (mL): 1550 mL  Total OUT: 3350 mL    Total NET: -1542 mL    Vital Signs Last 24 Hrs  T(C): 36.4 (15 Oct 2022 06:00), Max: 36.7 (14 Oct 2022 22:00)  T(F): 97.6 (15 Oct 2022 06:00), Max: 98 (14 Oct 2022 22:00)  HR: 92 (15 Oct 2022 13:00) (71 - 108)  BP: 83/46 (15 Oct 2022 13:00) (82/50 - 118/48)  BP(mean): 56 (15 Oct 2022 13:00) (53 - 82)  RR: 23 (15 Oct 2022 13:00) (15 - 27)  SpO2: 92% (15 Oct 2022 13:00) (90% - 100%)    Parameters below as of 15 Oct 2022 06:00  Patient On (Oxygen Delivery Method): mask, Venturi                          8.8    8.33  )-----------( 263      ( 15 Oct 2022 06:30 )             29.3     Ensure multimodal pain control  Continue diet  Ostomy care and education  Continue loperamide, Lomotil and Fiber supplement  Increased Cr 2.16 from 1.53, judicious fluid administration  Crackles on exam to suggest congestions  ICU continued input and care appreciated  Encourage ambulation, Daily PT  Continue heparin gtt  Steroid taper  Continue very close supportive care

## 2022-10-15 NOTE — PROGRESS NOTE ADULT - ASSESSMENT
A/P: 77 male S/P RCP-IPAA (restorative proctocolectomy with ileal pouch anal anastomosis), diverting loop ileostomy with Acute Hypoxic Respiratory Failure from a L sided Pneumonia and JOSE likely from ATN  FLORA  Obesity  AFIB  COPD    Plan:  ICU    PULM: Completed Antibiotics for L PNA.  Wean Fio2.  NIV QHS for FLORA.     Cardio:  Continue AC, decrease Midodrine    Renal: In JOSE likely from ATN is improving again     GI: Po Diet, PPI BID, possible GIB.  No bleeding noted.  IF H & H drops may need to get a CT.  IF so will D/W Surgery    DVT prophylaxis with UFH

## 2022-10-15 NOTE — PROVIDER CONTACT NOTE (OTHER) - ACTION/TREATMENT ORDERED:
Provider made aware of patient BP range, otherwise normal vital signs, NAD and alert and orientedx4. safety maintained, if map goes lower make them aware for a possible fluid bolus.

## 2022-10-15 NOTE — PROGRESS NOTE ADULT - ASSESSMENT
77 male w/pmhx a fib on apixaban  HTN HLD obesity FLORA COPD ex smoker  ulcerative colitis and colon cancer.  POD#9  Open restorative proctocolectomy with IPAA, diverting loop ileostomy     Plan:  continue loperamide 4mg q6h, metamucil BID, lomotil  replete ostomy output cautiously   wean off O2  ambulation/PT  pain control  monitor bowel function   foleu stay w/ legbag per  team  serial abdominal exams  continue with DASH diet  SW/CM for dispo planning PHILLIP placement  Medical management by ICU team  Dispo: pending downgrade to SICU    Plan discussed with CRS team and Dr Carlos

## 2022-10-15 NOTE — PROGRESS NOTE ADULT - SUBJECTIVE AND OBJECTIVE BOX
HPI: Patient is a 77 male S/P RCP-IPAA (restorative proctocolectomy with ileal pouch anal anastomosis), diverting loop ileostomy.  Patient seen in the ICU.  Patient has been on 100% NRB today for a L sided Pneumonia.  He also had to be resumed on pressors.  Patient remains in JOSE and oliguric.      10/8: On VM O2, pressors over night  10/9: VM O2/NC O2.  Off Pressors, coughing up dark sputum now, CXR reviewed and L side is improving, he denies vomiting or aspirating    10/14: Between 4 L NC and VM o2, CXR shows improvement sis i last saw the patient.    10/15: On NIV over night, NC O2 during the day.  H & H stable back on AC         PAST MEDICAL & SURGICAL HISTORY:  Ulcerative colitis      Colon cancer      Obstructive sleep apnea      Obesity      HTN (hypertension)      Atrial fibrillation      HLD (hyperlipidemia)      BPH (benign prostatic hyperplasia)      COPD (chronic obstructive pulmonary disease)      COVID-19 virus infection      History of pneumonia      H/O hernia repair      History of hydrocelectomy      H/O colonoscopy      H/O cystoscopy  urethral stricture      History of total knee replacement, bilateral      S/P placement of cardiac pacemaker      History of cataract surgery          FAMILY HISTORY:  Family history of cardiomyopathy (Father)        Social Hx:    Allergies    ACE inhibitors (Swelling)    Intolerances            ICU Vital Signs Last 24 Hrs  T(C): 36.4 (15 Oct 2022 06:00), Max: 36.7 (14 Oct 2022 14:00)  T(F): 97.6 (15 Oct 2022 06:00), Max: 98 (14 Oct 2022 14:00)  HR: 87 (15 Oct 2022 07:00) (70 - 97)  BP: 99/46 (15 Oct 2022 07:00) (83/56 - 118/48)  BP(mean): 56 (15 Oct 2022 07:00) (42 - 82)  ABP: --  ABP(mean): --  RR: 15 (15 Oct 2022 07:00) (15 - 27)  SpO2: 94% (15 Oct 2022 07:00) (91% - 100%)    O2 Parameters below as of 15 Oct 2022 06:00  Patient On (Oxygen Delivery Method): mask, Venturi                I&O's Summary    14 Oct 2022 07:01  -  15 Oct 2022 07:00  --------------------------------------------------------  IN: 1808 mL / OUT: 3350 mL / NET: -1542 mL                              8.8    8.33  )-----------( 263      ( 15 Oct 2022 06:30 )             29.3       10-15    130<L>  |  95<L>  |  45<H>  ----------------------------<  130<H>  4.5   |  29  |  2.16<H>    Ca    8.9      15 Oct 2022 06:30  Phos  3.9     10-15  Mg     2.2     10-15              ABG - ( 13 Oct 2022 20:20 )  pH, Arterial: 7.38  pH, Blood: x     /  pCO2: 49    /  pO2: 74    / HCO3: 29    / Base Excess: 3.0   /  SaO2: 95                      MEDICATIONS  (STANDING):  acetaminophen   IVPB .. 1000 milliGRAM(s) IV Intermittent once  apixaban 5 milliGRAM(s) Oral every 12 hours  atorvastatin 20 milliGRAM(s) Oral at bedtime  budesonide 160 MICROgram(s)/formoterol 4.5 MICROgram(s) Inhaler 2 Puff(s) Inhalation two times a day  chlorhexidine 4% Liquid 1 Application(s) Topical <User Schedule>  dextrose 50% Injectable 25 Gram(s) IV Push once  dextrose 50% Injectable 12.5 Gram(s) IV Push once  dextrose 50% Injectable 25 Gram(s) IV Push once  dextrose Oral Gel 15 Gram(s) Oral once  finasteride 5 milliGRAM(s) Oral daily  glucagon  Injectable 1 milliGRAM(s) IntraMuscular once  influenza  Vaccine (HIGH DOSE) 0.7 milliLiter(s) IntraMuscular once  insulin lispro (ADMELOG) corrective regimen sliding scale   SubCutaneous Before meals and at bedtime  loperamide 4 milliGRAM(s) Oral four times a day  midodrine 10 milliGRAM(s) Oral every 8 hours  pantoprazole  Injectable 40 milliGRAM(s) IV Push every 12 hours  predniSONE   Tablet 4 milliGRAM(s) Oral daily  psyllium Powder 1 Packet(s) Oral two times a day  sodium chloride 0.9% lock flush 3 milliLiter(s) IV Push every 8 hours  tamsulosin 0.8 milliGRAM(s) Oral at bedtime  tiotropium 18 MICROgram(s) Capsule 1 Capsule(s) Inhalation daily    MEDICATIONS  (PRN):  acetaminophen     Tablet .. 650 milliGRAM(s) Oral every 6 hours PRN Mild Pain (1 - 3)  ALBUTerol    90 MICROgram(s) HFA Inhaler 2 Puff(s) Inhalation every 6 hours PRN Shortness of Breath and/or Wheezing  albuterol/ipratropium for Nebulization 3 milliLiter(s) Nebulizer every 6 hours PRN Shortness of Breath and/or Wheezing  morphine  - Injectable 2 milliGRAM(s) IV Push every 4 hours PRN Moderate Pain (4 - 6)  naloxone Injectable 0.1 milliGRAM(s) IV Push every 3 minutes PRN For ANY of the following changes in patient status:  A. RR LESS THAN 10 breaths per minute, B. Oxygen saturation LESS THAN 90%, C. Sedation score of 6  ondansetron Injectable 4 milliGRAM(s) IV Push every 6 hours PRN Nausea and/or Vomiting  QUEtiapine 50 milliGRAM(s) Oral at bedtime PRN agitated delirium      DVT Prophylaxis: DOAC    Advanced Directives:  Discussed with:    Visit Information:     ** Time is exclusive of billed procedures and/or teaching and/or routine family updates.

## 2022-10-16 LAB
ANION GAP SERPL CALC-SCNC: 7 MMOL/L — SIGNIFICANT CHANGE UP (ref 5–17)
BUN SERPL-MCNC: 54 MG/DL — HIGH (ref 7–23)
CALCIUM SERPL-MCNC: 8.6 MG/DL — SIGNIFICANT CHANGE UP (ref 8.5–10.1)
CHLORIDE SERPL-SCNC: 98 MMOL/L — SIGNIFICANT CHANGE UP (ref 96–108)
CO2 SERPL-SCNC: 28 MMOL/L — SIGNIFICANT CHANGE UP (ref 22–31)
CREAT SERPL-MCNC: 2.47 MG/DL — HIGH (ref 0.5–1.3)
EGFR: 27 ML/MIN/1.73M2 — LOW
GLUCOSE SERPL-MCNC: 118 MG/DL — HIGH (ref 70–99)
HCT VFR BLD CALC: 28.6 % — LOW (ref 39–50)
HGB BLD-MCNC: 8.5 G/DL — LOW (ref 13–17)
MAGNESIUM SERPL-MCNC: 2.4 MG/DL — SIGNIFICANT CHANGE UP (ref 1.6–2.6)
MCHC RBC-ENTMCNC: 26.2 PG — LOW (ref 27–34)
MCHC RBC-ENTMCNC: 29.7 GM/DL — LOW (ref 32–36)
MCV RBC AUTO: 88 FL — SIGNIFICANT CHANGE UP (ref 80–100)
PHOSPHATE SERPL-MCNC: 4.5 MG/DL — SIGNIFICANT CHANGE UP (ref 2.5–4.5)
PLATELET # BLD AUTO: 318 K/UL — SIGNIFICANT CHANGE UP (ref 150–400)
POTASSIUM SERPL-MCNC: 4.9 MMOL/L — SIGNIFICANT CHANGE UP (ref 3.5–5.3)
POTASSIUM SERPL-SCNC: 4.9 MMOL/L — SIGNIFICANT CHANGE UP (ref 3.5–5.3)
RBC # BLD: 3.25 M/UL — LOW (ref 4.2–5.8)
RBC # FLD: 18.3 % — HIGH (ref 10.3–14.5)
SODIUM SERPL-SCNC: 133 MMOL/L — LOW (ref 135–145)
WBC # BLD: 8.69 K/UL — SIGNIFICANT CHANGE UP (ref 3.8–10.5)
WBC # FLD AUTO: 8.69 K/UL — SIGNIFICANT CHANGE UP (ref 3.8–10.5)

## 2022-10-16 PROCEDURE — 99233 SBSQ HOSP IP/OBS HIGH 50: CPT

## 2022-10-16 RX ORDER — ACETAMINOPHEN 500 MG
1000 TABLET ORAL ONCE
Refills: 0 | Status: COMPLETED | OUTPATIENT
Start: 2022-10-16 | End: 2022-10-16

## 2022-10-16 RX ORDER — SODIUM CHLORIDE 9 MG/ML
1000 INJECTION, SOLUTION INTRAVENOUS
Refills: 0 | Status: DISCONTINUED | OUTPATIENT
Start: 2022-10-16 | End: 2022-10-18

## 2022-10-16 RX ORDER — SODIUM CHLORIDE 9 MG/ML
1000 INJECTION INTRAMUSCULAR; INTRAVENOUS; SUBCUTANEOUS ONCE
Refills: 0 | Status: COMPLETED | OUTPATIENT
Start: 2022-10-16 | End: 2022-10-16

## 2022-10-16 RX ADMIN — APIXABAN 5 MILLIGRAM(S): 2.5 TABLET, FILM COATED ORAL at 13:34

## 2022-10-16 RX ADMIN — PANTOPRAZOLE SODIUM 40 MILLIGRAM(S): 20 TABLET, DELAYED RELEASE ORAL at 23:12

## 2022-10-16 RX ADMIN — Medication 400 MILLIGRAM(S): at 13:34

## 2022-10-16 RX ADMIN — SODIUM CHLORIDE 3 MILLILITER(S): 9 INJECTION INTRAMUSCULAR; INTRAVENOUS; SUBCUTANEOUS at 09:00

## 2022-10-16 RX ADMIN — SODIUM CHLORIDE 75 MILLILITER(S): 9 INJECTION, SOLUTION INTRAVENOUS at 12:17

## 2022-10-16 RX ADMIN — SODIUM CHLORIDE 1000 MILLILITER(S): 9 INJECTION INTRAMUSCULAR; INTRAVENOUS; SUBCUTANEOUS at 10:56

## 2022-10-16 RX ADMIN — CHLORHEXIDINE GLUCONATE 1 APPLICATION(S): 213 SOLUTION TOPICAL at 06:02

## 2022-10-16 RX ADMIN — SODIUM CHLORIDE 3 MILLILITER(S): 9 INJECTION INTRAMUSCULAR; INTRAVENOUS; SUBCUTANEOUS at 18:28

## 2022-10-16 RX ADMIN — FINASTERIDE 5 MILLIGRAM(S): 5 TABLET, FILM COATED ORAL at 12:07

## 2022-10-16 RX ADMIN — QUETIAPINE FUMARATE 50 MILLIGRAM(S): 200 TABLET, FILM COATED ORAL at 21:42

## 2022-10-16 RX ADMIN — SODIUM CHLORIDE 75 MILLILITER(S): 9 INJECTION, SOLUTION INTRAVENOUS at 11:01

## 2022-10-16 RX ADMIN — Medication 4 MILLIGRAM(S): at 12:07

## 2022-10-16 RX ADMIN — SODIUM CHLORIDE 3 MILLILITER(S): 9 INJECTION INTRAMUSCULAR; INTRAVENOUS; SUBCUTANEOUS at 21:31

## 2022-10-16 RX ADMIN — MIDODRINE HYDROCHLORIDE 10 MILLIGRAM(S): 2.5 TABLET ORAL at 06:02

## 2022-10-16 RX ADMIN — ATORVASTATIN CALCIUM 20 MILLIGRAM(S): 80 TABLET, FILM COATED ORAL at 21:42

## 2022-10-16 RX ADMIN — PANTOPRAZOLE SODIUM 40 MILLIGRAM(S): 20 TABLET, DELAYED RELEASE ORAL at 12:07

## 2022-10-16 RX ADMIN — Medication 400 MILLIGRAM(S): at 21:41

## 2022-10-16 RX ADMIN — APIXABAN 5 MILLIGRAM(S): 2.5 TABLET, FILM COATED ORAL at 04:04

## 2022-10-16 RX ADMIN — Medication 1000 MILLIGRAM(S): at 22:12

## 2022-10-16 RX ADMIN — TIOTROPIUM BROMIDE 1 CAPSULE(S): 18 CAPSULE ORAL; RESPIRATORY (INHALATION) at 09:06

## 2022-10-16 RX ADMIN — BUDESONIDE AND FORMOTEROL FUMARATE DIHYDRATE 2 PUFF(S): 160; 4.5 AEROSOL RESPIRATORY (INHALATION) at 09:06

## 2022-10-16 RX ADMIN — TAMSULOSIN HYDROCHLORIDE 0.8 MILLIGRAM(S): 0.4 CAPSULE ORAL at 21:41

## 2022-10-16 NOTE — PROGRESS NOTE ADULT - ASSESSMENT
77 male w/pmhx a fib on apixaban  HTN HLD obesity FLORA COPD ex smoker  ulcerative colitis and colon cancer.  POD#10  Open restorative proctocolectomy with IPAA, diverting loop ileostomy     Plan:  hold loperamide 4mg q6h,  c/w metamucil BID, lomotil  replete ostomy output cautiously   wean off O2  possible lasix for gentle diuresis today  ambulation/PT  pain control  monitor bowel function   alfaro stay w/ legbag per  team  serial abdominal exams  NPO, NGT to LWS  SW/CM for dispo planning PHILLIP placement  Medical management by ICU team  Dispo: pending downgrade to SICU    Plan discussed with CRS team and Dr Carlos

## 2022-10-16 NOTE — PROGRESS NOTE ADULT - SUBJECTIVE AND OBJECTIVE BOX
Patient seen and examined this AM at bedside. On venti mask. Resting comfortably this morning. NGT to LWs. Patient offers no active complaints. Denies fever/chills, shortness of breath, chest pain. VS reviewed    Objective:    PHYSICAL EXAM   GENERAL: NAD, AOx3, well developed  CHEST/LUNG: unlabored respirations on nasal cannula  Heart: S1, S2 normal  ABDOMEN: soft, nondistended, mild discomfort to palpation. Ostomy more thickened  : alfaro in place, clear urine        MEDICATIONS  (STANDING):  acetaminophen   IVPB .. 1000 milliGRAM(s) IV Intermittent once  apixaban 5 milliGRAM(s) Oral every 12 hours  atorvastatin 20 milliGRAM(s) Oral at bedtime  budesonide 160 MICROgram(s)/formoterol 4.5 MICROgram(s) Inhaler 2 Puff(s) Inhalation two times a day  chlorhexidine 4% Liquid 1 Application(s) Topical <User Schedule>  dextrose 50% Injectable 25 Gram(s) IV Push once  dextrose 50% Injectable 12.5 Gram(s) IV Push once  dextrose 50% Injectable 25 Gram(s) IV Push once  dextrose Oral Gel 15 Gram(s) Oral once  finasteride 5 milliGRAM(s) Oral daily  glucagon  Injectable 1 milliGRAM(s) IntraMuscular once  influenza  Vaccine (HIGH DOSE) 0.7 milliLiter(s) IntraMuscular once  insulin lispro (ADMELOG) corrective regimen sliding scale   SubCutaneous Before meals and at bedtime  midodrine 10 milliGRAM(s) Oral every 8 hours  midodrine 5 milliGRAM(s) Oral once  pantoprazole  Injectable 40 milliGRAM(s) IV Push every 12 hours  predniSONE   Tablet 4 milliGRAM(s) Oral daily  psyllium Powder 1 Packet(s) Oral two times a day  sodium chloride 0.9% lock flush 3 milliLiter(s) IV Push every 8 hours  tamsulosin 0.8 milliGRAM(s) Oral at bedtime  tiotropium 18 MICROgram(s) Capsule 1 Capsule(s) Inhalation daily    MEDICATIONS  (PRN):  acetaminophen     Tablet .. 650 milliGRAM(s) Oral every 6 hours PRN Mild Pain (1 - 3)  ALBUTerol    90 MICROgram(s) HFA Inhaler 2 Puff(s) Inhalation every 6 hours PRN Shortness of Breath and/or Wheezing  albuterol/ipratropium for Nebulization 3 milliLiter(s) Nebulizer every 6 hours PRN Shortness of Breath and/or Wheezing  naloxone Injectable 0.1 milliGRAM(s) IV Push every 3 minutes PRN For ANY of the following changes in patient status:  A. RR LESS THAN 10 breaths per minute, B. Oxygen saturation LESS THAN 90%, C. Sedation score of 6  ondansetron Injectable 4 milliGRAM(s) IV Push every 6 hours PRN Nausea and/or Vomiting  QUEtiapine 50 milliGRAM(s) Oral at bedtime PRN agitated delirium      PAST MEDICAL & SURGICAL HISTORY:  Ulcerative colitis      Colon cancer      Obstructive sleep apnea      Obesity      HTN (hypertension)      Atrial fibrillation      HLD (hyperlipidemia)      BPH (benign prostatic hyperplasia)      COPD (chronic obstructive pulmonary disease)      COVID-19 virus infection      History of pneumonia      H/O hernia repair      History of hydrocelectomy      H/O colonoscopy      H/O cystoscopy  urethral stricture      History of total knee replacement, bilateral      S/P placement of cardiac pacemaker      History of cataract surgery          Vital Signs Last 24 Hrs  T(C): 36.9 (16 Oct 2022 04:00), Max: 36.9 (16 Oct 2022 04:00)  T(F): 98.5 (16 Oct 2022 04:00), Max: 98.5 (16 Oct 2022 04:00)  HR: 74 (16 Oct 2022 09:12) (66 - 108)  BP: 87/55 (16 Oct 2022 08:00) (59/48 - 111/73)  BP(mean): 62 (16 Oct 2022 08:00) (51 - 86)  RR: 19 (16 Oct 2022 08:00) (14 - 27)  SpO2: 97% (16 Oct 2022 08:00) (90% - 98%)    Parameters below as of 16 Oct 2022 00:24  Patient On (Oxygen Delivery Method): BiPAP/CPAP        I&O's Summary    15 Oct 2022 07:01  -  16 Oct 2022 07:00  --------------------------------------------------------  IN: 1000 mL / OUT: 2850 mL / NET: -1850 mL                              8.5    8.69  )-----------( 318      ( 16 Oct 2022 05:29 )             28.6     10-16    133<L>  |  98  |  54<H>  ----------------------------<  118<H>  4.9   |  28  |  2.47<H>    Ca    8.6      16 Oct 2022 05:29  Phos  4.5     10-16  Mg     2.4     10-16

## 2022-10-16 NOTE — PROGRESS NOTE ADULT - ASSESSMENT
A/P: 77 male S/P RCP-IPAA (restorative proctocolectomy with ileal pouch anal anastomosis), diverting loop ileostomy with Acute Hypoxic Respiratory Failure from a L sided Pneumonia and JOSE likely from ATN  FLORA  Obesity  AFIB  COPD    Plan:  ICU    PULM: Completed Antibiotics for L PNA.  Wean Fio2.  NIV QHS for FLORA.     Cardio:  Continue AC, d/C Midodrine    Renal: In JOSE likely from ATN, will bolus 1 L now and start LR at 77    GI:  NPOt, PPI BID. no bleeding noted.  Continue NGT to suction, D/C Imodium and metamucil, may need a CT with PO contrast.  D/W Dr. Carlos    DVT prophylaxis with DOAC

## 2022-10-16 NOTE — PROGRESS NOTE ADULT - ATTENDING COMMENTS
Patient denies seen and examined at bedside. Made NPO and NGT placed for emesis yesterday. Patient reports feeling more relieved today. Receiving fluid boluses and fluid replacement, Midodrine discontinued. MAP improved today. Abdomen is soft, mildly distended, appropriately tender near incision without rebound/guarding.  No erythema at incision, staples in place.  Ostomy pink, paste-like consistency.     I&O's Detail    15 Oct 2022 07:01  -  16 Oct 2022 07:00  --------------------------------------------------------  IN:    Sodium Chloride 0.9% Bolus: 1000 mL  Total IN: 1000 mL  OUT:    Emesis (mL): 200 mL    Gastric Aspirate - Discarded (mL): 1150 mL    Ileostomy (mL): 850 mL    Indwelling Catheter - Urethral (mL): 650 mL  Total OUT: 2850 mL  Total NET: -1850 mL      16 Oct 2022 07:01  -  16 Oct 2022 14:06  --------------------------------------------------------  IN:    Sodium Chloride 0.9% Bolus: 1000 mL  Total IN: 1000 mL  OUT:    Gastric Aspirate - Discarded (mL): 900 mL  Total OUT: 900 mL  Total NET: 100 mL    Vital Signs Last 24 Hrs  T(C): 36.4 (16 Oct 2022 09:00), Max: 36.9 (16 Oct 2022 04:00)  T(F): 97.5 (16 Oct 2022 09:00), Max: 98.5 (16 Oct 2022 04:00)  HR: 75 (16 Oct 2022 12:00) (66 - 106)  BP: 98/61 (16 Oct 2022 12:00) (59/48 - 111/73)  BP(mean): 71 (16 Oct 2022 12:00) (51 - 86)  RR: 17 (16 Oct 2022 12:00) (14 - 24)  SpO2: 92% (16 Oct 2022 12:00) (85% - 98%)    Parameters below as of 16 Oct 2022 00:24  Patient On (Oxygen Delivery Method): BiPAP/CPAP                          8.5    8.69  )-----------( 318      ( 16 Oct 2022 05:29 )             28.6     10-16    133<L>  |  98  |  54<H>  ----------------------------<  118<H>  4.9   |  28  |  2.47<H>    Ca    8.6      16 Oct 2022 05:29  Phos  4.5     10-16  Mg     2.4     10-16      Ensure multimodal pain control  Likely ileus, keep NPO with NGT in place  Strict I's and O's  Continue to monitor hemodynamic status closely  Midodrine discontinued  Ostomy care and education  Loperamide, Lomotil and Fiber supplement discontinued at this time  Increased Cr 2.47 from 2.16, continue with judicious fluid administration  ICU continued input and care appreciated  Encourage ambulation, Daily PT  Steroid taper  Continue very close supportive care

## 2022-10-16 NOTE — PROGRESS NOTE ADULT - SUBJECTIVE AND OBJECTIVE BOX
HPI: Patient is a 77 male S/P RCP-IPAA (restorative proctocolectomy with ileal pouch anal anastomosis), diverting loop ileostomy.  Patient seen in the ICU.  Patient has been on 100% NRB today for a L sided Pneumonia.  He also had to be resumed on pressors.  Patient remains in JOSE and oliguric.      10/8: On VM O2, pressors over night  10/9: VM O2/NC O2.  Off Pressors, coughing up dark sputum now, CXR reviewed and L side is improving, he denies vomiting or aspirating    10/14: Between 4 L NC and VM o2, CXR shows improvement sis i last saw the patient.    10/15: On NIV over night, NC O2 during the day.  H & H stable back on AC  10/16: Had NGT placed yesterday, 1,150 from NGT, ostomy functioning, no pain, cr rising       PAST MEDICAL & SURGICAL HISTORY:  Ulcerative colitis      Colon cancer      Obstructive sleep apnea      Obesity      HTN (hypertension)      Atrial fibrillation      HLD (hyperlipidemia)      BPH (benign prostatic hyperplasia)      COPD (chronic obstructive pulmonary disease)      COVID-19 virus infection      History of pneumonia      H/O hernia repair      History of hydrocelectomy      H/O colonoscopy      H/O cystoscopy  urethral stricture      History of total knee replacement, bilateral      S/P placement of cardiac pacemaker      History of cataract surgery          FAMILY HISTORY:  Family history of cardiomyopathy (Father)        Social Hx:    Allergies    ACE inhibitors (Swelling)    Intolerances            ICU Vital Signs Last 24 Hrs  T(C): 36.4 (16 Oct 2022 09:00), Max: 36.9 (16 Oct 2022 04:00)  T(F): 97.5 (16 Oct 2022 09:00), Max: 98.5 (16 Oct 2022 04:00)  HR: 84 (16 Oct 2022 10:00) (66 - 108)  BP: 84/60 (16 Oct 2022 10:00) (59/48 - 111/73)  BP(mean): 69 (16 Oct 2022 10:00) (51 - 86)  ABP: --  ABP(mean): --  RR: 18 (16 Oct 2022 10:00) (14 - 27)  SpO2: 85% (16 Oct 2022 10:00) (85% - 98%)    O2 Parameters below as of 16 Oct 2022 00:24  Patient On (Oxygen Delivery Method): BiPAP/CPAP                I&O's Summary    15 Oct 2022 07:01  -  16 Oct 2022 07:00  --------------------------------------------------------  IN: 1000 mL / OUT: 2850 mL / NET: -1850 mL                              8.5    8.69  )-----------( 318      ( 16 Oct 2022 05:29 )             28.6       10-16    133<L>  |  98  |  54<H>  ----------------------------<  118<H>  4.9   |  28  |  2.47<H>    Ca    8.6      16 Oct 2022 05:29  Phos  4.5     10-16  Mg     2.4     10-16                      MEDICATIONS  (STANDING):  acetaminophen   IVPB .. 1000 milliGRAM(s) IV Intermittent once  apixaban 5 milliGRAM(s) Oral every 12 hours  atorvastatin 20 milliGRAM(s) Oral at bedtime  budesonide 160 MICROgram(s)/formoterol 4.5 MICROgram(s) Inhaler 2 Puff(s) Inhalation two times a day  chlorhexidine 4% Liquid 1 Application(s) Topical <User Schedule>  dextrose 50% Injectable 25 Gram(s) IV Push once  dextrose 50% Injectable 12.5 Gram(s) IV Push once  dextrose 50% Injectable 25 Gram(s) IV Push once  dextrose Oral Gel 15 Gram(s) Oral once  finasteride 5 milliGRAM(s) Oral daily  glucagon  Injectable 1 milliGRAM(s) IntraMuscular once  influenza  Vaccine (HIGH DOSE) 0.7 milliLiter(s) IntraMuscular once  insulin lispro (ADMELOG) corrective regimen sliding scale   SubCutaneous Before meals and at bedtime  lactated ringers. 1000 milliLiter(s) (75 mL/Hr) IV Continuous <Continuous>  pantoprazole  Injectable 40 milliGRAM(s) IV Push every 12 hours  predniSONE   Tablet 4 milliGRAM(s) Oral daily  sodium chloride 0.9% Bolus 1000 milliLiter(s) IV Bolus once  sodium chloride 0.9% lock flush 3 milliLiter(s) IV Push every 8 hours  tamsulosin 0.8 milliGRAM(s) Oral at bedtime  tiotropium 18 MICROgram(s) Capsule 1 Capsule(s) Inhalation daily    MEDICATIONS  (PRN):  acetaminophen     Tablet .. 650 milliGRAM(s) Oral every 6 hours PRN Mild Pain (1 - 3)  ALBUTerol    90 MICROgram(s) HFA Inhaler 2 Puff(s) Inhalation every 6 hours PRN Shortness of Breath and/or Wheezing  albuterol/ipratropium for Nebulization 3 milliLiter(s) Nebulizer every 6 hours PRN Shortness of Breath and/or Wheezing  naloxone Injectable 0.1 milliGRAM(s) IV Push every 3 minutes PRN For ANY of the following changes in patient status:  A. RR LESS THAN 10 breaths per minute, B. Oxygen saturation LESS THAN 90%, C. Sedation score of 6  ondansetron Injectable 4 milliGRAM(s) IV Push every 6 hours PRN Nausea and/or Vomiting  QUEtiapine 50 milliGRAM(s) Oral at bedtime PRN agitated delirium      DVT Prophylaxis: DOAC    Advanced Directives:  Discussed with:    Visit Information:    ** Time is exclusive of billed procedures and/or teaching and/or routine family updates.

## 2022-10-17 ENCOUNTER — TRANSCRIPTION ENCOUNTER (OUTPATIENT)
Age: 77
End: 2022-10-17

## 2022-10-17 LAB
ANION GAP SERPL CALC-SCNC: 5 MMOL/L — SIGNIFICANT CHANGE UP (ref 5–17)
BUN SERPL-MCNC: 47 MG/DL — HIGH (ref 7–23)
CALCIUM SERPL-MCNC: 9.1 MG/DL — SIGNIFICANT CHANGE UP (ref 8.5–10.1)
CHLORIDE SERPL-SCNC: 103 MMOL/L — SIGNIFICANT CHANGE UP (ref 96–108)
CO2 SERPL-SCNC: 29 MMOL/L — SIGNIFICANT CHANGE UP (ref 22–31)
CREAT SERPL-MCNC: 1.66 MG/DL — HIGH (ref 0.5–1.3)
EGFR: 43 ML/MIN/1.73M2 — LOW
GLUCOSE SERPL-MCNC: 102 MG/DL — HIGH (ref 70–99)
HCT VFR BLD CALC: 29.3 % — LOW (ref 39–50)
HGB BLD-MCNC: 8.9 G/DL — LOW (ref 13–17)
MAGNESIUM SERPL-MCNC: 2.5 MG/DL — SIGNIFICANT CHANGE UP (ref 1.6–2.6)
MCHC RBC-ENTMCNC: 26.9 PG — LOW (ref 27–34)
MCHC RBC-ENTMCNC: 30.4 GM/DL — LOW (ref 32–36)
MCV RBC AUTO: 88.5 FL — SIGNIFICANT CHANGE UP (ref 80–100)
PHOSPHATE SERPL-MCNC: 3.6 MG/DL — SIGNIFICANT CHANGE UP (ref 2.5–4.5)
PLATELET # BLD AUTO: 303 K/UL — SIGNIFICANT CHANGE UP (ref 150–400)
POTASSIUM SERPL-MCNC: 5 MMOL/L — SIGNIFICANT CHANGE UP (ref 3.5–5.3)
POTASSIUM SERPL-SCNC: 5 MMOL/L — SIGNIFICANT CHANGE UP (ref 3.5–5.3)
RBC # BLD: 3.31 M/UL — LOW (ref 4.2–5.8)
RBC # FLD: 18.2 % — HIGH (ref 10.3–14.5)
SARS-COV-2 RNA SPEC QL NAA+PROBE: SIGNIFICANT CHANGE UP
SODIUM SERPL-SCNC: 137 MMOL/L — SIGNIFICANT CHANGE UP (ref 135–145)
WBC # BLD: 7.62 K/UL — SIGNIFICANT CHANGE UP (ref 3.8–10.5)
WBC # FLD AUTO: 7.62 K/UL — SIGNIFICANT CHANGE UP (ref 3.8–10.5)

## 2022-10-17 PROCEDURE — 74176 CT ABD & PELVIS W/O CONTRAST: CPT | Mod: 26

## 2022-10-17 PROCEDURE — 76770 US EXAM ABDO BACK WALL COMP: CPT | Mod: 26

## 2022-10-17 PROCEDURE — 52000 CYSTOURETHROSCOPY: CPT

## 2022-10-17 PROCEDURE — 99233 SBSQ HOSP IP/OBS HIGH 50: CPT

## 2022-10-17 PROCEDURE — 74018 RADEX ABDOMEN 1 VIEW: CPT | Mod: 26

## 2022-10-17 PROCEDURE — 99232 SBSQ HOSP IP/OBS MODERATE 35: CPT | Mod: 25

## 2022-10-17 RX ORDER — OXYBUTYNIN CHLORIDE 5 MG
5 TABLET ORAL DAILY
Refills: 0 | Status: DISCONTINUED | OUTPATIENT
Start: 2022-10-17 | End: 2022-11-02

## 2022-10-17 RX ORDER — ACETAMINOPHEN 500 MG
1000 TABLET ORAL ONCE
Refills: 0 | Status: COMPLETED | OUTPATIENT
Start: 2022-10-17 | End: 2022-10-17

## 2022-10-17 RX ORDER — ELECTROLYTE SOLUTION,INJ
1 VIAL (ML) INTRAVENOUS
Refills: 0 | Status: DISCONTINUED | OUTPATIENT
Start: 2022-10-17 | End: 2022-10-17

## 2022-10-17 RX ADMIN — TAMSULOSIN HYDROCHLORIDE 0.8 MILLIGRAM(S): 0.4 CAPSULE ORAL at 23:06

## 2022-10-17 RX ADMIN — APIXABAN 5 MILLIGRAM(S): 2.5 TABLET, FILM COATED ORAL at 02:51

## 2022-10-17 RX ADMIN — SODIUM CHLORIDE 3 MILLILITER(S): 9 INJECTION INTRAMUSCULAR; INTRAVENOUS; SUBCUTANEOUS at 23:29

## 2022-10-17 RX ADMIN — Medication 5 MILLIGRAM(S): at 10:35

## 2022-10-17 RX ADMIN — BUDESONIDE AND FORMOTEROL FUMARATE DIHYDRATE 2 PUFF(S): 160; 4.5 AEROSOL RESPIRATORY (INHALATION) at 22:01

## 2022-10-17 RX ADMIN — Medication 400 MILLIGRAM(S): at 13:43

## 2022-10-17 RX ADMIN — SODIUM CHLORIDE 3 MILLILITER(S): 9 INJECTION INTRAMUSCULAR; INTRAVENOUS; SUBCUTANEOUS at 13:37

## 2022-10-17 RX ADMIN — BUDESONIDE AND FORMOTEROL FUMARATE DIHYDRATE 2 PUFF(S): 160; 4.5 AEROSOL RESPIRATORY (INHALATION) at 09:02

## 2022-10-17 RX ADMIN — Medication 650 MILLIGRAM(S): at 23:08

## 2022-10-17 RX ADMIN — TIOTROPIUM BROMIDE 1 CAPSULE(S): 18 CAPSULE ORAL; RESPIRATORY (INHALATION) at 09:02

## 2022-10-17 RX ADMIN — APIXABAN 5 MILLIGRAM(S): 2.5 TABLET, FILM COATED ORAL at 13:43

## 2022-10-17 RX ADMIN — QUETIAPINE FUMARATE 50 MILLIGRAM(S): 200 TABLET, FILM COATED ORAL at 23:07

## 2022-10-17 RX ADMIN — Medication 400 MILLIGRAM(S): at 06:47

## 2022-10-17 RX ADMIN — SODIUM CHLORIDE 75 MILLILITER(S): 9 INJECTION, SOLUTION INTRAVENOUS at 02:51

## 2022-10-17 RX ADMIN — FINASTERIDE 5 MILLIGRAM(S): 5 TABLET, FILM COATED ORAL at 09:14

## 2022-10-17 RX ADMIN — Medication 1 EACH: at 23:05

## 2022-10-17 RX ADMIN — Medication 1000 MILLIGRAM(S): at 14:00

## 2022-10-17 RX ADMIN — ATORVASTATIN CALCIUM 20 MILLIGRAM(S): 80 TABLET, FILM COATED ORAL at 23:06

## 2022-10-17 RX ADMIN — SODIUM CHLORIDE 3 MILLILITER(S): 9 INJECTION INTRAMUSCULAR; INTRAVENOUS; SUBCUTANEOUS at 06:33

## 2022-10-17 RX ADMIN — CHLORHEXIDINE GLUCONATE 1 APPLICATION(S): 213 SOLUTION TOPICAL at 05:55

## 2022-10-17 RX ADMIN — PANTOPRAZOLE SODIUM 40 MILLIGRAM(S): 20 TABLET, DELAYED RELEASE ORAL at 09:14

## 2022-10-17 RX ADMIN — Medication 4 MILLIGRAM(S): at 09:15

## 2022-10-17 NOTE — BRIEF OPERATIVE NOTE - NSICDXBRIEFPOSTOP_GEN_ALL_CORE_FT
POST-OP DIAGNOSIS:  Bladder neck contracture 17-Oct-2022 20:51:03  Angelito Lamar  Urethral false passage 17-Oct-2022 20:51:16  Angelito Lamar  
POST-OP DIAGNOSIS:  Rectosigmoid cancer 06-Oct-2022 15:14:00  Sri Kenney  Ulcerative colitis 06-Oct-2022 15:14:08  Sri Kenney

## 2022-10-17 NOTE — BRIEF OPERATIVE NOTE - NSICDXBRIEFPREOP_GEN_ALL_CORE_FT
PRE-OP DIAGNOSIS:  Urinary retention 17-Oct-2022 20:50:45  Angelito Lamar S  
PRE-OP DIAGNOSIS:  Rectosigmoid cancer 06-Oct-2022 15:13:39  Sri Kenney  Ulcerative colitis 06-Oct-2022 15:13:49  Sri Kenney

## 2022-10-17 NOTE — PROGRESS NOTE ADULT - SUBJECTIVE AND OBJECTIVE BOX
Patient's RN called urology with concern for low urine output and bladder scan of 750 mL. Pt seen at bedside denies any abd/flank pain.    General: No distress, No anxiety  VITALS  T(C): 36.6 (10-17-22 @ 08:00), Max: 36.8 (10-17-22 @ 06:00)  HR: 84 (10-17-22 @ 11:00) (68 - 87)  BP: 97/71 (10-17-22 @ 11:00) (89/55 - 113/69)  RR: 16 (10-17-22 @ 11:00) (8 - 20)  SpO2: 99% (10-17-22 @ 11:00) (91% - 100%)            Skin     : No jaundice   HEENT: Normocephalic, no icterus , EOM full , No epistaxis  Lung    : No resp distress  Abdo:   : obese abd, +incisional tenderness, mild distention    Genitalia Male: alfaro with yellow urine                           8.9    7.62  )-----------( 303      ( 17 Oct 2022 06:06 )             29.3   10-17    137  |  103  |  47<H>  ----------------------------<  102<H>  5.0   |  29  |  1.66<H>    Ca    9.1      17 Oct 2022 06:06  Phos  3.6     10-17  Mg     2.5     10-17

## 2022-10-17 NOTE — PROGRESS NOTE ADULT - ATTENDING COMMENTS
Seen and examined.  States urine is leaking around alfaro.  NGT 2400cc   Ileostomy 1000cc  AFVSS  NAD  incision CDI, stoma productive, soft, NTND  Labs reviewed  A/P -   Cont NPO, NGT, IVF.  IV nutrition to start.  CT A/P results and images reviewed.  Anterior abdominal abscess noted, likely contributing to ileus. IR to drain tomorrow.  Alfaro not in bladder, urology to perform cysto tonight.

## 2022-10-17 NOTE — BRIEF OPERATIVE NOTE - NSICDXBRIEFPROCEDURE_GEN_ALL_CORE_FT
PROCEDURES:  Proctocolectomy, total, with J-pouch creation 06-Oct-2022 15:12:34  Sri Kenney  Open creation of diverting loop ileostomy 06-Oct-2022 15:13:05  Sri Kenney  Insertion, biologic implant 06-Oct-2022 15:13:17  Sri Kenney  
PROCEDURES:  Cystoscopy 17-Oct-2022 20:50:06 and urethral catheter placement Angelito Lamar

## 2022-10-17 NOTE — CHART NOTE - NSCHARTNOTEFT_GEN_A_CORE
Clinical Nutrition PN Follow Up Note    *76yo male admitted with ulcerative colitis and colon CA now s/p open restorative proctocolectomy with IPPAA, diverting loop ileostomy, NGT to LWS with high outputs, ileostomy still functioning.  acute hypoxic resp failure from Lt sided PNA and JOSE likely from ATN.    *current status: NGT remains to LWS with large output.  will start PPN as pt remains NPO x 3 days with high nutr needs for wound healing.    *labs reviewed; sodium at lower end normal, potassium and magnesium at upper end normal (will not add potassium or magnesium in to PN bag at this time).  bilirubin total WNL.  corrected Ca elevated, DO NOT supplement calcium outside of PN bag at this time. POCT WNL, c/w POCT q6hrs with sliding scale.  obtain triglyceride level.  10-17    137  |  103  |  47<H>  ----------------------------<  102<H>  5.0   |  29  |  1.66<H>    Ca    9.1      17 Oct 2022 06:06  Phos  3.6     10-17  Mg     2.5     10-17      HbA1c: A1C with Estimated Average Glucose Result: 6.9 % (09-29-22 @ 14:21)  Glucose: POCT Blood Glucose.: 104 mg/dL (10-17-22 @ 05:55)    POCT Blood Glucose.: 104 mg/dL (17 Oct 2022 05:55)  POCT Blood Glucose.: 104 mg/dL (16 Oct 2022 22:54)  POCT Blood Glucose.: 110 mg/dL (16 Oct 2022 18:29)  POCT Blood Glucose.: 107 mg/dL (16 Oct 2022 12:18)      *pertinent meds: LR (IVF), atorvastatin, admelog sliding scale, protonix, prednisone, zofran, tylenol    *I&O's Detail    16 Oct 2022 07:01  -  17 Oct 2022 07:00  --------------------------------------------------------  IN:    IV PiggyBack: 200 mL    Lactated Ringers: 1366 mL    Sodium Chloride 0.9% Bolus: 1000 mL  Total IN: 2566 mL    OUT:    Gastric Aspirate - Discarded (mL): 2400 mL    Ileostomy (mL): 1000 mL    Indwelling Catheter - Urethral (mL): 475 mL  Total OUT: 3875 mL    Total NET: -1309 mL      * negative fluid status: BM (+) on 10/16 .  pt not on bowel regimen.    *Adán Score of 16; no PI documented. (+2) generalized edema documented.    *Malnutrition: now meets criteria for severe malnutrition in acute illness r/t decreased ability to meet increased nutr needs s/p GI surgery AEB meeting <50% of nutr needs x 9days, moderate edema    Estimated Needs: based on 87Kg (adjusted BW)  Calories: 2242-1950 Kcal (25-30 Kcal/Kg)  Protein: 139-157 g (1.6-1.8 g/Kg)  Fluids: 7477-5272  mL (25-30 mL/Kg)    Diet, NPO:   Except Medications (10-16-22 @ 08:23) [Active]      Weight History:  Height (cm): 180.3 (10-06-22 @ 08:40), 180.3 (09-29-22 @ 14:48)  Weight (kg): 126.1 (10-06-22 @ 08:40), 126.1 (09-29-22 @ 14:48)  BMI (kg/m2): 38.8 (10-06-22 @ 08:40), 38.8 (09-29-22 @ 14:48)  BSA (m2): 2.43 (10-06-22 @ 08:40), 2.43 (09-29-22 @ 14:48)      *will continue to monitor and adjust PN prn*    PPN Recommendations: via peripheral line  Total Volume: 2000mL  85 g  Amino Acids  100 g Dextrose  0 g Lipids 20% (no lipids, obtain triglycerides first)  128 mEq Sodium Chloride  11 mEq Sodium Acetate  10 mmol Sodium Phosphate  0 mEq Potassium Chloride  0 mEq Potassium Acetate  0 mmol Potassium Phosphate  0 mEq Calcium Gluconate ( corrected Ca elevated, DO NOT supplement calcium outside of PN bag at this time. )  0 mEq Magnesium Sulfate  200 mg Thiamine  1 ml Trace Elements  10 ml MVI    Total Calories  680Kcal  (   Meets 31%  of  Estimated Energy needs  and  61 %  Protein needs)(osmolarity 840)    Additional Recommendations:    1) Daily weights  2) Strict I & O's  3) Daily lyte checks including magnesium and phos  4) Weekly triglycerides/LFT checks  5) POCT q6hrs; maintain 140-180mg/dL  6) if on PN > 6 days, consider placing PICC line to meet 100% of nutr needs    *will monitor and adjust treatement plan prn*  Ania Anderson MA, RDN, CDN, CNSC [phone]  Nutrition

## 2022-10-17 NOTE — PROGRESS NOTE ADULT - SUBJECTIVE AND OBJECTIVE BOX
HPI: Patient is a 77 male S/P RCP-IPAA (restorative proctocolectomy with ileal pouch anal anastomosis), diverting loop ileostomy.  Patient seen in the ICU.  Patient has been on 100% NRB today for a L sided Pneumonia.  He also had to be resumed on pressors.  Patient remains in JOSE and oliguric.      10/8: On VM O2, pressors over night  10/9: VM O2/NC O2.  Off Pressors, coughing up dark sputum now, CXR reviewed and L side is improving, he denies vomiting or aspirating    10/14: Between 4 L NC and VM o2, CXR shows improvement sis i last saw the patient.    10/15: On NIV over night, NC O2 during the day.  H & H stable back on AC  10/16: Had NGT placed yesterday, 1,150 from NGT, ostomy functioning, no pain, cr rising   10/17: NGT drained over a L over night, ostomy 250          PAST MEDICAL & SURGICAL HISTORY:  Ulcerative colitis      Colon cancer      Obstructive sleep apnea      Obesity      HTN (hypertension)      Atrial fibrillation      HLD (hyperlipidemia)      BPH (benign prostatic hyperplasia)      COPD (chronic obstructive pulmonary disease)      COVID-19 virus infection      History of pneumonia      H/O hernia repair      History of hydrocelectomy      H/O colonoscopy      H/O cystoscopy  urethral stricture      History of total knee replacement, bilateral      S/P placement of cardiac pacemaker      History of cataract surgery          FAMILY HISTORY:  Family history of cardiomyopathy (Father)        Social Hx:    Allergies    ACE inhibitors (Swelling)    Intolerances            ICU Vital Signs Last 24 Hrs  T(C): 36.6 (17 Oct 2022 08:00), Max: 36.8 (17 Oct 2022 06:00)  T(F): 97.8 (17 Oct 2022 08:00), Max: 98.2 (17 Oct 2022 06:00)  HR: 84 (17 Oct 2022 11:00) (68 - 96)  BP: 97/71 (17 Oct 2022 11:00) (89/55 - 113/69)  BP(mean): 80 (17 Oct 2022 11:00) (66 - 82)  ABP: --  ABP(mean): --  RR: 16 (17 Oct 2022 11:00) (8 - 20)  SpO2: 99% (17 Oct 2022 11:00) (91% - 100%)    O2 Parameters below as of 17 Oct 2022 11:00  Patient On (Oxygen Delivery Method): nasal cannula  O2 Flow (L/min): 5              I&O's Summary    16 Oct 2022 07:01  -  17 Oct 2022 07:00  --------------------------------------------------------  IN: 2566 mL / OUT: 3875 mL / NET: -1309 mL                              8.9    7.62  )-----------( 303      ( 17 Oct 2022 06:06 )             29.3       10-17    137  |  103  |  47<H>  ----------------------------<  102<H>  5.0   |  29  |  1.66<H>    Ca    9.1      17 Oct 2022 06:06  Phos  3.6     10-17  Mg     2.5     10-17                      MEDICATIONS  (STANDING):  acetaminophen   IVPB .. 1000 milliGRAM(s) IV Intermittent once  apixaban 5 milliGRAM(s) Oral every 12 hours  atorvastatin 20 milliGRAM(s) Oral at bedtime  budesonide 160 MICROgram(s)/formoterol 4.5 MICROgram(s) Inhaler 2 Puff(s) Inhalation two times a day  chlorhexidine 4% Liquid 1 Application(s) Topical <User Schedule>  dextrose 50% Injectable 25 Gram(s) IV Push once  dextrose 50% Injectable 12.5 Gram(s) IV Push once  dextrose 50% Injectable 25 Gram(s) IV Push once  dextrose Oral Gel 15 Gram(s) Oral once  finasteride 5 milliGRAM(s) Oral daily  glucagon  Injectable 1 milliGRAM(s) IntraMuscular once  influenza  Vaccine (HIGH DOSE) 0.7 milliLiter(s) IntraMuscular once  insulin lispro (ADMELOG) corrective regimen sliding scale   SubCutaneous Before meals and at bedtime  lactated ringers. 1000 milliLiter(s) (75 mL/Hr) IV Continuous <Continuous>  pantoprazole  Injectable 40 milliGRAM(s) IV Push every 12 hours  Parenteral Nutrition - Adult 1 Each (83 mL/Hr) TPN Continuous <Continuous>  Parenteral Nutrition - Adult 1 Each (83 mL/Hr) TPN Continuous <Continuous>  predniSONE   Tablet 4 milliGRAM(s) Oral daily  sodium chloride 0.9% lock flush 3 milliLiter(s) IV Push every 8 hours  tamsulosin 0.8 milliGRAM(s) Oral at bedtime  tiotropium 18 MICROgram(s) Capsule 1 Capsule(s) Inhalation daily    MEDICATIONS  (PRN):  acetaminophen     Tablet .. 650 milliGRAM(s) Oral every 6 hours PRN Mild Pain (1 - 3)  ALBUTerol    90 MICROgram(s) HFA Inhaler 2 Puff(s) Inhalation every 6 hours PRN Shortness of Breath and/or Wheezing  albuterol/ipratropium for Nebulization 3 milliLiter(s) Nebulizer every 6 hours PRN Shortness of Breath and/or Wheezing  naloxone Injectable 0.1 milliGRAM(s) IV Push every 3 minutes PRN For ANY of the following changes in patient status:  A. RR LESS THAN 10 breaths per minute, B. Oxygen saturation LESS THAN 90%, C. Sedation score of 6  ondansetron Injectable 4 milliGRAM(s) IV Push every 6 hours PRN Nausea and/or Vomiting  oxybutynin 5 milliGRAM(s) Oral daily PRN Bladder spasms  QUEtiapine 50 milliGRAM(s) Oral at bedtime PRN agitated delirium      DVT Prophylaxis:    Advanced Directives:  Discussed with:    Visit Information:    ** Time is exclusive of billed procedures and/or teaching and/or routine family updates.

## 2022-10-17 NOTE — PROGRESS NOTE ADULT - NS ATTEND AMEND GEN_ALL_CORE FT
5 years had surgery for urinary obstruction.   Recommended Cystoscopy possible urethral dilatation and Zhao catheter placement and possible Suprapubic catheter placement.   Discussed risks and benefits. Informed consent obtained.

## 2022-10-17 NOTE — PROGRESS NOTE ADULT - NS ATTEND OPT1 GEN_ALL_CORE
I attest my time as attending is greater than 50% of the total combined time spent on qualifying patient care activities by the PA/NP and attending.
I independently performed the documented:

## 2022-10-17 NOTE — BRIEF OPERATIVE NOTE - OPERATION/FINDINGS
restorative proctocolectomy with IPAA, diverting loop ileostomy  rectosigmoid mass noted  humberto stool spillage from transverse colon  Evologics placed  Surgiflow and seprafilm used
Previously resected prostate ? Patient mentioned 5 years ago had surgery for urinary obstruction.   Bladder neck contracture.  Posterior prostatic urethra false passage.

## 2022-10-17 NOTE — PROGRESS NOTE ADULT - ASSESSMENT
76 yo male s/p proctocolectomy, ileoanal anastomosis, loop ileostomy 10/6. Urology consulted for pt with urinary retention requiring alfaro placement and failed trial of void with subsequent alfaro placement.   Pt with 16 fr coude in place, noted with less UOP and bladder scan of 750 mL, official RBUS ordered not read yet, reviewed imaging with Dr. Lamar with concern for alfaro not being in the bladder. Dr. Lamar recommended to change to20 Fr coude alfaro which was changed in a sterile fashion at bedside without any resistance and pt tolerated procedure well, bladder spasm with urine leakage noted and clear urine return noted after alfaro placement via alfaro but only 30 mL. Will obtain CT to further assess to ensure the alfaro is in the bladder and there are no false passages.   Recommend  - F/U CT scan  - Cont Flomax 0.8 mg and Finasteride 5mg QD  - Trend creat  - D/C home with alfaro to leg bag  - Outpatient follow up for trial of void    Case discussed with Dr. Lamar

## 2022-10-17 NOTE — PROGRESS NOTE ADULT - SUBJECTIVE AND OBJECTIVE BOX
Patient seen and examined this AM at bedside. On nasal canula. Resting comfortably this morning. NGT to LWs. Patient offers no active complaints. Denies fever/chills, shortness of breath, chest pain. VS reviewed    Objective:    PHYSICAL EXAM   GENERAL: NAD, AOx3, well developed  CHEST/LUNG: unlabored respirations on nasal cannula  Heart: S1, S2 normal  ABDOMEN: soft, less distended, mild discomfort to palpation. Ostomy patent with semi solid stool  : alfaro in place, clear urine, leaking around    Vitals:  T(C): 36.8 (10-17 @ 06:00), Max: 36.8 (10-17 @ 06:00)  HR: 82 (10-17 @ 07:) (68 - 96)  BP: 103/54 (10-17 @ 07:00) (84/60 - 113/69)  RR: 16 (10-17 @ 07:00) (8 - 20)  SpO2: 94% (10-17 @ 07:00) (85% - 100%)    10-16 @ 07:01  -  10-17 @ 07:00  --------------------------------------------------------  IN:    IV PiggyBack: 200 mL    Lactated Ringers: 1366 mL    Sodium Chloride 0.9% Bolus: 1000 mL  Total IN: 2566 mL    OUT:    Gastric Aspirate - Discarded (mL): 2400 mL    Ileostomy (mL): 1000 mL    Indwelling Catheter - Urethral (mL): 475 mL  Total OUT: 3875 mL    Total NET: -1309 mL      10-17 @ 06:06                    8.9  CBC: 7.62>)-------(<303                     29.3                 137 | 103 | 47    CMP:  ----------------------< 102               5.0 | 29 | 1.66                      Ca:9.1  Phos:3.6  M.5               -|      |-        LFTs:  ------|-|-----             -|      |-  10-16 @ 05:29                    8.5  CBC: 8.69>)-------(<318                     28.6                 133 | 98 | 54    CMP:  ----------------------< 118               4.9 | 28 | 2.47                      Ca:8.6  Phos:4.5  M.4               -|      |-        LFTs:  ------|-|-----             -|      |-      Current Inpatient Medications:  acetaminophen     Tablet .. 650 milliGRAM(s) Oral every 6 hours PRN  acetaminophen   IVPB .. 1000 milliGRAM(s) IV Intermittent once  ALBUTerol    90 MICROgram(s) HFA Inhaler 2 Puff(s) Inhalation every 6 hours PRN  albuterol/ipratropium for Nebulization 3 milliLiter(s) Nebulizer every 6 hours PRN  apixaban 5 milliGRAM(s) Oral every 12 hours  atorvastatin 20 milliGRAM(s) Oral at bedtime  budesonide 160 MICROgram(s)/formoterol 4.5 MICROgram(s) Inhaler 2 Puff(s) Inhalation two times a day  chlorhexidine 4% Liquid 1 Application(s) Topical <User Schedule>  dextrose 50% Injectable 25 Gram(s) IV Push once  dextrose 50% Injectable 12.5 Gram(s) IV Push once  dextrose 50% Injectable 25 Gram(s) IV Push once  dextrose Oral Gel 15 Gram(s) Oral once  finasteride 5 milliGRAM(s) Oral daily  glucagon  Injectable 1 milliGRAM(s) IntraMuscular once  influenza  Vaccine (HIGH DOSE) 0.7 milliLiter(s) IntraMuscular once  insulin lispro (ADMELOG) corrective regimen sliding scale   SubCutaneous Before meals and at bedtime  lactated ringers. 1000 milliLiter(s) (75 mL/Hr) IV Continuous <Continuous>  naloxone Injectable 0.1 milliGRAM(s) IV Push every 3 minutes PRN  ondansetron Injectable 4 milliGRAM(s) IV Push every 6 hours PRN  pantoprazole  Injectable 40 milliGRAM(s) IV Push every 12 hours  predniSONE   Tablet 4 milliGRAM(s) Oral daily  QUEtiapine 50 milliGRAM(s) Oral at bedtime PRN  sodium chloride 0.9% lock flush 3 milliLiter(s) IV Push every 8 hours  tamsulosin 0.8 milliGRAM(s) Oral at bedtime  tiotropium 18 MICROgram(s) Capsule 1 Capsule(s) Inhalation daily

## 2022-10-17 NOTE — PROVIDER CONTACT NOTE (CHANGE IN STATUS NOTIFICATION) - SITUATION
pt with a coude catheter placed 10/14. per report from night RN, continuous leaking around catheter and minimal output via alfaro. Urology PA called and made aware. pt with a coude catheter placed 10/14. per report from night RN, continuous leaking around catheter and minimal output via alfaor.  bladder scan showed 755cc urine. Urology PA called and made aware.

## 2022-10-17 NOTE — PROGRESS NOTE ADULT - ASSESSMENT
77 male w/pmhx a fib on apixaban  HTN HLD obesity FLORA COPD ex smoker  ulcerative colitis and colon cancer.  POD#11  Open restorative proctocolectomy with IPAA, diverting loop ileostomy   NGT with high outputs, ostomy still functional    Plan:    c/w metamucil BID, lomotil  replete ostomy output cautiously   wean off O2  ambulation/PT  pain control  monitor bowel function   alfaro stay w/ legbag per  team  serial abdominal exams  NPO, NGT to LWS  SW/CM for dispo planning PHILLIP placement  Medical management by ICU team  Dispo: pending downgrade to SICU    Plan discussed with CRS team

## 2022-10-18 DIAGNOSIS — R18.8 OTHER ASCITES: ICD-10-CM

## 2022-10-18 LAB
ALBUMIN SERPL ELPH-MCNC: 2 G/DL — LOW (ref 3.3–5)
ALP SERPL-CCNC: 60 U/L — SIGNIFICANT CHANGE UP (ref 40–120)
ALT FLD-CCNC: 17 U/L — SIGNIFICANT CHANGE UP (ref 12–78)
ANION GAP SERPL CALC-SCNC: 5 MMOL/L — SIGNIFICANT CHANGE UP (ref 5–17)
APTT BLD: 27.6 SEC — SIGNIFICANT CHANGE UP (ref 27.5–35.5)
AST SERPL-CCNC: 12 U/L — LOW (ref 15–37)
BILIRUB SERPL-MCNC: 0.2 MG/DL — SIGNIFICANT CHANGE UP (ref 0.2–1.2)
BUN SERPL-MCNC: 37 MG/DL — HIGH (ref 7–23)
CALCIUM SERPL-MCNC: 8.8 MG/DL — SIGNIFICANT CHANGE UP (ref 8.5–10.1)
CHLORIDE SERPL-SCNC: 103 MMOL/L — SIGNIFICANT CHANGE UP (ref 96–108)
CO2 SERPL-SCNC: 28 MMOL/L — SIGNIFICANT CHANGE UP (ref 22–31)
CREAT SERPL-MCNC: 1.42 MG/DL — HIGH (ref 0.5–1.3)
EGFR: 52 ML/MIN/1.73M2 — LOW
GLUCOSE SERPL-MCNC: 217 MG/DL — HIGH (ref 70–99)
HCT VFR BLD CALC: 28.6 % — LOW (ref 39–50)
HGB BLD-MCNC: 8.6 G/DL — LOW (ref 13–17)
INR BLD: 1.28 RATIO — HIGH (ref 0.88–1.16)
MAGNESIUM SERPL-MCNC: 2.2 MG/DL — SIGNIFICANT CHANGE UP (ref 1.6–2.6)
MCHC RBC-ENTMCNC: 26.5 PG — LOW (ref 27–34)
MCHC RBC-ENTMCNC: 30.1 GM/DL — LOW (ref 32–36)
MCV RBC AUTO: 88.3 FL — SIGNIFICANT CHANGE UP (ref 80–100)
PHOSPHATE SERPL-MCNC: 3.8 MG/DL — SIGNIFICANT CHANGE UP (ref 2.5–4.5)
PLATELET # BLD AUTO: 329 K/UL — SIGNIFICANT CHANGE UP (ref 150–400)
POTASSIUM SERPL-MCNC: 5.3 MMOL/L — SIGNIFICANT CHANGE UP (ref 3.5–5.3)
POTASSIUM SERPL-SCNC: 5.3 MMOL/L — SIGNIFICANT CHANGE UP (ref 3.5–5.3)
PROT SERPL-MCNC: 6.3 GM/DL — SIGNIFICANT CHANGE UP (ref 6–8.3)
PROTHROM AB SERPL-ACNC: 14.9 SEC — HIGH (ref 10.5–13.4)
RBC # BLD: 3.24 M/UL — LOW (ref 4.2–5.8)
RBC # FLD: 18.1 % — HIGH (ref 10.3–14.5)
SODIUM SERPL-SCNC: 136 MMOL/L — SIGNIFICANT CHANGE UP (ref 135–145)
TRIGL SERPL-MCNC: 152 MG/DL — HIGH
WBC # BLD: 7.68 K/UL — SIGNIFICANT CHANGE UP (ref 3.8–10.5)
WBC # FLD AUTO: 7.68 K/UL — SIGNIFICANT CHANGE UP (ref 3.8–10.5)

## 2022-10-18 PROCEDURE — 99233 SBSQ HOSP IP/OBS HIGH 50: CPT

## 2022-10-18 PROCEDURE — 99221 1ST HOSP IP/OBS SF/LOW 40: CPT

## 2022-10-18 PROCEDURE — 74018 RADEX ABDOMEN 1 VIEW: CPT | Mod: 26

## 2022-10-18 PROCEDURE — 71045 X-RAY EXAM CHEST 1 VIEW: CPT | Mod: 26

## 2022-10-18 PROCEDURE — 99232 SBSQ HOSP IP/OBS MODERATE 35: CPT

## 2022-10-18 RX ORDER — ACETAMINOPHEN 500 MG
1000 TABLET ORAL ONCE
Refills: 0 | Status: COMPLETED | OUTPATIENT
Start: 2022-10-18 | End: 2022-10-18

## 2022-10-18 RX ORDER — ELECTROLYTE SOLUTION,INJ
1 VIAL (ML) INTRAVENOUS
Refills: 0 | Status: DISCONTINUED | OUTPATIENT
Start: 2022-10-18 | End: 2022-10-18

## 2022-10-18 RX ADMIN — TAMSULOSIN HYDROCHLORIDE 0.8 MILLIGRAM(S): 0.4 CAPSULE ORAL at 21:55

## 2022-10-18 RX ADMIN — QUETIAPINE FUMARATE 50 MILLIGRAM(S): 200 TABLET, FILM COATED ORAL at 23:43

## 2022-10-18 RX ADMIN — Medication 400 MILLIGRAM(S): at 15:28

## 2022-10-18 RX ADMIN — ATORVASTATIN CALCIUM 20 MILLIGRAM(S): 80 TABLET, FILM COATED ORAL at 21:55

## 2022-10-18 RX ADMIN — Medication 1000 MILLIGRAM(S): at 20:15

## 2022-10-18 RX ADMIN — PANTOPRAZOLE SODIUM 40 MILLIGRAM(S): 20 TABLET, DELAYED RELEASE ORAL at 00:40

## 2022-10-18 RX ADMIN — CHLORHEXIDINE GLUCONATE 1 APPLICATION(S): 213 SOLUTION TOPICAL at 05:41

## 2022-10-18 RX ADMIN — APIXABAN 5 MILLIGRAM(S): 2.5 TABLET, FILM COATED ORAL at 00:40

## 2022-10-18 RX ADMIN — Medication 650 MILLIGRAM(S): at 09:25

## 2022-10-18 RX ADMIN — FINASTERIDE 5 MILLIGRAM(S): 5 TABLET, FILM COATED ORAL at 10:40

## 2022-10-18 RX ADMIN — Medication 650 MILLIGRAM(S): at 08:54

## 2022-10-18 RX ADMIN — Medication 4 MILLIGRAM(S): at 10:40

## 2022-10-18 RX ADMIN — SODIUM CHLORIDE 3 MILLILITER(S): 9 INJECTION INTRAMUSCULAR; INTRAVENOUS; SUBCUTANEOUS at 15:05

## 2022-10-18 RX ADMIN — Medication 2: at 22:17

## 2022-10-18 RX ADMIN — PANTOPRAZOLE SODIUM 40 MILLIGRAM(S): 20 TABLET, DELAYED RELEASE ORAL at 10:39

## 2022-10-18 RX ADMIN — Medication 400 MILLIGRAM(S): at 20:00

## 2022-10-18 RX ADMIN — SODIUM CHLORIDE 3 MILLILITER(S): 9 INJECTION INTRAMUSCULAR; INTRAVENOUS; SUBCUTANEOUS at 21:04

## 2022-10-18 RX ADMIN — TIOTROPIUM BROMIDE 1 CAPSULE(S): 18 CAPSULE ORAL; RESPIRATORY (INHALATION) at 11:22

## 2022-10-18 RX ADMIN — APIXABAN 5 MILLIGRAM(S): 2.5 TABLET, FILM COATED ORAL at 10:39

## 2022-10-18 RX ADMIN — Medication 1 EACH: at 22:01

## 2022-10-18 RX ADMIN — BUDESONIDE AND FORMOTEROL FUMARATE DIHYDRATE 2 PUFF(S): 160; 4.5 AEROSOL RESPIRATORY (INHALATION) at 20:51

## 2022-10-18 RX ADMIN — ALBUTEROL 2 PUFF(S): 90 AEROSOL, METERED ORAL at 11:21

## 2022-10-18 RX ADMIN — PANTOPRAZOLE SODIUM 40 MILLIGRAM(S): 20 TABLET, DELAYED RELEASE ORAL at 21:54

## 2022-10-18 RX ADMIN — Medication 2: at 15:05

## 2022-10-18 RX ADMIN — Medication 650 MILLIGRAM(S): at 00:00

## 2022-10-18 RX ADMIN — SODIUM CHLORIDE 3 MILLILITER(S): 9 INJECTION INTRAMUSCULAR; INTRAVENOUS; SUBCUTANEOUS at 05:59

## 2022-10-18 RX ADMIN — Medication 4: at 05:49

## 2022-10-18 RX ADMIN — Medication 2: at 17:53

## 2022-10-18 NOTE — PROGRESS NOTE ADULT - SUBJECTIVE AND OBJECTIVE BOX
Patient is POD 1 s/p cystoscopy alfaro placement. Pt denies any abd/flank pain, fevers or chills.    PE  General: No distress, No anxiety  VITALS  T(C): 36.4 (10-18-22 @ 12:00), Max: 36.9 (10-17-22 @ 21:10)  HR: 73 (10-18-22 @ 15:00) (66 - 90)  BP: 117/59 (10-18-22 @ 15:00) (80/65 - 135/56)  RR: 18 (10-18-22 @ 15:00) (13 - 25)  SpO2: 96% (10-18-22 @ 15:00) (84% - 100%)            Skin     : No jaundice   Lung    : No resp distress  Abdo:   : Soft, Non tender, No guarding, No distension, incision site c/d/i w staples +ostomy   Genitalia Male: alfaro with yellow urine  Neuro   : A&Ox3                            8.6    7.68  )-----------( 329      ( 18 Oct 2022 05:22 )             28.6   10-18    136  |  103  |  37<H>  ----------------------------<  217<H>  5.3   |  28  |  1.42<H>    Ca    8.8      18 Oct 2022 05:22  Phos  3.8     10-18  Mg     2.2     10-18    TPro  6.3  /  Alb  2.0<L>  /  TBili  0.2  /  DBili  x   /  AST  12<L>  /  ALT  17  /  AlkPhos  60  10-18

## 2022-10-18 NOTE — PROGRESS NOTE ADULT - ASSESSMENT
A/P: 77 male S/P RCP-IPAA (restorative proctocolectomy with ileal pouch anal anastomosis), diverting loop ileostomy with Acute Hypoxic Respiratory Failure from a L sided Pneumonia and JOSE likely from ATN  FLORA  Obesity  AFIB  COPD    Plan:  ICU    PULM: Completed Antibiotics for L PNA.  Wean Fio2.  NIV QHS for FLORA.     Cardio:  Continue AC    Renal: JOSE improving    GI:  NPO, PPI BID.  Clamp NGT     DVT prophylaxis with DOAC    D/W CRS

## 2022-10-18 NOTE — CHART NOTE - NSCHARTNOTEFT_GEN_A_CORE
Clinical Nutrition PN Follow Up Note    *78yo male admitted with ulcerative colitis and colon CA now s/p open restorative proctocolectomy with IPPAA, diverting loop ileostomy, NGT to LWS with high outputs, ileostomy still functioning.  acute hypoxic resp failure from Lt sided PNA and JOSE likely from ATN.    *current status: NGT remains to LWS with large output.  will start PPN as pt remains NPO x 3 days with high nutr needs for wound healing.    *labs reviewed; sodium at lower end normal, potassium and magnesium at upper end normal (will not add potassium or magnesium in to PN bag at this time).  bilirubin total WNL.  corrected Ca elevated, DO NOT supplement calcium outside of PN bag at this time. POCT WNL, c/w POCT q6hrs with sliding scale.  obtain triglyceride level.  10-17    137  |  103  |  47<H>  ----------------------------<  102<H>  5.0   |  29  |  1.66<H>    Ca    9.1      17 Oct 2022 06:06  Phos  3.6     10-17  Mg     2.5     10-17      HbA1c: A1C with Estimated Average Glucose Result: 6.9 % (09-29-22 @ 14:21)  Glucose: POCT Blood Glucose.: 104 mg/dL (10-17-22 @ 05:55)    POCT Blood Glucose.: 104 mg/dL (17 Oct 2022 05:55)  POCT Blood Glucose.: 104 mg/dL (16 Oct 2022 22:54)  POCT Blood Glucose.: 110 mg/dL (16 Oct 2022 18:29)  POCT Blood Glucose.: 107 mg/dL (16 Oct 2022 12:18)      *pertinent meds: LR (IVF), atorvastatin, admelog sliding scale, protonix, prednisone, zofran, tylenol    *I&O's Detail    16 Oct 2022 07:01  -  17 Oct 2022 07:00  --------------------------------------------------------  IN:    IV PiggyBack: 200 mL    Lactated Ringers: 1366 mL    Sodium Chloride 0.9% Bolus: 1000 mL  Total IN: 2566 mL    OUT:    Gastric Aspirate - Discarded (mL): 2400 mL    Ileostomy (mL): 1000 mL    Indwelling Catheter - Urethral (mL): 475 mL  Total OUT: 3875 mL    Total NET: -1309 mL      * negative fluid status: BM (+) on 10/16 .  pt not on bowel regimen.    *Adán Score of 16; no PI documented. (+2) generalized edema documented.    *Malnutrition: now meets criteria for severe malnutrition in acute illness r/t decreased ability to meet increased nutr needs s/p GI surgery AEB meeting <50% of nutr needs x 9days, moderate edema    Estimated Needs: based on 87Kg (adjusted BW)  Calories: 7673-7062 Kcal (25-30 Kcal/Kg)  Protein: 139-157 g (1.6-1.8 g/Kg)  Fluids: 6270-4606  mL (25-30 mL/Kg)    Diet, NPO:   Except Medications (10-16-22 @ 08:23) [Active]      Weight History:  Height (cm): 180.3 (10-06-22 @ 08:40), 180.3 (09-29-22 @ 14:48)  Weight (kg): 126.1 (10-06-22 @ 08:40), 126.1 (09-29-22 @ 14:48)  BMI (kg/m2): 38.8 (10-06-22 @ 08:40), 38.8 (09-29-22 @ 14:48)  BSA (m2): 2.43 (10-06-22 @ 08:40), 2.43 (09-29-22 @ 14:48)      *will continue to monitor and adjust PN prn*    PPN Recommendations: via peripheral line  Total Volume: 2000mL  85 g  Amino Acids  100 g Dextrose  0 g Lipids 20% (no lipids, obtain triglycerides first)  128 mEq Sodium Chloride  11 mEq Sodium Acetate  10 mmol Sodium Phosphate  0 mEq Potassium Chloride  0 mEq Potassium Acetate  0 mmol Potassium Phosphate  0 mEq Calcium Gluconate ( corrected Ca elevated, DO NOT supplement calcium outside of PN bag at this time. )  0 mEq Magnesium Sulfate  200 mg Thiamine  1 ml Trace Elements  10 ml MVI    Total Calories  680Kcal  (   Meets 31%  of  Estimated Energy needs  and  61 %  Protein needs)(osmolarity 840)    Additional Recommendations:    1) Daily weights  2) Strict I & O's  3) Daily lyte checks including magnesium and phos  4) Weekly triglycerides/LFT checks  5) POCT q6hrs; maintain 140-180mg/dL  6) if on PN > 6 days, consider placing PICC line to meet 100% of nutr needs    *will monitor and adjust treatement plan prn*  Ania Anderson MA, RDN, CDN, CNSC [phone]  Nutrition  Clinical Nutrition PN Follow Up Note    *78yo male admitted with ulcerative colitis and colon CA now s/p open restorative proctocolectomy with IPPAA, diverting loop ileostomy, NGT to LWS with high outputs, ileostomy still functioning.  acute hypoxic resp failure from Lt sided PNA and JOSE likely from ATN.    *current status: NGT remains to LWS with 500mL output.  will c/w PPN as pt remains NPO x 4 days with high nutr needs for wound healing. s/p surgery on 10/17 for cystoscopy.    *labs reviewed; sodium at lower end normal (will increase in PN bag), potassium and magnesium at upper end normal (will not add potassium or magnesium in to PN bag at this time).  bilirubin total WNL.  corrected Ca elevated, DO NOT supplement calcium outside of PN bag at this time. POCT elevated, will add 6 units insulin into PN bag and c/w POCT q6hrs with sliding scale.  obtain triglyceride level.  10-18    136  |  103  |  37<H>  ----------------------------<  217<H>  5.3   |  28  |  1.42<H>    Ca    8.8      18 Oct 2022 05:22  Phos  3.8     10-18  Mg     2.2     10-18    TPro  6.3  /  Alb  2.0<L>  /  TBili  0.2  /  DBili  x   /  AST  12<L>  /  ALT  17  /  AlkPhos  60  10-18      HbA1c: A1C with Estimated Average Glucose Result: 6.9 % (09-29-22 @ 14:21)  Glucose: POCT Blood Glucose.: 104 mg/dL (10-17-22 @ 05:55)    POCT Blood Glucose.: 212 mg/dL (18 Oct 2022 05:25)  POCT Blood Glucose.: 108 mg/dL (17 Oct 2022 23:03)  POCT Blood Glucose.: 110 mg/dL (17 Oct 2022 17:34)  POCT Blood Glucose.: 108 mg/dL (17 Oct 2022 13:16)      *pertinent meds: atorvastatin, admelog sliding scale, protonix, prednisone, zofran, tylenol    *I&O's Detail    17 Oct 2022 07:01  -  18 Oct 2022 07:00  --------------------------------------------------------  IN:    Lactated Ringers: 995 mL    PPN (Peripheral Parenteral Nutrition): 545 mL  Total IN: 1540 mL    OUT:    Gastric Aspirate - Discarded (mL): 600 mL    Ileostomy (mL): 100 mL    Indwelling Catheter - Urethral (mL): 800 mL  Total OUT: 1500 mL    Total NET: 40 mL    * positive fluid status: BM (+) on 10/16 .  pt not on bowel regimen.    *Adán Score of 16; no PI documented. (+1) generalized edema documented.    *Malnutrition: now meets criteria for severe malnutrition in acute illness r/t decreased ability to meet increased nutr needs s/p GI surgery AEB meeting <50% of nutr needs x 9days, moderate edema    Estimated Needs: based on 87Kg (adjusted BW)  Calories: 1179-0090 Kcal (25-30 Kcal/Kg)  Protein: 139-157 g (1.6-1.8 g/Kg)  Fluids: 6341-9971  mL (25-30 mL/Kg)    Diet, NPO:   Except Medications (10-16-22 @ 08:23) [Active]      Weight History:  Height (cm): 180.3 (10-06-22 @ 08:40), 180.3 (09-29-22 @ 14:48)  Weight (kg): 126.1 (10-06-22 @ 08:40), 126.1 (09-29-22 @ 14:48)  BMI (kg/m2): 38.8 (10-06-22 @ 08:40), 38.8 (09-29-22 @ 14:48)  BSA (m2): 2.43 (10-06-22 @ 08:40), 2.43 (09-29-22 @ 14:48)      *will continue to monitor and adjust PN prn*    PPN Recommendations: via peripheral line  Total Volume: 2000mL  85 g  Amino Acids  100 g Dextrose  0 g Lipids 20% (no lipids, obtain triglycerides first)  136 mEq Sodium Chloride  18 mEq Sodium Acetate  10 mmol Sodium Phosphate  0 mEq Potassium Chloride  0 mEq Potassium Acetate  0 mmol Potassium Phosphate  0 mEq Calcium Gluconate ( corrected Ca elevated, DO NOT supplement calcium outside of PN bag at this time. )  0 mEq Magnesium Sulfate  200 mg Thiamine  6 units regular insulin  1 ml Trace Elements  10 ml MVI    Total Calories  680Kcal  (   Meets 31%  of  Estimated Energy needs  and  61 %  Protein needs)(osmolarity 850)    Additional Recommendations:    1) Daily weights  2) Strict I & O's  3) Daily lyte checks including magnesium and phos  4) Weekly triglycerides/LFT checks  5) POCT q6hrs; maintain 140-180mg/dL  6) if on PN > 6 days, consider placing PICC line to meet 100% of nutr needs    *will monitor and adjust treatement plan prn*  Ania Anderson MA, RDN, CDN, CNSC [phone]  Nutrition

## 2022-10-18 NOTE — CONSULT NOTE ADULT - PROBLEM SELECTOR RECOMMENDATION 9
- Case reviewed with Dr. Halaibeh, plan for IR drainage tomorrow  - Keep pt NPO after midnight, repeat AM labs - Case reviewed with Dr. Halaibeh, plan for IR drainage tomorrow  - Keep pt NPO after midnight, repeat AM labs  - Hold eliquis

## 2022-10-18 NOTE — PROGRESS NOTE ADULT - SUBJECTIVE AND OBJECTIVE BOX
HPI: Patient is a 77 male S/P RCP-IPAA (restorative proctocolectomy with ileal pouch anal anastomosis), diverting loop ileostomy.  Patient seen in the ICU.  Patient has been on 100% NRB today for a L sided Pneumonia.  He also had to be resumed on pressors.  Patient remains in JOSE and oliguric.      10/8: On VM O2, pressors over night  10/9: VM O2/NC O2.  Off Pressors, coughing up dark sputum now, CXR reviewed and L side is improving, he denies vomiting or aspirating    10/14: Between 4 L NC and VM o2, CXR shows improvement sis i last saw the patient.    10/15: On NIV over night, NC O2 during the day.  H & H stable back on AC  10/16: Had NGT placed yesterday, 1,150 from NGT, ostomy functioning, no pain, cr rising   10/17: NGT drained over a L over night, ostomy 250  10/18: Zhao replaced in the OR last night.  NGT drained 600 over night          PAST MEDICAL & SURGICAL HISTORY:  Ulcerative colitis      Colon cancer      Obstructive sleep apnea      Obesity      HTN (hypertension)      Atrial fibrillation      HLD (hyperlipidemia)      BPH (benign prostatic hyperplasia)      COPD (chronic obstructive pulmonary disease)      COVID-19 virus infection      History of pneumonia      H/O hernia repair      History of hydrocelectomy      H/O colonoscopy      H/O cystoscopy  urethral stricture      History of total knee replacement, bilateral      S/P placement of cardiac pacemaker      History of cataract surgery          FAMILY HISTORY:  Family history of cardiomyopathy (Father)        Social Hx:    Allergies    ACE inhibitors (Swelling)    Intolerances            ICU Vital Signs Last 24 Hrs  T(C): 36.7 (18 Oct 2022 08:00), Max: 36.9 (17 Oct 2022 21:10)  T(F): 98 (18 Oct 2022 08:00), Max: 98.5 (17 Oct 2022 21:10)  HR: 89 (18 Oct 2022 11:00) (66 - 90)  BP: 122/59 (18 Oct 2022 11:00) (80/65 - 135/56)  BP(mean): 79 (18 Oct 2022 11:00) (65 - 90)  ABP: --  ABP(mean): --  RR: 18 (18 Oct 2022 11:00) (13 - 25)  SpO2: 97% (18 Oct 2022 11:00) (92% - 100%)    O2 Parameters below as of 18 Oct 2022 11:00    O2 Flow (L/min): 4              I&O's Summary    17 Oct 2022 07:01  -  18 Oct 2022 07:00  --------------------------------------------------------  IN: 1540 mL / OUT: 1500 mL / NET: 40 mL                              8.6    7.68  )-----------( 329      ( 18 Oct 2022 05:22 )             28.6       10-18    136  |  103  |  37<H>  ----------------------------<  217<H>  5.3   |  28  |  1.42<H>    Ca    8.8      18 Oct 2022 05:22  Phos  3.8     10-18  Mg     2.2     10-18    TPro  6.3  /  Alb  2.0<L>  /  TBili  0.2  /  DBili  x   /  AST  12<L>  /  ALT  17  /  AlkPhos  60  10-18                    MEDICATIONS  (STANDING):  acetaminophen   IVPB .. 1000 milliGRAM(s) IV Intermittent once  apixaban 5 milliGRAM(s) Oral every 12 hours  atorvastatin 20 milliGRAM(s) Oral at bedtime  budesonide 160 MICROgram(s)/formoterol 4.5 MICROgram(s) Inhaler 2 Puff(s) Inhalation two times a day  chlorhexidine 4% Liquid 1 Application(s) Topical <User Schedule>  dextrose 50% Injectable 25 Gram(s) IV Push once  dextrose 50% Injectable 12.5 Gram(s) IV Push once  dextrose 50% Injectable 25 Gram(s) IV Push once  dextrose Oral Gel 15 Gram(s) Oral once  finasteride 5 milliGRAM(s) Oral daily  glucagon  Injectable 1 milliGRAM(s) IntraMuscular once  influenza  Vaccine (HIGH DOSE) 0.7 milliLiter(s) IntraMuscular once  insulin lispro (ADMELOG) corrective regimen sliding scale   SubCutaneous every 6 hours  pantoprazole  Injectable 40 milliGRAM(s) IV Push every 12 hours  Parenteral Nutrition - Adult 1 Each (83 mL/Hr) TPN Continuous <Continuous>  Parenteral Nutrition - Adult 1 Each (83 mL/Hr) TPN Continuous <Continuous>  predniSONE   Tablet 4 milliGRAM(s) Oral daily  sodium chloride 0.9% lock flush 3 milliLiter(s) IV Push every 8 hours  tamsulosin 0.8 milliGRAM(s) Oral at bedtime  tiotropium 18 MICROgram(s) Capsule 1 Capsule(s) Inhalation daily    MEDICATIONS  (PRN):  acetaminophen     Tablet .. 650 milliGRAM(s) Oral every 6 hours PRN Mild Pain (1 - 3)  ALBUTerol    90 MICROgram(s) HFA Inhaler 2 Puff(s) Inhalation every 6 hours PRN Shortness of Breath and/or Wheezing  albuterol/ipratropium for Nebulization 3 milliLiter(s) Nebulizer every 6 hours PRN Shortness of Breath and/or Wheezing  naloxone Injectable 0.1 milliGRAM(s) IV Push every 3 minutes PRN For ANY of the following changes in patient status:  A. RR LESS THAN 10 breaths per minute, B. Oxygen saturation LESS THAN 90%, C. Sedation score of 6  ondansetron Injectable 4 milliGRAM(s) IV Push every 6 hours PRN Nausea and/or Vomiting  oxybutynin 5 milliGRAM(s) Oral daily PRN Bladder spasms  QUEtiapine 50 milliGRAM(s) Oral at bedtime PRN agitated delirium      DVT Prophylaxis: DOAC    Advanced Directives:  Discussed with:    Visit Information:    ** Time is exclusive of billed procedures and/or teaching and/or routine family updates.

## 2022-10-18 NOTE — PROGRESS NOTE ADULT - SUBJECTIVE AND OBJECTIVE BOX
SURGERY DAILY PROGRESS NOTE:     Subjective:  Patient seen and examined this AM at bedside. No acute events overnight and patient resting comfortably. NG/alfaro intact. no abdominal pain/n/v. Denies fever/chills, shortness of breath, chest pain. VS reviewed    Objective:    MEDICATIONS  (STANDING):  acetaminophen   IVPB .. 1000 milliGRAM(s) IV Intermittent once  apixaban 5 milliGRAM(s) Oral every 12 hours  atorvastatin 20 milliGRAM(s) Oral at bedtime  budesonide 160 MICROgram(s)/formoterol 4.5 MICROgram(s) Inhaler 2 Puff(s) Inhalation two times a day  chlorhexidine 4% Liquid 1 Application(s) Topical <User Schedule>  dextrose 50% Injectable 25 Gram(s) IV Push once  dextrose 50% Injectable 12.5 Gram(s) IV Push once  dextrose 50% Injectable 25 Gram(s) IV Push once  dextrose Oral Gel 15 Gram(s) Oral once  finasteride 5 milliGRAM(s) Oral daily  glucagon  Injectable 1 milliGRAM(s) IntraMuscular once  influenza  Vaccine (HIGH DOSE) 0.7 milliLiter(s) IntraMuscular once  insulin lispro (ADMELOG) corrective regimen sliding scale   SubCutaneous Before meals and at bedtime  pantoprazole  Injectable 40 milliGRAM(s) IV Push every 12 hours  Parenteral Nutrition - Adult 1 Each (83 mL/Hr) TPN Continuous <Continuous>  Parenteral Nutrition - Adult 1 Each (83 mL/Hr) TPN Continuous <Continuous>  predniSONE   Tablet 4 milliGRAM(s) Oral daily  sodium chloride 0.9% lock flush 3 milliLiter(s) IV Push every 8 hours  tamsulosin 0.8 milliGRAM(s) Oral at bedtime  tiotropium 18 MICROgram(s) Capsule 1 Capsule(s) Inhalation daily    MEDICATIONS  (PRN):  acetaminophen     Tablet .. 650 milliGRAM(s) Oral every 6 hours PRN Mild Pain (1 - 3)  ALBUTerol    90 MICROgram(s) HFA Inhaler 2 Puff(s) Inhalation every 6 hours PRN Shortness of Breath and/or Wheezing  albuterol/ipratropium for Nebulization 3 milliLiter(s) Nebulizer every 6 hours PRN Shortness of Breath and/or Wheezing  naloxone Injectable 0.1 milliGRAM(s) IV Push every 3 minutes PRN For ANY of the following changes in patient status:  A. RR LESS THAN 10 breaths per minute, B. Oxygen saturation LESS THAN 90%, C. Sedation score of 6  ondansetron Injectable 4 milliGRAM(s) IV Push every 6 hours PRN Nausea and/or Vomiting  oxybutynin 5 milliGRAM(s) Oral daily PRN Bladder spasms  QUEtiapine 50 milliGRAM(s) Oral at bedtime PRN agitated delirium      Vital Signs Last 24 Hrs  T(C): 36.7 (18 Oct 2022 08:00), Max: 36.9 (17 Oct 2022 21:10)  T(F): 98 (18 Oct 2022 08:00), Max: 98.5 (17 Oct 2022 21:10)  HR: 89 (18 Oct 2022 11:00) (66 - 90)  BP: 122/59 (18 Oct 2022 11:00) (80/65 - 135/56)  BP(mean): 79 (18 Oct 2022 11:00) (65 - 90)  RR: 18 (18 Oct 2022 11:00) (13 - 25)  SpO2: 97% (18 Oct 2022 11:00) (92% - 100%)    Parameters below as of 18 Oct 2022 11:00    O2 Flow (L/min): 4        PHYSICAL EXAM   GENERAL: NAD, AOx3, well developed  HEAD: Atraumatic, normocephalic  EYES: EOMI, PERRLA, conjunctiva and sclera clear  ENT: moist mucous membrane  NECK: supple, No JVD, midline trachea  CHEST/LUNG: unlabored respirations b/l on NC  Heart: S1, S2 normal  ABDOMEN: soft, nondistended, nontender. ostomy functioning. NG tube intact  NERVOUS SYSTEM: AOx3, speech clear, no neuro-deficits  MSK: full ROM, no deformities  SKIN: warm to touch, no rash or lesions      I&O's Detail    17 Oct 2022 07:01  -  18 Oct 2022 07:00  --------------------------------------------------------  IN:    Lactated Ringers: 995 mL    PPN (Peripheral Parenteral Nutrition): 545 mL  Total IN: 1540 mL    OUT:    Gastric Aspirate - Discarded (mL): 600 mL    Ileostomy (mL): 100 mL    Indwelling Catheter - Urethral (mL): 800 mL  Total OUT: 1500 mL    Total NET: 40 mL          Daily     Daily     LABS:                        8.6    7.68  )-----------( 329      ( 18 Oct 2022 05:22 )             28.6     10-18    136  |  103  |  37<H>  ----------------------------<  217<H>  5.3   |  28  |  1.42<H>    Ca    8.8      18 Oct 2022 05:22  Phos  3.8     10-18  Mg     2.2     10-18    TPro  6.3  /  Alb  2.0<L>  /  TBili  0.2  /  DBili  x   /  AST  12<L>  /  ALT  17  /  AlkPhos  60  10-18    PT/INR - ( 18 Oct 2022 09:55 )   PT: 14.9 sec;   INR: 1.28 ratio         PTT - ( 18 Oct 2022 09:55 )  PTT:27.6 sec      RADIOLOGY & ADDITIONAL STUDIES:

## 2022-10-18 NOTE — CONSULT NOTE ADULT - ASSESSMENT
78yo M with hx of colon CA, recent proctocolectomy w/ ileostomy, referred to IR for drainage of LUQ abdominal fluid collection  - Pt stable  - WBC WNL  - Covid negative 10/17  - Consentable

## 2022-10-18 NOTE — PROGRESS NOTE ADULT - ATTENDING COMMENTS
This is a delayed attestation.  Patient was seen and examined this morning.  Underwent cystoscopy and Zhao catheter replacement yesterday due to old catheter being in false passage.  This morning abdomen was soft, nondistended, appropriately tender near incision.  Ostomy with small amount green output.  NGT was clamped but output >1L throughout the day today so remains in place.  Will continue to monitor output.     Patient also requires drainage of pelvic collections; IR was unable to accommodate in schedule today - hopefully can undergo procedure tomorrow.    Zhao catheter to remain in place for at least 1 week (as per Urology recommendations).

## 2022-10-18 NOTE — PROGRESS NOTE ADULT - ASSESSMENT
77 male w/pmhx a fib on apixaban  HTN HLD obesity FLORA COPD ex smoker  ulcerative colitis and colon cancer.  POD#12  Open restorative proctocolectomy with IPAA, diverting loop ileostomy   NGT slowing down, ostomy still functional  Had cystoscopy 10/17 for alfaro malposition at false lumen of posterior urethra. alfaro replaced by  intraop - stays >1 week    Plan:  replete ostomy output cautiously   wean off O2  ambulation/PT  pain control  monitor bowel function   clamp trial today. keep NPO today despite NG removal  serial abdominal exams  Appreciate  recc - alfaro stays at least 1 week  PPN  SW/CM for dispo planning PHILLIP placement  Medical management by ICU team    Plan discussed with CRS team

## 2022-10-18 NOTE — PROGRESS NOTE ADULT - ASSESSMENT
78 yo male with false urethral passage, POD 1 s/p cystoscopy and alfaro placement over sensor wire. Discussed with patient his alfaro needs to remain in place for 1-2 weeks.   Recommend  - DO NOT PERFORM TOV IN HOUSE  - Cont Flomax 0.8 mg and Finasteride 5mg QD  - D/C home with alfaro to leg bag  - Outpatient follow up for trial of void in 1-2 weeks    Discussed above with pt's wife as well    Case discussed with Dr. Lamar

## 2022-10-19 LAB
ANION GAP SERPL CALC-SCNC: 2 MMOL/L — LOW (ref 5–17)
BUN SERPL-MCNC: 31 MG/DL — HIGH (ref 7–23)
CALCIUM SERPL-MCNC: 8.6 MG/DL — SIGNIFICANT CHANGE UP (ref 8.5–10.1)
CHLORIDE SERPL-SCNC: 105 MMOL/L — SIGNIFICANT CHANGE UP (ref 96–108)
CO2 SERPL-SCNC: 33 MMOL/L — HIGH (ref 22–31)
CREAT SERPL-MCNC: 1.21 MG/DL — SIGNIFICANT CHANGE UP (ref 0.5–1.3)
EGFR: 62 ML/MIN/1.73M2 — SIGNIFICANT CHANGE UP
GLUCOSE SERPL-MCNC: 157 MG/DL — HIGH (ref 70–99)
HCT VFR BLD CALC: 25.7 % — LOW (ref 39–50)
HGB BLD-MCNC: 7.9 G/DL — LOW (ref 13–17)
INR BLD: 1.32 RATIO — HIGH (ref 0.88–1.16)
MAGNESIUM SERPL-MCNC: 2 MG/DL — SIGNIFICANT CHANGE UP (ref 1.6–2.6)
MCHC RBC-ENTMCNC: 26.5 PG — LOW (ref 27–34)
MCHC RBC-ENTMCNC: 30.7 GM/DL — LOW (ref 32–36)
MCV RBC AUTO: 86.2 FL — SIGNIFICANT CHANGE UP (ref 80–100)
PHOSPHATE SERPL-MCNC: 1.7 MG/DL — LOW (ref 2.5–4.5)
PLATELET # BLD AUTO: 337 K/UL — SIGNIFICANT CHANGE UP (ref 150–400)
POTASSIUM SERPL-MCNC: 4.2 MMOL/L — SIGNIFICANT CHANGE UP (ref 3.5–5.3)
POTASSIUM SERPL-SCNC: 4.2 MMOL/L — SIGNIFICANT CHANGE UP (ref 3.5–5.3)
PROTHROM AB SERPL-ACNC: 15.4 SEC — HIGH (ref 10.5–13.4)
RBC # BLD: 2.98 M/UL — LOW (ref 4.2–5.8)
RBC # FLD: 17.8 % — HIGH (ref 10.3–14.5)
SARS-COV-2 RNA SPEC QL NAA+PROBE: SIGNIFICANT CHANGE UP
SODIUM SERPL-SCNC: 140 MMOL/L — SIGNIFICANT CHANGE UP (ref 135–145)
WBC # BLD: 6.51 K/UL — SIGNIFICANT CHANGE UP (ref 3.8–10.5)
WBC # FLD AUTO: 6.51 K/UL — SIGNIFICANT CHANGE UP (ref 3.8–10.5)

## 2022-10-19 PROCEDURE — 99233 SBSQ HOSP IP/OBS HIGH 50: CPT

## 2022-10-19 PROCEDURE — 49406 IMAGE CATH FLUID PERI/RETRO: CPT

## 2022-10-19 PROCEDURE — 74176 CT ABD & PELVIS W/O CONTRAST: CPT | Mod: 26

## 2022-10-19 RX ORDER — ACETAMINOPHEN 500 MG
1000 TABLET ORAL ONCE
Refills: 0 | Status: COMPLETED | OUTPATIENT
Start: 2022-10-19 | End: 2022-10-19

## 2022-10-19 RX ORDER — ELECTROLYTE SOLUTION,INJ
1 VIAL (ML) INTRAVENOUS
Refills: 0 | Status: DISCONTINUED | OUTPATIENT
Start: 2022-10-19 | End: 2022-10-19

## 2022-10-19 RX ORDER — I.V. FAT EMULSION 20 G/100ML
0.79 EMULSION INTRAVENOUS
Qty: 100 | Refills: 0 | Status: DISCONTINUED | OUTPATIENT
Start: 2022-10-19 | End: 2022-10-19

## 2022-10-19 RX ORDER — MORPHINE SULFATE 50 MG/1
4 CAPSULE, EXTENDED RELEASE ORAL ONCE
Refills: 0 | Status: DISCONTINUED | OUTPATIENT
Start: 2022-10-19 | End: 2022-10-19

## 2022-10-19 RX ADMIN — Medication 400 MILLIGRAM(S): at 06:12

## 2022-10-19 RX ADMIN — SODIUM CHLORIDE 3 MILLILITER(S): 9 INJECTION INTRAMUSCULAR; INTRAVENOUS; SUBCUTANEOUS at 22:52

## 2022-10-19 RX ADMIN — MORPHINE SULFATE 4 MILLIGRAM(S): 50 CAPSULE, EXTENDED RELEASE ORAL at 18:25

## 2022-10-19 RX ADMIN — TAMSULOSIN HYDROCHLORIDE 0.8 MILLIGRAM(S): 0.4 CAPSULE ORAL at 22:06

## 2022-10-19 RX ADMIN — TIOTROPIUM BROMIDE 1 CAPSULE(S): 18 CAPSULE ORAL; RESPIRATORY (INHALATION) at 09:19

## 2022-10-19 RX ADMIN — APIXABAN 5 MILLIGRAM(S): 2.5 TABLET, FILM COATED ORAL at 22:06

## 2022-10-19 RX ADMIN — BUDESONIDE AND FORMOTEROL FUMARATE DIHYDRATE 2 PUFF(S): 160; 4.5 AEROSOL RESPIRATORY (INHALATION) at 09:21

## 2022-10-19 RX ADMIN — BUDESONIDE AND FORMOTEROL FUMARATE DIHYDRATE 2 PUFF(S): 160; 4.5 AEROSOL RESPIRATORY (INHALATION) at 20:06

## 2022-10-19 RX ADMIN — Medication 400 MILLIGRAM(S): at 16:39

## 2022-10-19 RX ADMIN — CHLORHEXIDINE GLUCONATE 1 APPLICATION(S): 213 SOLUTION TOPICAL at 06:13

## 2022-10-19 RX ADMIN — PANTOPRAZOLE SODIUM 40 MILLIGRAM(S): 20 TABLET, DELAYED RELEASE ORAL at 09:39

## 2022-10-19 RX ADMIN — ONDANSETRON 4 MILLIGRAM(S): 8 TABLET, FILM COATED ORAL at 15:28

## 2022-10-19 RX ADMIN — SODIUM CHLORIDE 3 MILLILITER(S): 9 INJECTION INTRAMUSCULAR; INTRAVENOUS; SUBCUTANEOUS at 05:06

## 2022-10-19 RX ADMIN — Medication 1000 MILLIGRAM(S): at 17:52

## 2022-10-19 RX ADMIN — ATORVASTATIN CALCIUM 20 MILLIGRAM(S): 80 TABLET, FILM COATED ORAL at 22:06

## 2022-10-19 RX ADMIN — MORPHINE SULFATE 4 MILLIGRAM(S): 50 CAPSULE, EXTENDED RELEASE ORAL at 18:41

## 2022-10-19 RX ADMIN — Medication 85 MILLIMOLE(S): at 09:39

## 2022-10-19 RX ADMIN — Medication 1 EACH: at 22:57

## 2022-10-19 RX ADMIN — Medication 1000 MILLIGRAM(S): at 06:27

## 2022-10-19 RX ADMIN — SODIUM CHLORIDE 3 MILLILITER(S): 9 INJECTION INTRAMUSCULAR; INTRAVENOUS; SUBCUTANEOUS at 15:24

## 2022-10-19 RX ADMIN — PANTOPRAZOLE SODIUM 40 MILLIGRAM(S): 20 TABLET, DELAYED RELEASE ORAL at 22:05

## 2022-10-19 RX ADMIN — I.V. FAT EMULSION 41.67 GM/KG/DAY: 20 EMULSION INTRAVENOUS at 22:55

## 2022-10-19 RX ADMIN — Medication 2: at 06:12

## 2022-10-19 NOTE — PROGRESS NOTE ADULT - ASSESSMENT
77 male w/pmhx a fib on apixaban  HTN HLD obesity FLORA COPD ex smoker  ulcerative colitis and colon cancer.  POD#13  Open restorative proctocolectomy with IPAA, diverting loop ileostomy   Ostomy still functional  Had cystoscopy 10/17 for alfaro malposition at false lumen of posterior urethra. alfaro replaced by  intraop - stays >1 week    Plan:  replete ostomy output cautiously   wean off O2  ambulation/PT  pain control  monitor bowel function   IR for guided drainage today  serial abdominal exams  Appreciate  recc - alfaro stays at least 1 week  PPN  SW/CM for dispo planning PHILLIP placement  Medical management by ICU team    Plan discussed with CRS team

## 2022-10-19 NOTE — PROGRESS NOTE ADULT - SUBJECTIVE AND OBJECTIVE BOX
MEDICATIONS  (STANDING):  acetaminophen   IVPB .. 1000 milliGRAM(s) IV Intermittent once  acetaminophen   IVPB .. 1000 milliGRAM(s) IV Intermittent once  apixaban 5 milliGRAM(s) Oral every 12 hours  atorvastatin 20 milliGRAM(s) Oral at bedtime  budesonide 160 MICROgram(s)/formoterol 4.5 MICROgram(s) Inhaler 2 Puff(s) Inhalation two times a day  chlorhexidine 4% Liquid 1 Application(s) Topical <User Schedule>  dextrose 50% Injectable 25 Gram(s) IV Push once  dextrose 50% Injectable 12.5 Gram(s) IV Push once  dextrose 50% Injectable 25 Gram(s) IV Push once  dextrose Oral Gel 15 Gram(s) Oral once  finasteride 5 milliGRAM(s) Oral daily  glucagon  Injectable 1 milliGRAM(s) IntraMuscular once  influenza  Vaccine (HIGH DOSE) 0.7 milliLiter(s) IntraMuscular once  insulin lispro (ADMELOG) corrective regimen sliding scale   SubCutaneous every 6 hours  pantoprazole  Injectable 40 milliGRAM(s) IV Push every 12 hours  Parenteral Nutrition - Adult 1 Each (83 mL/Hr) TPN Continuous <Continuous>  predniSONE   Tablet 4 milliGRAM(s) Oral daily  sodium chloride 0.9% lock flush 3 milliLiter(s) IV Push every 8 hours  tamsulosin 0.8 milliGRAM(s) Oral at bedtime  tiotropium 18 MICROgram(s) Capsule 1 Capsule(s) Inhalation daily    MEDICATIONS  (PRN):  acetaminophen     Tablet .. 650 milliGRAM(s) Oral every 6 hours PRN Mild Pain (1 - 3)  ALBUTerol    90 MICROgram(s) HFA Inhaler 2 Puff(s) Inhalation every 6 hours PRN Shortness of Breath and/or Wheezing  albuterol/ipratropium for Nebulization 3 milliLiter(s) Nebulizer every 6 hours PRN Shortness of Breath and/or Wheezing  naloxone Injectable 0.1 milliGRAM(s) IV Push every 3 minutes PRN For ANY of the following changes in patient status:  A. RR LESS THAN 10 breaths per minute, B. Oxygen saturation LESS THAN 90%, C. Sedation score of 6  ondansetron Injectable 4 milliGRAM(s) IV Push every 6 hours PRN Nausea and/or Vomiting  oxybutynin 5 milliGRAM(s) Oral daily PRN Bladder spasms  QUEtiapine 50 milliGRAM(s) Oral at bedtime PRN agitated delirium      PAST MEDICAL & SURGICAL HISTORY:  Ulcerative colitis      Colon cancer      Obstructive sleep apnea      Obesity      HTN (hypertension)      Atrial fibrillation      HLD (hyperlipidemia)      BPH (benign prostatic hyperplasia)      COPD (chronic obstructive pulmonary disease)      COVID-19 virus infection      History of pneumonia      H/O hernia repair      History of hydrocelectomy      H/O colonoscopy      H/O cystoscopy  urethral stricture      History of total knee replacement, bilateral      S/P placement of cardiac pacemaker      History of cataract surgery          Vital Signs Last 24 Hrs  T(C): 36.4 (19 Oct 2022 08:00), Max: 36.6 (18 Oct 2022 16:00)  T(F): 97.6 (19 Oct 2022 08:00), Max: 97.9 (18 Oct 2022 20:00)  HR: 65 (19 Oct 2022 08:00) (61 - 89)  BP: 118/57 (19 Oct 2022 08:00) (58/30 - 136/61)  BP(mean): 74 (19 Oct 2022 08:00) (39 - 114)  RR: 12 (19 Oct 2022 08:00) (10 - 26)  SpO2: 95% (19 Oct 2022 08:00) (84% - 100%)    Parameters below as of 19 Oct 2022 08:00  Patient On (Oxygen Delivery Method): nasal cannula  O2 Flow (L/min): 4      I&O's Summary    18 Oct 2022 07:01  -  19 Oct 2022 07:00  --------------------------------------------------------  IN: 1721 mL / OUT: 5250 mL / NET: -3529 mL                              7.9    6.51  )-----------( 337      ( 19 Oct 2022 05:42 )             25.7     10-19    140  |  105  |  31<H>  ----------------------------<  157<H>  4.2   |  33<H>  |  1.21    Ca    8.6      19 Oct 2022 05:42  Phos  1.7     10-19  Mg     2.0     10-19    TPro  6.3  /  Alb  2.0<L>  /  TBili  0.2  /  DBili  x   /  AST  12<L>  /  ALT  17  /  AlkPhos  60  10-18                SURGERY DAILY PROGRESS NOTE:     Subjective:  Patient seen and examined this AM at bedside. No acute events overnight and patient resting comfortably. NG/alfaro intact. no abdominal pain/n/v. Denies fever/chills, shortness of breath, chest pain. VS reviewed    Objective:    PHYSICAL EXAM   GENERAL: NAD, AOx3, well developed  HEAD: Atraumatic, normocephalic  EYES: EOMI, PERRLA, conjunctiva and sclera clear  ENT: moist mucous membrane  NECK: supple, No JVD, midline trachea  CHEST/LUNG: unlabored respirations b/l on NC  Heart: S1, S2 normal  ABDOMEN: soft, nondistended, nontender. ostomy functioning. NG tube intact  NERVOUS SYSTEM: AOx3, speech clear, no neuro-deficits  MSK: full ROM, no deformities  SKIN: warm to touch, no rash or lesions

## 2022-10-19 NOTE — PROGRESS NOTE ADULT - ATTENDING COMMENTS
Seen and examined.  No complaints.  NGT 3600cc.  Ileostomy 150cc.  AFVSS   incision CDI, soft, NT, mild distention  Labs reviewed  A/P -   To IR for drainage of abdominal collection today.  Expect improvement/resolution of ileus after drainage of collection.  Cont alfaro.  Keep NGT in place given high output. IV PPN while NPO.

## 2022-10-19 NOTE — PROCEDURE NOTE - PROCEDURE FINDINGS AND DETAILS
US shows left abdominal fluid collection with stranding resembling a hematoma.   12 Fr drain was placed with aspiration of 8 cc blood tinged fluid, which was sent for culture.

## 2022-10-19 NOTE — PROGRESS NOTE ADULT - SUBJECTIVE AND OBJECTIVE BOX
HPI: Patient is a 77 male S/P RCP-IPAA (restorative proctocolectomy with ileal pouch anal anastomosis), diverting loop ileostomy.  Patient seen in the ICU.  Patient has been on 100% NRB today for a L sided Pneumonia.  He also had to be resumed on pressors.  Patient remains in JOSE and oliguric.      10/8: On VM O2, pressors over night  10/9: VM O2/NC O2.  Off Pressors, coughing up dark sputum now, CXR reviewed and L side is improving, he denies vomiting or aspirating    10/14: Between 4 L NC and VM o2, CXR shows improvement sis i last saw the patient.    10/15: On NIV over night, NC O2 during the day.  H & H stable back on AC  10/16: Had NGT placed yesterday, 1,150 from NGT, ostomy functioning, no pain, cr rising   10/17: NGT drained over a L over night, ostomy 250  10/18: Zhao replaced in the OR last night.  NGT drained 600 over night  10/19: For IR Drainage, Post clamp 700 cc after 6 hours.  Then over iL over night               PAST MEDICAL & SURGICAL HISTORY:  Ulcerative colitis      Colon cancer      Obstructive sleep apnea      Obesity      HTN (hypertension)      Atrial fibrillation      HLD (hyperlipidemia)      BPH (benign prostatic hyperplasia)      COPD (chronic obstructive pulmonary disease)      COVID-19 virus infection      History of pneumonia      H/O hernia repair      History of hydrocelectomy      H/O colonoscopy      H/O cystoscopy  urethral stricture      History of total knee replacement, bilateral      S/P placement of cardiac pacemaker      History of cataract surgery          FAMILY HISTORY:  Family history of cardiomyopathy (Father)        Social Hx:    Allergies    ACE inhibitors (Swelling)    Intolerances            ICU Vital Signs Last 24 Hrs  T(C): 36.4 (19 Oct 2022 08:00), Max: 36.6 (18 Oct 2022 16:00)  T(F): 97.6 (19 Oct 2022 08:00), Max: 97.9 (18 Oct 2022 20:00)  HR: 63 (19 Oct 2022 11:00) (61 - 83)  BP: 119/59 (19 Oct 2022 10:00) (58/30 - 136/61)  BP(mean): 77 (19 Oct 2022 10:00) (39 - 114)  ABP: --  ABP(mean): --  RR: 15 (19 Oct 2022 11:00) (10 - 26)  SpO2: 96% (19 Oct 2022 11:00) (84% - 100%)    O2 Parameters below as of 19 Oct 2022 10:00  Patient On (Oxygen Delivery Method): nasal cannula w/ humidification  O2 Flow (L/min): 4              I&O's Summary    18 Oct 2022 07:01  -  19 Oct 2022 07:00  --------------------------------------------------------  IN: 1721 mL / OUT: 5250 mL / NET: -3529 mL                              7.9    6.51  )-----------( 337      ( 19 Oct 2022 05:42 )             25.7       10-19    140  |  105  |  31<H>  ----------------------------<  157<H>  4.2   |  33<H>  |  1.21    Ca    8.6      19 Oct 2022 05:42  Phos  1.7     10-19  Mg     2.0     10-19    TPro  6.3  /  Alb  2.0<L>  /  TBili  0.2  /  DBili  x   /  AST  12<L>  /  ALT  17  /  AlkPhos  60  10-18                    MEDICATIONS  (STANDING):  acetaminophen   IVPB .. 1000 milliGRAM(s) IV Intermittent once  acetaminophen   IVPB .. 1000 milliGRAM(s) IV Intermittent once  apixaban 5 milliGRAM(s) Oral every 12 hours  atorvastatin 20 milliGRAM(s) Oral at bedtime  budesonide 160 MICROgram(s)/formoterol 4.5 MICROgram(s) Inhaler 2 Puff(s) Inhalation two times a day  chlorhexidine 4% Liquid 1 Application(s) Topical <User Schedule>  dextrose 50% Injectable 25 Gram(s) IV Push once  dextrose 50% Injectable 12.5 Gram(s) IV Push once  dextrose 50% Injectable 25 Gram(s) IV Push once  dextrose Oral Gel 15 Gram(s) Oral once  fat emulsion (Fish Oil and Plant Based) 20% Infusion 0.794 Gm/kG/Day (41.67 mL/Hr) IV Continuous <Continuous>  finasteride 5 milliGRAM(s) Oral daily  glucagon  Injectable 1 milliGRAM(s) IntraMuscular once  influenza  Vaccine (HIGH DOSE) 0.7 milliLiter(s) IntraMuscular once  insulin lispro (ADMELOG) corrective regimen sliding scale   SubCutaneous every 6 hours  pantoprazole  Injectable 40 milliGRAM(s) IV Push every 12 hours  Parenteral Nutrition - Adult 1 Each (83 mL/Hr) TPN Continuous <Continuous>  Parenteral Nutrition - Adult 1 Each (83 mL/Hr) TPN Continuous <Continuous>  predniSONE   Tablet 4 milliGRAM(s) Oral daily  sodium chloride 0.9% lock flush 3 milliLiter(s) IV Push every 8 hours  tamsulosin 0.8 milliGRAM(s) Oral at bedtime  tiotropium 18 MICROgram(s) Capsule 1 Capsule(s) Inhalation daily    MEDICATIONS  (PRN):  acetaminophen     Tablet .. 650 milliGRAM(s) Oral every 6 hours PRN Mild Pain (1 - 3)  ALBUTerol    90 MICROgram(s) HFA Inhaler 2 Puff(s) Inhalation every 6 hours PRN Shortness of Breath and/or Wheezing  albuterol/ipratropium for Nebulization 3 milliLiter(s) Nebulizer every 6 hours PRN Shortness of Breath and/or Wheezing  naloxone Injectable 0.1 milliGRAM(s) IV Push every 3 minutes PRN For ANY of the following changes in patient status:  A. RR LESS THAN 10 breaths per minute, B. Oxygen saturation LESS THAN 90%, C. Sedation score of 6  ondansetron Injectable 4 milliGRAM(s) IV Push every 6 hours PRN Nausea and/or Vomiting  oxybutynin 5 milliGRAM(s) Oral daily PRN Bladder spasms  QUEtiapine 50 milliGRAM(s) Oral at bedtime PRN agitated delirium      DVT Prophylaxis: V    Advanced Directives:  Discussed with:    Visit Information:    ** Time is exclusive of billed procedures and/or teaching and/or routine family updates.

## 2022-10-19 NOTE — CHART NOTE - NSCHARTNOTEFT_GEN_A_CORE
Clinical Nutrition PN Follow Up Note    *76yo male admitted with ulcerative colitis and colon CA now s/p open restorative proctocolectomy with IPPAA, diverting loop ileostomy, NGT to LWS with high outputs, ileostomy still functioning.  acute hypoxic resp failure from Lt sided PNA and JOSE likely from ATN.    *current status: NGT continues to LWS - 3600 mL output; trialed clamp and failed.  will c/w PPN as pt remains NPO x 5 days with high nutr needs for wound healing. s/p surgery on 10/17 for cystoscopy.    *labs reviewed; Labs WNL except hypophosphatemia, will add 5 mEq to bag.   bilirubin total WNL.  corrected Ca elevated, DO NOT supplement calcium outside of PN bag at this time. POCT now normalized, will c/w 6 units insulin into PN bag and c/w POCT q6hrs with sliding scale.  triglyceride level WNL <400, to initiate 100g lipids.    10-19    140  |  105  |  31<H>  ----------------------------<  157<H>  4.2   |  33<H>  |  1.21    Ca    8.6      19 Oct 2022 05:42  Phos  1.7     10-19  Mg     2.0     10-19    TPro  6.3  /  Alb  2.0<L>  /  TBili  0.2  /  DBili  x   /  AST  12<L>  /  ALT  17  /  AlkPhos  60  10-18      HbA1c: A1C with Estimated Average Glucose Result: 6.9 % (09-29-22 @ 14:21)  Glucose: POCT Blood Glucose.: 104 mg/dL (10-17-22 @ 05:55)    POCT Blood Glucose.: 160 mg/dL (19 Oct 2022 06:04)  POCT Blood Glucose.: 168 mg/dL (18 Oct 2022 22:05)  POCT Blood Glucose.: 153 mg/dL (18 Oct 2022 17:38)  POCT Blood Glucose.: 180 mg/dL (18 Oct 2022 14:40)  POCT Blood Glucose.: 179 mg/dL (18 Oct 2022 12:43)    *pertinent meds: atorvastatin, admelog sliding scale (received 10 units in last 24 hours), protonix, prednisone, zofran, tylenol, NaPhos IV     *I&O's Detail    18 Oct 2022 07:01  -  19 Oct 2022 07:00  --------------------------------------------------------  IN:    IV PiggyBack: 200 mL    PPN (Peripheral Parenteral Nutrition): 1521 mL  Total IN: 1721 mL    OUT:    Gastric Aspirate - Discarded (mL): 3600 mL    Ileostomy (mL): 150 mL    Indwelling Catheter - Urethral (mL): 1500 mL  Total OUT: 5250 mL    Total NET: -3529 mL  *negative fluid status: large NGT output; minimal ostomy output.  *BM (+) on 10/18 x 7, loose/ liquid via ostomy .  pt not on bowel regimen.    *Malnutrition: now meets criteria for severe malnutrition in acute illness r/t decreased ability to meet increased nutr needs s/p GI surgery AEB meeting <50% of nutr needs x 9days, moderate edema    Estimated Needs: based on 87Kg (adjusted BW)  Calories: 8253-2374 Kcal (25-30 Kcal/Kg)  Protein: 139-157 g (1.6-1.8 g/Kg)  Fluids: 1917-9845  mL (25-30 mL/Kg)    Diet, NPO:   Except Medications (10-16-22 @ 08:23) [Active]    *will continue to monitor and adjust PN prn*    PPN Recommendations: via peripheral line  Total Volume: 2000mL  85 g  Amino Acids  100 g Dextrose  100 g Lipids 20%  136 mEq Sodium Chloride  12 mEq Sodium Acetate  15 mmol Sodium Phosphate  0 mEq Potassium Chloride  0 mEq Potassium Acetate  0 mmol Potassium Phosphate  0 mEq Calcium Gluconate ( corrected Ca elevated, DO NOT supplement calcium outside of PN bag at this time. )  0 mEq Magnesium Sulfate  200 mg Thiamine  6 units regular insulin  1 ml Trace Elements  10 ml MVI    Total Calories  1680 Kcal  (Meets    70%  of  Estimated Energy needs  and  61%  Protein needs)   (osmolarity 850)    Additional Recommendations:    1) Daily weights  2) Strict I & O's  3) Daily lyte checks including magnesium and phos  4) Weekly triglycerides/LFT checks  5) POCT q6hrs; maintain 140-180mg/dL  6) if on PN > 6 days, consider placing PICC line to meet 100% of nutr needs    *will monitor and adjust treatement plan prn*  Geetha Mccallum MS, RDN, [facility] [phone]  Nutrition

## 2022-10-19 NOTE — PROGRESS NOTE ADULT - ASSESSMENT
A/P: 77 male S/P RCP-IPAA (restorative proctocolectomy with ileal pouch anal anastomosis), diverting loop ileostomy with Acute Hypoxic Respiratory Failure from a L sided Pneumonia and JOSE likely from ATN  FLORA  Obesity  AFIB  COPD    Plan:  ICU    PULM: Completed Antibiotics for L PNA.  Wean Fio2.  NIV QHS for FLORA.     Cardio:  Continue AC    Renal: JSOE improved    GI:  NPO, PPI BID.  Clamp NGT, PPN.  IR for frainage of collection.  Contacting CRS to see if they want a CT with PO contrast    DVT prophylaxis with DOAC on hold for IR procedure

## 2022-10-19 NOTE — PROVIDER CONTACT NOTE (EICU) - SITUATION
76yo M who is going for a CT scan with worsening abdominal pain. Requesting medication to ease pain prior to scan. No signs of liver or kidney dysfunction. Ordered 4mg IV morphine to ease pain control for imaging.

## 2022-10-20 LAB
ALBUMIN SERPL ELPH-MCNC: 2 G/DL — LOW (ref 3.3–5)
ALP SERPL-CCNC: 55 U/L — SIGNIFICANT CHANGE UP (ref 40–120)
ALT FLD-CCNC: 16 U/L — SIGNIFICANT CHANGE UP (ref 12–78)
ANION GAP SERPL CALC-SCNC: 6 MMOL/L — SIGNIFICANT CHANGE UP (ref 5–17)
AST SERPL-CCNC: 17 U/L — SIGNIFICANT CHANGE UP (ref 15–37)
BILIRUB SERPL-MCNC: 0.3 MG/DL — SIGNIFICANT CHANGE UP (ref 0.2–1.2)
BUN SERPL-MCNC: 28 MG/DL — HIGH (ref 7–23)
CALCIUM SERPL-MCNC: 8.7 MG/DL — SIGNIFICANT CHANGE UP (ref 8.5–10.1)
CHLORIDE SERPL-SCNC: 103 MMOL/L — SIGNIFICANT CHANGE UP (ref 96–108)
CO2 SERPL-SCNC: 31 MMOL/L — SIGNIFICANT CHANGE UP (ref 22–31)
CREAT SERPL-MCNC: 1.23 MG/DL — SIGNIFICANT CHANGE UP (ref 0.5–1.3)
EGFR: 61 ML/MIN/1.73M2 — SIGNIFICANT CHANGE UP
GLUCOSE SERPL-MCNC: 141 MG/DL — HIGH (ref 70–99)
HCT VFR BLD CALC: 27.7 % — LOW (ref 39–50)
HGB BLD-MCNC: 8.2 G/DL — LOW (ref 13–17)
MAGNESIUM SERPL-MCNC: 1.8 MG/DL — SIGNIFICANT CHANGE UP (ref 1.6–2.6)
MCHC RBC-ENTMCNC: 26.1 PG — LOW (ref 27–34)
MCHC RBC-ENTMCNC: 29.6 GM/DL — LOW (ref 32–36)
MCV RBC AUTO: 88.2 FL — SIGNIFICANT CHANGE UP (ref 80–100)
PHOSPHATE SERPL-MCNC: 3.2 MG/DL — SIGNIFICANT CHANGE UP (ref 2.5–4.5)
PLATELET # BLD AUTO: 368 K/UL — SIGNIFICANT CHANGE UP (ref 150–400)
POTASSIUM SERPL-MCNC: 3.6 MMOL/L — SIGNIFICANT CHANGE UP (ref 3.5–5.3)
POTASSIUM SERPL-SCNC: 3.6 MMOL/L — SIGNIFICANT CHANGE UP (ref 3.5–5.3)
PROT SERPL-MCNC: 5.7 GM/DL — LOW (ref 6–8.3)
RBC # BLD: 3.14 M/UL — LOW (ref 4.2–5.8)
RBC # FLD: 18.2 % — HIGH (ref 10.3–14.5)
SODIUM SERPL-SCNC: 140 MMOL/L — SIGNIFICANT CHANGE UP (ref 135–145)
WBC # BLD: 5.68 K/UL — SIGNIFICANT CHANGE UP (ref 3.8–10.5)
WBC # FLD AUTO: 5.68 K/UL — SIGNIFICANT CHANGE UP (ref 3.8–10.5)

## 2022-10-20 PROCEDURE — 99233 SBSQ HOSP IP/OBS HIGH 50: CPT

## 2022-10-20 RX ORDER — ACETAMINOPHEN 500 MG
1000 TABLET ORAL ONCE
Refills: 0 | Status: COMPLETED | OUTPATIENT
Start: 2022-10-20 | End: 2022-10-20

## 2022-10-20 RX ORDER — I.V. FAT EMULSION 20 G/100ML
0.8 EMULSION INTRAVENOUS
Qty: 100 | Refills: 0 | Status: DISCONTINUED | OUTPATIENT
Start: 2022-10-20 | End: 2022-10-20

## 2022-10-20 RX ORDER — PIPERACILLIN AND TAZOBACTAM 4; .5 G/20ML; G/20ML
3.38 INJECTION, POWDER, LYOPHILIZED, FOR SOLUTION INTRAVENOUS ONCE
Refills: 0 | Status: COMPLETED | OUTPATIENT
Start: 2022-10-20 | End: 2022-10-20

## 2022-10-20 RX ORDER — METOCLOPRAMIDE HCL 10 MG
10 TABLET ORAL EVERY 6 HOURS
Refills: 0 | Status: DISCONTINUED | OUTPATIENT
Start: 2022-10-20 | End: 2022-10-22

## 2022-10-20 RX ORDER — MORPHINE SULFATE 50 MG/1
2 CAPSULE, EXTENDED RELEASE ORAL EVERY 4 HOURS
Refills: 0 | Status: DISCONTINUED | OUTPATIENT
Start: 2022-10-20 | End: 2022-10-26

## 2022-10-20 RX ORDER — ELECTROLYTE SOLUTION,INJ
1 VIAL (ML) INTRAVENOUS
Refills: 0 | Status: DISCONTINUED | OUTPATIENT
Start: 2022-10-20 | End: 2022-10-20

## 2022-10-20 RX ORDER — PIPERACILLIN AND TAZOBACTAM 4; .5 G/20ML; G/20ML
3.38 INJECTION, POWDER, LYOPHILIZED, FOR SOLUTION INTRAVENOUS EVERY 8 HOURS
Refills: 0 | Status: DISCONTINUED | OUTPATIENT
Start: 2022-10-20 | End: 2022-11-01

## 2022-10-20 RX ADMIN — PANTOPRAZOLE SODIUM 40 MILLIGRAM(S): 20 TABLET, DELAYED RELEASE ORAL at 22:06

## 2022-10-20 RX ADMIN — TAMSULOSIN HYDROCHLORIDE 0.8 MILLIGRAM(S): 0.4 CAPSULE ORAL at 22:07

## 2022-10-20 RX ADMIN — I.V. FAT EMULSION 41.67 GM/KG/DAY: 20 EMULSION INTRAVENOUS at 22:35

## 2022-10-20 RX ADMIN — Medication 4 MILLIGRAM(S): at 10:00

## 2022-10-20 RX ADMIN — BUDESONIDE AND FORMOTEROL FUMARATE DIHYDRATE 2 PUFF(S): 160; 4.5 AEROSOL RESPIRATORY (INHALATION) at 19:54

## 2022-10-20 RX ADMIN — PIPERACILLIN AND TAZOBACTAM 25 GRAM(S): 4; .5 INJECTION, POWDER, LYOPHILIZED, FOR SOLUTION INTRAVENOUS at 14:04

## 2022-10-20 RX ADMIN — Medication 2: at 19:30

## 2022-10-20 RX ADMIN — Medication 1 EACH: at 22:36

## 2022-10-20 RX ADMIN — APIXABAN 5 MILLIGRAM(S): 2.5 TABLET, FILM COATED ORAL at 22:07

## 2022-10-20 RX ADMIN — SODIUM CHLORIDE 3 MILLILITER(S): 9 INJECTION INTRAMUSCULAR; INTRAVENOUS; SUBCUTANEOUS at 18:47

## 2022-10-20 RX ADMIN — Medication 10 MILLIGRAM(S): at 19:24

## 2022-10-20 RX ADMIN — ATORVASTATIN CALCIUM 20 MILLIGRAM(S): 80 TABLET, FILM COATED ORAL at 22:06

## 2022-10-20 RX ADMIN — MORPHINE SULFATE 2 MILLIGRAM(S): 50 CAPSULE, EXTENDED RELEASE ORAL at 10:39

## 2022-10-20 RX ADMIN — Medication 400 MILLIGRAM(S): at 22:17

## 2022-10-20 RX ADMIN — PIPERACILLIN AND TAZOBACTAM 200 GRAM(S): 4; .5 INJECTION, POWDER, LYOPHILIZED, FOR SOLUTION INTRAVENOUS at 02:57

## 2022-10-20 RX ADMIN — PIPERACILLIN AND TAZOBACTAM 25 GRAM(S): 4; .5 INJECTION, POWDER, LYOPHILIZED, FOR SOLUTION INTRAVENOUS at 22:20

## 2022-10-20 RX ADMIN — PANTOPRAZOLE SODIUM 40 MILLIGRAM(S): 20 TABLET, DELAYED RELEASE ORAL at 10:00

## 2022-10-20 RX ADMIN — Medication 10 MILLIGRAM(S): at 11:53

## 2022-10-20 RX ADMIN — Medication 650 MILLIGRAM(S): at 03:29

## 2022-10-20 RX ADMIN — CHLORHEXIDINE GLUCONATE 1 APPLICATION(S): 213 SOLUTION TOPICAL at 05:27

## 2022-10-20 RX ADMIN — TIOTROPIUM BROMIDE 1 CAPSULE(S): 18 CAPSULE ORAL; RESPIRATORY (INHALATION) at 11:05

## 2022-10-20 RX ADMIN — MORPHINE SULFATE 2 MILLIGRAM(S): 50 CAPSULE, EXTENDED RELEASE ORAL at 10:09

## 2022-10-20 RX ADMIN — APIXABAN 5 MILLIGRAM(S): 2.5 TABLET, FILM COATED ORAL at 10:00

## 2022-10-20 RX ADMIN — PIPERACILLIN AND TAZOBACTAM 25 GRAM(S): 4; .5 INJECTION, POWDER, LYOPHILIZED, FOR SOLUTION INTRAVENOUS at 04:53

## 2022-10-20 RX ADMIN — SODIUM CHLORIDE 3 MILLILITER(S): 9 INJECTION INTRAMUSCULAR; INTRAVENOUS; SUBCUTANEOUS at 05:30

## 2022-10-20 RX ADMIN — Medication 650 MILLIGRAM(S): at 06:33

## 2022-10-20 RX ADMIN — BUDESONIDE AND FORMOTEROL FUMARATE DIHYDRATE 2 PUFF(S): 160; 4.5 AEROSOL RESPIRATORY (INHALATION) at 11:06

## 2022-10-20 RX ADMIN — FINASTERIDE 5 MILLIGRAM(S): 5 TABLET, FILM COATED ORAL at 10:00

## 2022-10-20 NOTE — PROGRESS NOTE ADULT - ATTENDING COMMENTS
Seen and examined.  Minimal serosang fluid from IR drain.  NGT outpt less significant once pt restricted from ice chips/sips.  Ileostomy 180 recorded.  AFVSS  NAD  incision CDI, stoma healthy, soft, NTND  Labs reviewed  A/P -  Clamp trial underway.  Add reglan.   Cont OOB and mobilization.  D/W Dr. Armstrong.  Attempted pt reassurance.

## 2022-10-20 NOTE — PROGRESS NOTE ADULT - SUBJECTIVE AND OBJECTIVE BOX
HPI: Patient is a 77 male S/P RCP-IPAA (restorative proctocolectomy with ileal pouch anal anastomosis), diverting loop ileostomy.  Patient seen in the ICU.  Patient has been on 100% NRB today for a L sided Pneumonia.  He also had to be resumed on pressors.  Patient remains in JOSE and oliguric.      10/8: On VM O2, pressors over night  10/9: VM O2/NC O2.  Off Pressors, coughing up dark sputum now, CXR reviewed and L side is improving, he denies vomiting or aspirating    10/14: Between 4 L NC and VM o2, CXR shows improvement sis i last saw the patient.    10/15: On NIV over night, NC O2 during the day.  H & H stable back on AC  10/16: Had NGT placed yesterday, 1,150 from NGT, ostomy functioning, no pain, cr rising   10/17: NGT drained over a L over night, ostomy 250  10/18: Zhao replaced in the OR last night.  NGT drained 600 over night  10/19: For IR Drainage, Post clamp 700 cc after 6 hours.  Then over iL over night  10/20: No more abdominal pain, NGT clamped, OOB         PAST MEDICAL & SURGICAL HISTORY:  Ulcerative colitis      Colon cancer      Obstructive sleep apnea      Obesity      HTN (hypertension)      Atrial fibrillation      HLD (hyperlipidemia)      BPH (benign prostatic hyperplasia)      COPD (chronic obstructive pulmonary disease)      COVID-19 virus infection      History of pneumonia      H/O hernia repair      History of hydrocelectomy      H/O colonoscopy      H/O cystoscopy  urethral stricture      History of total knee replacement, bilateral      S/P placement of cardiac pacemaker      History of cataract surgery          FAMILY HISTORY:  Family history of cardiomyopathy (Father)        Social Hx:    Allergies    ACE inhibitors (Swelling)    Intolerances            ICU Vital Signs Last 24 Hrs  T(C): 36.6 (20 Oct 2022 08:00), Max: 37.2 (19 Oct 2022 18:00)  T(F): 97.8 (20 Oct 2022 08:00), Max: 98.9 (19 Oct 2022 18:00)  HR: 83 (20 Oct 2022 11:00) (68 - 95)  BP: 116/62 (20 Oct 2022 11:00) (104/53 - 142/75)  BP(mean): 72 (20 Oct 2022 11:00) (63 - 96)  ABP: --  ABP(mean): --  RR: 15 (20 Oct 2022 11:00) (13 - 29)  SpO2: 96% (20 Oct 2022 11:00) (88% - 100%)    O2 Parameters below as of 20 Oct 2022 11:00  Patient On (Oxygen Delivery Method): mask, Venturi  O2 Flow (L/min): 8              I&O's Summary    19 Oct 2022 07:01  -  20 Oct 2022 07:00  --------------------------------------------------------  IN: 2579 mL / OUT: 2575 mL / NET: 4 mL                              8.2    5.68  )-----------( 368      ( 20 Oct 2022 05:32 )             27.7       10-20    140  |  103  |  28<H>  ----------------------------<  141<H>  3.6   |  31  |  1.23    Ca    8.7      20 Oct 2022 05:32  Phos  3.2     10-20  Mg     1.8     10-20    TPro  5.7<L>  /  Alb  2.0<L>  /  TBili  0.3  /  DBili  x   /  AST  17  /  ALT  16  /  AlkPhos  55  10-20                    MEDICATIONS  (STANDING):  acetaminophen   IVPB .. 1000 milliGRAM(s) IV Intermittent once  apixaban 5 milliGRAM(s) Oral every 12 hours  atorvastatin 20 milliGRAM(s) Oral at bedtime  budesonide 160 MICROgram(s)/formoterol 4.5 MICROgram(s) Inhaler 2 Puff(s) Inhalation two times a day  chlorhexidine 4% Liquid 1 Application(s) Topical <User Schedule>  dextrose 50% Injectable 25 Gram(s) IV Push once  dextrose 50% Injectable 12.5 Gram(s) IV Push once  dextrose 50% Injectable 25 Gram(s) IV Push once  dextrose Oral Gel 15 Gram(s) Oral once  fat emulsion (Fish Oil and Plant Based) 20% Infusion 0.794 Gm/kG/Day (41.67 mL/Hr) IV Continuous <Continuous>  fat emulsion (Fish Oil and Plant Based) 20% Infusion 0.8 Gm/kG/Day (42 mL/Hr) IV Continuous <Continuous>  finasteride 5 milliGRAM(s) Oral daily  glucagon  Injectable 1 milliGRAM(s) IntraMuscular once  influenza  Vaccine (HIGH DOSE) 0.7 milliLiter(s) IntraMuscular once  insulin lispro (ADMELOG) corrective regimen sliding scale   SubCutaneous every 6 hours  metoclopramide Injectable 10 milliGRAM(s) IV Push every 6 hours  pantoprazole  Injectable 40 milliGRAM(s) IV Push every 12 hours  Parenteral Nutrition - Adult 1 Each (83 mL/Hr) TPN Continuous <Continuous>  Parenteral Nutrition - Adult 1 Each (83 mL/Hr) TPN Continuous <Continuous>  piperacillin/tazobactam IVPB.. 3.375 Gram(s) IV Intermittent every 8 hours  predniSONE   Tablet 4 milliGRAM(s) Oral daily  sodium chloride 0.9% lock flush 3 milliLiter(s) IV Push every 8 hours  tamsulosin 0.8 milliGRAM(s) Oral at bedtime  tiotropium 18 MICROgram(s) Capsule 1 Capsule(s) Inhalation daily    MEDICATIONS  (PRN):  acetaminophen     Tablet .. 650 milliGRAM(s) Oral every 6 hours PRN Mild Pain (1 - 3)  ALBUTerol    90 MICROgram(s) HFA Inhaler 2 Puff(s) Inhalation every 6 hours PRN Shortness of Breath and/or Wheezing  albuterol/ipratropium for Nebulization 3 milliLiter(s) Nebulizer every 6 hours PRN Shortness of Breath and/or Wheezing  morphine  - Injectable 2 milliGRAM(s) IV Push every 4 hours PRN Moderate Pain (4 - 6)  naloxone Injectable 0.1 milliGRAM(s) IV Push every 3 minutes PRN For ANY of the following changes in patient status:  A. RR LESS THAN 10 breaths per minute, B. Oxygen saturation LESS THAN 90%, C. Sedation score of 6  ondansetron Injectable 4 milliGRAM(s) IV Push every 6 hours PRN Nausea and/or Vomiting  oxybutynin 5 milliGRAM(s) Oral daily PRN Bladder spasms      DVT Prophylaxis: DOAC    Advanced Directives:  Discussed with:    Visit Information:    ** Time is exclusive of billed procedures and/or teaching and/or routine family updates.

## 2022-10-20 NOTE — PROGRESS NOTE ADULT - ASSESSMENT
POD#15 s/p proctocolectomy, IPAA, DLI for UC/Sigmoid CA, s/p POD 1 from IR drain, 8 cc of fluid removed. S/p cystoscopy w/ stent placement and [place] tip alfaro POD2. Yesterday pt has increased abdominal pain and distention after a procedure and CT done, Small extraluminal collection with fluid and air is seen in the pelvis just to the right of midline. Hydronephrosis resolved, collection w/drain improved. Today abdomen less distended, Ileostomy functional 180c. Alfaro 1525 cc,  12h. WBC 5.6; H/H: 8.2/27.7, Cr: 1.23. K .6, Mg 1.8, rest WNL.     plan:  replete ostomy output cautiously   wean off O2  ambulation/PT  pain control  monitor bowel function   IR drain output  NGT output  serial abdominal exams  Appreciate  recc -TOV after discharge  Medical management by ICU team    Plan discussed with CRS team

## 2022-10-20 NOTE — PROGRESS NOTE ADULT - SUBJECTIVE AND OBJECTIVE BOX
Subjective:  Patient seen and examined this AM at bedside. No acute events overnight and patient resting comfortably. NG/alfaro intact. no abdominal pain/n/v. Denies fever/chills, shortness of breath, chest pain. VS reviewed    Objective:    PHYSICAL EXAM   GENERAL: NAD, AOx3, well developed  HEAD: Atraumatic, normocephalic  EYES: EOMI, PERRLA, conjunctiva and sclera clear  ENT: moist mucous membrane  NECK: supple, No JVD, midline trachea  CHEST/LUNG: unlabored respirations b/l on NC  Heart: S1, S2 normal  ABDOMEN: soft, nondistended, nontender. ostomy functioning. NG tube intact  NERVOUS SYSTEM: AOx3, speech clear, no neuro-deficits  MSK: full ROM, no deformities  SKIN: warm to touch, no rash or lesions    Vitals:  T(C): 36.6 (10-20 @ 04:00), Max: 37.2 (10-19 @ 18:00)  HR: 77 (10-20 @ 09:00) (63 - 87)  BP: 104/53 (10-20 @ 09:00) (104/53 - 142/75)  RR: 18 (10-20 @ 09:00) (13 - 29)  SpO2: 91% (10-20 @ 09:00) (91% - 100%)    10-19 @ 07:01  -  10-20 @ 07:00  --------------------------------------------------------  IN:    Fat Emulsion (Fish Oil &amp; Plant Based) 20% Infusion: 291 mL    IV PiggyBack: 736 mL    PPN (Peripheral Parenteral Nutrition): 1552 mL  Total IN: 2579 mL    OUT:    Bulb (mL): 20 mL    Gastric Aspirate - Discarded (mL): 850 mL    Ileostomy (mL): 180 mL    Indwelling Catheter - Urethral (mL): 1525 mL  Total OUT: 2575 mL    Total NET: 4 mL      10-20 @ 05:32                    8.2  CBC: 5.68>)-------(<368                     27.7                 140 | 103 | 28    CMP:  ----------------------< 141               3.6 | 31 | 1.23                      Ca:8.7  Phos:3.2  M.8               0.3|      |17        LFTs:  ------|55|-----             -|      |-  10-19 @ 05:42                    7.9  CBC: 6.51>)-------(<337                     25.7                 140 | 105 | 31    CMP:  ----------------------< 157               4.2 | 33 | 1.21                      Ca:8.6  Phos:1.7  M.0               -|      |-        LFTs:  ------|-|-----             -|      |-      Current Inpatient Medications:  acetaminophen     Tablet .. 650 milliGRAM(s) Oral every 6 hours PRN  acetaminophen   IVPB .. 1000 milliGRAM(s) IV Intermittent once  ALBUTerol    90 MICROgram(s) HFA Inhaler 2 Puff(s) Inhalation every 6 hours PRN  albuterol/ipratropium for Nebulization 3 milliLiter(s) Nebulizer every 6 hours PRN  apixaban 5 milliGRAM(s) Oral every 12 hours  atorvastatin 20 milliGRAM(s) Oral at bedtime  budesonide 160 MICROgram(s)/formoterol 4.5 MICROgram(s) Inhaler 2 Puff(s) Inhalation two times a day  chlorhexidine 4% Liquid 1 Application(s) Topical <User Schedule>  dextrose 50% Injectable 25 Gram(s) IV Push once  dextrose 50% Injectable 12.5 Gram(s) IV Push once  dextrose 50% Injectable 25 Gram(s) IV Push once  dextrose Oral Gel 15 Gram(s) Oral once  fat emulsion (Fish Oil and Plant Based) 20% Infusion 0.794 Gm/kG/Day (41.67 mL/Hr) IV Continuous <Continuous>  finasteride 5 milliGRAM(s) Oral daily  glucagon  Injectable 1 milliGRAM(s) IntraMuscular once  influenza  Vaccine (HIGH DOSE) 0.7 milliLiter(s) IntraMuscular once  insulin lispro (ADMELOG) corrective regimen sliding scale   SubCutaneous every 6 hours  morphine  - Injectable 2 milliGRAM(s) IV Push every 4 hours PRN  naloxone Injectable 0.1 milliGRAM(s) IV Push every 3 minutes PRN  ondansetron Injectable 4 milliGRAM(s) IV Push every 6 hours PRN  oxybutynin 5 milliGRAM(s) Oral daily PRN  pantoprazole  Injectable 40 milliGRAM(s) IV Push every 12 hours  Parenteral Nutrition - Adult 1 Each (83 mL/Hr) TPN Continuous <Continuous>  piperacillin/tazobactam IVPB.. 3.375 Gram(s) IV Intermittent every 8 hours  predniSONE   Tablet 4 milliGRAM(s) Oral daily  QUEtiapine 50 milliGRAM(s) Oral at bedtime PRN  sodium chloride 0.9% lock flush 3 milliLiter(s) IV Push every 8 hours  tamsulosin 0.8 milliGRAM(s) Oral at bedtime  tiotropium 18 MICROgram(s) Capsule 1 Capsule(s) Inhalation daily

## 2022-10-20 NOTE — PROGRESS NOTE ADULT - EYES
EOMI/conjunctiva clear

## 2022-10-20 NOTE — PROGRESS NOTE ADULT - ASSESSMENT
A/P: 77 male S/P RCP-IPAA (restorative proctocolectomy with ileal pouch anal anastomosis), diverting loop ileostomy with Acute Hypoxic Respiratory Failure from a L sided Pneumonia and JOSE likely from ATN  FLORA  Obesity  AFIB  COPD    Plan:  ICU    PULM: Completed Antibiotics for L PNA.  Wean Fio2.  NIV QHS for FLORA.     Cardio:  Continue AC    Renal: JOSE improved    GI:  NPO, PPI BID.  Clamp NGT again.  Added SOFIA Gruber.  Gram Stain PND.  Continue Zosyn for now    DVT prophylaxis with DOAC on hold for IR procedure    OOB, PT

## 2022-10-20 NOTE — CHART NOTE - NSCHARTNOTEFT_GEN_A_CORE
Clinical Nutrition PN Follow Up Note    *76yo male admitted with ulcerative colitis and colon CA now s/p open restorative proctocolectomy with IPPAA, diverting loop ileostomy, NGT to LWS with high outputs, ileostomy still functioning.  acute hypoxic resp failure from Lt sided PNA and JOSE likely from ATN.    *10/19-NGT continues to LWS - 3600 mL output; trialed clamp and failed.  will c/w PPN as pt remains NPO x 5 days with high nutr needs for wound healing. s/p surgery on 10/17 for cystoscopy.    *Current status: S/P IR for guided drainage (abdominal) on 10/19. Will c/w PPN for support. NPO status maintained. PPN day #5.       *labs reviewed; Labs WNL.   bilirubin total WNL.  corrected Ca elevated, DO NOT supplement calcium outside of PN bag at this time. POCT now normalized, will c/w 6 units insulin into PN bag and c/w POCT q6hrs with sliding scale.  triglyceride level WNL <400, to initiate 100g lipids.    10-20    140  |  103  |  28<H>  ----------------------------<  141<H>  3.6   |  31  |  1.23    Ca    8.7      20 Oct 2022 05:32  Phos  3.2     10-20  Mg     1.8     10-20    TPro  5.7<L>  /  Alb  2.0<L>  /  TBili  0.3  /  DBili  x   /  AST  17  /  ALT  16  /  AlkPhos  55  10-20  BMI: BMI (kg/m2): 38.8 (10-06-22 @ 08:40)  HbA1c: A1C with Estimated Average Glucose Result: 6.9 % (09-29-22 @ 14:21)    Glucose: POCT Blood Glucose.: 145 mg/dL (10-20-22 @ 05:40)    BP: 117/65 (10-20-22 @ 08:00) (58/30 - 142/75)  Lipid Panel: Date/Time: 10-18-22 @ 05:22  Cholesterol, Serum: --  Direct LDL: --  HDL Cholesterol, Serum: --  Total Cholesterol/HDL Ration Measurement: --  Triglycerides, Serum: 152    POCT Blood Glucose.: 145 mg/dL (20 Oct 2022 05:40)  POCT Blood Glucose.: 143 mg/dL (20 Oct 2022 00:19)  POCT Blood Glucose.: 134 mg/dL (19 Oct 2022 17:43)  POCT Blood Glucose.: 132 mg/dL (19 Oct 2022 11:28)      *pertinent meds: atorvastatin, admelog sliding scale (received 10 units in last 24 hours), protonix, prednisone, zofran, tylenol, NaPhos IV     *I&O's Detail    19 Oct 2022 07:01  -  20 Oct 2022 07:00  --------------------------------------------------------  IN:    Fat Emulsion (Fish Oil &amp; Plant Based) 20% Infusion: 291 mL    IV PiggyBack: 736 mL    PPN (Peripheral Parenteral Nutrition): 1552 mL  Total IN: 2579 mL    OUT:    Bulb (mL): 20 mL    Gastric Aspirate - Discarded (mL): 350 mL    Ileostomy (mL): 180 mL    Indwelling Catheter - Urethral (mL): 1525 mL  Total OUT: 2075 mL    Total NET: 504 mL        *positive fluid status: minimal ostomy output.  *BM (+) on 10/19, loose/ liquid via ostomy .  pt not on bowel regimen.    *Malnutrition: now meets criteria for severe malnutrition in acute illness r/t decreased ability to meet increased nutr needs s/p GI surgery AEB meeting <50% of nutr needs x 9days, moderate edema    Estimated Needs: based on 87Kg (adjusted BW)  Calories: 1633-6653 Kcal (25-30 Kcal/Kg)  Protein: 139-157 g (1.6-1.8 g/Kg)  Fluids: 1456-7189  mL (25-30 mL/Kg)    Diet, NPO:   Except Medications (10-16-22 @ 08:23) [Active]      ***NO CHANGES TO BAG TODAY***    PPN Recommendations: via peripheral line  Total Volume: 2000mL  85 g  Amino Acids  100 g Dextrose  100 g Lipids 20%  136 mEq Sodium Chloride  12 mEq Sodium Acetate  15 mmol Sodium Phosphate  0 mEq Potassium Chloride  0 mEq Potassium Acetate  0 mmol Potassium Phosphate  0 mEq Calcium Gluconate ( corrected Ca elevated, DO NOT supplement calcium outside of PN bag at this time. )  0 mEq Magnesium Sulfate  200 mg Thiamine  6 units regular insulin  1 ml Trace Elements  10 ml MVI    Total Calories  1680 Kcal  (Meets 70%  of  Estimated Energy needs  and  61%  Protein needs)   (osmolarity 850)    Additional Recommendations:    1) Daily weights  2) Strict I & O's  3) Daily lyte checks including magnesium and phos  4) Weekly triglycerides/LFT checks  5) POCT q6hrs; maintain 140-180mg/dL  6) if on PN > 6 days, consider placing PICC line to meet 100% of nutr needs    *will continue to monitor and adjust PN prn*  Veronica Arevalo RD, CDN   Nutrition   Office# [phone] Cell# [phone] Clinical Nutrition PN Follow Up Note    *76yo male admitted with ulcerative colitis and colon CA now s/p open restorative proctocolectomy with IPPAA, diverting loop ileostomy, NGT to LWS with high outputs, ileostomy still functioning.  acute hypoxic resp failure from Lt sided PNA and JOSE likely from ATN.    *10/19-NGT continues to LWS - 3600 mL output; trialed clamp and failed.  will c/w PPN as pt remains NPO x 5 days with high nutr needs for wound healing. s/p surgery on 10/17 for cystoscopy.    *Current status: S/P IR for guided drainage (abdominal) on 10/19. Will c/w PPN for support. NPO status maintained. PPN day #5.       *labs reviewed; Labs WNL.   Magnesium WNL will add 5 mEq to bag. bilirubin total WNL.  corrected Ca elevated, DO NOT supplement calcium outside of PN bag at this time. POCT now normalized, will c/w 6 units insulin into PN bag and c/w POCT q6hrs with sliding scale.  triglyceride level WNL <400, to initiate 100g lipids.    10-20    140  |  103  |  28<H>  ----------------------------<  141<H>  3.6   |  31  |  1.23    Ca    8.7      20 Oct 2022 05:32  Phos  3.2     10-20  Mg     1.8     10-20    TPro  5.7<L>  /  Alb  2.0<L>  /  TBili  0.3  /  DBili  x   /  AST  17  /  ALT  16  /  AlkPhos  55  10-20  BMI: BMI (kg/m2): 38.8 (10-06-22 @ 08:40)  HbA1c: A1C with Estimated Average Glucose Result: 6.9 % (09-29-22 @ 14:21)    Glucose: POCT Blood Glucose.: 145 mg/dL (10-20-22 @ 05:40)    BP: 117/65 (10-20-22 @ 08:00) (58/30 - 142/75)  Lipid Panel: Date/Time: 10-18-22 @ 05:22  Cholesterol, Serum: --  Direct LDL: --  HDL Cholesterol, Serum: --  Total Cholesterol/HDL Ration Measurement: --  Triglycerides, Serum: 152    POCT Blood Glucose.: 145 mg/dL (20 Oct 2022 05:40)  POCT Blood Glucose.: 143 mg/dL (20 Oct 2022 00:19)  POCT Blood Glucose.: 134 mg/dL (19 Oct 2022 17:43)  POCT Blood Glucose.: 132 mg/dL (19 Oct 2022 11:28)      *pertinent meds: atorvastatin, admelog sliding scale (received 10 units in last 24 hours), protonix, prednisone, zofran, tylenol, NaPhos IV     *I&O's Detail    19 Oct 2022 07:01  -  20 Oct 2022 07:00  --------------------------------------------------------  IN:    Fat Emulsion (Fish Oil &amp; Plant Based) 20% Infusion: 291 mL    IV PiggyBack: 736 mL    PPN (Peripheral Parenteral Nutrition): 1552 mL  Total IN: 2579 mL    OUT:    Bulb (mL): 20 mL    Gastric Aspirate - Discarded (mL): 350 mL    Ileostomy (mL): 180 mL    Indwelling Catheter - Urethral (mL): 1525 mL  Total OUT: 2075 mL    Total NET: 504 mL        *positive fluid status: minimal ostomy output.  *BM (+) on 10/19, loose/ liquid via ostomy .  pt not on bowel regimen.    *Malnutrition: now meets criteria for severe malnutrition in acute illness r/t decreased ability to meet increased nutr needs s/p GI surgery AEB meeting <50% of nutr needs x 9days, moderate edema    Estimated Needs: based on 87Kg (adjusted BW)  Calories: 3126-8156 Kcal (25-30 Kcal/Kg)  Protein: 139-157 g (1.6-1.8 g/Kg)  Fluids: 8739-6651  mL (25-30 mL/Kg)    Diet, NPO:   Except Medications (10-16-22 @ 08:23) [Active]      ***NO CHANGES TO BAG TODAY EXCEPT INCREASE IN MAGNESIUM***    PPN Recommendations: via peripheral line  Total Volume: 2000mL  85 g  Amino Acids  100 g Dextrose  100 g Lipids 20%  136 mEq Sodium Chloride  12 mEq Sodium Acetate  15 mmol Sodium Phosphate  0 mEq Potassium Chloride  0 mEq Potassium Acetate  0 mmol Potassium Phosphate  0 mEq Calcium Gluconate ( corrected Ca elevated, DO NOT supplement calcium outside of PN bag at this time. )  5 mEq Magnesium Sulfate  200 mg Thiamine  6 units regular insulin  1 ml Trace Elements  10 ml MVI    Total Calories  1680 Kcal  (Meets 70%  of  Estimated Energy needs  and  61%  Protein needs)   (osmolarity 850)    Additional Recommendations:    1) Daily weights  2) Strict I & O's  3) Daily lyte checks including magnesium and phos  4) Weekly triglycerides/LFT checks  5) POCT q6hrs; maintain 140-180mg/dL  6) if on PN > 6 days, consider placing PICC line to meet 100% of nutr needs    *will continue to monitor and adjust PN prn*  Veronica Arevalo RD, CDN   Nutrition   Office# [phone] Cell# [phone]

## 2022-10-21 LAB
ANION GAP SERPL CALC-SCNC: 5 MMOL/L — SIGNIFICANT CHANGE UP (ref 5–17)
BUN SERPL-MCNC: 26 MG/DL — HIGH (ref 7–23)
CALCIUM SERPL-MCNC: 8.3 MG/DL — LOW (ref 8.5–10.1)
CHLORIDE SERPL-SCNC: 103 MMOL/L — SIGNIFICANT CHANGE UP (ref 96–108)
CO2 SERPL-SCNC: 31 MMOL/L — SIGNIFICANT CHANGE UP (ref 22–31)
CREAT SERPL-MCNC: 1.23 MG/DL — SIGNIFICANT CHANGE UP (ref 0.5–1.3)
EGFR: 61 ML/MIN/1.73M2 — SIGNIFICANT CHANGE UP
GLUCOSE SERPL-MCNC: 139 MG/DL — HIGH (ref 70–99)
HCT VFR BLD CALC: 26.4 % — LOW (ref 39–50)
HGB BLD-MCNC: 8 G/DL — LOW (ref 13–17)
MAGNESIUM SERPL-MCNC: 1.6 MG/DL — SIGNIFICANT CHANGE UP (ref 1.6–2.6)
MCHC RBC-ENTMCNC: 26.2 PG — LOW (ref 27–34)
MCHC RBC-ENTMCNC: 30.3 GM/DL — LOW (ref 32–36)
MCV RBC AUTO: 86.6 FL — SIGNIFICANT CHANGE UP (ref 80–100)
PHOSPHATE SERPL-MCNC: 3.1 MG/DL — SIGNIFICANT CHANGE UP (ref 2.5–4.5)
PLATELET # BLD AUTO: 313 K/UL — SIGNIFICANT CHANGE UP (ref 150–400)
POTASSIUM SERPL-MCNC: 3.4 MMOL/L — LOW (ref 3.5–5.3)
POTASSIUM SERPL-SCNC: 3.4 MMOL/L — LOW (ref 3.5–5.3)
RBC # BLD: 3.05 M/UL — LOW (ref 4.2–5.8)
RBC # FLD: 17.9 % — HIGH (ref 10.3–14.5)
SODIUM SERPL-SCNC: 139 MMOL/L — SIGNIFICANT CHANGE UP (ref 135–145)
WBC # BLD: 5.28 K/UL — SIGNIFICANT CHANGE UP (ref 3.8–10.5)
WBC # FLD AUTO: 5.28 K/UL — SIGNIFICANT CHANGE UP (ref 3.8–10.5)

## 2022-10-21 PROCEDURE — 99233 SBSQ HOSP IP/OBS HIGH 50: CPT

## 2022-10-21 RX ORDER — POTASSIUM CHLORIDE 20 MEQ
10 PACKET (EA) ORAL
Refills: 0 | Status: COMPLETED | OUTPATIENT
Start: 2022-10-21 | End: 2022-10-21

## 2022-10-21 RX ORDER — ELECTROLYTE SOLUTION,INJ
1 VIAL (ML) INTRAVENOUS
Refills: 0 | Status: DISCONTINUED | OUTPATIENT
Start: 2022-10-21 | End: 2022-10-21

## 2022-10-21 RX ORDER — LANOLIN ALCOHOL/MO/W.PET/CERES
5 CREAM (GRAM) TOPICAL ONCE
Refills: 0 | Status: COMPLETED | OUTPATIENT
Start: 2022-10-21 | End: 2022-10-21

## 2022-10-21 RX ORDER — I.V. FAT EMULSION 20 G/100ML
0.79 EMULSION INTRAVENOUS
Qty: 100 | Refills: 0 | Status: DISCONTINUED | OUTPATIENT
Start: 2022-10-21 | End: 2022-10-21

## 2022-10-21 RX ORDER — ACETAMINOPHEN 500 MG
1000 TABLET ORAL ONCE
Refills: 0 | Status: COMPLETED | OUTPATIENT
Start: 2022-10-21 | End: 2022-10-21

## 2022-10-21 RX ADMIN — PIPERACILLIN AND TAZOBACTAM 25 GRAM(S): 4; .5 INJECTION, POWDER, LYOPHILIZED, FOR SOLUTION INTRAVENOUS at 23:03

## 2022-10-21 RX ADMIN — Medication 10 MILLIGRAM(S): at 23:04

## 2022-10-21 RX ADMIN — Medication 100 MILLIEQUIVALENT(S): at 11:39

## 2022-10-21 RX ADMIN — PIPERACILLIN AND TAZOBACTAM 25 GRAM(S): 4; .5 INJECTION, POWDER, LYOPHILIZED, FOR SOLUTION INTRAVENOUS at 15:12

## 2022-10-21 RX ADMIN — TAMSULOSIN HYDROCHLORIDE 0.8 MILLIGRAM(S): 0.4 CAPSULE ORAL at 23:03

## 2022-10-21 RX ADMIN — TIOTROPIUM BROMIDE 1 CAPSULE(S): 18 CAPSULE ORAL; RESPIRATORY (INHALATION) at 10:22

## 2022-10-21 RX ADMIN — Medication 10 MILLIGRAM(S): at 07:41

## 2022-10-21 RX ADMIN — Medication 2: at 17:55

## 2022-10-21 RX ADMIN — I.V. FAT EMULSION 41.5 GM/KG/DAY: 20 EMULSION INTRAVENOUS at 23:30

## 2022-10-21 RX ADMIN — Medication 10 MILLIGRAM(S): at 13:42

## 2022-10-21 RX ADMIN — PANTOPRAZOLE SODIUM 40 MILLIGRAM(S): 20 TABLET, DELAYED RELEASE ORAL at 11:03

## 2022-10-21 RX ADMIN — Medication 5 MILLIGRAM(S): at 23:03

## 2022-10-21 RX ADMIN — Medication 2: at 23:09

## 2022-10-21 RX ADMIN — Medication 400 MILLIGRAM(S): at 12:00

## 2022-10-21 RX ADMIN — Medication 1000 MILLIGRAM(S): at 12:30

## 2022-10-21 RX ADMIN — FINASTERIDE 5 MILLIGRAM(S): 5 TABLET, FILM COATED ORAL at 11:03

## 2022-10-21 RX ADMIN — PIPERACILLIN AND TAZOBACTAM 25 GRAM(S): 4; .5 INJECTION, POWDER, LYOPHILIZED, FOR SOLUTION INTRAVENOUS at 06:06

## 2022-10-21 RX ADMIN — SODIUM CHLORIDE 3 MILLILITER(S): 9 INJECTION INTRAMUSCULAR; INTRAVENOUS; SUBCUTANEOUS at 23:08

## 2022-10-21 RX ADMIN — Medication 100 MILLIEQUIVALENT(S): at 12:53

## 2022-10-21 RX ADMIN — APIXABAN 5 MILLIGRAM(S): 2.5 TABLET, FILM COATED ORAL at 23:04

## 2022-10-21 RX ADMIN — PANTOPRAZOLE SODIUM 40 MILLIGRAM(S): 20 TABLET, DELAYED RELEASE ORAL at 23:04

## 2022-10-21 RX ADMIN — ATORVASTATIN CALCIUM 20 MILLIGRAM(S): 80 TABLET, FILM COATED ORAL at 23:04

## 2022-10-21 RX ADMIN — SODIUM CHLORIDE 3 MILLILITER(S): 9 INJECTION INTRAMUSCULAR; INTRAVENOUS; SUBCUTANEOUS at 05:50

## 2022-10-21 RX ADMIN — MORPHINE SULFATE 2 MILLIGRAM(S): 50 CAPSULE, EXTENDED RELEASE ORAL at 03:23

## 2022-10-21 RX ADMIN — CHLORHEXIDINE GLUCONATE 1 APPLICATION(S): 213 SOLUTION TOPICAL at 07:41

## 2022-10-21 RX ADMIN — Medication 1 EACH: at 23:29

## 2022-10-21 RX ADMIN — SODIUM CHLORIDE 3 MILLILITER(S): 9 INJECTION INTRAMUSCULAR; INTRAVENOUS; SUBCUTANEOUS at 14:57

## 2022-10-21 RX ADMIN — APIXABAN 5 MILLIGRAM(S): 2.5 TABLET, FILM COATED ORAL at 11:03

## 2022-10-21 RX ADMIN — Medication 4 MILLIGRAM(S): at 11:03

## 2022-10-21 RX ADMIN — Medication 10 MILLIGRAM(S): at 00:26

## 2022-10-21 RX ADMIN — SODIUM CHLORIDE 3 MILLILITER(S): 9 INJECTION INTRAMUSCULAR; INTRAVENOUS; SUBCUTANEOUS at 05:53

## 2022-10-21 RX ADMIN — Medication 2: at 00:26

## 2022-10-21 RX ADMIN — Medication 10 MILLIGRAM(S): at 17:55

## 2022-10-21 RX ADMIN — Medication 100 MILLIEQUIVALENT(S): at 14:01

## 2022-10-21 RX ADMIN — BUDESONIDE AND FORMOTEROL FUMARATE DIHYDRATE 2 PUFF(S): 160; 4.5 AEROSOL RESPIRATORY (INHALATION) at 10:21

## 2022-10-21 RX ADMIN — BUDESONIDE AND FORMOTEROL FUMARATE DIHYDRATE 2 PUFF(S): 160; 4.5 AEROSOL RESPIRATORY (INHALATION) at 20:28

## 2022-10-21 NOTE — PROGRESS NOTE ADULT - ATTENDING COMMENTS
Seen and examined.  Ileostomy output 500 over 24hrs.   over 24hrs but pt appears to be taking large volumes of ice chips/sips.  AF  NAD  incision CDI, stoma productive, soft, NTND  Labs reviewed  A/P -  D/C NGT.  Trial of clears.  Slow advancement of diet.  Cont OOB with PT.

## 2022-10-21 NOTE — PROGRESS NOTE ADULT - RESPIRATORY
no wheezes/no rales/diminished breath sounds, L
clear to auscultation bilaterally/no wheezes/no rales/diminished breath sounds, L
no wheezes/no rales/diminished breath sounds, L
clear to auscultation bilaterally/no wheezes/no rales/diminished breath sounds, L
no wheezes/no rales/diminished breath sounds, L
no wheezes/no rales/diminished breath sounds, L
clear to auscultation bilaterally/no wheezes/no rales/diminished breath sounds, L
clear to auscultation bilaterally/no wheezes/no rales/diminished breath sounds, L

## 2022-10-21 NOTE — PROGRESS NOTE ADULT - ASSESSMENT
Patient is S/p proctocolectomy, IPAA, DLI for UC/Sigmoid CA, s/p POD 2 from IR drain. S/p cystoscopy w/ stent placement and [place] tip alfaro. AFVSS. Ostomy still functional.    Plan:  Remove NGT  Ice chips for comfort  Replete ostomy output 1:1  Wean off O2  Ambulation/PT  Pain control  Monitor bowel function   IR drain output  Serial abdominal exams  Appreciate  recs  Medical management by ICU team    Plan discussed with CRS team  Patient is S/p proctocolectomy, IPAA, DLI for UC/Sigmoid CA, s/p POD 2 from IR drain. S/p cystoscopy w/ stent placement and [place] tip Zhao. AFVSS. Ostomy still functional. HH stable, hypokalemia, JOSE, creatinine stable.    Plan:  Remove NGT  Ice chips for comfort  Replete ostomy output 1:1  Continue Eliquis  Wean off O2  Ambulation/PT  Pain control  Monitor bowel function   IR drain output  Serial abdominal exams  Appreciate  recs  Medical management by ICU team    Plan discussed with CRS team

## 2022-10-21 NOTE — PROGRESS NOTE ADULT - NEGATIVE RESPIRATORY AND THORAX SYMPTOMS
no dyspnea/no cough
no dyspnea/no cough
no wheezing/no cough
no wheezing/no cough
no dyspnea/no cough
no wheezing/no cough
no wheezing/no cough

## 2022-10-21 NOTE — PROGRESS NOTE ADULT - ASSESSMENT
A/P: 77 male S/P RCP-IPAA (restorative proctocolectomy with ileal pouch anal anastomosis), diverting loop ileostomy with Acute Hypoxic Respiratory Failure from a L sided Pneumonia and JOSE likely from ATN  FLORA  Obesity  AFIB  COPD    Plan:  ICU    PULM: Completed Antibiotics for L PNA.  Wean Fio2.  NIV QHS for FLORA.     Cardio:  Continue AC    Renal: JOSE improved    GI:  NPO, PPI BID. NGT removed.  Reglan, PPN.  No Growth so far from IR drainage.  Continue Zosyn for now    DVT prophylaxis with DOAC     OOB, PT    Transfer to Med Surg    D/W Surgery today

## 2022-10-21 NOTE — PROGRESS NOTE ADULT - SUBJECTIVE AND OBJECTIVE BOX
HPI: Patient is a 77 male S/P RCP-IPAA (restorative proctocolectomy with ileal pouch anal anastomosis), diverting loop ileostomy.  Patient seen in the ICU.  Patient has been on 100% NRB today for a L sided Pneumonia.  He also had to be resumed on pressors.  Patient remains in JOSE and oliguric.      10/8: On VM O2, pressors over night  10/9: VM O2/NC O2.  Off Pressors, coughing up dark sputum now, CXR reviewed and L side is improving, he denies vomiting or aspirating    10/14: Between 4 L NC and VM o2, CXR shows improvement sis i last saw the patient.    10/15: On NIV over night, NC O2 during the day.  H & H stable back on AC  10/16: Had NGT placed yesterday, 1,150 from NGT, ostomy functioning, no pain, cr rising   10/17: NGT drained over a L over night, ostomy 250  10/18: Zhao replaced in the OR last night.  NGT drained 600 over night  10/19: For IR Drainage, Post clamp 700 cc after 6 hours.  Then over iL over night  10/20: No more abdominal pain, NGT clamped, OOB  10/21: NGT out, no pain, sleeping poorly          PAST MEDICAL & SURGICAL HISTORY:  Ulcerative colitis      Colon cancer      Obstructive sleep apnea      Obesity      HTN (hypertension)      Atrial fibrillation      HLD (hyperlipidemia)      BPH (benign prostatic hyperplasia)      COPD (chronic obstructive pulmonary disease)      COVID-19 virus infection      History of pneumonia      H/O hernia repair      History of hydrocelectomy      H/O colonoscopy      H/O cystoscopy  urethral stricture      History of total knee replacement, bilateral      S/P placement of cardiac pacemaker      History of cataract surgery          FAMILY HISTORY:  Family history of cardiomyopathy (Father)        Social Hx:    Allergies    ACE inhibitors (Swelling)    Intolerances            ICU Vital Signs Last 24 Hrs  T(C): 36.5 (21 Oct 2022 08:00), Max: 37 (20 Oct 2022 20:00)  T(F): 97.7 (21 Oct 2022 08:00), Max: 98.6 (20 Oct 2022 20:00)  HR: 78 (21 Oct 2022 10:00) (60 - 89)  BP: 110/74 (21 Oct 2022 10:00) (78/52 - 135/77)  BP(mean): 87 (21 Oct 2022 10:00) (49 - 100)  ABP: --  ABP(mean): --  RR: 19 (21 Oct 2022 10:00) (14 - 28)  SpO2: 95% (21 Oct 2022 10:00) (89% - 99%)    O2 Parameters below as of 21 Oct 2022 10:00  Patient On (Oxygen Delivery Method): nasal cannula  O2 Flow (L/min): 4              I&O's Summary    20 Oct 2022 07:01  -  21 Oct 2022 07:00  --------------------------------------------------------  IN: 1230 mL / OUT: 2260 mL / NET: -1030 mL    21 Oct 2022 07:01  -  21 Oct 2022 11:23  --------------------------------------------------------  IN: 500 mL / OUT: 970 mL / NET: -470 mL                              8.0    5.28  )-----------( 313      ( 21 Oct 2022 05:27 )             26.4       10-21    139  |  103  |  26<H>  ----------------------------<  139<H>  3.4<L>   |  31  |  1.23    Ca    8.3<L>      21 Oct 2022 05:27  Phos  3.1     10-21  Mg     1.6     10-21    TPro  5.7<L>  /  Alb  2.0<L>  /  TBili  0.3  /  DBili  x   /  AST  17  /  ALT  16  /  AlkPhos  55  10-20                    MEDICATIONS  (STANDING):  acetaminophen   IVPB .. 1000 milliGRAM(s) IV Intermittent once  apixaban 5 milliGRAM(s) Oral every 12 hours  atorvastatin 20 milliGRAM(s) Oral at bedtime  budesonide 160 MICROgram(s)/formoterol 4.5 MICROgram(s) Inhaler 2 Puff(s) Inhalation two times a day  chlorhexidine 4% Liquid 1 Application(s) Topical <User Schedule>  dextrose 50% Injectable 25 Gram(s) IV Push once  dextrose 50% Injectable 12.5 Gram(s) IV Push once  dextrose 50% Injectable 25 Gram(s) IV Push once  dextrose Oral Gel 15 Gram(s) Oral once  fat emulsion (Fish Oil and Plant Based) 20% Infusion 0.8 Gm/kG/Day (41.67 mL/Hr) IV Continuous <Continuous>  finasteride 5 milliGRAM(s) Oral daily  glucagon  Injectable 1 milliGRAM(s) IntraMuscular once  influenza  Vaccine (HIGH DOSE) 0.7 milliLiter(s) IntraMuscular once  insulin lispro (ADMELOG) corrective regimen sliding scale   SubCutaneous every 6 hours  metoclopramide Injectable 10 milliGRAM(s) IV Push every 6 hours  pantoprazole  Injectable 40 milliGRAM(s) IV Push every 12 hours  Parenteral Nutrition - Adult 1 Each (83 mL/Hr) TPN Continuous <Continuous>  piperacillin/tazobactam IVPB.. 3.375 Gram(s) IV Intermittent every 8 hours  potassium chloride  10 mEq/100 mL IVPB 10 milliEquivalent(s) IV Intermittent every 1 hour  predniSONE   Tablet 4 milliGRAM(s) Oral daily  sodium chloride 0.9% lock flush 3 milliLiter(s) IV Push every 8 hours  tamsulosin 0.8 milliGRAM(s) Oral at bedtime  tiotropium 18 MICROgram(s) Capsule 1 Capsule(s) Inhalation daily    MEDICATIONS  (PRN):  acetaminophen     Tablet .. 650 milliGRAM(s) Oral every 6 hours PRN Mild Pain (1 - 3)  ALBUTerol    90 MICROgram(s) HFA Inhaler 2 Puff(s) Inhalation every 6 hours PRN Shortness of Breath and/or Wheezing  albuterol/ipratropium for Nebulization 3 milliLiter(s) Nebulizer every 6 hours PRN Shortness of Breath and/or Wheezing  morphine  - Injectable 2 milliGRAM(s) IV Push every 4 hours PRN Moderate Pain (4 - 6)  naloxone Injectable 0.1 milliGRAM(s) IV Push every 3 minutes PRN For ANY of the following changes in patient status:  A. RR LESS THAN 10 breaths per minute, B. Oxygen saturation LESS THAN 90%, C. Sedation score of 6  ondansetron Injectable 4 milliGRAM(s) IV Push every 6 hours PRN Nausea and/or Vomiting  oxybutynin 5 milliGRAM(s) Oral daily PRN Bladder spasms      DVT Prophylaxis: DOAC    Advanced Directives:  Discussed with:    Visit Information:    ** Time is exclusive of billed procedures and/or teaching and/or routine family updates.

## 2022-10-21 NOTE — CHART NOTE - NSCHARTNOTEFT_GEN_A_CORE
Clinical Nutrition PN Follow Up Note    *78yo male admitted with ulcerative colitis and colon CA now s/p open restorative proctocolectomy with IPPAA, diverting loop ileostomy, NGT to LWS with high outputs, ileostomy still functioning.  acute hypoxic resp failure from Lt sided PNA and JOSE likely from ATN.    *10/19-NGT continues to LWS - 3600 mL output; trialed clamp and failed.  will c/w PPN as pt remains NPO x 5 days with high nutr needs for wound healing. s/p surgery on 10/17 for cystoscopy.  *S/P IR for guided drainage (abdominal) on 10/19.     *Current status: Will c/w PPN for support. NPO status maintained. PPN day #6. NGT remains to LWS with high output.       *labs reviewed; hypokalemia and hypomagnesemia; will increase both.  bilirubin total WNL.  corrected Ca elevated, DO NOT supplement calcium outside of PN bag at this time. POCT WNL, however, pt received 4units of sliding scale outside PN bag, will increase insulin by 2units inside PN bag. triglyceride level WNL <400, to initiate 100g lipids.  10-21    139  |  103  |  26<H>  ----------------------------<  139<H>  3.4<L>   |  31  |  1.23    Ca    8.3<L>      21 Oct 2022 05:27  Phos  3.1     10-21  Mg     1.6     10-21    TPro  5.7<L>  /  Alb  2.0<L>  /  TBili  0.3  /  DBili  x   /  AST  17  /  ALT  16  /  AlkPhos  55  10-20    HbA1c: A1C with Estimated Average Glucose Result: 6.9 % (09-29-22 @ 14:21)  Glucose: POCT Blood Glucose.: 145 mg/dL (10-20-22 @ 05:40)  Lipid Panel: Date/Time: 10-18-22 @ 05:22  Triglycerides, Serum: 152    POCT Blood Glucose.: 143 mg/dL (21 Oct 2022 06:02)  POCT Blood Glucose.: 157 mg/dL (21 Oct 2022 00:20)  POCT Blood Glucose.: 153 mg/dL (20 Oct 2022 19:26)  POCT Blood Glucose.: 149 mg/dL (20 Oct 2022 11:59)    *pertinent meds: atorvastatin, admelog sliding scale (received 4 units in last 24 hours), protonix, prednisone, zofran, tylenol    *I&O's Detail    20 Oct 2022 07:01  -  21 Oct 2022 07:00  --------------------------------------------------------  IN:    Fat Emulsion (Fish Oil &amp; Plant Based) 20% Infusion: 203 mL    IV PiggyBack: 100 mL    PPN (Peripheral Parenteral Nutrition): 927 mL  Total IN: 1230 mL    OUT:    Bulb (mL): 10 mL    Gastric Aspirate - Discarded (mL): 950 mL    Ileostomy (mL): 500 mL    Indwelling Catheter - Urethral (mL): 800 mL  Total OUT: 2260 mL    Total NET: -1030 mL      *negative fluid status: minimal ostomy output.  NGT output large.  *BM (+) on 10/21, loose/ liquid via ostomy .  pt not on bowel regimen.  no edema documented.      *Malnutrition: now meets criteria for severe malnutrition in acute illness r/t decreased ability to meet increased nutr needs s/p GI surgery AEB meeting <50% of nutr needs x 9days, moderate edema    Estimated Needs: based on 87Kg (adjusted BW)  Calories: 2028-9930 Kcal (25-30 Kcal/Kg)  Protein: 139-157 g (1.6-1.8 g/Kg)  Fluids: 0245-3068  mL (25-30 mL/Kg)    Diet, NPO:   Except Medications (10-16-22 @ 08:23) [Active]      PPN Recommendations: via peripheral line  Total Volume: 2000mL  85 g  Amino Acids  100 g Dextrose  100 g Lipids 20%  136 mEq Sodium Chloride  12 mEq Sodium Acetate  15 mmol Sodium Phosphate  20 mEq Potassium Chloride  20 mEq Potassium Acetate  0 mmol Potassium Phosphate  0 mEq Calcium Gluconate ( corrected Ca elevated, DO NOT supplement calcium outside of PN bag at this time. )  8 mEq Magnesium Sulfate  200 mg Thiamine  8 units regular insulin  1 ml Trace Elements  10 ml MVI    Total Calories  1680 Kcal  (Meets 70%  of  Estimated Energy needs  and  61%  Protein needs)   (osmolarity 900)    Additional Recommendations:    1) Daily weights  2) Strict I & O's  3) Daily lyte checks including magnesium and phos  4) Weekly triglycerides/LFT checks  5) POCT q6hrs; maintain 140-180mg/dL  6) if on PN > 6 days, consider placing PICC line to meet 100% of nutr needs    *will monitor and adjust treatement plan prn*  Ania Anderson MA, RDN, CDN, CNSC [phone]  Nutrition

## 2022-10-21 NOTE — PROGRESS NOTE ADULT - GENERAL
negative
details…
negative
details…
negative
details…
negative
details…
negative

## 2022-10-21 NOTE — PROGRESS NOTE ADULT - SUBJECTIVE AND OBJECTIVE BOX
Pt. seen and examined at bedside this morning. Patient reports no abdominal pain. He denies fever, chest pain, SOB, nausea, vomiting.    Vitals:  T(C): 36.5 (10-21 @ 08:00), Max: 37 (10-20 @ 20:00)  HR: 83 (10-21 @ 08:) (60 - 95)  BP: 103/56 (10-21 @ 08:00) (78/52 - 135/77)  RR: 19 (10-21 @ 08:00) (14 - 28)  SpO2: 94% (10-21 @ 08:) (88% - 99%)    10-20 @ 07:  -  10-21 @ 07:00  --------------------------------------------------------  IN:    Fat Emulsion (Fish Oil &amp; Plant Based) 20% Infusion: 203 mL    IV PiggyBack: 100 mL    PPN (Peripheral Parenteral Nutrition): 927 mL  Total IN: 1230 mL    OUT:    Bulb (mL): 10 mL    Gastric Aspirate - Discarded (mL): 950 mL    Ileostomy (mL): 500 mL    Indwelling Catheter - Urethral (mL): 800 mL  Total OUT: 2260 mL    Total NET: -1030 mL      10-21 @ 07:01  -  10-21 @ 08:49  --------------------------------------------------------  IN:    PPN (Peripheral Parenteral Nutrition): 500 mL  Total IN: 500 mL    OUT:    Bulb (mL): 20 mL    Gastric Aspirate - Discarded (mL): 150 mL    Ileostomy (mL): 100 mL    Indwelling Catheter - Urethral (mL): 700 mL  Total OUT: 970 mL    Total NET: -470 mL    PHYSICAL EXAM   GENERAL: NAD, AOx3, well developed, NGT in place  HEAD: Atraumatic, normocephalic  EYES: EOMI, PERRLA, conjunctiva and sclera clear  ENT: moist mucous membrane  NECK: supple, No JVD, midline trachea  CHEST/LUNG: unlabored respirations b/l on NC  Heart: RRR, normotensive  ABDOMEN: obese, soft, nondistended, nontender. ostomy functioning, liquid stool  NERVOUS SYSTEM: AOx3, speech clear, no neuro-deficits  MSK: full ROM, no deformities  SKIN: warm to touch, no rash or lesions  10-21 @ 05:27                    8.0  CBC: 5.28>)-------(<313                     26.4                 139 | 103 | 26    CMP:  ----------------------< 139               3.4 | 31 | 1.23                      Ca:8.3  Phos:3.1  M.6               -|      |-        LFTs:  ------|-|-----             -|      |-  10-20 @ 05:32                    8.2  CBC: 5.68>)-------(<368                     27.7                 140 | 103 | 28    CMP:  ----------------------< 141               3.6 | 31 | 1.23                      Ca:8.7  Phos:3.2  M.8               0.3|      |17        LFTs:  ------|55|-----             -|      |-      Culture - Abscess with Gram Stain (collected 10-19-22 @ 12:20)  Source: .Abscess abdominal collection  Preliminary Report (10-20-22 @ 18:03):    No growth      Current Inpatient Medications:  acetaminophen     Tablet .. 650 milliGRAM(s) Oral every 6 hours PRN  acetaminophen   IVPB .. 1000 milliGRAM(s) IV Intermittent once  ALBUTerol    90 MICROgram(s) HFA Inhaler 2 Puff(s) Inhalation every 6 hours PRN  albuterol/ipratropium for Nebulization 3 milliLiter(s) Nebulizer every 6 hours PRN  apixaban 5 milliGRAM(s) Oral every 12 hours  atorvastatin 20 milliGRAM(s) Oral at bedtime  budesonide 160 MICROgram(s)/formoterol 4.5 MICROgram(s) Inhaler 2 Puff(s) Inhalation two times a day  chlorhexidine 4% Liquid 1 Application(s) Topical <User Schedule>  dextrose 50% Injectable 25 Gram(s) IV Push once  dextrose 50% Injectable 12.5 Gram(s) IV Push once  dextrose 50% Injectable 25 Gram(s) IV Push once  dextrose Oral Gel 15 Gram(s) Oral once  fat emulsion (Fish Oil and Plant Based) 20% Infusion 0.8 Gm/kG/Day (41.67 mL/Hr) IV Continuous <Continuous>  finasteride 5 milliGRAM(s) Oral daily  glucagon  Injectable 1 milliGRAM(s) IntraMuscular once  influenza  Vaccine (HIGH DOSE) 0.7 milliLiter(s) IntraMuscular once  insulin lispro (ADMELOG) corrective regimen sliding scale   SubCutaneous every 6 hours  metoclopramide Injectable 10 milliGRAM(s) IV Push every 6 hours  morphine  - Injectable 2 milliGRAM(s) IV Push every 4 hours PRN  naloxone Injectable 0.1 milliGRAM(s) IV Push every 3 minutes PRN  ondansetron Injectable 4 milliGRAM(s) IV Push every 6 hours PRN  oxybutynin 5 milliGRAM(s) Oral daily PRN  pantoprazole  Injectable 40 milliGRAM(s) IV Push every 12 hours  Parenteral Nutrition - Adult 1 Each (83 mL/Hr) TPN Continuous <Continuous>  piperacillin/tazobactam IVPB.. 3.375 Gram(s) IV Intermittent every 8 hours  potassium chloride  10 mEq/100 mL IVPB 10 milliEquivalent(s) IV Intermittent every 1 hour  predniSONE   Tablet 4 milliGRAM(s) Oral daily  sodium chloride 0.9% lock flush 3 milliLiter(s) IV Push every 8 hours  tamsulosin 0.8 milliGRAM(s) Oral at bedtime  tiotropium 18 MICROgram(s) Capsule 1 Capsule(s) Inhalation daily

## 2022-10-21 NOTE — PROGRESS NOTE ADULT - NEGATIVE CARDIOVASCULAR SYMPTOMS
no chest pain/no palpitations
no chest pain/no palpitations/no dyspnea on exertion/no peripheral edema
no chest pain/no palpitations
no chest pain/no palpitations/no dyspnea on exertion/no peripheral edema
no chest pain/no palpitations
no chest pain/no palpitations

## 2022-10-22 LAB
ANION GAP SERPL CALC-SCNC: 6 MMOL/L — SIGNIFICANT CHANGE UP (ref 5–17)
BUN SERPL-MCNC: 20 MG/DL — SIGNIFICANT CHANGE UP (ref 7–23)
CALCIUM SERPL-MCNC: 8.2 MG/DL — LOW (ref 8.5–10.1)
CHLORIDE SERPL-SCNC: 104 MMOL/L — SIGNIFICANT CHANGE UP (ref 96–108)
CO2 SERPL-SCNC: 29 MMOL/L — SIGNIFICANT CHANGE UP (ref 22–31)
CREAT SERPL-MCNC: 1.11 MG/DL — SIGNIFICANT CHANGE UP (ref 0.5–1.3)
EGFR: 69 ML/MIN/1.73M2 — SIGNIFICANT CHANGE UP
GLUCOSE SERPL-MCNC: 153 MG/DL — HIGH (ref 70–99)
HCT VFR BLD CALC: 25.5 % — LOW (ref 39–50)
HGB BLD-MCNC: 7.8 G/DL — LOW (ref 13–17)
MAGNESIUM SERPL-MCNC: 1.6 MG/DL — SIGNIFICANT CHANGE UP (ref 1.6–2.6)
MCHC RBC-ENTMCNC: 26.2 PG — LOW (ref 27–34)
MCHC RBC-ENTMCNC: 30.6 GM/DL — LOW (ref 32–36)
MCV RBC AUTO: 85.6 FL — SIGNIFICANT CHANGE UP (ref 80–100)
PHOSPHATE SERPL-MCNC: 3 MG/DL — SIGNIFICANT CHANGE UP (ref 2.5–4.5)
PLATELET # BLD AUTO: 271 K/UL — SIGNIFICANT CHANGE UP (ref 150–400)
POTASSIUM SERPL-MCNC: 3.6 MMOL/L — SIGNIFICANT CHANGE UP (ref 3.5–5.3)
POTASSIUM SERPL-SCNC: 3.6 MMOL/L — SIGNIFICANT CHANGE UP (ref 3.5–5.3)
RBC # BLD: 2.98 M/UL — LOW (ref 4.2–5.8)
RBC # FLD: 17.8 % — HIGH (ref 10.3–14.5)
SODIUM SERPL-SCNC: 139 MMOL/L — SIGNIFICANT CHANGE UP (ref 135–145)
WBC # BLD: 5.82 K/UL — SIGNIFICANT CHANGE UP (ref 3.8–10.5)
WBC # FLD AUTO: 5.82 K/UL — SIGNIFICANT CHANGE UP (ref 3.8–10.5)

## 2022-10-22 PROCEDURE — 99233 SBSQ HOSP IP/OBS HIGH 50: CPT

## 2022-10-22 RX ORDER — LANOLIN ALCOHOL/MO/W.PET/CERES
5 CREAM (GRAM) TOPICAL AT BEDTIME
Refills: 0 | Status: DISCONTINUED | OUTPATIENT
Start: 2022-10-22 | End: 2022-11-02

## 2022-10-22 RX ORDER — I.V. FAT EMULSION 20 G/100ML
0.79 EMULSION INTRAVENOUS
Qty: 100 | Refills: 0 | Status: DISCONTINUED | OUTPATIENT
Start: 2022-10-22 | End: 2022-10-23

## 2022-10-22 RX ORDER — PSYLLIUM SEED (WITH DEXTROSE)
1 POWDER (GRAM) ORAL
Refills: 0 | Status: DISCONTINUED | OUTPATIENT
Start: 2022-10-22 | End: 2022-11-02

## 2022-10-22 RX ORDER — ELECTROLYTE SOLUTION,INJ
1 VIAL (ML) INTRAVENOUS
Refills: 0 | Status: DISCONTINUED | OUTPATIENT
Start: 2022-10-22 | End: 2022-10-23

## 2022-10-22 RX ORDER — LOPERAMIDE HCL 2 MG
4 TABLET ORAL
Refills: 0 | Status: DISCONTINUED | OUTPATIENT
Start: 2022-10-22 | End: 2022-11-02

## 2022-10-22 RX ADMIN — Medication 5 MILLIGRAM(S): at 01:57

## 2022-10-22 RX ADMIN — Medication 10 MILLIGRAM(S): at 05:43

## 2022-10-22 RX ADMIN — FINASTERIDE 5 MILLIGRAM(S): 5 TABLET, FILM COATED ORAL at 09:38

## 2022-10-22 RX ADMIN — TIOTROPIUM BROMIDE 1 CAPSULE(S): 18 CAPSULE ORAL; RESPIRATORY (INHALATION) at 08:15

## 2022-10-22 RX ADMIN — Medication 4 MILLIGRAM(S): at 09:38

## 2022-10-22 RX ADMIN — APIXABAN 5 MILLIGRAM(S): 2.5 TABLET, FILM COATED ORAL at 21:25

## 2022-10-22 RX ADMIN — PIPERACILLIN AND TAZOBACTAM 25 GRAM(S): 4; .5 INJECTION, POWDER, LYOPHILIZED, FOR SOLUTION INTRAVENOUS at 15:20

## 2022-10-22 RX ADMIN — TAMSULOSIN HYDROCHLORIDE 0.8 MILLIGRAM(S): 0.4 CAPSULE ORAL at 21:26

## 2022-10-22 RX ADMIN — APIXABAN 5 MILLIGRAM(S): 2.5 TABLET, FILM COATED ORAL at 09:38

## 2022-10-22 RX ADMIN — ATORVASTATIN CALCIUM 20 MILLIGRAM(S): 80 TABLET, FILM COATED ORAL at 21:25

## 2022-10-22 RX ADMIN — Medication 1 EACH: at 23:20

## 2022-10-22 RX ADMIN — Medication 4 MILLIGRAM(S): at 17:27

## 2022-10-22 RX ADMIN — I.V. FAT EMULSION 41.67 GM/KG/DAY: 20 EMULSION INTRAVENOUS at 23:19

## 2022-10-22 RX ADMIN — Medication 650 MILLIGRAM(S): at 06:30

## 2022-10-22 RX ADMIN — Medication 4 MILLIGRAM(S): at 12:00

## 2022-10-22 RX ADMIN — PIPERACILLIN AND TAZOBACTAM 25 GRAM(S): 4; .5 INJECTION, POWDER, LYOPHILIZED, FOR SOLUTION INTRAVENOUS at 05:42

## 2022-10-22 RX ADMIN — SODIUM CHLORIDE 3 MILLILITER(S): 9 INJECTION INTRAMUSCULAR; INTRAVENOUS; SUBCUTANEOUS at 05:50

## 2022-10-22 RX ADMIN — Medication 2: at 11:58

## 2022-10-22 RX ADMIN — Medication 4: at 17:27

## 2022-10-22 RX ADMIN — CHLORHEXIDINE GLUCONATE 1 APPLICATION(S): 213 SOLUTION TOPICAL at 08:58

## 2022-10-22 RX ADMIN — PANTOPRAZOLE SODIUM 40 MILLIGRAM(S): 20 TABLET, DELAYED RELEASE ORAL at 09:39

## 2022-10-22 RX ADMIN — PANTOPRAZOLE SODIUM 40 MILLIGRAM(S): 20 TABLET, DELAYED RELEASE ORAL at 21:26

## 2022-10-22 RX ADMIN — SODIUM CHLORIDE 3 MILLILITER(S): 9 INJECTION INTRAMUSCULAR; INTRAVENOUS; SUBCUTANEOUS at 13:29

## 2022-10-22 RX ADMIN — Medication 2: at 22:59

## 2022-10-22 RX ADMIN — SODIUM CHLORIDE 3 MILLILITER(S): 9 INJECTION INTRAMUSCULAR; INTRAVENOUS; SUBCUTANEOUS at 19:47

## 2022-10-22 RX ADMIN — BUDESONIDE AND FORMOTEROL FUMARATE DIHYDRATE 2 PUFF(S): 160; 4.5 AEROSOL RESPIRATORY (INHALATION) at 08:16

## 2022-10-22 RX ADMIN — BUDESONIDE AND FORMOTEROL FUMARATE DIHYDRATE 2 PUFF(S): 160; 4.5 AEROSOL RESPIRATORY (INHALATION) at 22:17

## 2022-10-22 RX ADMIN — Medication 1 PACKET(S): at 21:25

## 2022-10-22 RX ADMIN — Medication 5 MILLIGRAM(S): at 21:25

## 2022-10-22 RX ADMIN — Medication 2: at 05:42

## 2022-10-22 RX ADMIN — PIPERACILLIN AND TAZOBACTAM 25 GRAM(S): 4; .5 INJECTION, POWDER, LYOPHILIZED, FOR SOLUTION INTRAVENOUS at 23:02

## 2022-10-22 RX ADMIN — Medication 4 MILLIGRAM(S): at 21:26

## 2022-10-22 NOTE — PROGRESS NOTE ADULT - ASSESSMENT
Patient is S/p proctocolectomy, IPAA, DLI for UC/Sigmoid CA, IR drain for collection. S/p cystoscopy w/ stent placement and [place] tip Zhao.   AFVSS.   Ostomy functional      Plan:  CLD  Replete ostomy output 1:1  Continue Eliquis  Wean off O2  Ambulation/PT  Pain control  Monitor bowel function   IR drain output 20 cc SS  Serial abdominal exams  Appreciate  recs    Plan discussed with Dr. Wagner

## 2022-10-22 NOTE — PROGRESS NOTE ADULT - SUBJECTIVE AND OBJECTIVE BOX
Pt. seen and examined at bedside this morning. Patient reports no abdominal pain. He denies fever, chest pain, SOB, nausea, vomiting.      PHYSICAL EXAM   GENERAL: NAD, AOx3, well developed  HEAD: Atraumatic, normocephalic  EYES: EOMI, PERRLA, conjunctiva and sclera clear  ENT: moist mucous membrane  NECK: supple, No JVD, midline trachea  CHEST/LUNG: unlabored respirations b/l on NC  Heart: RRR, normotensive  ABDOMEN: obese, soft, nondistended, nontender. ostomy functioning  NERVOUS SYSTEM: AOx3, speech clear, no neuro-deficits  MSK: full ROM, no deformities  SKIN: warm to touch, no rash or lesions        MEDICATIONS  (STANDING):  acetaminophen   IVPB .. 1000 milliGRAM(s) IV Intermittent once  apixaban 5 milliGRAM(s) Oral every 12 hours  atorvastatin 20 milliGRAM(s) Oral at bedtime  budesonide 160 MICROgram(s)/formoterol 4.5 MICROgram(s) Inhaler 2 Puff(s) Inhalation two times a day  chlorhexidine 4% Liquid 1 Application(s) Topical <User Schedule>  dextrose 50% Injectable 25 Gram(s) IV Push once  dextrose 50% Injectable 12.5 Gram(s) IV Push once  dextrose 50% Injectable 25 Gram(s) IV Push once  dextrose Oral Gel 15 Gram(s) Oral once  fat emulsion (Fish Oil and Plant Based) 20% Infusion 0.79 Gm/kG/Day (41.5 mL/Hr) IV Continuous <Continuous>  finasteride 5 milliGRAM(s) Oral daily  glucagon  Injectable 1 milliGRAM(s) IntraMuscular once  influenza  Vaccine (HIGH DOSE) 0.7 milliLiter(s) IntraMuscular once  insulin lispro (ADMELOG) corrective regimen sliding scale   SubCutaneous every 6 hours  melatonin 5 milliGRAM(s) Oral at bedtime  metoclopramide Injectable 10 milliGRAM(s) IV Push every 6 hours  pantoprazole  Injectable 40 milliGRAM(s) IV Push every 12 hours  Parenteral Nutrition - Adult 1 Each (83 mL/Hr) TPN Continuous <Continuous>  piperacillin/tazobactam IVPB.. 3.375 Gram(s) IV Intermittent every 8 hours  predniSONE   Tablet 4 milliGRAM(s) Oral daily  sodium chloride 0.9% lock flush 3 milliLiter(s) IV Push every 8 hours  tamsulosin 0.8 milliGRAM(s) Oral at bedtime  tiotropium 18 MICROgram(s) Capsule 1 Capsule(s) Inhalation daily    MEDICATIONS  (PRN):  acetaminophen     Tablet .. 650 milliGRAM(s) Oral every 6 hours PRN Mild Pain (1 - 3)  ALBUTerol    90 MICROgram(s) HFA Inhaler 2 Puff(s) Inhalation every 6 hours PRN Shortness of Breath and/or Wheezing  albuterol/ipratropium for Nebulization 3 milliLiter(s) Nebulizer every 6 hours PRN Shortness of Breath and/or Wheezing  morphine  - Injectable 2 milliGRAM(s) IV Push every 4 hours PRN Moderate Pain (4 - 6)  naloxone Injectable 0.1 milliGRAM(s) IV Push every 3 minutes PRN For ANY of the following changes in patient status:  A. RR LESS THAN 10 breaths per minute, B. Oxygen saturation LESS THAN 90%, C. Sedation score of 6  ondansetron Injectable 4 milliGRAM(s) IV Push every 6 hours PRN Nausea and/or Vomiting  oxybutynin 5 milliGRAM(s) Oral daily PRN Bladder spasms      PAST MEDICAL & SURGICAL HISTORY:  Ulcerative colitis      Colon cancer      Obstructive sleep apnea      Obesity      HTN (hypertension)      Atrial fibrillation      HLD (hyperlipidemia)      BPH (benign prostatic hyperplasia)      COPD (chronic obstructive pulmonary disease)      COVID-19 virus infection      History of pneumonia      H/O hernia repair      History of hydrocelectomy      H/O colonoscopy      H/O cystoscopy  urethral stricture      History of total knee replacement, bilateral      S/P placement of cardiac pacemaker      History of cataract surgery          Vital Signs Last 24 Hrs  T(C): 36.8 (22 Oct 2022 04:32), Max: 36.8 (22 Oct 2022 04:32)  T(F): 98.3 (22 Oct 2022 04:32), Max: 98.3 (22 Oct 2022 04:32)  HR: 66 (22 Oct 2022 04:32) (66 - 87)  BP: 121/66 (22 Oct 2022 04:32) (78/52 - 122/73)  BP(mean): 71 (21 Oct 2022 13:00) (57 - 111)  RR: 18 (22 Oct 2022 04:32) (17 - 24)  SpO2: 96% (22 Oct 2022 04:32) (84% - 98%)    Parameters below as of 22 Oct 2022 04:32  Patient On (Oxygen Delivery Method): room air        I&O's Summary    20 Oct 2022 07:01  -  21 Oct 2022 07:00  --------------------------------------------------------  IN: 1230 mL / OUT: 2260 mL / NET: -1030 mL    21 Oct 2022 07:01  -  22 Oct 2022 06:08  --------------------------------------------------------  IN: 1262 mL / OUT: 4145 mL / NET: -2883 mL                              7.8    5.82  )-----------( 271      ( 22 Oct 2022 04:57 )             25.5     10-22    139  |  104  |  20  ----------------------------<  153<H>  3.6   |  29  |  1.11    Ca    8.2<L>      22 Oct 2022 04:57  Phos  3.0     10-22  Mg     1.6     10-22            Culture - Abscess with Gram Stain (collected 10-19-22 @ 12:20)  Source: .Abscess abdominal collection  Preliminary Report (10-20-22 @ 18:03):    No growth

## 2022-10-22 NOTE — CHART NOTE - NSCHARTNOTEFT_GEN_A_CORE
Clinical Nutrition PN Follow Up Note    *78yo male admitted with ulcerative colitis and colon CA now s/p open restorative proctocolectomy with IPPAA, diverting loop ileostomy, NGT to LWS with high outputs, ileostomy still functioning.  acute hypoxic resp failure from Lt sided PNA and JOSE likely from ATN.    *10/19-NGT continues to LWS - 3600 mL output; trialed clamp and failed.  will c/w PPN as pt remains NPO x 5 days with high nutr needs for wound healing. s/p surgery on 10/17 for cystoscopy.  *S/P IR for guided drainage (abdominal) on 10/19.     *Current status: Will c/w PPN for support. PPN day #7. pt advanced to clear liquids with high output from ileostomy (2000mL).  d/w surgery, will c/w PPN.  rec'd c/w PPN until pt is meeting >60% of nutr needs from PO diet.    *labs reviewed; potassium and magnesium remain at lower limit (potassium was supplemented outside of PN bag by 30mEq on 10/21); will increase both.  bilirubin total WNL.  corrected Ca elevated, DO NOT supplement calcium outside of PN bag at this time. POCT WNL, however, pt received 2units of sliding scale outside PN bag, will increase insulin by 1units inside PN bag. triglyceride level WNL <400, to initiate 100g lipids.  10-22    139  |  104  |  20  ----------------------------<  153<H>  3.6   |  29  |  1.11    Ca    8.2<L>      22 Oct 2022 04:57  Phos  3.0     10-22  Mg     1.6     10-22    HbA1c: A1C with Estimated Average Glucose Result: 6.9 % (09-29-22 @ 14:21)  Glucose: POCT Blood Glucose.: 145 mg/dL (10-20-22 @ 05:40)  Lipid Panel: Date/Time: 10-18-22 @ 05:22  Triglycerides, Serum: 152    POCT Blood Glucose.: 174 mg/dL (22 Oct 2022 05:40)  POCT Blood Glucose.: 170 mg/dL (21 Oct 2022 23:02)  POCT Blood Glucose.: 163 mg/dL (21 Oct 2022 17:03)  POCT Blood Glucose.: 147 mg/dL (21 Oct 2022 13:14)      *pertinent meds: atorvastatin, admelog sliding scale (received 2 units in last 24 hours), protonix, prednisone, zofran, tylenol    *I&O's Detail    21 Oct 2022 07:01  -  22 Oct 2022 07:00  --------------------------------------------------------  IN:    IV PiggyBack: 300 mL    PPN (Peripheral Parenteral Nutrition): 962 mL  Total IN: 1262 mL    OUT:    Bulb (mL): 40 mL    Gastric Aspirate - Discarded (mL): 150 mL    Ileostomy (mL): 2075 mL    Indwelling Catheter - Urethral (mL): 2400 mL  Total OUT: 4665 mL    Total NET: -3403 mL      *negative fluid status: high ostomy output.  NGT taken out.  pt received 2000mL of PPN on 10/21, not fully documented, still remains in negative fluid status of -2365mL.  will c/w 2000mL volume of PPN for now and monitor closely.  *BM (+) on 10/22, loose/ liquid via ostomy .  pt not on bowel regimen.  (+2) Lt/Rt ankle/foot edema documented.      *Malnutrition: now meets criteria for severe malnutrition in acute illness r/t decreased ability to meet increased nutr needs s/p GI surgery AEB meeting <50% of nutr needs x 9days, moderate edema    Estimated Needs: based on 87Kg (adjusted BW)  Calories: 1772-8617 Kcal (25-30 Kcal/Kg)  Protein: 139-157 g (1.6-1.8 g/Kg)  Fluids: 6903-2356  mL (25-30 mL/Kg)    Diet, Clear Liquid:   Ensure Clear Cans or Servings Per Day:  1       Frequency:  Three Times a day     Special Instructions for Nursing:  To include 1 Ensure clear, 1 Lemon Ice, 2 cups of hot water to make tea or broth.  Caffeinated coffee is permissible (10-21-22 @ 14:07)    PPN Recommendations: via peripheral line  Total Volume: 2000mL  85 g  Amino Acids  100 g Dextrose  100 g Lipids 20%  136 mEq Sodium Chloride  12 mEq Sodium Acetate  15 mmol Sodium Phosphate  25 mEq Potassium Chloride  25 mEq Potassium Acetate  0 mmol Potassium Phosphate  0 mEq Calcium Gluconate ( corrected Ca elevated, DO NOT supplement calcium outside of PN bag at this time. )  10 mEq Magnesium Sulfate  200 mg Thiamine  9 units regular insulin  1 ml Trace Elements  10 ml MVI    Total Calories  1680 Kcal  (Meets 70%  of  Estimated Energy needs  and  61%  Protein needs)   (osmolarity 900)    Additional Recommendations:    1) Daily weights  2) Strict I & O's  3) Daily lyte checks including magnesium and phos  4) Weekly triglycerides/LFT checks  5) POCT q6hrs; maintain 140-180mg/dL  6) if on PN > 6 days, consider placing PICC line to meet 100% of nutr needs  7) ADAT to low fiber with ensure max BID    *will monitor and adjust treatement plan prn*  Ania Anderson MA, RDN, CDN, CNSC [phone]  Nutrition

## 2022-10-22 NOTE — CONSULT NOTE ADULT - ASSESSMENT
78 yo male with h/o AF, PPM, HTN, UC, newly Dx colon CA, HTN, COPD (long smoking Hx), obesity and hyperlipidemia found to have a colon mass and CA and admitted for resection.  He underwent successful surgery on 10/6/22 complicated by acute hypoxic respiratory failure secondary to L sided pneumonia which improved with NIPPV, septic shock requiring pressors due to fecal spillage peritonitis, and JOSE likely from ATN. Was started on Zosyn. Off pressors 10/9. On 10/10 patient was transfused 2u pRBC after drop in h/h and noted with ?coffee ground content in ileostomy. Improved respiratory status, weaned to supplemental O2 via NC. Had urethral cath placed in OR by urology for urinary retention found to have bladder neck contracture. Underwent IR procedure for abd drain placement, found to have fluid collection, with 8cc aspirate, culture has been negative. Parenteral nutrition started on 10/17/22. Patient downgraded from ICU 10/21/22. Hospitalist consulted for continued medical management.  76 yo male with h/o AF, PPM, HTN, UC, newly Dx colon CA, HTN, COPD (long smoking Hx), obesity and hyperlipidemia found to have a colon mass and CA and admitted for resection.  He underwent successful surgery on 10/6/22 complicated by acute hypoxic respiratory failure secondary to L sided pneumonia which improved with NIPPV, septic shock requiring pressors due to fecal spillage peritonitis, and JOSE likely from ATN. Was started on Zosyn. Off pressors 10/9. On 10/10 patient was transfused 2u pRBC after drop in h/h and noted with ?coffee ground content in ileostomy. Improved respiratory status, weaned to supplemental O2 via NC. Had urethral cath placed in OR by urology for urinary retention found to have bladder neck contracture. Underwent IR procedure for abd drain placement, found to have fluid collection, with 8cc aspirate, culture has been negative. Parenteral nutrition started on 10/17/22. Patient downgraded from ICU 10/21/22. Hospitalist consulted for continued medical management.     #Colon mass   -S/P RCP-IPAA (restorative proctocolectomy with ileal pouch anal anastomosis), diverting loop ileostomy 10/6  -C/B acute hypoxic respiratory failure due to pneumonia/Septic shock/JOSE/fecal spillage peritonitis   -Off pressors, given stress dose steroids, now tapering, on prednisone 4mg daily   -Completed zosyn 10 days for PNA  -S/p IR drainage of fluid collection on 10/19, restarted zosyn, cultures have been negative >48hrs - day #4, will complete 5 days  -Started PPN on 10/17, now tolerating clear liquids, if pt does not tolerate advancing diet, may need PICC to continue PPB - today is day #6   -Advance diet as per primary team   -Daily weights, electrolytes, including Mg and phos, maintain -180  -Pain management as per orders  -F/u PT eval     #HTN/AFIB/PPM/HLD  -Continue Eliquis 5mg q12hrs   -Continue lipitor 20mg qhs  -Off antihypertensives here, bp stable  -On amlodipine 2.5mg, Metoprolol ER 25mg daily     #COPD  -Stable  -Continue Prednisone 4mg daily, Symbicort, Spiriva Albuterol HFA PRN     #Urinary retention  -Alfaro's in place  -Continue tamsulosin 0.8mg qhs, finasteride 5mg daily   -Urology consult appreciated: d/c home with alfaro's    VTE prophylaxis  -On eliquis     Dispo: continue medical management for now, PT aundrea, D/c planning

## 2022-10-22 NOTE — PROGRESS NOTE ADULT - ATTENDING COMMENTS
Seen and examined.  Better spirits.  Km clear liquids, ileostomy 2000L.  AFVSS  NAD  incision CDI, stoma productive, soft, NT, baseline distention  Labs reviewed  A/P -   Advance to full liquid diet.  Start imodium 2 tabs po qachs.  Cont PPN.  PT/OT.  D/C planning.

## 2022-10-23 LAB
ALBUMIN SERPL ELPH-MCNC: 2.1 G/DL — LOW (ref 3.3–5)
ALP SERPL-CCNC: 51 U/L — SIGNIFICANT CHANGE UP (ref 40–120)
ALT FLD-CCNC: 17 U/L — SIGNIFICANT CHANGE UP (ref 12–78)
ANION GAP SERPL CALC-SCNC: 4 MMOL/L — LOW (ref 5–17)
AST SERPL-CCNC: 19 U/L — SIGNIFICANT CHANGE UP (ref 15–37)
BILIRUB SERPL-MCNC: 0.3 MG/DL — SIGNIFICANT CHANGE UP (ref 0.2–1.2)
BUN SERPL-MCNC: 19 MG/DL — SIGNIFICANT CHANGE UP (ref 7–23)
CALCIUM SERPL-MCNC: 8.2 MG/DL — LOW (ref 8.5–10.1)
CHLORIDE SERPL-SCNC: 104 MMOL/L — SIGNIFICANT CHANGE UP (ref 96–108)
CO2 SERPL-SCNC: 30 MMOL/L — SIGNIFICANT CHANGE UP (ref 22–31)
CREAT SERPL-MCNC: 1.15 MG/DL — SIGNIFICANT CHANGE UP (ref 0.5–1.3)
EGFR: 66 ML/MIN/1.73M2 — SIGNIFICANT CHANGE UP
GLUCOSE SERPL-MCNC: 145 MG/DL — HIGH (ref 70–99)
HCT VFR BLD CALC: 25.4 % — LOW (ref 39–50)
HGB BLD-MCNC: 7.6 G/DL — LOW (ref 13–17)
MAGNESIUM SERPL-MCNC: 1.7 MG/DL — SIGNIFICANT CHANGE UP (ref 1.6–2.6)
MCHC RBC-ENTMCNC: 25.8 PG — LOW (ref 27–34)
MCHC RBC-ENTMCNC: 29.9 GM/DL — LOW (ref 32–36)
MCV RBC AUTO: 86.1 FL — SIGNIFICANT CHANGE UP (ref 80–100)
PHOSPHATE SERPL-MCNC: 2.8 MG/DL — SIGNIFICANT CHANGE UP (ref 2.5–4.5)
PLATELET # BLD AUTO: 252 K/UL — SIGNIFICANT CHANGE UP (ref 150–400)
POTASSIUM SERPL-MCNC: 3.8 MMOL/L — SIGNIFICANT CHANGE UP (ref 3.5–5.3)
POTASSIUM SERPL-SCNC: 3.8 MMOL/L — SIGNIFICANT CHANGE UP (ref 3.5–5.3)
PROT SERPL-MCNC: 5.9 GM/DL — LOW (ref 6–8.3)
RBC # BLD: 2.95 M/UL — LOW (ref 4.2–5.8)
RBC # FLD: 17.9 % — HIGH (ref 10.3–14.5)
SODIUM SERPL-SCNC: 138 MMOL/L — SIGNIFICANT CHANGE UP (ref 135–145)
WBC # BLD: 5.08 K/UL — SIGNIFICANT CHANGE UP (ref 3.8–10.5)
WBC # FLD AUTO: 5.08 K/UL — SIGNIFICANT CHANGE UP (ref 3.8–10.5)

## 2022-10-23 PROCEDURE — 99232 SBSQ HOSP IP/OBS MODERATE 35: CPT

## 2022-10-23 RX ORDER — SODIUM,POTASSIUM PHOSPHATES 278-250MG
2 POWDER IN PACKET (EA) ORAL ONCE
Refills: 0 | Status: COMPLETED | OUTPATIENT
Start: 2022-10-23 | End: 2022-10-23

## 2022-10-23 RX ORDER — MAGNESIUM OXIDE 400 MG ORAL TABLET 241.3 MG
400 TABLET ORAL ONCE
Refills: 0 | Status: COMPLETED | OUTPATIENT
Start: 2022-10-23 | End: 2022-10-23

## 2022-10-23 RX ORDER — DIPHENOXYLATE HCL/ATROPINE 2.5-.025MG
1 TABLET ORAL
Refills: 0 | Status: DISCONTINUED | OUTPATIENT
Start: 2022-10-23 | End: 2022-10-24

## 2022-10-23 RX ORDER — SODIUM CHLORIDE 9 MG/ML
1000 INJECTION INTRAMUSCULAR; INTRAVENOUS; SUBCUTANEOUS ONCE
Refills: 0 | Status: COMPLETED | OUTPATIENT
Start: 2022-10-23 | End: 2022-10-23

## 2022-10-23 RX ADMIN — Medication 1 TABLET(S): at 12:11

## 2022-10-23 RX ADMIN — PANTOPRAZOLE SODIUM 40 MILLIGRAM(S): 20 TABLET, DELAYED RELEASE ORAL at 21:41

## 2022-10-23 RX ADMIN — Medication 4 MILLIGRAM(S): at 09:32

## 2022-10-23 RX ADMIN — Medication 4 MILLIGRAM(S): at 12:10

## 2022-10-23 RX ADMIN — Medication 4 MILLIGRAM(S): at 05:44

## 2022-10-23 RX ADMIN — SODIUM CHLORIDE 1000 MILLILITER(S): 9 INJECTION INTRAMUSCULAR; INTRAVENOUS; SUBCUTANEOUS at 10:26

## 2022-10-23 RX ADMIN — Medication 4 MILLIGRAM(S): at 17:19

## 2022-10-23 RX ADMIN — Medication 4 MILLIGRAM(S): at 21:42

## 2022-10-23 RX ADMIN — Medication 1 TABLET(S): at 17:19

## 2022-10-23 RX ADMIN — Medication 2: at 17:19

## 2022-10-23 RX ADMIN — PIPERACILLIN AND TAZOBACTAM 25 GRAM(S): 4; .5 INJECTION, POWDER, LYOPHILIZED, FOR SOLUTION INTRAVENOUS at 21:42

## 2022-10-23 RX ADMIN — BUDESONIDE AND FORMOTEROL FUMARATE DIHYDRATE 2 PUFF(S): 160; 4.5 AEROSOL RESPIRATORY (INHALATION) at 08:54

## 2022-10-23 RX ADMIN — MAGNESIUM OXIDE 400 MG ORAL TABLET 400 MILLIGRAM(S): 241.3 TABLET ORAL at 09:32

## 2022-10-23 RX ADMIN — BUDESONIDE AND FORMOTEROL FUMARATE DIHYDRATE 2 PUFF(S): 160; 4.5 AEROSOL RESPIRATORY (INHALATION) at 20:56

## 2022-10-23 RX ADMIN — Medication 5 MILLIGRAM(S): at 21:42

## 2022-10-23 RX ADMIN — APIXABAN 5 MILLIGRAM(S): 2.5 TABLET, FILM COATED ORAL at 21:42

## 2022-10-23 RX ADMIN — ATORVASTATIN CALCIUM 20 MILLIGRAM(S): 80 TABLET, FILM COATED ORAL at 21:43

## 2022-10-23 RX ADMIN — APIXABAN 5 MILLIGRAM(S): 2.5 TABLET, FILM COATED ORAL at 09:32

## 2022-10-23 RX ADMIN — SODIUM CHLORIDE 3 MILLILITER(S): 9 INJECTION INTRAMUSCULAR; INTRAVENOUS; SUBCUTANEOUS at 05:50

## 2022-10-23 RX ADMIN — PIPERACILLIN AND TAZOBACTAM 25 GRAM(S): 4; .5 INJECTION, POWDER, LYOPHILIZED, FOR SOLUTION INTRAVENOUS at 05:43

## 2022-10-23 RX ADMIN — Medication 1 PACKET(S): at 21:43

## 2022-10-23 RX ADMIN — Medication 1 TABLET(S): at 21:42

## 2022-10-23 RX ADMIN — SODIUM CHLORIDE 3 MILLILITER(S): 9 INJECTION INTRAMUSCULAR; INTRAVENOUS; SUBCUTANEOUS at 20:32

## 2022-10-23 RX ADMIN — Medication 2: at 12:11

## 2022-10-23 RX ADMIN — PANTOPRAZOLE SODIUM 40 MILLIGRAM(S): 20 TABLET, DELAYED RELEASE ORAL at 09:33

## 2022-10-23 RX ADMIN — Medication 2 PACKET(S): at 09:33

## 2022-10-23 RX ADMIN — SODIUM CHLORIDE 3 MILLILITER(S): 9 INJECTION INTRAMUSCULAR; INTRAVENOUS; SUBCUTANEOUS at 13:42

## 2022-10-23 RX ADMIN — TAMSULOSIN HYDROCHLORIDE 0.8 MILLIGRAM(S): 0.4 CAPSULE ORAL at 21:43

## 2022-10-23 RX ADMIN — FINASTERIDE 5 MILLIGRAM(S): 5 TABLET, FILM COATED ORAL at 09:32

## 2022-10-23 RX ADMIN — PIPERACILLIN AND TAZOBACTAM 25 GRAM(S): 4; .5 INJECTION, POWDER, LYOPHILIZED, FOR SOLUTION INTRAVENOUS at 15:09

## 2022-10-23 RX ADMIN — Medication 1 PACKET(S): at 09:33

## 2022-10-23 NOTE — PROGRESS NOTE ADULT - ATTENDING COMMENTS
Seen and examined.  No complaints.  Km full liquid diet. Ileostomy 2800cc despite 24 hrs of Imodium 2 tabs po qachs.  AFVSS  NAD  incision CDI, stoma healthy, drain minimal and serosan, soft, NT, moderate distention  Labs reviewed  A/P -   Advance to diabetic diet.  Add lomotil to current high ileostomy output regimen.  Possible d/c in next 1-2 days if output is reasonable.

## 2022-10-23 NOTE — PROGRESS NOTE ADULT - SUBJECTIVE AND OBJECTIVE BOX
76 yo male with h/o AF, PPM, HTN, UC, newly Dx colon CA, HTN, COPD (long smoking Hx), obesity and hyperlipidemia found to have a colon mass and CA and admitted for resection.  He underwent successful surgery on 10/6/22 complicated by acute hypoxic respiratory failure secondary to L sided pneumonia which improved with NIPPV, septic shock requiring pressors due to fecal spillage peritonitis, and JOSE likely from ATN. Was started on Zosyn. Off pressors 10/9. On 10/10 patient was transfused 2u pRBC after drop in h/h and noted with ?coffee ground content in ileostomy. Improved respiratory status, weaned to supplemental O2 via NC. Had urethral cath placed in OR by urology for urinary retention found to have bladder neck contracture. Underwent IR procedure for abd drain placement, found to have fluid collection, with 8cc aspirate, culture has been negative. Parenteral nutrition started on 10/17/22. Patient downgraded from ICU 10/21/22. Hospitalist consulted for continued medical management.     Subjective: patient seen at bedside, awake, alert, tolerating diet so far, diet advanced today, no overnight issues reported. Noted today with O2 not in place, w/o c/O SOB, will decrease O2 and observe.     REVIEW OF SYSTEMS:    CONSTITUTIONAL: No weakness, fevers or chills  EYES/ENT: No visual changes;  No vertigo or throat pain   NECK: No pain or stiffness  RESPIRATORY: No cough, wheezing, hemoptysis; No shortness of breath  CARDIOVASCULAR: No chest pain or palpitations  GASTROINTESTINAL: No abdominal or epigastric pain. No nausea, vomiting, or hematemesis; No diarrhea or constipation. No melena or hematochezia.  GENITOURINARY: No dysuria, frequency or hematuria  NEUROLOGICAL: No numbness or weakness  SKIN: No itching, rashes    Vital Signs Last 24 Hrs  T(C): 36.5 (23 Oct 2022 09:00), Max: 37 (22 Oct 2022 20:59)  T(F): 97.7 (23 Oct 2022 09:00), Max: 98.6 (22 Oct 2022 20:59)  HR: 70 (23 Oct 2022 09:00) (56 - 78)  BP: 107/51 (23 Oct 2022 09:00) (101/52 - 108/56)  RR: 18 (23 Oct 2022 09:00) (18 - 18)  SpO2: 94% (23 Oct 2022 09:00) (94% - 96%)    Parameters below as of 23 Oct 2022 09:00  Patient On (Oxygen Delivery Method): nasal cannula  O2 Flow (L/min): 4    PHYSICAL EXAM:  GENERAL: NAD, lying in bed comfortably  HEAD:  Atraumatic, Normocephalic  EYES: conjunctiva and sclera clear  ENT: Moist mucous membranes  NECK: Supple, No JVD  CHEST/LUNG: Clear to auscultation bilaterally; No rales, rhonchi, wheezing, or rubs. Unlabored respirations  HEART: Regular rate and rhythm; No murmurs, rubs, or gallops  ABDOMEN: Bowel sounds present; Soft, Nontender, + ostomy, midline incision w/ staples in place, C/D/I, no surrounding erythema   EXTREMITIES:  2+ Peripheral Pulses, brisk capillary refill. No clubbing, cyanosis, or edema  NERVOUS SYSTEM:  Alert & Oriented X3, speech clear. No deficits   MSK: FROM all 4 extremities, full and equal strength      MEDICATIONS  (STANDING):  acetaminophen   IVPB .. 1000 milliGRAM(s) IV Intermittent once  apixaban 5 milliGRAM(s) Oral every 12 hours  atorvastatin 20 milliGRAM(s) Oral at bedtime  budesonide 160 MICROgram(s)/formoterol 4.5 MICROgram(s) Inhaler 2 Puff(s) Inhalation two times a day  chlorhexidine 4% Liquid 1 Application(s) Topical <User Schedule>  dextrose 50% Injectable 25 Gram(s) IV Push once  dextrose 50% Injectable 12.5 Gram(s) IV Push once  dextrose 50% Injectable 25 Gram(s) IV Push once  dextrose Oral Gel 15 Gram(s) Oral once  diphenoxylate/atropine 1 Tablet(s) Oral four times a day  finasteride 5 milliGRAM(s) Oral daily  glucagon  Injectable 1 milliGRAM(s) IntraMuscular once  influenza  Vaccine (HIGH DOSE) 0.7 milliLiter(s) IntraMuscular once  insulin lispro (ADMELOG) corrective regimen sliding scale   SubCutaneous every 6 hours  loperamide 4 milliGRAM(s) Oral <User Schedule>  melatonin 5 milliGRAM(s) Oral at bedtime  pantoprazole  Injectable 40 milliGRAM(s) IV Push every 12 hours  piperacillin/tazobactam IVPB.. 3.375 Gram(s) IV Intermittent every 8 hours  predniSONE   Tablet 4 milliGRAM(s) Oral daily  psyllium Powder 1 Packet(s) Oral two times a day  sodium chloride 0.9% lock flush 3 milliLiter(s) IV Push every 8 hours  tamsulosin 0.8 milliGRAM(s) Oral at bedtime  tiotropium 18 MICROgram(s) Capsule 1 Capsule(s) Inhalation daily    MEDICATIONS  (PRN):  acetaminophen     Tablet .. 650 milliGRAM(s) Oral every 6 hours PRN Mild Pain (1 - 3)  ALBUTerol    90 MICROgram(s) HFA Inhaler 2 Puff(s) Inhalation every 6 hours PRN Shortness of Breath and/or Wheezing  albuterol/ipratropium for Nebulization 3 milliLiter(s) Nebulizer every 6 hours PRN Shortness of Breath and/or Wheezing  morphine  - Injectable 2 milliGRAM(s) IV Push every 4 hours PRN Moderate Pain (4 - 6)  naloxone Injectable 0.1 milliGRAM(s) IV Push every 3 minutes PRN For ANY of the following changes in patient status:  A. RR LESS THAN 10 breaths per minute, B. Oxygen saturation LESS THAN 90%, C. Sedation score of 6  ondansetron Injectable 4 milliGRAM(s) IV Push every 6 hours PRN Nausea and/or Vomiting  oxybutynin 5 milliGRAM(s) Oral daily PRN Bladder spasms      Labs:                                       7.6    5.08  )-----------( 252      ( 23 Oct 2022 05:08 )             25.4   10-23    138  |  104  |  19  ----------------------------<  145<H>  3.8   |  30  |  1.15    Ca    8.2<L>      23 Oct 2022 05:08  Phos  2.8     10-23  Mg     1.7     10-23    TPro  5.9<L>  /  Alb  2.1<L>  /  TBili  0.3  /  DBili  x   /  AST  19  /  ALT  17  /  AlkPhos  51  10-23

## 2022-10-23 NOTE — CHART NOTE - NSCHARTNOTEFT_GEN_A_CORE
Clinical Nutrition PN Follow Up Note    *78yo male admitted with ulcerative colitis and colon CA now s/p open restorative proctocolectomy with IPPAA, diverting loop ileostomy, NGT to LWS with high outputs, ileostomy still functioning.  acute hypoxic resp failure from Lt sided PNA and JOSE likely from ATN.    *10/19-NGT continues to LWS - 3600 mL output; trialed clamp and failed.  will c/w PPN as pt remains NPO x 5 days with high nutr needs for wound healing. s/p surgery on 10/17 for cystoscopy.  *S/P IR for guided drainage (abdominal) on 10/19.     *Current status: c/w with high output from ileostomy (2800mL), started on imodium and metamucil, if continues consider starting tincture of opium.  d/w surgery, stop PPN after today's dose as diet is being advanced.  encourage ensure max BID.    *labs reviewed; magnesium remain at lower limit, monitor and replete prn.  bilirubin total WNL.  POCT elevated, received 10 units insulin outside of PN bag, monitor and adjust insulin regimen prn.  10-23    138  |  104  |  19  ----------------------------<  145<H>  3.8   |  30  |  1.15    Ca    8.2<L>      23 Oct 2022 05:08  Phos  2.8     10-23  Mg     1.7     10-23    TPro  5.9<L>  /  Alb  2.1<L>  /  TBili  0.3  /  DBili  x   /  AST  19  /  ALT  17  /  AlkPhos  51  10-23    HbA1c: A1C with Estimated Average Glucose Result: 6.9 % (09-29-22 @ 14:21)  Glucose: POCT Blood Glucose.: 145 mg/dL (10-20-22 @ 05:40)  Lipid Panel: Date/Time: 10-18-22 @ 05:22  Triglycerides, Serum: 152      POCT Blood Glucose.: 141 mg/dL (23 Oct 2022 05:42)  POCT Blood Glucose.: 158 mg/dL (22 Oct 2022 22:57)  POCT Blood Glucose.: 222 mg/dL (22 Oct 2022 17:25)  POCT Blood Glucose.: 182 mg/dL (22 Oct 2022 11:52)      *pertinent meds: atorvastatin, admelog sliding scale (received 10 units in last 24 hours), protonix, prednisone, zofran, tylenol    *I&O's Detail    22 Oct 2022 07:01  -  23 Oct 2022 07:00  --------------------------------------------------------  IN:  Total IN: 0 mL    OUT:    Bulb (mL): 22.5 mL    Ileostomy (mL): 2800 mL    Indwelling Catheter - Urethral (mL): 1775 mL  Total OUT: 4597.5 mL    Total NET: -4597.5 mL      *negative fluid status: high ostomy output.  pt received 2000mL of PPN on 10/22, not documented, still remains in negative fluid status.  monitor closely and add IVF prn.  *BM (+) on 10/22, loose/ liquid via ostomy .  pt not on bowel regimen.  (+1) Lt/Rt ankle/foot edema documented.      *new wt of 117Kg (10/23); admit wt of 126.1Kg (10/6): wt loss of ~9.1Kg in 17 days; (7%), clinically significant.    *Malnutrition: now meets criteria for severe malnutrition in acute illness r/t decreased ability to meet increased nutr needs s/p GI surgery AEB meeting <50% of nutr needs x 9days, moderate edema, 7% wt loss x 17 days    Estimated Needs: based on 87Kg (adjusted BW)  Calories: 5481-1006 Kcal (25-30 Kcal/Kg)  Protein: 139-157 g (1.6-1.8 g/Kg)  Fluids: 5672-7051  mL (25-30 mL/Kg)    Diet, Full Liquid (10-22-22 @ 10:47)      RECOMMENDATIONS:  1) add ensure max BID  2) daily wt checks to track/trend changes  3) monitor ileostomy output; consider adding tincture of opium prn  4) stop PPN after today's dose      *will monitor and adjust treatement plan prn*  Ania Anderson MA, RDN, CDN, [facility] [phone]  Nutrition

## 2022-10-23 NOTE — PROGRESS NOTE ADULT - SUBJECTIVE AND OBJECTIVE BOX
Pt. seen and examined at bedside this morning. Patient reports no abdominal pain, tolerating FLD, passing gas, ileostomy functional. He denies fever, chest pain, SOB, nausea, vomiting.      PHYSICAL EXAM   GENERAL: NAD, AOx3, well developed  HEAD: Atraumatic, normocephalic  EYES: EOMI, PERRLA, conjunctiva and sclera clear  ENT: moist mucous membrane  NECK: supple, No JVD, midline trachea  CHEST/LUNG: unlabored respirations b/l on NC  Heart: RRR, normotensive  ABDOMEN: obese, soft, nondistended, nontender. Ostomy functional  NERVOUS SYSTEM: AOx3, speech clear, no neuro-deficits  MSK: full ROM, no deformities  SKIN: warm to touch, no rash or lesions    Vitals:  T(C): 37 (10-22 @ 20:59), Max: 37 (10-22 @ 20:59)  HR: 75 (10-22 @ 21:48) (56 - 78)  BP: 108/56 (10-22 @ 20:59) (101/52 - 108/56)  RR: 18 (10-22 @ 20:59) (18 - 18)  SpO2: 96% (10-22 @ 21:48) (95% - 96%)    10-22 @ 07:01  -  10-23 @ 07:00  --------------------------------------------------------  IN:  Total IN: 0 mL    OUT:    Bulb (mL): 22.5 mL    Ileostomy (mL): 2800 mL    Indwelling Catheter - Urethral (mL): 1775 mL  Total OUT: 4597.5 mL    Total NET: -4597.5 mL    10-23 @ 05:08                    7.6  CBC: 5.08>)-------(<252                     25.4                 138 | 104 | 19    CMP:  ----------------------< 145               3.8 | 30 | 1.15                      Ca:8.2  Phos:2.8  M.7               0.3|      |19        LFTs:  ------|51|-----             -|      |-  10-22 @ 04:57                    7.8  CBC: 5.82>)-------(<271                     25.5                 139 | 104 | 20    CMP:  ----------------------< 153               3.6 | 29 | 1.11                      Ca:8.2  Phos:3.0  M.6               -|      |-        LFTs:  ------|-|-----             -|      |-      Culture - Abscess with Gram Stain (collected 10-19-22 @ 12:20)  Source: .Abscess abdominal collection  Preliminary Report (10-20-22 @ 18:03):    No growth      Current Inpatient Medications:  acetaminophen     Tablet .. 650 milliGRAM(s) Oral every 6 hours PRN  acetaminophen   IVPB .. 1000 milliGRAM(s) IV Intermittent once  ALBUTerol    90 MICROgram(s) HFA Inhaler 2 Puff(s) Inhalation every 6 hours PRN  albuterol/ipratropium for Nebulization 3 milliLiter(s) Nebulizer every 6 hours PRN  apixaban 5 milliGRAM(s) Oral every 12 hours  atorvastatin 20 milliGRAM(s) Oral at bedtime  budesonide 160 MICROgram(s)/formoterol 4.5 MICROgram(s) Inhaler 2 Puff(s) Inhalation two times a day  chlorhexidine 4% Liquid 1 Application(s) Topical <User Schedule>  dextrose 50% Injectable 25 Gram(s) IV Push once  dextrose 50% Injectable 12.5 Gram(s) IV Push once  dextrose 50% Injectable 25 Gram(s) IV Push once  dextrose Oral Gel 15 Gram(s) Oral once  fat emulsion (Fish Oil and Plant Based) 20% Infusion 0.79 Gm/kG/Day (41.67 mL/Hr) IV Continuous <Continuous>  finasteride 5 milliGRAM(s) Oral daily  glucagon  Injectable 1 milliGRAM(s) IntraMuscular once  influenza  Vaccine (HIGH DOSE) 0.7 milliLiter(s) IntraMuscular once  insulin lispro (ADMELOG) corrective regimen sliding scale   SubCutaneous every 6 hours  loperamide 4 milliGRAM(s) Oral <User Schedule>  magnesium oxide 400 milliGRAM(s) Oral once  melatonin 5 milliGRAM(s) Oral at bedtime  morphine  - Injectable 2 milliGRAM(s) IV Push every 4 hours PRN  naloxone Injectable 0.1 milliGRAM(s) IV Push every 3 minutes PRN  ondansetron Injectable 4 milliGRAM(s) IV Push every 6 hours PRN  oxybutynin 5 milliGRAM(s) Oral daily PRN  pantoprazole  Injectable 40 milliGRAM(s) IV Push every 12 hours  Parenteral Nutrition - Adult 1 Each (83 mL/Hr) TPN Continuous <Continuous>  piperacillin/tazobactam IVPB.. 3.375 Gram(s) IV Intermittent every 8 hours  potassium phosphate / sodium phosphate Powder (PHOS-NaK) 2 Packet(s) Oral once  predniSONE   Tablet 4 milliGRAM(s) Oral daily  psyllium Powder 1 Packet(s) Oral two times a day  sodium chloride 0.9% lock flush 3 milliLiter(s) IV Push every 8 hours  tamsulosin 0.8 milliGRAM(s) Oral at bedtime  tiotropium 18 MICROgram(s) Capsule 1 Capsule(s) Inhalation daily

## 2022-10-23 NOTE — PROGRESS NOTE ADULT - ASSESSMENT
Patient is S/p proctocolectomy, IPAA, DLI for UC/Sigmoid CA, IR drain for collection. S/p cystoscopy w/ stent placement and [place] tip Zhao.   AFVSS.   Ostomy functional      Plan:  advance to LRD  Replete ostomy output 1:1  Continue Eliquis  Wean off O2  Ambulation/PT  Pain control  Monitor bowel function   Appreciate  recs    Plan discussed with Dr. Wagner

## 2022-10-23 NOTE — PROGRESS NOTE ADULT - ASSESSMENT
76 yo male with h/o AF, PPM, HTN, UC, newly Dx colon CA, HTN, COPD (long smoking Hx), obesity and hyperlipidemia found to have a colon mass and CA and admitted for resection.  He underwent successful surgery on 10/6/22 complicated by acute hypoxic respiratory failure secondary to L sided pneumonia which improved with NIPPV, septic shock requiring pressors due to fecal spillage peritonitis, and JOSE likely from ATN. Was started on Zosyn. Off pressors 10/9. On 10/10 patient was transfused 2u pRBC after drop in h/h and noted with ?coffee ground content in ileostomy. Improved respiratory status, weaned to supplemental O2 via NC. Had urethral cath placed in OR by urology for urinary retention found to have bladder neck contracture. Underwent IR procedure for abd drain placement, found to have fluid collection, with 8cc aspirate, culture has been negative. Parenteral nutrition started on 10/17/22. Patient downgraded from ICU 10/21/22. Hospitalist consulted for continued medical management.     #Colon mass   -S/P RCP-IPAA (restorative proctocolectomy with ileal pouch anal anastomosis), diverting loop ileostomy 10/6  -C/B acute hypoxic respiratory failure due to pneumonia/Septic shock/JOSE/fecal spillage peritonitis   -Off pressors, given stress dose steroids, now tapering, on prednisone 4mg daily   -Completed zosyn 10 days for PNA  -S/p IR drainage of fluid collection on 10/19, actually restarted zosyn 10/20, (cultures have been negative >48hrs)- day #4, will discontinue   -Started PPN on 10/17, now tolerating clear liquids, if pt does not tolerate advancing diet, may need PICC to continue PPB - today is day #7   -Advance diet as per primary team - tolerating diet well, once tolerating regular diet, d/c PPN  -Daily weights, electrolytes, including Mg and phos, maintain -180  -Trend and replete electrolytes daily   -Pain management as per orders  -F/u PT eval     #HTN/AFIB/PPM/HLD  -Continue Eliquis 5mg q12hrs   -Continue lipitor 20mg qhs  -Off antihypertensives here, bp stable  -On amlodipine 2.5mg, Metoprolol ER 25mg daily     #COPD  -Stable  -Continue Prednisone 4mg daily, Symbicort, Spiriva Albuterol HFA PRN     #Urinary retention  -Alfaro's in place  -Continue tamsulosin 0.8mg qhs, finasteride 5mg daily   -Urology consult appreciated: d/c home with alfaro's    VTE prophylaxis  -On eliquis     Dispo: continue medical management for now, PT aundrea, D/c planning

## 2022-10-24 LAB
ANION GAP SERPL CALC-SCNC: 5 MMOL/L — SIGNIFICANT CHANGE UP (ref 5–17)
BASOPHILS # BLD AUTO: 0.01 K/UL — SIGNIFICANT CHANGE UP (ref 0–0.2)
BASOPHILS NFR BLD AUTO: 0.1 % — SIGNIFICANT CHANGE UP (ref 0–2)
BUN SERPL-MCNC: 19 MG/DL — SIGNIFICANT CHANGE UP (ref 7–23)
CALCIUM SERPL-MCNC: 8.5 MG/DL — SIGNIFICANT CHANGE UP (ref 8.5–10.1)
CHLORIDE SERPL-SCNC: 103 MMOL/L — SIGNIFICANT CHANGE UP (ref 96–108)
CO2 SERPL-SCNC: 29 MMOL/L — SIGNIFICANT CHANGE UP (ref 22–31)
CREAT SERPL-MCNC: 1.33 MG/DL — HIGH (ref 0.5–1.3)
CULTURE RESULTS: SIGNIFICANT CHANGE UP
EGFR: 56 ML/MIN/1.73M2 — LOW
EOSINOPHIL # BLD AUTO: 0.06 K/UL — SIGNIFICANT CHANGE UP (ref 0–0.5)
EOSINOPHIL NFR BLD AUTO: 0.9 % — SIGNIFICANT CHANGE UP (ref 0–6)
GLUCOSE SERPL-MCNC: 116 MG/DL — HIGH (ref 70–99)
HCT VFR BLD CALC: 24.8 % — LOW (ref 39–50)
HGB BLD-MCNC: 7.6 G/DL — LOW (ref 13–17)
IMM GRANULOCYTES NFR BLD AUTO: 1.7 % — HIGH (ref 0–0.9)
LYMPHOCYTES # BLD AUTO: 0.87 K/UL — LOW (ref 1–3.3)
LYMPHOCYTES # BLD AUTO: 12.7 % — LOW (ref 13–44)
MAGNESIUM SERPL-MCNC: 1.8 MG/DL — SIGNIFICANT CHANGE UP (ref 1.6–2.6)
MCHC RBC-ENTMCNC: 26.4 PG — LOW (ref 27–34)
MCHC RBC-ENTMCNC: 30.6 GM/DL — LOW (ref 32–36)
MCV RBC AUTO: 86.1 FL — SIGNIFICANT CHANGE UP (ref 80–100)
MONOCYTES # BLD AUTO: 0.53 K/UL — SIGNIFICANT CHANGE UP (ref 0–0.9)
MONOCYTES NFR BLD AUTO: 7.7 % — SIGNIFICANT CHANGE UP (ref 2–14)
NEUTROPHILS # BLD AUTO: 5.27 K/UL — SIGNIFICANT CHANGE UP (ref 1.8–7.4)
NEUTROPHILS NFR BLD AUTO: 76.9 % — SIGNIFICANT CHANGE UP (ref 43–77)
PHOSPHATE SERPL-MCNC: 2.6 MG/DL — SIGNIFICANT CHANGE UP (ref 2.5–4.5)
PLATELET # BLD AUTO: 195 K/UL — SIGNIFICANT CHANGE UP (ref 150–400)
POTASSIUM SERPL-MCNC: 4.4 MMOL/L — SIGNIFICANT CHANGE UP (ref 3.5–5.3)
POTASSIUM SERPL-SCNC: 4.4 MMOL/L — SIGNIFICANT CHANGE UP (ref 3.5–5.3)
RBC # BLD: 2.88 M/UL — LOW (ref 4.2–5.8)
RBC # FLD: 17.9 % — HIGH (ref 10.3–14.5)
SARS-COV-2 RNA SPEC QL NAA+PROBE: SIGNIFICANT CHANGE UP
SODIUM SERPL-SCNC: 137 MMOL/L — SIGNIFICANT CHANGE UP (ref 135–145)
SPECIMEN SOURCE: SIGNIFICANT CHANGE UP
WBC # BLD: 6.86 K/UL — SIGNIFICANT CHANGE UP (ref 3.8–10.5)
WBC # FLD AUTO: 6.86 K/UL — SIGNIFICANT CHANGE UP (ref 3.8–10.5)

## 2022-10-24 PROCEDURE — 99232 SBSQ HOSP IP/OBS MODERATE 35: CPT

## 2022-10-24 RX ORDER — FUROSEMIDE 40 MG
40 TABLET ORAL ONCE
Refills: 0 | Status: DISCONTINUED | OUTPATIENT
Start: 2022-10-24 | End: 2022-10-24

## 2022-10-24 RX ORDER — DIPHENOXYLATE HCL/ATROPINE 2.5-.025MG
2 TABLET ORAL
Refills: 0 | Status: DISCONTINUED | OUTPATIENT
Start: 2022-10-24 | End: 2022-10-31

## 2022-10-24 RX ADMIN — Medication 4 MILLIGRAM(S): at 11:47

## 2022-10-24 RX ADMIN — TIOTROPIUM BROMIDE 1 CAPSULE(S): 18 CAPSULE ORAL; RESPIRATORY (INHALATION) at 09:14

## 2022-10-24 RX ADMIN — APIXABAN 5 MILLIGRAM(S): 2.5 TABLET, FILM COATED ORAL at 11:01

## 2022-10-24 RX ADMIN — BUDESONIDE AND FORMOTEROL FUMARATE DIHYDRATE 2 PUFF(S): 160; 4.5 AEROSOL RESPIRATORY (INHALATION) at 09:15

## 2022-10-24 RX ADMIN — Medication 4 MILLIGRAM(S): at 17:34

## 2022-10-24 RX ADMIN — SODIUM CHLORIDE 3 MILLILITER(S): 9 INJECTION INTRAMUSCULAR; INTRAVENOUS; SUBCUTANEOUS at 22:00

## 2022-10-24 RX ADMIN — Medication 1 PACKET(S): at 21:20

## 2022-10-24 RX ADMIN — PIPERACILLIN AND TAZOBACTAM 25 GRAM(S): 4; .5 INJECTION, POWDER, LYOPHILIZED, FOR SOLUTION INTRAVENOUS at 15:14

## 2022-10-24 RX ADMIN — FINASTERIDE 5 MILLIGRAM(S): 5 TABLET, FILM COATED ORAL at 11:01

## 2022-10-24 RX ADMIN — SODIUM CHLORIDE 3 MILLILITER(S): 9 INJECTION INTRAMUSCULAR; INTRAVENOUS; SUBCUTANEOUS at 06:04

## 2022-10-24 RX ADMIN — Medication 1 PACKET(S): at 11:02

## 2022-10-24 RX ADMIN — TAMSULOSIN HYDROCHLORIDE 0.8 MILLIGRAM(S): 0.4 CAPSULE ORAL at 21:19

## 2022-10-24 RX ADMIN — Medication 4 MILLIGRAM(S): at 21:19

## 2022-10-24 RX ADMIN — SODIUM CHLORIDE 3 MILLILITER(S): 9 INJECTION INTRAMUSCULAR; INTRAVENOUS; SUBCUTANEOUS at 15:13

## 2022-10-24 RX ADMIN — Medication 5 MILLIGRAM(S): at 21:19

## 2022-10-24 RX ADMIN — Medication 4 MILLIGRAM(S): at 11:01

## 2022-10-24 RX ADMIN — BUDESONIDE AND FORMOTEROL FUMARATE DIHYDRATE 2 PUFF(S): 160; 4.5 AEROSOL RESPIRATORY (INHALATION) at 20:10

## 2022-10-24 RX ADMIN — ATORVASTATIN CALCIUM 20 MILLIGRAM(S): 80 TABLET, FILM COATED ORAL at 21:19

## 2022-10-24 RX ADMIN — Medication 1 TABLET(S): at 11:01

## 2022-10-24 RX ADMIN — Medication 2: at 12:49

## 2022-10-24 RX ADMIN — PIPERACILLIN AND TAZOBACTAM 25 GRAM(S): 4; .5 INJECTION, POWDER, LYOPHILIZED, FOR SOLUTION INTRAVENOUS at 21:20

## 2022-10-24 RX ADMIN — PANTOPRAZOLE SODIUM 40 MILLIGRAM(S): 20 TABLET, DELAYED RELEASE ORAL at 11:01

## 2022-10-24 RX ADMIN — APIXABAN 5 MILLIGRAM(S): 2.5 TABLET, FILM COATED ORAL at 21:19

## 2022-10-24 RX ADMIN — Medication 4 MILLIGRAM(S): at 06:10

## 2022-10-24 RX ADMIN — Medication 2: at 17:33

## 2022-10-24 RX ADMIN — PANTOPRAZOLE SODIUM 40 MILLIGRAM(S): 20 TABLET, DELAYED RELEASE ORAL at 21:19

## 2022-10-24 RX ADMIN — Medication 1 TABLET(S): at 17:34

## 2022-10-24 RX ADMIN — ALBUTEROL 2 PUFF(S): 90 AEROSOL, METERED ORAL at 09:15

## 2022-10-24 RX ADMIN — PIPERACILLIN AND TAZOBACTAM 25 GRAM(S): 4; .5 INJECTION, POWDER, LYOPHILIZED, FOR SOLUTION INTRAVENOUS at 06:44

## 2022-10-24 NOTE — PROGRESS NOTE ADULT - SUBJECTIVE AND OBJECTIVE BOX
Pt. seen and examined at bedside this morning. Patient reports no abdominal pain, tolerating regular diet, passing gas, ileostomy functional. He denies fever, chest pain, SOB, nausea, vomiting.      PHYSICAL EXAM   GENERAL: NAD, AOx3, well developed  HEAD: Atraumatic, normocephalic  EYES: EOMI, PERRLA, conjunctiva and sclera clear  ENT: moist mucous membrane  NECK: supple, No JVD, midline trachea  CHEST/LUNG: unlabored respirations b/l on NC  Heart: RRR, normotensive  ABDOMEN: obese, soft, nondistended, nontender. Ostomy functional  NERVOUS SYSTEM: AOx3, speech clear, no neuro-deficits  MSK: full ROM, no deformities  SKIN: warm to touch, no rash or lesions    Vitals:  T(C): 36.9 (10-23 @ 21:09), Max: 37 (10-23 @ 16:20)  HR: 73 (10-23 @ 21:09) (60 - 73)  BP: 110/58 (10-23 @ 21:09) (104/68 - 110/58)  RR: 18 (10-23 @ 21:09) (18 - 18)  SpO2: 97% (10-23 @ 21:09) (94% - 97%)    10-22 @ 07:01  -  10-23 @ 07:00  --------------------------------------------------------  IN:  Total IN: 0 mL    OUT:    Bulb (mL): 22.5 mL    Ileostomy (mL): 2800 mL    Indwelling Catheter - Urethral (mL): 1775 mL  Total OUT: 4597.5 mL    Total NET: -4597.5 mL      10-23 @ 07:01  -  10-24 @ 06:37  --------------------------------------------------------  IN:  Total IN: 0 mL    OUT:    Bulb (mL): 15 mL    Ileostomy (mL): 1800 mL    Indwelling Catheter - Urethral (mL): 1500 mL  Total OUT: 3315 mL    Total NET: -3315 mL        10-23 @ 05:08                    7.6  CBC: 5.08>)-------(<252                     25.4                 138 | 104 | 19    CMP:  ----------------------< 145               3.8 | 30 | 1.15                      Ca:8.2  Phos:2.8  M.7               0.3|      |19        LFTs:  ------|51|-----             -|      |-      Culture - Abscess with Gram Stain (collected 10-19-22 @ 12:20)  Source: .Abscess abdominal collection  Preliminary Report (10-20-22 @ 18:03):    No growth      Current Inpatient Medications:  acetaminophen     Tablet .. 650 milliGRAM(s) Oral every 6 hours PRN  acetaminophen   IVPB .. 1000 milliGRAM(s) IV Intermittent once  ALBUTerol    90 MICROgram(s) HFA Inhaler 2 Puff(s) Inhalation every 6 hours PRN  albuterol/ipratropium for Nebulization 3 milliLiter(s) Nebulizer every 6 hours PRN  apixaban 5 milliGRAM(s) Oral every 12 hours  atorvastatin 20 milliGRAM(s) Oral at bedtime  budesonide 160 MICROgram(s)/formoterol 4.5 MICROgram(s) Inhaler 2 Puff(s) Inhalation two times a day  chlorhexidine 4% Liquid 1 Application(s) Topical <User Schedule>  dextrose 50% Injectable 25 Gram(s) IV Push once  dextrose 50% Injectable 12.5 Gram(s) IV Push once  dextrose 50% Injectable 25 Gram(s) IV Push once  dextrose Oral Gel 15 Gram(s) Oral once  diphenoxylate/atropine 1 Tablet(s) Oral four times a day  finasteride 5 milliGRAM(s) Oral daily  glucagon  Injectable 1 milliGRAM(s) IntraMuscular once  influenza  Vaccine (HIGH DOSE) 0.7 milliLiter(s) IntraMuscular once  insulin lispro (ADMELOG) corrective regimen sliding scale   SubCutaneous every 6 hours  loperamide 4 milliGRAM(s) Oral <User Schedule>  melatonin 5 milliGRAM(s) Oral at bedtime  morphine  - Injectable 2 milliGRAM(s) IV Push every 4 hours PRN  naloxone Injectable 0.1 milliGRAM(s) IV Push every 3 minutes PRN  ondansetron Injectable 4 milliGRAM(s) IV Push every 6 hours PRN  oxybutynin 5 milliGRAM(s) Oral daily PRN  pantoprazole  Injectable 40 milliGRAM(s) IV Push every 12 hours  piperacillin/tazobactam IVPB.. 3.375 Gram(s) IV Intermittent every 8 hours  predniSONE   Tablet 4 milliGRAM(s) Oral daily  psyllium Powder 1 Packet(s) Oral two times a day  sodium chloride 0.9% lock flush 3 milliLiter(s) IV Push every 8 hours  tamsulosin 0.8 milliGRAM(s) Oral at bedtime  tiotropium 18 MICROgram(s) Capsule 1 Capsule(s) Inhalation daily

## 2022-10-24 NOTE — PROGRESS NOTE ADULT - ASSESSMENT
Patient is S/p proctocolectomy, IPAA, DLI for UC/Sigmoid CA, IR drain for collection. S/p cystoscopy w/ stent placement and [place] tip Zhao.   AFVSS.   Ostomy functional      Plan:  c/w regular diet  Continue Eliquis  Ambulation/PT  Pain control  Monitor bowel function   Appreciate  recs  Dispo PHILLIP  CM for placement    Plan discussed with Colorectal team

## 2022-10-24 NOTE — PROGRESS NOTE ADULT - SUBJECTIVE AND OBJECTIVE BOX
76 yo male with h/o AF, PPM, HTN, UC, newly Dx colon CA, HTN, COPD (long smoking Hx), obesity and hyperlipidemia found to have a colon mass and CA and admitted for resection.  He underwent successful surgery on 10/6/22 complicated by acute hypoxic respiratory failure secondary to L sided pneumonia which improved with NIPPV, septic shock requiring pressors due to fecal spillage peritonitis, and JOSE likely from ATN. Was started on Zosyn. Off pressors 10/9. On 10/10 patient was transfused 2u pRBC after drop in h/h and noted with ?coffee ground content in ileostomy. Improved respiratory status, weaned to supplemental O2 via NC. Had urethral cath placed in OR by urology for urinary retention found to have bladder neck contracture. Underwent IR procedure for abd drain placement, found to have fluid collection, with 8cc aspirate, culture has been negative. Parenteral nutrition started on 10/17/22. Patient downgraded from ICU 10/21/22. Hospitalist consulted for continued medical management.     Subjective: patient seen at bedside, awake, alert, tolerating diet so far, diet advanced today, no overnight issues reported. Noted today with O2 not in place, w/o c/O SOB, will decrease O2 and observe.     10/24 - no cp palps sob abdo pain at rest     PHYSICAL EXAM:  T(F): 97.9 (24 Oct 2022 14:30), Max: 98.4 (23 Oct 2022 21:09)  HR: 59 (24 Oct 2022 14:30) (59 - 73)  BP: 103/57 (24 Oct 2022 14:30) (98/46 - 119/62)  RR: 18 (24 Oct 2022 14:30) (18 - 18)  SpO2: 94% (24 Oct 2022 14:30) (94% - 98%)  Parameters below as of 24 Oct 2022 14:30  Patient On (Oxygen Delivery Method): nasal cannula  O2 Flow (L/min): 3  GENERAL: NAD, lying in bed comfortably with NC on   HEAD:  Atraumatic, Normocephalic  EYES: conjunctiva and sclera clear  ENT: Moist mucous membranes  NECK: Supple, No JVD  CHEST/LUNG: Clear to auscultation bilaterally; No rales, rhonchi, wheezing, or rubs. Unlabored respirations  HEART: Regular rate and rhythm; No murmurs, rubs, or gallops  ABDOMEN: Bowel sounds present; Soft, Nontender, + ostomy, midline incision w/ staples in place, C/D/I, no surrounding erythema   EXTREMITIES:  2+ Peripheral Pulses, brisk capillary refill. No clubbing, cyanosis, or edema  NERVOUS SYSTEM:  Alert & Oriented X3, speech clear. No deficits   MSK: FROM all 4 extremities, full and equal strength    LABS: All Labs Reviewed:                     7.6    6.86  )-----------( 195      ( 24 Oct 2022 07:55 )             24.8     137  |  103  |  19  ----------------------------<  116<H>  4.4   |  29  |  1.33<H>  Ca    8.5      24 Oct 2022 07:55  Phos  2.6     10-24  Mg     1.8     10-24  TPro  5.9<L>  /  Alb  2.1<L>  /  TBili  0.3  /  DBili  x   /  AST  19  /  ALT  17  /  AlkPhos  51  10-23    MEDS:   acetaminophen     Tablet .. 650 milliGRAM(s) Oral every 6 hours PRN  acetaminophen   IVPB .. 1000 milliGRAM(s) IV Intermittent once  ALBUTerol    90 MICROgram(s) HFA Inhaler 2 Puff(s) Inhalation every 6 hours PRN  albuterol/ipratropium for Nebulization 3 milliLiter(s) Nebulizer every 6 hours PRN  apixaban 5 milliGRAM(s) Oral every 12 hours  atorvastatin 20 milliGRAM(s) Oral at bedtime  budesonide 160 MICROgram(s)/formoterol 4.5 MICROgram(s) Inhaler 2 Puff(s) Inhalation two times a day  chlorhexidine 4% Liquid 1 Application(s) Topical <User Schedule>  diphenoxylate/atropine 1 Tablet(s) Oral four times a day  finasteride 5 milliGRAM(s) Oral daily  glucagon  Injectable 1 milliGRAM(s) IntraMuscular once  influenza  Vaccine (HIGH DOSE) 0.7 milliLiter(s) IntraMuscular once  insulin lispro (ADMELOG) corrective regimen sliding scale   SubCutaneous every 6 hours  loperamide 4 milliGRAM(s) Oral <User Schedule>  melatonin 5 milliGRAM(s) Oral at bedtime  morphine  - Injectable 2 milliGRAM(s) IV Push every 4 hours PRN  naloxone Injectable 0.1 milliGRAM(s) IV Push every 3 minutes PRN  ondansetron Injectable 4 milliGRAM(s) IV Push every 6 hours PRN  oxybutynin 5 milliGRAM(s) Oral daily PRN  pantoprazole  Injectable 40 milliGRAM(s) IV Push every 12 hours  piperacillin/tazobactam IVPB.. 3.375 Gram(s) IV Intermittent every 8 hours  predniSONE   Tablet 4 milliGRAM(s) Oral daily  psyllium Powder 1 Packet(s) Oral two times a day  sodium chloride 0.9% lock flush 3 milliLiter(s) IV Push every 8 hours  tamsulosin 0.8 milliGRAM(s) Oral at bedtime  tiotropium 18 MICROgram(s) Capsule 1 Capsule(s) Inhalation daily

## 2022-10-24 NOTE — PROGRESS NOTE ADULT - ASSESSMENT
76 yo male with h/o AF, PPM, HTN, UC, newly Dx colon CA, HTN, COPD (long smoking Hx), obesity and hyperlipidemia found to have a colon mass and CA and admitted for resection.  He underwent successful surgery on 10/6/22 complicated by acute hypoxic respiratory failure secondary to L sided pneumonia which improved with NIPPV, septic shock requiring pressors due to fecal spillage peritonitis, and JOSE likely from ATN. Was started on Zosyn. Off pressors 10/9. On 10/10 patient was transfused 2u pRBC after drop in h/h and noted with ?coffee ground content in ileostomy. Improved respiratory status, weaned to supplemental O2 via NC. Had urethral cath placed in OR by urology for urinary retention found to have bladder neck contracture. Underwent IR procedure for abd drain placement, found to have fluid collection, with 8cc aspirate, culture has been negative. Parenteral nutrition started on 10/17/22. Patient downgraded from ICU 10/21/22. Hospitalist consulted for continued medical management.     #Colon mass   -S/P RCP-IPAA (restorative proctocolectomy with ileal pouch anal anastomosis), diverting loop ileostomy 10/6  -C/B acute hypoxic respiratory failure due to pneumonia/Septic shock/JOSE/fecal spillage peritonitis   -Off pressors, given stress dose steroids, now tapering, on prednisone 4mg daily   -Completed zosyn 10 days for PNA  -S/p IR drainage of fluid collection on 10/19, actually restarted zosyn 10/20, (cultures have been negative >48hrs)- day #4, will discontinue   -Started PPN on 10/17, now tolerating clear liquids, if pt does not tolerate advancing diet, may need PICC to continue PPB - today is day #7   -Advance diet as per primary team - tolerating diet well, once tolerating regular diet, d/c PPN    #Normocytic Anemia, multifactorial   10/24 PRBCS ordered by primary team - will hold off on lasix pre-med due to slight elevation in Cr   Monitor closely while on Eliquis     #Mild Systolic 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%  check BNP and CXR in AM   recall Card Dr Barrett, optimize meds - ACEI and BB as toelrated; low Na diet   suspect drop in CHF In the setting of septic but will need re-eval in 6 weeks     #HTN/AFIB/PPM/HLD  -Continue Eliquis 5mg q12hrs   -Continue lipitor 20mg qhs  -Off antihypertensives here, bp stable  -On amlodipine 2.5mg, Metoprolol ER 25mg daily     #COPD  -Stable  -Continue Prednisone 4mg daily, Symbicort, Spiriva Albuterol HFA PRN   - before dc check Pulse Ox on ambulation to see how hes doing and ensure safety while in Rehab      #Urinary retention  -Alfaro's in place  -Continue tamsulosin 0.8mg qhs, finasteride 5mg daily   -Urology consult appreciated: d/c home with alfaro's    VTE prophylaxis  -On eliquis     Dispo: Pulse Ox on ambulation before dc to rehab to ensure a safe discharge, CXR in AM, H&H while on eliquis, BNP, Card recall

## 2022-10-24 NOTE — PROGRESS NOTE ADULT - ATTENDING COMMENTS
Patient without any significant complaints.  Tolerating consistent carbohydrate diet.  On Imodium 4mg qAC/HS and Lomotil 1 tab qAC/HS.  Denies pain.  On exam abdomen soft, nondistended, mildly appropriately tender near incision without rebound/guarding.  Ileostomy output light brown and soft, 1800cc over last 24 hours.  Lomotil was just started yesterday.  Labs with slightly increased Cr to 1.3 from 1.1.  Hgb 7.6.    -- Transfuse 1u pRBCs  -- Will monitor ostomy output throughout the day - if near 1L during day shift alone will plan to increase Lomotil dose  -- Ostomy care and education  -- Encourage ambulation  -- Multimodal pain control, minimize narcotics  -- Patient is currently on home dose of prednisone - continue  -- Plan for eventual discharge to [facility]  -- Continue Eliquis

## 2022-10-25 LAB
ANION GAP SERPL CALC-SCNC: 4 MMOL/L — LOW (ref 5–17)
BASOPHILS # BLD AUTO: 0 K/UL — SIGNIFICANT CHANGE UP (ref 0–0.2)
BASOPHILS NFR BLD AUTO: 0 % — SIGNIFICANT CHANGE UP (ref 0–2)
BUN SERPL-MCNC: 18 MG/DL — SIGNIFICANT CHANGE UP (ref 7–23)
CALCIUM SERPL-MCNC: 8.7 MG/DL — SIGNIFICANT CHANGE UP (ref 8.5–10.1)
CHLORIDE SERPL-SCNC: 100 MMOL/L — SIGNIFICANT CHANGE UP (ref 96–108)
CO2 SERPL-SCNC: 31 MMOL/L — SIGNIFICANT CHANGE UP (ref 22–31)
CREAT SERPL-MCNC: 1.57 MG/DL — HIGH (ref 0.5–1.3)
EGFR: 46 ML/MIN/1.73M2 — LOW
EOSINOPHIL # BLD AUTO: 0.07 K/UL — SIGNIFICANT CHANGE UP (ref 0–0.5)
EOSINOPHIL NFR BLD AUTO: 1 % — SIGNIFICANT CHANGE UP (ref 0–6)
GLUCOSE SERPL-MCNC: 126 MG/DL — HIGH (ref 70–99)
HCT VFR BLD CALC: 27.8 % — LOW (ref 39–50)
HGB BLD-MCNC: 8.5 G/DL — LOW (ref 13–17)
LYMPHOCYTES # BLD AUTO: 1.26 K/UL — SIGNIFICANT CHANGE UP (ref 1–3.3)
LYMPHOCYTES # BLD AUTO: 19 % — SIGNIFICANT CHANGE UP (ref 13–44)
MAGNESIUM SERPL-MCNC: 1.6 MG/DL — SIGNIFICANT CHANGE UP (ref 1.6–2.6)
MCHC RBC-ENTMCNC: 26.2 PG — LOW (ref 27–34)
MCHC RBC-ENTMCNC: 30.6 GM/DL — LOW (ref 32–36)
MCV RBC AUTO: 85.5 FL — SIGNIFICANT CHANGE UP (ref 80–100)
MONOCYTES # BLD AUTO: 0.46 K/UL — SIGNIFICANT CHANGE UP (ref 0–0.9)
MONOCYTES NFR BLD AUTO: 7 % — SIGNIFICANT CHANGE UP (ref 2–14)
NEUTROPHILS # BLD AUTO: 4.83 K/UL — SIGNIFICANT CHANGE UP (ref 1.8–7.4)
NEUTROPHILS NFR BLD AUTO: 71 % — SIGNIFICANT CHANGE UP (ref 43–77)
NRBC # BLD: SIGNIFICANT CHANGE UP /100 WBCS (ref 0–0)
NT-PROBNP SERPL-SCNC: 1252 PG/ML — HIGH (ref 0–450)
PHOSPHATE SERPL-MCNC: 2.8 MG/DL — SIGNIFICANT CHANGE UP (ref 2.5–4.5)
PLATELET # BLD AUTO: 205 K/UL — SIGNIFICANT CHANGE UP (ref 150–400)
POTASSIUM SERPL-MCNC: 4.5 MMOL/L — SIGNIFICANT CHANGE UP (ref 3.5–5.3)
POTASSIUM SERPL-SCNC: 4.5 MMOL/L — SIGNIFICANT CHANGE UP (ref 3.5–5.3)
RBC # BLD: 3.25 M/UL — LOW (ref 4.2–5.8)
RBC # FLD: 17.6 % — HIGH (ref 10.3–14.5)
SODIUM SERPL-SCNC: 135 MMOL/L — SIGNIFICANT CHANGE UP (ref 135–145)
WBC # BLD: 6.61 K/UL — SIGNIFICANT CHANGE UP (ref 3.8–10.5)
WBC # FLD AUTO: 6.61 K/UL — SIGNIFICANT CHANGE UP (ref 3.8–10.5)

## 2022-10-25 PROCEDURE — 71045 X-RAY EXAM CHEST 1 VIEW: CPT | Mod: 26

## 2022-10-25 PROCEDURE — 99232 SBSQ HOSP IP/OBS MODERATE 35: CPT

## 2022-10-25 RX ORDER — SODIUM CHLORIDE 9 MG/ML
1000 INJECTION INTRAMUSCULAR; INTRAVENOUS; SUBCUTANEOUS ONCE
Refills: 0 | Status: COMPLETED | OUTPATIENT
Start: 2022-10-25 | End: 2022-10-25

## 2022-10-25 RX ORDER — FUROSEMIDE 40 MG
20 TABLET ORAL ONCE
Refills: 0 | Status: DISCONTINUED | OUTPATIENT
Start: 2022-10-25 | End: 2022-10-25

## 2022-10-25 RX ADMIN — Medication 4 MILLIGRAM(S): at 12:31

## 2022-10-25 RX ADMIN — Medication 2: at 12:31

## 2022-10-25 RX ADMIN — PIPERACILLIN AND TAZOBACTAM 25 GRAM(S): 4; .5 INJECTION, POWDER, LYOPHILIZED, FOR SOLUTION INTRAVENOUS at 21:57

## 2022-10-25 RX ADMIN — Medication 2: at 18:14

## 2022-10-25 RX ADMIN — Medication 2 TABLET(S): at 01:43

## 2022-10-25 RX ADMIN — SODIUM CHLORIDE 3 MILLILITER(S): 9 INJECTION INTRAMUSCULAR; INTRAVENOUS; SUBCUTANEOUS at 21:54

## 2022-10-25 RX ADMIN — SODIUM CHLORIDE 3 MILLILITER(S): 9 INJECTION INTRAMUSCULAR; INTRAVENOUS; SUBCUTANEOUS at 14:27

## 2022-10-25 RX ADMIN — PIPERACILLIN AND TAZOBACTAM 25 GRAM(S): 4; .5 INJECTION, POWDER, LYOPHILIZED, FOR SOLUTION INTRAVENOUS at 05:40

## 2022-10-25 RX ADMIN — SODIUM CHLORIDE 166.67 MILLILITER(S): 9 INJECTION INTRAMUSCULAR; INTRAVENOUS; SUBCUTANEOUS at 14:28

## 2022-10-25 RX ADMIN — PANTOPRAZOLE SODIUM 40 MILLIGRAM(S): 20 TABLET, DELAYED RELEASE ORAL at 21:55

## 2022-10-25 RX ADMIN — Medication 2 TABLET(S): at 12:31

## 2022-10-25 RX ADMIN — TIOTROPIUM BROMIDE 1 CAPSULE(S): 18 CAPSULE ORAL; RESPIRATORY (INHALATION) at 08:19

## 2022-10-25 RX ADMIN — PIPERACILLIN AND TAZOBACTAM 25 GRAM(S): 4; .5 INJECTION, POWDER, LYOPHILIZED, FOR SOLUTION INTRAVENOUS at 14:27

## 2022-10-25 RX ADMIN — Medication 650 MILLIGRAM(S): at 14:57

## 2022-10-25 RX ADMIN — Medication 4 MILLIGRAM(S): at 18:14

## 2022-10-25 RX ADMIN — Medication 1 PACKET(S): at 09:50

## 2022-10-25 RX ADMIN — Medication 2 TABLET(S): at 18:14

## 2022-10-25 RX ADMIN — BUDESONIDE AND FORMOTEROL FUMARATE DIHYDRATE 2 PUFF(S): 160; 4.5 AEROSOL RESPIRATORY (INHALATION) at 08:19

## 2022-10-25 RX ADMIN — Medication 4 MILLIGRAM(S): at 09:51

## 2022-10-25 RX ADMIN — Medication 650 MILLIGRAM(S): at 14:27

## 2022-10-25 RX ADMIN — APIXABAN 5 MILLIGRAM(S): 2.5 TABLET, FILM COATED ORAL at 21:57

## 2022-10-25 RX ADMIN — APIXABAN 5 MILLIGRAM(S): 2.5 TABLET, FILM COATED ORAL at 09:51

## 2022-10-25 RX ADMIN — Medication 4 MILLIGRAM(S): at 21:56

## 2022-10-25 RX ADMIN — Medication 4 MILLIGRAM(S): at 05:40

## 2022-10-25 RX ADMIN — PANTOPRAZOLE SODIUM 40 MILLIGRAM(S): 20 TABLET, DELAYED RELEASE ORAL at 09:50

## 2022-10-25 RX ADMIN — FINASTERIDE 5 MILLIGRAM(S): 5 TABLET, FILM COATED ORAL at 09:51

## 2022-10-25 RX ADMIN — Medication 2 TABLET(S): at 05:39

## 2022-10-25 RX ADMIN — Medication 1 PACKET(S): at 21:55

## 2022-10-25 RX ADMIN — CHLORHEXIDINE GLUCONATE 1 APPLICATION(S): 213 SOLUTION TOPICAL at 13:56

## 2022-10-25 RX ADMIN — SODIUM CHLORIDE 3 MILLILITER(S): 9 INJECTION INTRAMUSCULAR; INTRAVENOUS; SUBCUTANEOUS at 05:32

## 2022-10-25 RX ADMIN — ATORVASTATIN CALCIUM 20 MILLIGRAM(S): 80 TABLET, FILM COATED ORAL at 21:57

## 2022-10-25 RX ADMIN — TAMSULOSIN HYDROCHLORIDE 0.8 MILLIGRAM(S): 0.4 CAPSULE ORAL at 21:56

## 2022-10-25 RX ADMIN — Medication 5 MILLIGRAM(S): at 21:55

## 2022-10-25 NOTE — PROGRESS NOTE ADULT - SUBJECTIVE AND OBJECTIVE BOX
CHIEF COMPLAINT:  Patient is a 77y old  Male who presents with a chief complaint of Elective colectomy (07 Oct 2022 02:35)      HPI: 10/7/22:  76 yo male with h/o AF, PPM, HTN, UC, newly Dx colon CA, HTN, COPD (long smoking Hx), obesity and hyperlipidemia found to have a colon mass and CA and admitted for resection.  He underwent successful surgery on 10/6/22.  He is followed from a cardiac standpoint by his own cardiologist, Dr Sanjay Serna, in [place] and had pre-op evaluation and reportedly had an echo in 5/2022 showing LVEF=44% with a dilated RV and mildly elevated RVSP.  Lexiscan nuclear stress test reportedly showed evidence for a prior inferior/inferolateral MI but no new ischemia.  Cardiac cath in 2017 reportedly showed a <40% LAD narrowing.  He has no anginal chest pains or increased SOB at this time on O2 via face mask.    10/25: Patient had successful colon mass resection 10/6/22. Course complicated by acute hypoxic respiratory failure secondary to L sided pneumonia which improved with NIPPV, septic shock requiring pressors due to fecal spillage peritonitis, and JOSE likely from ATN. Off pressors 10/9. On 10/10 patient was transfused 2u pRBC after drop in h/h and noted with ?coffee ground content in ileostomy. Improved respiratory status, weaned to supplemental O2 via NC. Had urethral cath placed in OR by urology for urinary retention found to have bladder neck contracture. Underwent IR procedure for abd drain placement, found to have fluid collection, with 8cc aspirate, culture has been negative. Parenteral nutrition started on 10/17/22. Patient downgraded from ICU 10/21/22 and continues to improve. He is feeling much better and wants to know when he will be going home.    PMHx:  PAST MEDICAL & SURGICAL HISTORY:  ICM EF 40-45%  CAD  Ulcerative colitis  Colon cancer  Obstructive sleep apnea  Obesity  HTN (hypertension)  Atrial fibrillation  HLD (hyperlipidemia)  BPH (benign prostatic hyperplasia)  COPD (chronic obstructive pulmonary disease)  COVID-19 virus infection  History of pneumonia  H/O hernia repair  History of hydrocelectomy  H/O colonoscopy  H/O cystoscopy-urethral stricture  History of total knee replacement, bilateral  S/P placement of cardiac pacemaker  History of cataract surgery      FAMILY HISTORY:   FAMILY HISTORY:  Family history of cardiomyopathy (Father)      ALLERGIES:  Allergies  ACE inhibitors (Swelling)      REVIEW OF SYSTEMS:  10 point ROS was obtained  Pertinent positives and negatives are as above  All other review of systems is negative unless indicated above      Vital Signs Last 24 Hrs  T(C): 36.9 (07 Oct 2022 00:00), Max: 37 (06 Oct 2022 20:00)  T(F): 98.4 (07 Oct 2022 00:00), Max: 98.6 (06 Oct 2022 20:00)  HR: 90 (07 Oct 2022 05:35) (52 - 112)  BP: 97/61 (07 Oct 2022 04:00) (62/30 - 129/98)  BP(mean): 70 (07 Oct 2022 04:00) (37 - 105)  RR: 16 (07 Oct 2022 04:00) (9 - 25)  SpO2: 93% (07 Oct 2022 05:35) (91% - 100%): BiPAP/CPAP        I&O's Summary    06 Oct 2022 07:01  -  07 Oct 2022 07:00  --------------------------------------------------------  IN: 5622 mL / OUT: 505 mL / NET: 5117 mL      CAPILLARY BLOOD GLUCOSE  POCT Blood Glucose.: 229 mg/dL (07 Oct 2022 06:05)  POCT Blood Glucose.: 228 mg/dL (06 Oct 2022 23:09)  POCT Blood Glucose.: 239 mg/dL (06 Oct 2022 21:46)  POCT Blood Glucose.: 217 mg/dL (06 Oct 2022 14:52)  POCT Blood Glucose.: 121 mg/dL (06 Oct 2022 09:56)  POCT Blood Glucose.: 152 mg/dL (06 Oct 2022 08:21)      PHYSICAL EXAM:   Constitutional: NAD, awake and alert, well-developed  HEENT: PERR, EOMI, Normal Hearing, MMM  Neck: Soft and supple, No LAD, No JVD  Respiratory: Breath sounds are clear bilaterally, No wheezing, rales or rhonchi  Cardiovascular: S1 and S2, regular rate and rhythm, soft DONNY at LLSB and base as before, no gallops or rubs  Gastrointestinal: Bowel Sounds present, soft, distended, +ostomy with stool, Staples along incision site  Extremities: No peripheral edema  Vascular: 2+ peripheral pulses  Neurological: A/O x 3, no focal deficits  Skin: No rashes    MEDICATIONS  (STANDING):  acetaminophen   IVPB .. 1000 milliGRAM(s) IV Intermittent once  apixaban 5 milliGRAM(s) Oral every 12 hours  atorvastatin 20 milliGRAM(s) Oral at bedtime  budesonide 160 MICROgram(s)/formoterol 4.5 MICROgram(s) Inhaler 2 Puff(s) Inhalation two times a day  chlorhexidine 4% Liquid 1 Application(s) Topical <User Schedule>  dextrose 50% Injectable 25 Gram(s) IV Push once  dextrose 50% Injectable 12.5 Gram(s) IV Push once  dextrose 50% Injectable 25 Gram(s) IV Push once  dextrose Oral Gel 15 Gram(s) Oral once  diphenoxylate/atropine 2 Tablet(s) Oral four times a day  finasteride 5 milliGRAM(s) Oral daily  glucagon  Injectable 1 milliGRAM(s) IntraMuscular once  influenza  Vaccine (HIGH DOSE) 0.7 milliLiter(s) IntraMuscular once  insulin lispro (ADMELOG) corrective regimen sliding scale   SubCutaneous every 6 hours  loperamide 4 milliGRAM(s) Oral <User Schedule>  melatonin 5 milliGRAM(s) Oral at bedtime  pantoprazole  Injectable 40 milliGRAM(s) IV Push every 12 hours  piperacillin/tazobactam IVPB.. 3.375 Gram(s) IV Intermittent every 8 hours  predniSONE   Tablet 4 milliGRAM(s) Oral daily  psyllium Powder 1 Packet(s) Oral two times a day  sodium chloride 0.9% lock flush 3 milliLiter(s) IV Push every 8 hours  tamsulosin 0.8 milliGRAM(s) Oral at bedtime  tiotropium 18 MICROgram(s) Capsule 1 Capsule(s) Inhalation daily    MEDICATIONS  (PRN):  acetaminophen     Tablet .. 650 milliGRAM(s) Oral every 6 hours PRN Mild Pain (1 - 3)  ALBUTerol    90 MICROgram(s) HFA Inhaler 2 Puff(s) Inhalation every 6 hours PRN Shortness of Breath and/or Wheezing  albuterol/ipratropium for Nebulization 3 milliLiter(s) Nebulizer every 6 hours PRN Shortness of Breath and/or Wheezing  morphine  - Injectable 2 milliGRAM(s) IV Push every 4 hours PRN Moderate Pain (4 - 6)  naloxone Injectable 0.1 milliGRAM(s) IV Push every 3 minutes PRN For ANY of the following changes in patient status:  A. RR LESS THAN 10 breaths per minute, B. Oxygen saturation LESS THAN 90%, C. Sedation score of 6  ondansetron Injectable 4 milliGRAM(s) IV Push every 6 hours PRN Nausea and/or Vomiting  oxybutynin 5 milliGRAM(s) Oral daily PRN Bladder spasms    LABS: All Labs Reviewed:                          7.6    6.86  )-----------( 195      ( 24 Oct 2022 07:55 )             24.8                       11.0   11.60 )-----------( 159      ( 07 Oct 2022 05:53 )             35.6     10-24    137  |  103  |  19  ----------------------------<  116<H>  4.4   |  29  |  1.33<H>    Ca    8.5      24 Oct 2022 07:55  Phos  2.6     10-24  Mg     1.8     10-24      10-07    140  |  105  |  26<H>  ----------------------------<  215<H>  5.8<H>   |  28  |  2.36<H>    Ca    8.1<L>      07 Oct 2022 05:53  Phos  5.9     10-07  Mg     1.6     10-07    TPro  5.0<L>  /  Alb  2.3<L>  /  TBili  0.6  /  DBili  x   /  AST  19  /  ALT  30  /  AlkPhos  43  10-07    A1C with Estimated Average Glucose (09.29.22 @ 14:21): 6.9  Serum Pro-Brain Natriuretic Peptide: 543 pg/mL (10-06 @ 16:10)    BLOOD CULTURES:   LIPID PROFILE     RADIOLOGY:   CXR: 9/29/22:  FINDINGS:  PULMONARY: The airway is midline.  There are no airspace consolidations. Cardiac device wire leads are   within right atrium and right ventricle.  No pleural effusion or pneumothorax.  HEART/VASCULAR: The heart size and mediastinum configuration are within the limits of normal.  BONES: The visualized osseus structures are intact.  IMPRESSION:  No radiographic evidence of active chest disease..       EKG:  10/6/22:  Atrial-sensed ventricular-paced rhythm with occasional Premature ventricular complexes    TELEMETRY:    Not on tele    ECHO:    ACC: [id number] EXAM:  ECHO TTE WITH CON COMP W DOPP                          PROCEDURE DATE:  10/07/2022          INTERPRETATION:  Transthoracic Echocardiography Report (TTE)     Demographics     Patient name         GITA BALLARD   Age76 year(s)     Med Rec #            [id number]          Gender        Male     Account #            [id number]       Date of Birth 1945     Interpreting         Brenton Jba,   Room Number   0077   Physician            MD     Referring Physician  mirna starr        Sonographer   Jamila Sloan,                                                         [facility]     Date of study        10/07/2022 07:52                        AM     Height               70.87 in           Weight        277.78 pounds    Type of Study:     TTE procedure: ECHO TTE WO CON COMP W DOP, ECHO TTE W C COMP W DOPP     BP: 93/57 mmHg     Contrast Medium: Lumason.     Study Location: [facility]ical Quality: Technically Difficullt    Indications   1) I95.81 - Postprocedural hypotension    M-Mode Measurements (cm)     LVEDd: 5.44 cm            LVESd: 3.59 cm   IVSEd: 1.17 cm   LVPWd: 1.21 cm            AO Root Dimension: 3.5 cm                             ACS: 1 cm                             LA: 3.8 cm               LVOT: 2.2 cm    Doppler Measurements:     AV Velocity:248 cm/s                 MV Peak E-Wave: 70.2 cm/s   AV Peak Gradient: 24.6 mmHg          MV Peak A-Wave: 119 cm/s   AV Mean Gradient: 14 mmHg            MV E/A Ratio: 0.59 %   AV Area (Continuity):1.62 cm^2       MV Peak Gradient: 1.97 mmHg   TR Velocity:314 cm/s   TR Gradient:39.4384 mmHg   Estimated RAP:3 mmHg   RVSP:42 mmHg     Findings     Mitral Valve   Moderate mitral annular calcification is present.   The mitral valve leaflets appear thickened.   EA reversal of the mitral inflow consistent with reduced compliance of   the   left ventricle.     Aortic Valve   The aortic valve appears mildly calcified. Valve opening seems to be   restricted.   Peak and mean transaortic gradients are 25 and 14 mmHg respectively; this   finding is consistent with mild aortic stenosis.     Tricuspid Valve   The tricuspid valve leaflets appear mildly thickened and/or calcified,   but   open well.   Moderate (2+) tricuspid valve regurgitation is present.   Mild pulmonary hypertension.     Pulmonic Valve   Normal appearing pulmonic valve structure.   Trace pulmonic valvular regurgitation is present.     Left Atrium   Normal appearing left atrium.     Left Ventricle   Mild concentric left ventricular hypertrophy is present.   Left ventricle systolic function moderately decreased paradoxical septal   motion ,apical wall , anterior wall , apical lateral hypokinesis in the   presence of a cardiac arrhythmia.   Lumason was used to better define the endocardial border.   Visual estimation of left ventricle ejection fraction is 40-45 %.     Right Atrium   Normal appearing right atrium.   A device wire is seen in the RV and RA.     Right Ventricle   Normal appearing right ventricle structure and function.     Pericardial Effusion   No evidence of pericardial effusion.     Miscellaneous   The IVC is not visualized.     Impression     Summary     Moderate mitral annular calcification is present.   The mitral valve leaflets appear thickened.   EA reversal of the mitral inflow consistent with reduced compliance of the left ventricle.   The aortic valve appears mildly calcified. Valve opening seems to be restricted.   Peak and mean transaortic gradients are 25 and 14 mmHg respectively; this   finding is consistent with mild aortic stenosis.   The tricuspid valve leaflets appear mildly thickened and/or calcified, but open well.   Moderate (2+) tricuspid valve regurgitation is present.   Mild pulmonary hypertension.   Mild concentric left ventricular hypertrophy is present.   Left ventricle systolic function moderately decreased paradoxical septal motion ,apical wall , anterior wall , apical lateral hypokinesis in the presence of a cardiac arrhythmia.   Lumason was used to better define the endocardial border.   Visual estimation of left ventricle ejection fraction is 40-45 %.   Normal appearing right atrium.   A device wire is seen in the RV and RA.   Normal appearing right ventricle structure and function.

## 2022-10-25 NOTE — PROGRESS NOTE ADULT - ASSESSMENT
Patient is S/p proctocolectomy, IPAA, DLI for UC/Sigmoid CA, IR drain for collection. S/p cystoscopy w/ stent placement and [place] tip Zhao.   AFVSS.   Ostomy functional    Plan:  c/w regular diet  Continue Eliquis  Ambulation/PT  Pain control  Monitor ileostomy output - on loperamide, metamucil, lomotil (lomotil dose increased yesterday)  Appreciate MAIDA FISHER  CM for placement    Plan discussed with colorectal team

## 2022-10-25 NOTE — PROGRESS NOTE ADULT - SUBJECTIVE AND OBJECTIVE BOX
Pt. seen and examined at bedside this morning. Patient reports no abdominal pain, tolerating regular diet, passing gas, ileostomy functional. He denies fever, chest pain, SOB, nausea, vomiting. Upset as he wants to go home/leave the hospital. Received 1u PRBC yesterday. VS reviewed.    Vital Signs Last 24 Hrs  T(C): 36.3 (24 Oct 2022 21:07), Max: 36.9 (24 Oct 2022 12:10)  T(F): 97.4 (24 Oct 2022 21:07), Max: 98.4 (24 Oct 2022 12:10)  HR: 64 (24 Oct 2022 21:07) (59 - 71)  BP: 116/62 (24 Oct 2022 21:07) (98/46 - 119/62)  BP(mean): --  RR: 18 (24 Oct 2022 21:07) (18 - 18)  SpO2: 97% (24 Oct 2022 21:07) (94% - 98%)    Parameters below as of 24 Oct 2022 21:07  Patient On (Oxygen Delivery Method): nasal cannula      PHYSICAL EXAM   GENERAL: NAD, AOx3, well developed  HEAD: Atraumatic, normocephalic  EYES: EOMI, PERRLA, conjunctiva and sclera clear  ENT: moist mucous membrane  NECK: supple, No JVD, midline trachea  CHEST/LUNG: unlabored respirations b/l on NC  Heart: RRR, normotensive  ABDOMEN: obese, soft, nondistended, nontender. Ostomy functional  NERVOUS SYSTEM: AOx3, speech clear, no neuro-deficits  MSK: full ROM, no deformities  SKIN: warm to touch, no rash or lesions                          7.6    6.86  )-----------( 195      ( 24 Oct 2022 07:55 )             24.8   10-24    137  |  103  |  19  ----------------------------<  116<H>  4.4   |  29  |  1.33<H>    Ca    8.5      24 Oct 2022 07:55  Phos  2.6     10-24  Mg     1.8     10-24    I&O's Detail    23 Oct 2022 07:01  -  24 Oct 2022 07:00  --------------------------------------------------------  IN:  Total IN: 0 mL    OUT:    Bulb (mL): 15 mL    Ileostomy (mL): 1800 mL    Indwelling Catheter - Urethral (mL): 1500 mL  Total OUT: 3315 mL    Total NET: -3315 mL      24 Oct 2022 07:01  -  25 Oct 2022 06:11  --------------------------------------------------------  IN:    PRBCs (Packed Red Blood Cells): 247 mL  Total IN: 247 mL    OUT:    Ileostomy (mL): 1450 mL    Indwelling Catheter - Urethral (mL): 1000 mL  Total OUT: 2450 mL    Total NET: -2203 mL          Current Inpatient Medications:  acetaminophen     Tablet .. 650 milliGRAM(s) Oral every 6 hours PRN  acetaminophen   IVPB .. 1000 milliGRAM(s) IV Intermittent once  ALBUTerol    90 MICROgram(s) HFA Inhaler 2 Puff(s) Inhalation every 6 hours PRN  albuterol/ipratropium for Nebulization 3 milliLiter(s) Nebulizer every 6 hours PRN  apixaban 5 milliGRAM(s) Oral every 12 hours  atorvastatin 20 milliGRAM(s) Oral at bedtime  budesonide 160 MICROgram(s)/formoterol 4.5 MICROgram(s) Inhaler 2 Puff(s) Inhalation two times a day  chlorhexidine 4% Liquid 1 Application(s) Topical <User Schedule>  dextrose 50% Injectable 25 Gram(s) IV Push once  dextrose 50% Injectable 12.5 Gram(s) IV Push once  dextrose 50% Injectable 25 Gram(s) IV Push once  dextrose Oral Gel 15 Gram(s) Oral once  diphenoxylate/atropine 1 Tablet(s) Oral four times a day  finasteride 5 milliGRAM(s) Oral daily  glucagon  Injectable 1 milliGRAM(s) IntraMuscular once  influenza  Vaccine (HIGH DOSE) 0.7 milliLiter(s) IntraMuscular once  insulin lispro (ADMELOG) corrective regimen sliding scale   SubCutaneous every 6 hours  loperamide 4 milliGRAM(s) Oral <User Schedule>  melatonin 5 milliGRAM(s) Oral at bedtime  morphine  - Injectable 2 milliGRAM(s) IV Push every 4 hours PRN  naloxone Injectable 0.1 milliGRAM(s) IV Push every 3 minutes PRN  ondansetron Injectable 4 milliGRAM(s) IV Push every 6 hours PRN  oxybutynin 5 milliGRAM(s) Oral daily PRN  pantoprazole  Injectable 40 milliGRAM(s) IV Push every 12 hours  piperacillin/tazobactam IVPB.. 3.375 Gram(s) IV Intermittent every 8 hours  predniSONE   Tablet 4 milliGRAM(s) Oral daily  psyllium Powder 1 Packet(s) Oral two times a day  sodium chloride 0.9% lock flush 3 milliLiter(s) IV Push every 8 hours  tamsulosin 0.8 milliGRAM(s) Oral at bedtime  tiotropium 18 MICROgram(s) Capsule 1 Capsule(s) Inhalation daily

## 2022-10-25 NOTE — PROGRESS NOTE ADULT - ASSESSMENT
78 yo male with h/o AF, PPM, HTN, UC, newly Dx colon CA, HTN, COPD (long smoking Hx), obesity and hyperlipidemia found to have a colon mass and CA and admitted for resection.  He underwent successful surgery on 10/6/22 complicated by acute hypoxic respiratory failure secondary to L sided pneumonia which improved with NIPPV, septic shock requiring pressors due to fecal spillage peritonitis, and JOSE likely from ATN. Was started on Zosyn. Off pressors 10/9. On 10/10 patient was transfused 2u pRBC after drop in h/h and noted with ?coffee ground content in ileostomy. Improved respiratory status, weaned to supplemental O2 via NC. Had urethral cath placed in OR by urology for urinary retention found to have bladder neck contracture. Underwent IR procedure for abd drain placement, found to have fluid collection, with 8cc aspirate, culture has been negative. Parenteral nutrition started on 10/17/22. Patient downgraded from ICU 10/21/22. Hospitalist consulted for continued medical management.     #Colon mass   -S/P RCP-IPAA (restorative proctocolectomy with ileal pouch anal anastomosis), diverting loop ileostomy 10/6  -C/B acute hypoxic respiratory failure due to pneumonia/Septic shock/JOSE/fecal spillage peritonitis   -Off pressors, given stress dose steroids, now tapering, on prednisone 4mg daily   -Completed zosyn 10 days for PNA  -S/p IR drainage of fluid collection on 10/19, actually restarted zosyn 10/20, (cultures have been negative >48hrs)- day #4, will discontinue   -Started PPN on 10/17, now tolerating clear liquids, if pt does not tolerate advancing diet, may need PICC to continue PPB - today is day #7   -Advance diet as per primary team - tolerating diet well, once tolerating regular diet, d/c PPN    #Normocytic Anemia, multifactorial   10/24 PRBCS ordered by primary team - will hold off on lasix pre-med due to slight elevation in Cr   Monitor closely while on Eliquis     #Mild Systolic 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%  #mild JOSE  check BNP and CXR in AM - suspect pulm vac congestion - will give lasix and re-eval in AM   discuss Card Dr Barrett, optimize meds - ACEI and BB as toelrated; low Na diet   suspect drop in CHF In the setting of septic shock but will need repeat ECHO in 6 weeks     #HTN/AFIB/PPM/HLD  -Continue Eliquis 5mg q12hrs   -Continue lipitor 20mg qhs  -Off antihypertensives here, bp stable  -On amlodipine 2.5mg, Metoprolol ER 25mg daily as BP allows     #COPD  -Stable  -Continue Prednisone 4mg daily, Symbicort, Spiriva Albuterol HFA PRN   - before dc check Pulse Ox on ambulation to see how hes doing and ensure safety while in Rehab      #Urinary retention  -Alfaro's in place  -Continue tamsulosin 0.8mg qhs, finasteride 5mg daily   -Urology consult appreciated: d/c home with alfaro's    VTE prophylaxis  -On eliquis     Dispo: Pulse Ox on ambulation before dc to rehab to ensure a safe discharge, CXR in AM, H&H while on eliquis, BNP, Card follow-up    78 yo male with h/o AF, PPM, HTN, UC, newly Dx colon CA, HTN, COPD (long smoking Hx), obesity and hyperlipidemia found to have a colon mass and CA and admitted for resection.  He underwent successful surgery on 10/6/22 complicated by acute hypoxic respiratory failure secondary to L sided pneumonia which improved with NIPPV, septic shock requiring pressors due to fecal spillage peritonitis, and JOSE likely from ATN. Was started on Zosyn. Off pressors 10/9. On 10/10 patient was transfused 2u pRBC after drop in h/h and noted with ?coffee ground content in ileostomy. Improved respiratory status, weaned to supplemental O2 via NC. Had urethral cath placed in OR by urology for urinary retention found to have bladder neck contracture. Underwent IR procedure for abd drain placement, found to have fluid collection, with 8cc aspirate, culture has been negative. Parenteral nutrition started on 10/17/22. Patient downgraded from ICU 10/21/22. Hospitalist consulted for continued medical management.     #Colon mass   -S/P RCP-IPAA (restorative proctocolectomy with ileal pouch anal anastomosis), diverting loop ileostomy 10/6  -C/B acute hypoxic respiratory failure due to pneumonia/Septic shock/JOSE/fecal spillage peritonitis   -Off pressors, given stress dose steroids, now tapering, on prednisone 4mg daily   -Completed zosyn 10 days for PNA  -S/p IR drainage of fluid collection on 10/19, actually restarted zosyn 10/20, (cultures have been negative >48hrs)- day #4, will discontinue   -Started PPN on 10/17, now tolerating clear liquids, if pt does not tolerate advancing diet, may need PICC to continue PPB - today is day #7   -Advance diet as per primary team - tolerating diet well, once tolerating regular diet, d/c PPN    #Normocytic Anemia, multifactorial   10/24 PRBCS ordered by primary team - will hold off on lasix pre-med due to slight elevation in Cr   Monitor closely while on Eliquis     #Mild Systolic 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%  #mild JOSE  patient just got IVFs 10/25 due to high ostomy output - for now monitor only  BNP and CXR done in AM - suspect pulm vasc congestion   at this time rpt Serum Cr, BNP and CXR in AM   discuss Card Dr Barrett, optimize meds - ACEI and BB as toelrated; low Na diet   suspect drop in CHF In the setting of septic shock but will need repeat ECHO in 6 weeks     #HTN/AFIB/PPM/HLD  -Continue Eliquis 5mg q12hrs   -Continue lipitor 20mg qhs  -Off antihypertensives here, bp stable  -On amlodipine 2.5mg, Metoprolol ER 25mg daily as BP allows     #COPD  -Stable  -Continue Prednisone 4mg daily, Symbicort, Spiriva Albuterol HFA PRN   - before dc check Pulse Ox on ambulation to see how hes doing and ensure safety while in Rehab      #Urinary retention  -Alfaro's in place  -Continue tamsulosin 0.8mg qhs, finasteride 5mg daily   -Urology consult appreciated: d/c home with alfaro's    VTE prophylaxis  -On eliquis     Dispo: Pulse Ox on ambulation before dc to rehab to ensure a safe discharge, CXR in AM, H&H while on eliquis, BNP, Card follow-up   pls call with salvador

## 2022-10-25 NOTE — PROGRESS NOTE ADULT - ATTENDING COMMENTS
Pt seen and examined. No complaints. Has appropriate hgb response to transfusion. Abdomen soft, non tender. Ileostomy functioning. Cr a bit up to 1.57. Will give gentle fluids. Antidiarrheals to manage ileostomy output. Discharge planning for later this week

## 2022-10-25 NOTE — PROGRESS NOTE ADULT - ASSESSMENT
76 yo M with above PMHx presents for colon resection complicated by acute hypoxemic resp failure 2/2 PNA and septic shock, now resolved.    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  -ACEI allergy due to swelling so not a candidate for ACEI, ARB, Entresto.  -Monitor BP, in last 24 hours BP had dropped low. If BP remains stable recommend starting on metoprolol succinate 25  -Will hold off on other CM medications due to low BP.  -Titrate O2 as tolerates - appears euvolemic at this time so will hold off on diuretics - continue to monitor  -CM medications can be titrated/added as outpatient with his outside cardiologist if needed. 76 yo M with above PMHx presents for colon resection complicated by acute hypoxemic resp failure 2/2 PNA and septic shock, now resolved.    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  -No ASA due to being on eliquis and high risk for bleeding.  -C/W statin  -ACEI allergy due to swelling so not a candidate for ACEI, ARB, Entresto.  -Monitor BP, in last 24 hours BP had dropped low. If BP remains stable recommend starting on metoprolol succinate 25  -Will hold off on other CM medications due to low BP.  -Titrate O2 as tolerates - appears euvolemic at this time so will hold off on diuretics - continue to monitor  -CM medications can be titrated/added as outpatient with his outside cardiologist if needed.

## 2022-10-25 NOTE — PROGRESS NOTE ADULT - SUBJECTIVE AND OBJECTIVE BOX
78 yo male with h/o AF, PPM, HTN, UC, newly Dx colon CA, HTN, COPD (long smoking Hx), obesity and hyperlipidemia found to have a colon mass and CA and admitted for resection.  He underwent successful surgery on 10/6/22 complicated by acute hypoxic respiratory failure secondary to L sided pneumonia which improved with NIPPV, septic shock requiring pressors due to fecal spillage peritonitis, and JOSE likely from ATN. Was started on Zosyn. Off pressors 10/9. On 10/10 patient was transfused 2u pRBC after drop in h/h and noted with ?coffee ground content in ileostomy. Improved respiratory status, weaned to supplemental O2 via NC. Had urethral cath placed in OR by urology for urinary retention found to have bladder neck contracture. Underwent IR procedure for abd drain placement, found to have fluid collection, with 8cc aspirate, culture has been negative. Parenteral nutrition started on 10/17/22. Patient downgraded from ICU 10/21/22. Hospitalist consulted for continued medical management.     10/25 - no cp palps sob abdo pain, got PRBCs yesterday no lasix; Cr trending up today, BNP slightly elevated     PHYSICAL EXAM:  T(F): 97.4 (25 Oct 2022 15:22), Max: 98 (25 Oct 2022 09:46)  HR: 67 (25 Oct 2022 15:22) (64 - 71)  BP: 110/64 (25 Oct 2022 15:22) (100/54 - 116/62)  RR: 18 (25 Oct 2022 15:22) (18 - 18)  SpO2: 96% (25 Oct 2022 15:22) (85% - 97%) nasal cannula  GENERAL: NAD, lying in bed comfortably with NC on   HEAD:  Atraumatic, Normocephalic  EYES: conjunctiva and sclera clear  ENT: Moist mucous membranes  NECK: Supple, No JVD  CHEST/LUNG: Clear to auscultation bilaterally; No rales, rhonchi, wheezing, or rubs. Unlabored respirations  HEART: Regular rate and rhythm; No murmurs, rubs, or gallops  ABDOMEN: Bowel sounds present; Soft, Nontender, + ostomy, midline incision w/ staples in place, C/D/I, no surrounding erythema   EXTREMITIES:  2+ Peripheral Pulses, brisk capillary refill. No clubbing, cyanosis, or edema  NERVOUS SYSTEM:  Alert & Oriented X3, speech clear. No deficits   MSK: FROM all 4 extremities, full and equal strength    LABS: All Labs Reviewed:                      8.5    6.61  )-----------( 205      ( 25 Oct 2022 08:27 )             27.8   135  |  100  |  18  ----------------------------<  126<H>  4.5   |  31  |  1.57<H>    MEDS:   acetaminophen     Tablet .. 650 milliGRAM(s) Oral every 6 hours PRN  acetaminophen   IVPB .. 1000 milliGRAM(s) IV Intermittent once  ALBUTerol    90 MICROgram(s) HFA Inhaler 2 Puff(s) Inhalation every 6 hours PRN  albuterol/ipratropium for Nebulization 3 milliLiter(s) Nebulizer every 6 hours PRN  apixaban 5 milliGRAM(s) Oral every 12 hours  atorvastatin 20 milliGRAM(s) Oral at bedtime  budesonide 160 MICROgram(s)/formoterol 4.5 MICROgram(s) Inhaler 2 Puff(s) Inhalation two times a day  chlorhexidine 4% Liquid 1 Application(s) Topical <User Schedule>  diphenoxylate/atropine 2 Tablet(s) Oral four times a day  finasteride 5 milliGRAM(s) Oral daily  glucagon  Injectable 1 milliGRAM(s) IntraMuscular once  influenza  Vaccine (HIGH DOSE) 0.7 milliLiter(s) IntraMuscular once  insulin lispro (ADMELOG) corrective regimen sliding scale   SubCutaneous every 6 hours  loperamide 4 milliGRAM(s) Oral <User Schedule>  melatonin 5 milliGRAM(s) Oral at bedtime  morphine  - Injectable 2 milliGRAM(s) IV Push every 4 hours PRN  naloxone Injectable 0.1 milliGRAM(s) IV Push every 3 minutes PRN  ondansetron Injectable 4 milliGRAM(s) IV Push every 6 hours PRN  oxybutynin 5 milliGRAM(s) Oral daily PRN  pantoprazole  Injectable 40 milliGRAM(s) IV Push every 12 hours  piperacillin/tazobactam IVPB.. 3.375 Gram(s) IV Intermittent every 8 hours  predniSONE   Tablet 4 milliGRAM(s) Oral daily  psyllium Powder 1 Packet(s) Oral two times a day  sodium chloride 0.9% lock flush 3 milliLiter(s) IV Push every 8 hours  tamsulosin 0.8 milliGRAM(s) Oral at bedtime  tiotropium 18 MICROgram(s) Capsule 1 Capsule(s) Inhalation daily

## 2022-10-26 LAB
ANION GAP SERPL CALC-SCNC: 5 MMOL/L — SIGNIFICANT CHANGE UP (ref 5–17)
BUN SERPL-MCNC: 15 MG/DL — SIGNIFICANT CHANGE UP (ref 7–23)
CALCIUM SERPL-MCNC: 8.7 MG/DL — SIGNIFICANT CHANGE UP (ref 8.5–10.1)
CHLORIDE SERPL-SCNC: 104 MMOL/L — SIGNIFICANT CHANGE UP (ref 96–108)
CO2 SERPL-SCNC: 29 MMOL/L — SIGNIFICANT CHANGE UP (ref 22–31)
CREAT SERPL-MCNC: 1.61 MG/DL — HIGH (ref 0.5–1.3)
EGFR: 44 ML/MIN/1.73M2 — LOW
GLUCOSE SERPL-MCNC: 129 MG/DL — HIGH (ref 70–99)
HCT VFR BLD CALC: 29.1 % — LOW (ref 39–50)
HGB BLD-MCNC: 8.6 G/DL — LOW (ref 13–17)
MAGNESIUM SERPL-MCNC: 1.9 MG/DL — SIGNIFICANT CHANGE UP (ref 1.6–2.6)
MCHC RBC-ENTMCNC: 25.5 PG — LOW (ref 27–34)
MCHC RBC-ENTMCNC: 29.6 GM/DL — LOW (ref 32–36)
MCV RBC AUTO: 86.4 FL — SIGNIFICANT CHANGE UP (ref 80–100)
NT-PROBNP SERPL-SCNC: 1647 PG/ML — HIGH (ref 0–450)
PHOSPHATE SERPL-MCNC: 3.2 MG/DL — SIGNIFICANT CHANGE UP (ref 2.5–4.5)
PLATELET # BLD AUTO: 183 K/UL — SIGNIFICANT CHANGE UP (ref 150–400)
POTASSIUM SERPL-MCNC: 4.5 MMOL/L — SIGNIFICANT CHANGE UP (ref 3.5–5.3)
POTASSIUM SERPL-SCNC: 4.5 MMOL/L — SIGNIFICANT CHANGE UP (ref 3.5–5.3)
RBC # BLD: 3.37 M/UL — LOW (ref 4.2–5.8)
RBC # FLD: 17.9 % — HIGH (ref 10.3–14.5)
SODIUM SERPL-SCNC: 138 MMOL/L — SIGNIFICANT CHANGE UP (ref 135–145)
WBC # BLD: 7.36 K/UL — SIGNIFICANT CHANGE UP (ref 3.8–10.5)
WBC # FLD AUTO: 7.36 K/UL — SIGNIFICANT CHANGE UP (ref 3.8–10.5)

## 2022-10-26 PROCEDURE — 71045 X-RAY EXAM CHEST 1 VIEW: CPT | Mod: 26

## 2022-10-26 PROCEDURE — 99232 SBSQ HOSP IP/OBS MODERATE 35: CPT

## 2022-10-26 RX ORDER — OXYCODONE HYDROCHLORIDE 5 MG/1
5 TABLET ORAL EVERY 4 HOURS
Refills: 0 | Status: DISCONTINUED | OUTPATIENT
Start: 2022-10-26 | End: 2022-10-26

## 2022-10-26 RX ORDER — ACETAMINOPHEN 500 MG
650 TABLET ORAL EVERY 6 HOURS
Refills: 0 | Status: DISCONTINUED | OUTPATIENT
Start: 2022-10-26 | End: 2022-11-02

## 2022-10-26 RX ORDER — FUROSEMIDE 40 MG
20 TABLET ORAL
Refills: 0 | Status: COMPLETED | OUTPATIENT
Start: 2022-10-26 | End: 2022-10-26

## 2022-10-26 RX ORDER — CHOLESTYRAMINE 4 G/9G
4 POWDER, FOR SUSPENSION ORAL DAILY
Refills: 0 | Status: DISCONTINUED | OUTPATIENT
Start: 2022-10-26 | End: 2022-11-02

## 2022-10-26 RX ORDER — OXYCODONE HYDROCHLORIDE 5 MG/1
10 TABLET ORAL EVERY 4 HOURS
Refills: 0 | Status: DISCONTINUED | OUTPATIENT
Start: 2022-10-26 | End: 2022-10-26

## 2022-10-26 RX ADMIN — Medication 4 MILLIGRAM(S): at 09:19

## 2022-10-26 RX ADMIN — Medication 4 MILLIGRAM(S): at 05:46

## 2022-10-26 RX ADMIN — MORPHINE SULFATE 2 MILLIGRAM(S): 50 CAPSULE, EXTENDED RELEASE ORAL at 07:56

## 2022-10-26 RX ADMIN — SODIUM CHLORIDE 3 MILLILITER(S): 9 INJECTION INTRAMUSCULAR; INTRAVENOUS; SUBCUTANEOUS at 05:40

## 2022-10-26 RX ADMIN — Medication 2 TABLET(S): at 00:17

## 2022-10-26 RX ADMIN — BUDESONIDE AND FORMOTEROL FUMARATE DIHYDRATE 2 PUFF(S): 160; 4.5 AEROSOL RESPIRATORY (INHALATION) at 22:21

## 2022-10-26 RX ADMIN — PIPERACILLIN AND TAZOBACTAM 25 GRAM(S): 4; .5 INJECTION, POWDER, LYOPHILIZED, FOR SOLUTION INTRAVENOUS at 13:25

## 2022-10-26 RX ADMIN — ATORVASTATIN CALCIUM 20 MILLIGRAM(S): 80 TABLET, FILM COATED ORAL at 22:17

## 2022-10-26 RX ADMIN — PIPERACILLIN AND TAZOBACTAM 25 GRAM(S): 4; .5 INJECTION, POWDER, LYOPHILIZED, FOR SOLUTION INTRAVENOUS at 05:47

## 2022-10-26 RX ADMIN — CHOLESTYRAMINE 4 GRAM(S): 4 POWDER, FOR SUSPENSION ORAL at 13:25

## 2022-10-26 RX ADMIN — Medication 650 MILLIGRAM(S): at 22:50

## 2022-10-26 RX ADMIN — Medication 2 TABLET(S): at 12:42

## 2022-10-26 RX ADMIN — Medication 2 TABLET(S): at 17:26

## 2022-10-26 RX ADMIN — APIXABAN 5 MILLIGRAM(S): 2.5 TABLET, FILM COATED ORAL at 22:18

## 2022-10-26 RX ADMIN — Medication 650 MILLIGRAM(S): at 22:19

## 2022-10-26 RX ADMIN — Medication 4 MILLIGRAM(S): at 16:06

## 2022-10-26 RX ADMIN — SODIUM CHLORIDE 3 MILLILITER(S): 9 INJECTION INTRAMUSCULAR; INTRAVENOUS; SUBCUTANEOUS at 22:29

## 2022-10-26 RX ADMIN — PANTOPRAZOLE SODIUM 40 MILLIGRAM(S): 20 TABLET, DELAYED RELEASE ORAL at 22:17

## 2022-10-26 RX ADMIN — Medication 5 MILLIGRAM(S): at 22:17

## 2022-10-26 RX ADMIN — Medication 650 MILLIGRAM(S): at 16:06

## 2022-10-26 RX ADMIN — CHLORHEXIDINE GLUCONATE 1 APPLICATION(S): 213 SOLUTION TOPICAL at 12:12

## 2022-10-26 RX ADMIN — Medication 650 MILLIGRAM(S): at 04:39

## 2022-10-26 RX ADMIN — Medication 20 MILLIGRAM(S): at 10:19

## 2022-10-26 RX ADMIN — Medication 4 MILLIGRAM(S): at 22:18

## 2022-10-26 RX ADMIN — Medication 2 TABLET(S): at 22:19

## 2022-10-26 RX ADMIN — Medication 2: at 17:27

## 2022-10-26 RX ADMIN — SODIUM CHLORIDE 3 MILLILITER(S): 9 INJECTION INTRAMUSCULAR; INTRAVENOUS; SUBCUTANEOUS at 13:41

## 2022-10-26 RX ADMIN — Medication 2 TABLET(S): at 05:47

## 2022-10-26 RX ADMIN — Medication 1 PACKET(S): at 09:19

## 2022-10-26 RX ADMIN — Medication 650 MILLIGRAM(S): at 05:54

## 2022-10-26 RX ADMIN — TIOTROPIUM BROMIDE 1 CAPSULE(S): 18 CAPSULE ORAL; RESPIRATORY (INHALATION) at 11:12

## 2022-10-26 RX ADMIN — APIXABAN 5 MILLIGRAM(S): 2.5 TABLET, FILM COATED ORAL at 09:19

## 2022-10-26 RX ADMIN — Medication 20 MILLIGRAM(S): at 22:16

## 2022-10-26 RX ADMIN — PANTOPRAZOLE SODIUM 40 MILLIGRAM(S): 20 TABLET, DELAYED RELEASE ORAL at 09:19

## 2022-10-26 RX ADMIN — Medication 2: at 12:42

## 2022-10-26 RX ADMIN — Medication 4 MILLIGRAM(S): at 10:20

## 2022-10-26 RX ADMIN — BUDESONIDE AND FORMOTEROL FUMARATE DIHYDRATE 2 PUFF(S): 160; 4.5 AEROSOL RESPIRATORY (INHALATION) at 11:12

## 2022-10-26 RX ADMIN — FINASTERIDE 5 MILLIGRAM(S): 5 TABLET, FILM COATED ORAL at 09:19

## 2022-10-26 RX ADMIN — Medication 1 PACKET(S): at 22:17

## 2022-10-26 RX ADMIN — TAMSULOSIN HYDROCHLORIDE 0.8 MILLIGRAM(S): 0.4 CAPSULE ORAL at 22:17

## 2022-10-26 NOTE — PROGRESS NOTE ADULT - SUBJECTIVE AND OBJECTIVE BOX
SURGERY DAILY PROGRESS NOTE:     Subjective:  Patient seen and examined this AM at bedside. No acute events overnight and patient resting comfortably. Ostomy still high output despite bulking agents. Tolerating diet. Denies fever/chills, shortness of breath, chest pain. VS reviewed    Objective:    MEDICATIONS  (STANDING):  acetaminophen   IVPB .. 1000 milliGRAM(s) IV Intermittent once  apixaban 5 milliGRAM(s) Oral every 12 hours  atorvastatin 20 milliGRAM(s) Oral at bedtime  budesonide 160 MICROgram(s)/formoterol 4.5 MICROgram(s) Inhaler 2 Puff(s) Inhalation two times a day  chlorhexidine 4% Liquid 1 Application(s) Topical <User Schedule>  dextrose 50% Injectable 25 Gram(s) IV Push once  dextrose 50% Injectable 12.5 Gram(s) IV Push once  dextrose 50% Injectable 25 Gram(s) IV Push once  dextrose Oral Gel 15 Gram(s) Oral once  diphenoxylate/atropine 2 Tablet(s) Oral four times a day  finasteride 5 milliGRAM(s) Oral daily  glucagon  Injectable 1 milliGRAM(s) IntraMuscular once  influenza  Vaccine (HIGH DOSE) 0.7 milliLiter(s) IntraMuscular once  insulin lispro (ADMELOG) corrective regimen sliding scale   SubCutaneous every 6 hours  loperamide 4 milliGRAM(s) Oral <User Schedule>  melatonin 5 milliGRAM(s) Oral at bedtime  pantoprazole  Injectable 40 milliGRAM(s) IV Push every 12 hours  piperacillin/tazobactam IVPB.. 3.375 Gram(s) IV Intermittent every 8 hours  predniSONE   Tablet 4 milliGRAM(s) Oral daily  psyllium Powder 1 Packet(s) Oral two times a day  sodium chloride 0.9% lock flush 3 milliLiter(s) IV Push every 8 hours  tamsulosin 0.8 milliGRAM(s) Oral at bedtime  tiotropium 18 MICROgram(s) Capsule 1 Capsule(s) Inhalation daily    MEDICATIONS  (PRN):  acetaminophen     Tablet .. 650 milliGRAM(s) Oral every 6 hours PRN Mild Pain (1 - 3)  ALBUTerol    90 MICROgram(s) HFA Inhaler 2 Puff(s) Inhalation every 6 hours PRN Shortness of Breath and/or Wheezing  albuterol/ipratropium for Nebulization 3 milliLiter(s) Nebulizer every 6 hours PRN Shortness of Breath and/or Wheezing  morphine  - Injectable 2 milliGRAM(s) IV Push every 4 hours PRN Moderate Pain (4 - 6)  naloxone Injectable 0.1 milliGRAM(s) IV Push every 3 minutes PRN For ANY of the following changes in patient status:  A. RR LESS THAN 10 breaths per minute, B. Oxygen saturation LESS THAN 90%, C. Sedation score of 6  ondansetron Injectable 4 milliGRAM(s) IV Push every 6 hours PRN Nausea and/or Vomiting  oxybutynin 5 milliGRAM(s) Oral daily PRN Bladder spasms      Vital Signs Last 24 Hrs  T(C): 36.8 (25 Oct 2022 21:15), Max: 36.8 (25 Oct 2022 21:15)  T(F): 98.2 (25 Oct 2022 21:15), Max: 98.2 (25 Oct 2022 21:15)  HR: 66 (25 Oct 2022 21:15) (65 - 71)  BP: 110/60 (25 Oct 2022 21:15) (100/54 - 110/64)  BP(mean): --  RR: 18 (25 Oct 2022 21:15) (18 - 18)  SpO2: 96% (25 Oct 2022 21:15) (85% - 96%)    Parameters below as of 25 Oct 2022 21:15  Patient On (Oxygen Delivery Method): nasal cannula          PHYSICAL EXAM   GENERAL: NAD, AOx3, well developed  HEAD: Atraumatic, normocephalic  EYES: EOMI, PERRLA, conjunctiva and sclera clear  ENT: moist mucous membrane  NECK: supple, No JVD, midline trachea  CHEST/LUNG: unlabored respirations b/l  Heart: S1, S2 normal  ABDOMEN: soft, nondistended, nontender. surgical site c/d/i. high ostomy output  NERVOUS SYSTEM: AOx3, speech clear, no neuro-deficits  MSK: full ROM, no deformities  SKIN: warm to touch, no rash or lesions      I&O's Detail    25 Oct 2022 07:01  -  26 Oct 2022 07:00  --------------------------------------------------------  IN:  Total IN: 0 mL    OUT:    Bulb (mL): 9 mL    Ileostomy (mL): 1600 mL    Indwelling Catheter - Urethral (mL): 2900 mL  Total OUT: 4509 mL    Total NET: -4509 mL      26 Oct 2022 07:01  -  26 Oct 2022 08:25  --------------------------------------------------------  IN:  Total IN: 0 mL    OUT:    Ileostomy (mL): 300 mL  Total OUT: 300 mL    Total NET: -300 mL          Daily     Daily     LABS:                        8.6    7.36  )-----------( 183      ( 26 Oct 2022 07:30 )             29.1     10-26    138  |  104  |  15  ----------------------------<  129<H>  4.5   |  29  |  1.61<H>    Ca    8.7      26 Oct 2022 07:30  Phos  3.2     10-26  Mg     1.9     10-26            RADIOLOGY & ADDITIONAL STUDIES:

## 2022-10-26 NOTE — PROGRESS NOTE ADULT - ASSESSMENT
76 yo male with h/o AF, PPM, HTN, UC, newly Dx colon CA, HTN, COPD (long smoking Hx), obesity and hyperlipidemia found to have a colon mass and CA and admitted for resection.  He underwent successful surgery on 10/6/22 complicated by acute hypoxic respiratory failure secondary to L sided pneumonia which improved with NIPPV, septic shock requiring pressors due to fecal spillage peritonitis, and JOSE likely from ATN. Was started on Zosyn. Off pressors 10/9. On 10/10 patient was transfused 2u pRBC after drop in h/h and noted with ?coffee ground content in ileostomy. Improved respiratory status, weaned to supplemental O2 via NC. Had urethral cath placed in OR by urology for urinary retention found to have bladder neck contracture. Underwent IR procedure for abd drain placement, found to have fluid collection, with 8cc aspirate, culture has been negative. Parenteral nutrition started on 10/17/22. Patient downgraded from ICU 10/21/22. Hospitalist consulted for continued medical management.     #Colon mass   -S/P RCP-IPAA (restorative proctocolectomy with ileal pouch anal anastomosis), diverting loop ileostomy 10/6  -C/B acute hypoxic respiratory failure due to pneumonia/Septic shock/JOSE/fecal spillage peritonitis   -Off pressors, given stress dose steroids, now tapering, on prednisone 4mg daily   -Completed zosyn 10 days for PNA  -S/p IR drainage of fluid collection on 10/19, actually restarted zosyn 10/20, (cultures have been negative >48hrs)- day #4, will discontinue   -Started PPN on 10/17, now tolerating clear liquids, if pt does not tolerate advancing diet, may need PICC to continue PPB - today is day #7   -Advance diet as per primary team - tolerating diet well, once tolerating regular diet, d/c PPN    #Normocytic Anemia, multifactorial   10/24 PRBCS ordered by primary team - will hold off on lasix pre-med due to slight elevation in Cr   Monitor closely while on Eliquis     #Mild Systolic 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%  #mild JOSE  patient just got IVFs  due to high ostomy output   PLEASE DC IVFS, worsening  pulm vascular congestion and Cr  will give lasix 20 IV q12h   at this time rpt Serum Cr, BNP and CXR in AM   discuss Card Dr Barrett, optimize meds - ACEI and BB as toelrated; low Na diet   suspect drop in CHF In the setting of septic shock but will need repeat ECHO in 6 weeks     #HTN/AFIB/PPM/HLD  -Continue Eliquis 5mg q12hrs   -Continue lipitor 20mg qhs  -Off antihypertensives here, bp stable  -On amlodipine 2.5mg, Metoprolol ER 25mg daily as BP allows     #COPD  -Stable  -Continue Prednisone 4mg daily, Symbicort, Spiriva Albuterol HFA PRN   - before dc check Pulse Ox on ambulation to see how hes doing and ensure safety while in Rehab      #Urinary retention  -Alfaro's in place  -Continue tamsulosin 0.8mg qhs, finasteride 5mg daily   -Urology consult appreciated: d/c home with alfaro's    VTE prophylaxis  -On eliquis     Dispo: Pulse Ox on ambulation before dc to rehab to ensure a safe discharge, is now OFF IVFs, is on IV LASIX, CXR in AM, H&H, while on eliquis, monitor lytes and BNP, Card follow-up   discussed with Surgery  pls call with salvador

## 2022-10-26 NOTE — PROGRESS NOTE ADULT - ASSESSMENT
Patient is S/p proctocolectomy, IPAA, DLI for UC/Sigmoid CA, IR drain for collection. S/p cystoscopy w/ stent placement and [place] tip Zhao.   high output ostomy. JOSE worsening.    Plan:  c/w regular diet  Continue Eliquis  appreciate card recc  Ambulation/PT  Pain control  Monitor ileostomy output - on loperamide, metamucil, lomotil  trend labs  Appreciate MAIDA foote  Dispo PHILLIP  CM for placement    Plan discussed with colorectal team

## 2022-10-26 NOTE — PROGRESS NOTE ADULT - ASSESSMENT
78 yo M with above PMHx presents for colon resection complicated by acute hypoxemic resp failure 2/2 PNA and septic shock, now resolved.    -With worsening pro-BNP, BUN/Cr, and inability to wean O2 there were concerns he was starting to get fluid overload with mild systolic CHF exacerbation.  -Labs pending this morning but he diuresed well (No intake taken but output was good). F/U on today's labs, but will likely need another dose of IV lasix. Monitor electrolytes with goal K 3-4 and Mg >2.  -Continue to wean O2 as tolerates  -Once he is more euvolemic will start on BB, HRs in the 60s now. Has PPM so no concerns for bradycardia on BB.  -No ACE/ARB/Entresto due to his allergy  -EF >40% so not a candidate for SGLT2  -No ASA due to being on eliquis. C/W statin

## 2022-10-26 NOTE — PROGRESS NOTE ADULT - SUBJECTIVE AND OBJECTIVE BOX
76 yo male with h/o AF, PPM, HTN, UC, newly Dx colon CA, HTN, COPD (long smoking Hx), obesity and hyperlipidemia found to have a colon mass and CA and admitted for resection.  He underwent successful surgery on 10/6/22 complicated by acute hypoxic respiratory failure secondary to L sided pneumonia which improved with NIPPV, septic shock requiring pressors due to fecal spillage peritonitis, and JOSE likely from ATN. Was started on Zosyn. Off pressors 10/9. On 10/10 patient was transfused 2u pRBC after drop in h/h and noted with ?coffee ground content in ileostomy. Improved respiratory status, weaned to supplemental O2 via NC. Had urethral cath placed in OR by urology for urinary retention found to have bladder neck contracture. Underwent IR procedure for abd drain placement, found to have fluid collection, with 8cc aspirate, culture has been negative. Parenteral nutrition started on 10/17/22. Patient downgraded from ICU 10/21/22. Hospitalist consulted for continued medical management.     10/25 - no cp palps sob abdo pain, got PRBCs yesterday no lasix; Cr trending up today, BNP slightly elevated   10/26 - no cp palps sob abdo pain, able to lie flat in bed but BNP rising and he got IVFs yesterday, CR worse     Vital Signs Last 24 Hrs  T(C): 36.6 (26 Oct 2022 15:24), Max: 36.8 (25 Oct 2022 21:15)  T(F): 97.8 (26 Oct 2022 15:24), Max: 98.2 (25 Oct 2022 21:15)  HR: 66 (26 Oct 2022 15:24) (64 - 75)  BP: 114/54 (26 Oct 2022 15:24) (101/56 - 131/62)  RR: 16 (26 Oct 2022 15:24) (16 - 18)  SpO2: 95% (26 Oct 2022 15:24) (95% - 97%)  Parameters below as of 26 Oct 2022 15:24  Patient On (Oxygen Delivery Method): nasal cannula  GENERAL: NAD, lying in bed comfortably with NC on   HEAD:  Atraumatic, Normocephalic  EYES: conjunctiva and sclera clear  ENT: Moist mucous membranes  NECK: Supple, No JVD  CHEST/LUNG: few bibasilar crackles   HEART: Regular rate and rhythm; No murmurs, rubs, or gallops  ABDOMEN: Bowel sounds present; Soft, Nontender, + ostomy, midline incision w/ staples in place, C/D/I, no surrounding erythema   EXTREMITIES:  2+ Peripheral Pulses, brisk capillary refill. No clubbing, cyanosis, or edema  NERVOUS SYSTEM:  Alert & Oriented X3, speech clear. No deficits   MSK: FROM all 4 extremities, full and equal strength    LABS: All Labs Reviewed:                        8.6    7.36  )-----------( 183      ( 26 Oct 2022 07:30 )             29.1     10-26    138  |  104  |  15  ----------------------------<  129<H>  4.5   |  29  |  1.61<H>    MEDS:   acetaminophen     Tablet .. 650 milliGRAM(s) Oral every 6 hours PRN  acetaminophen     Tablet .. 650 milliGRAM(s) Oral every 6 hours  acetaminophen   IVPB .. 1000 milliGRAM(s) IV Intermittent once  ALBUTerol    90 MICROgram(s) HFA Inhaler 2 Puff(s) Inhalation every 6 hours PRN  albuterol/ipratropium for Nebulization 3 milliLiter(s) Nebulizer every 6 hours PRN  apixaban 5 milliGRAM(s) Oral every 12 hours  atorvastatin 20 milliGRAM(s) Oral at bedtime  budesonide 160 MICROgram(s)/formoterol 4.5 MICROgram(s) Inhaler 2 Puff(s) Inhalation two times a day  chlorhexidine 4% Liquid 1 Application(s) Topical <User Schedule>  cholestyramine Powder (Sugar-Free) 4 Gram(s) Oral daily  diphenoxylate/atropine 2 Tablet(s) Oral four times a day  finasteride 5 milliGRAM(s) Oral daily  furosemide   Injectable 20 milliGRAM(s) IV Push two times a day  influenza  Vaccine (HIGH DOSE) 0.7 milliLiter(s) IntraMuscular once  insulin lispro (ADMELOG) corrective regimen sliding scale   SubCutaneous every 6 hours  loperamide 4 milliGRAM(s) Oral <User Schedule>  melatonin 5 milliGRAM(s) Oral at bedtime  morphine  - Injectable 2 milliGRAM(s) IV Push every 4 hours PRN  naloxone Injectable 0.1 milliGRAM(s) IV Push every 3 minutes PRN  ondansetron Injectable 4 milliGRAM(s) IV Push every 6 hours PRN  oxybutynin 5 milliGRAM(s) Oral daily PRN  oxyCODONE    IR 5 milliGRAM(s) Oral every 4 hours PRN  oxyCODONE    IR 10 milliGRAM(s) Oral every 4 hours PRN  pantoprazole  Injectable 40 milliGRAM(s) IV Push every 12 hours  piperacillin/tazobactam IVPB.. 3.375 Gram(s) IV Intermittent every 8 hours  predniSONE   Tablet 4 milliGRAM(s) Oral daily  psyllium Powder 1 Packet(s) Oral two times a day  sodium chloride 0.9% lock flush 3 milliLiter(s) IV Push every 8 hours  tamsulosin 0.8 milliGRAM(s) Oral at bedtime  tiotropium 18 MICROgram(s) Capsule 1 Capsule(s) Inhalation daily

## 2022-10-26 NOTE — PROGRESS NOTE ADULT - ATTENDING COMMENTS
This is a delayed attestation.  Patient was seen and examined morning of 10/26/2022.  No acute complaints.  Noted to have continued upward trend to Cr, BNP, and O2 requirements.  Ostomy output 1600cc over 24 hours.  Abdominal exam benign; drain output minimal and serous.  Will add Questran daily to further decrease stoma output.  Hold off on boluses for now given CHF concerns.  Lasix trial as per Medicine.  Can also DC IR drain.

## 2022-10-26 NOTE — PROGRESS NOTE ADULT - SUBJECTIVE AND OBJECTIVE BOX
CHIEF COMPLAINT:  Patient is a 77y old  Male who presents with a chief complaint of Elective colectomy (07 Oct 2022 02:35)      HPI: 10/7/22:  78 yo male with h/o AF, PPM, HTN, UC, newly Dx colon CA, HTN, COPD (long smoking Hx), obesity and hyperlipidemia found to have a colon mass and CA and admitted for resection.  He underwent successful surgery on 10/6/22.  He is followed from a cardiac standpoint by his own cardiologist, Dr Sanjay Serna, in [place] and had pre-op evaluation and reportedly had an echo in 5/2022 showing LVEF=44% with a dilated RV and mildly elevated RVSP.  Lexiscan nuclear stress test reportedly showed evidence for a prior inferior/inferolateral MI but no new ischemia.  Cardiac cath in 2017 reportedly showed a <40% LAD narrowing.  He has no anginal chest pains or increased SOB at this time on O2 via face mask.    10/25: Patient had successful colon mass resection 10/6/22. Course complicated by acute hypoxic respiratory failure secondary to L sided pneumonia which improved with NIPPV, septic shock requiring pressors due to fecal spillage peritonitis, and JOSE likely from ATN. Off pressors 10/9. On 10/10 patient was transfused 2u pRBC after drop in h/h and noted with ?coffee ground content in ileostomy. Improved respiratory status, weaned to supplemental O2 via NC. Had urethral cath placed in OR by urology for urinary retention found to have bladder neck contracture. Underwent IR procedure for abd drain placement, found to have fluid collection, with 8cc aspirate, culture has been negative. Parenteral nutrition started on 10/17/22. Patient downgraded from ICU 10/21/22 and continues to improve. He is feeling much better and wants to know when he will be going home.    10/26: Patient's renal function was worsening and Pro-BNP increased with crackles at bases and unable to wean NC. There were concerns for fluid overload. He was given lasix 20 IV x1 and has been diuresing well. His main complaint today is wanting to go home and his HA.    He denies any fever, chills, CP, SOB, abd pain, N/V, dizziness, syncope, orthopnea, PND.    PMHx:  PAST MEDICAL & SURGICAL HISTORY:  ICM EF 40-45%  CAD  Ulcerative colitis  Colon cancer  Obstructive sleep apnea  Obesity  HTN (hypertension)  Atrial fibrillation  HLD (hyperlipidemia)  BPH (benign prostatic hyperplasia)  COPD (chronic obstructive pulmonary disease)  COVID-19 virus infection  History of pneumonia  H/O hernia repair  History of hydrocelectomy  H/O colonoscopy  H/O cystoscopy-urethral stricture  History of total knee replacement, bilateral  S/P placement of cardiac pacemaker  History of cataract surgery      FAMILY HISTORY:   FAMILY HISTORY:  Family history of cardiomyopathy (Father)      ALLERGIES:  Allergies  ACE inhibitors (Swelling)      REVIEW OF SYSTEMS:  10 point ROS was obtained  Pertinent positives and negatives are as above  All other review of systems is negative unless indicated above      Vital Signs Last 24 Hrs  T(C): 36.8 (25 Oct 2022 21:15), Max: 36.8 (25 Oct 2022 21:15)  T(F): 98.2 (25 Oct 2022 21:15), Max: 98.2 (25 Oct 2022 21:15)  HR: 66 (25 Oct 2022 21:15) (65 - 71)  BP: 110/60 (25 Oct 2022 21:15) (100/54 - 110/64)  BP(mean): --  RR: 18 (25 Oct 2022 21:15) (18 - 18)  SpO2: 96% (25 Oct 2022 21:15) (85% - 96%)    Parameters below as of 25 Oct 2022 21:15  Patient On (Oxygen Delivery Method): nasal cannula    I&O's Summary    25 Oct 2022 07:01  -  26 Oct 2022 07:00  --------------------------------------------------------  IN: 0 mL / OUT: 4509 mL / NET: -4509 mL    26 Oct 2022 07:01  -  26 Oct 2022 07:41  --------------------------------------------------------  IN: 0 mL / OUT: 300 mL / NET: -300 mL    PHYSICAL EXAM:   Constitutional: NAD, awake and alert, well-developed  HEENT: EOMI, Normal Hearing, MMM  Neck: Soft and supple, No LAD, No JVD  Respiratory: Breath sounds are clear bilaterally, No wheezing, rales or rhonchi  Cardiovascular: S1 and S2, regular rate and rhythm, soft DONNY at LLSB and base as before, no gallops or rubs  Gastrointestinal: Bowel Sounds present, soft, distended, +ostomy with stool, Staples along incision site  Extremities: No peripheral edema  Vascular: 2+ peripheral pulses  Neurological: A/O x 3, no focal deficits  Skin: No rashes    MEDICATIONS  (STANDING):  acetaminophen   IVPB .. 1000 milliGRAM(s) IV Intermittent once  apixaban 5 milliGRAM(s) Oral every 12 hours  atorvastatin 20 milliGRAM(s) Oral at bedtime  budesonide 160 MICROgram(s)/formoterol 4.5 MICROgram(s) Inhaler 2 Puff(s) Inhalation two times a day  chlorhexidine 4% Liquid 1 Application(s) Topical <User Schedule>  dextrose 50% Injectable 25 Gram(s) IV Push once  dextrose 50% Injectable 12.5 Gram(s) IV Push once  dextrose 50% Injectable 25 Gram(s) IV Push once  dextrose Oral Gel 15 Gram(s) Oral once  diphenoxylate/atropine 2 Tablet(s) Oral four times a day  finasteride 5 milliGRAM(s) Oral daily  glucagon  Injectable 1 milliGRAM(s) IntraMuscular once  influenza  Vaccine (HIGH DOSE) 0.7 milliLiter(s) IntraMuscular once  insulin lispro (ADMELOG) corrective regimen sliding scale   SubCutaneous every 6 hours  loperamide 4 milliGRAM(s) Oral <User Schedule>  melatonin 5 milliGRAM(s) Oral at bedtime  pantoprazole  Injectable 40 milliGRAM(s) IV Push every 12 hours  piperacillin/tazobactam IVPB.. 3.375 Gram(s) IV Intermittent every 8 hours  predniSONE   Tablet 4 milliGRAM(s) Oral daily  psyllium Powder 1 Packet(s) Oral two times a day  sodium chloride 0.9% lock flush 3 milliLiter(s) IV Push every 8 hours  tamsulosin 0.8 milliGRAM(s) Oral at bedtime  tiotropium 18 MICROgram(s) Capsule 1 Capsule(s) Inhalation daily    MEDICATIONS  (PRN):  acetaminophen     Tablet .. 650 milliGRAM(s) Oral every 6 hours PRN Mild Pain (1 - 3)  ALBUTerol    90 MICROgram(s) HFA Inhaler 2 Puff(s) Inhalation every 6 hours PRN Shortness of Breath and/or Wheezing  albuterol/ipratropium for Nebulization 3 milliLiter(s) Nebulizer every 6 hours PRN Shortness of Breath and/or Wheezing  morphine  - Injectable 2 milliGRAM(s) IV Push every 4 hours PRN Moderate Pain (4 - 6)  naloxone Injectable 0.1 milliGRAM(s) IV Push every 3 minutes PRN For ANY of the following changes in patient status:  A. RR LESS THAN 10 breaths per minute, B. Oxygen saturation LESS THAN 90%, C. Sedation score of 6  ondansetron Injectable 4 milliGRAM(s) IV Push every 6 hours PRN Nausea and/or Vomiting  oxybutynin 5 milliGRAM(s) Oral daily PRN Bladder spasms      LABS: All Labs Reviewed:    Today's labs pending. Most recent labs available                         8.5    6.61  )-----------( 205      ( 25 Oct 2022 08:27 )             27.8                         7.6    6.86  )-----------( 195      ( 24 Oct 2022 07:55 )             24.8                       11.0   11.60 )-----------( 159      ( 07 Oct 2022 05:53 )             35.6     10-25    135  |  100  |  18  ----------------------------<  126<H>  4.5   |  31  |  1.57<H>    Ca    8.7      25 Oct 2022 08:27  Phos  2.8     10-25  Mg     1.6     10-25    10-24    137  |  103  |  19  ----------------------------<  116<H>  4.4   |  29  |  1.33<H>    Ca    8.5      24 Oct 2022 07:55  Phos  2.6     10-24  Mg     1.8     10-24      10-07    140  |  105  |  26<H>  ----------------------------<  215<H>  5.8<H>   |  28  |  2.36<H>    Ca    8.1<L>      07 Oct 2022 05:53  Phos  5.9     10-07  Mg     1.6     10-07    TPro  5.0<L>  /  Alb  2.3<L>  /  TBili  0.6  /  DBili  x   /  AST  19  /  ALT  30  /  AlkPhos  43  10-07    A1C with Estimated Average Glucose (09.29.22 @ 14:21): 6.9  Serum Pro-Brain Natriuretic Peptide: 543 pg/mL (10-06 @ 16:10)    BLOOD CULTURES:   LIPID PROFILE     RADIOLOGY:   CXR: 9/29/22:  FINDINGS:  PULMONARY: The airway is midline.  There are no airspace consolidations. Cardiac device wire leads are   within right atrium and right ventricle.  No pleural effusion or pneumothorax.  HEART/VASCULAR: The heart size and mediastinum configuration are within the limits of normal.  BONES: The visualized osseus structures are intact.  IMPRESSION:  No radiographic evidence of active chest disease..       EKG:  10/6/22:  Atrial-sensed ventricular-paced rhythm with occasional Premature ventricular complexes    TELEMETRY:    Not on tele    ECHO:    ACC: [id number] EXAM:  ECHO TTE WITH CON COMP W DOPP                          PROCEDURE DATE:  10/07/2022          INTERPRETATION:  Transthoracic Echocardiography Report (TTE)     Demographics     Patient name         GITA BALLARD   Age78 year(s)     Med Rec #            [id number]          Gender        Male     Account #            [id number]       Date of Birth 1945     Interpreting         Brenton Palla,   Room Number   0077   Physician            MD     Referring Physician  mirna starr        Sonographer   Jamila Sloan                                                         [facility]     Date of study        10/07/2022 07:52                        AM     Height               70.87 in           Weight        277.78 pounds    Type of Study:     TTE procedure: ECHO TTE WO CON COMP W DOP, ECHO TTE W C COMP W DOPP     BP: 93/57 mmHg     Contrast Medium: Lumason.     Study Location: [facility] Quality: Technically Difficullt    Indications   1) I95.81 - Postprocedural hypotension    M-Mode Measurements (cm)     LVEDd: 5.44 cm            LVESd: 3.59 cm   IVSEd: 1.17 cm   LVPWd: 1.21 cm            AO Root Dimension: 3.5 cm                             ACS: 1 cm                             LA: 3.8 cm               LVOT: 2.2 cm    Doppler Measurements:     AV Velocity:248 cm/s                 MV Peak E-Wave: 70.2 cm/s   AV Peak Gradient: 24.6 mmHg          MV Peak A-Wave: 119 cm/s   AV Mean Gradient: 14 mmHg            MV E/A Ratio: 0.59 %   AV Area (Continuity):1.62 cm^2       MV Peak Gradient: 1.97 mmHg   TR Velocity:314 cm/s   TR Gradient:39.4384 mmHg   Estimated RAP:3 mmHg   RVSP:42 mmHg     Findings     Mitral Valve   Moderate mitral annular calcification is present.   The mitral valve leaflets appear thickened.   EA reversal of the mitral inflow consistent with reduced compliance of   the   left ventricle.     Aortic Valve   The aortic valve appears mildly calcified. Valve opening seems to be   restricted.   Peak and mean transaortic gradients are 25 and 14 mmHg respectively; this   finding is consistent with mild aortic stenosis.     Tricuspid Valve   The tricuspid valve leaflets appear mildly thickened and/or calcified,   but   open well.   Moderate (2+) tricuspid valve regurgitation is present.   Mild pulmonary hypertension.     Pulmonic Valve   Normal appearing pulmonic valve structure.   Trace pulmonic valvular regurgitation is present.     Left Atrium   Normal appearing left atrium.     Left Ventricle   Mild concentric left ventricular hypertrophy is present.   Left ventricle systolic function moderately decreased paradoxical septal   motion ,apical wall , anterior wall , apical lateral hypokinesis in the   presence of a cardiac arrhythmia.   Lumason was used to better define the endocardial border.   Visual estimation of left ventricle ejection fraction is 40-45 %.     Right Atrium   Normal appearing right atrium.   A device wire is seen in the RV and RA.     Right Ventricle   Normal appearing right ventricle structure and function.     Pericardial Effusion   No evidence of pericardial effusion.     Miscellaneous   The IVC is not visualized.     Impression     Summary     Moderate mitral annular calcification is present.   The mitral valve leaflets appear thickened.   EA reversal of the mitral inflow consistent with reduced compliance of the left ventricle.   The aortic valve appears mildly calcified. Valve opening seems to be restricted.   Peak and mean transaortic gradients are 25 and 14 mmHg respectively; this   finding is consistent with mild aortic stenosis.   The tricuspid valve leaflets appear mildly thickened and/or calcified, but open well.   Moderate (2+) tricuspid valve regurgitation is present.   Mild pulmonary hypertension.   Mild concentric left ventricular hypertrophy is present.   Left ventricle systolic function moderately decreased paradoxical septal motion ,apical wall , anterior wall , apical lateral hypokinesis in the presence of a cardiac arrhythmia.   Lumason was used to better define the endocardial border.   Visual estimation of left ventricle ejection fraction is 40-45 %.   Normal appearing right atrium.   A device wire is seen in the RV and RA.   Normal appearing right ventricle structure and function.

## 2022-10-27 LAB
ANION GAP SERPL CALC-SCNC: 6 MMOL/L — SIGNIFICANT CHANGE UP (ref 5–17)
BASOPHILS # BLD AUTO: 0.02 K/UL — SIGNIFICANT CHANGE UP (ref 0–0.2)
BASOPHILS NFR BLD AUTO: 0.2 % — SIGNIFICANT CHANGE UP (ref 0–2)
BUN SERPL-MCNC: 20 MG/DL — SIGNIFICANT CHANGE UP (ref 7–23)
CALCIUM SERPL-MCNC: 8.4 MG/DL — LOW (ref 8.5–10.1)
CHLORIDE SERPL-SCNC: 101 MMOL/L — SIGNIFICANT CHANGE UP (ref 96–108)
CO2 SERPL-SCNC: 28 MMOL/L — SIGNIFICANT CHANGE UP (ref 22–31)
CREAT SERPL-MCNC: 2.32 MG/DL — HIGH (ref 0.5–1.3)
EGFR: 29 ML/MIN/1.73M2 — LOW
EOSINOPHIL # BLD AUTO: 0.03 K/UL — SIGNIFICANT CHANGE UP (ref 0–0.5)
EOSINOPHIL NFR BLD AUTO: 0.3 % — SIGNIFICANT CHANGE UP (ref 0–6)
GLUCOSE SERPL-MCNC: 144 MG/DL — HIGH (ref 70–99)
HCT VFR BLD CALC: 26.6 % — LOW (ref 39–50)
HGB BLD-MCNC: 8.1 G/DL — LOW (ref 13–17)
IMM GRANULOCYTES NFR BLD AUTO: 0.8 % — SIGNIFICANT CHANGE UP (ref 0–0.9)
LYMPHOCYTES # BLD AUTO: 1.09 K/UL — SIGNIFICANT CHANGE UP (ref 1–3.3)
LYMPHOCYTES # BLD AUTO: 10.9 % — LOW (ref 13–44)
MAGNESIUM SERPL-MCNC: 1.7 MG/DL — SIGNIFICANT CHANGE UP (ref 1.6–2.6)
MCHC RBC-ENTMCNC: 26.4 PG — LOW (ref 27–34)
MCHC RBC-ENTMCNC: 30.5 GM/DL — LOW (ref 32–36)
MCV RBC AUTO: 86.6 FL — SIGNIFICANT CHANGE UP (ref 80–100)
MONOCYTES # BLD AUTO: 0.88 K/UL — SIGNIFICANT CHANGE UP (ref 0–0.9)
MONOCYTES NFR BLD AUTO: 8.8 % — SIGNIFICANT CHANGE UP (ref 2–14)
NEUTROPHILS # BLD AUTO: 7.9 K/UL — HIGH (ref 1.8–7.4)
NEUTROPHILS NFR BLD AUTO: 79 % — HIGH (ref 43–77)
NT-PROBNP SERPL-SCNC: 2318 PG/ML — HIGH (ref 0–450)
PHOSPHATE SERPL-MCNC: 3.1 MG/DL — SIGNIFICANT CHANGE UP (ref 2.5–4.5)
PLATELET # BLD AUTO: 170 K/UL — SIGNIFICANT CHANGE UP (ref 150–400)
POTASSIUM SERPL-MCNC: 4.2 MMOL/L — SIGNIFICANT CHANGE UP (ref 3.5–5.3)
POTASSIUM SERPL-SCNC: 4.2 MMOL/L — SIGNIFICANT CHANGE UP (ref 3.5–5.3)
RBC # BLD: 3.07 M/UL — LOW (ref 4.2–5.8)
RBC # FLD: 17.8 % — HIGH (ref 10.3–14.5)
SODIUM SERPL-SCNC: 135 MMOL/L — SIGNIFICANT CHANGE UP (ref 135–145)
WBC # BLD: 10 K/UL — SIGNIFICANT CHANGE UP (ref 3.8–10.5)
WBC # FLD AUTO: 10 K/UL — SIGNIFICANT CHANGE UP (ref 3.8–10.5)

## 2022-10-27 PROCEDURE — 71045 X-RAY EXAM CHEST 1 VIEW: CPT | Mod: 26

## 2022-10-27 PROCEDURE — 99232 SBSQ HOSP IP/OBS MODERATE 35: CPT

## 2022-10-27 RX ADMIN — Medication 4 MILLIGRAM(S): at 06:35

## 2022-10-27 RX ADMIN — Medication 2 TABLET(S): at 17:46

## 2022-10-27 RX ADMIN — APIXABAN 5 MILLIGRAM(S): 2.5 TABLET, FILM COATED ORAL at 22:35

## 2022-10-27 RX ADMIN — Medication 1 PACKET(S): at 10:39

## 2022-10-27 RX ADMIN — FINASTERIDE 5 MILLIGRAM(S): 5 TABLET, FILM COATED ORAL at 10:38

## 2022-10-27 RX ADMIN — Medication 2: at 08:18

## 2022-10-27 RX ADMIN — SODIUM CHLORIDE 3 MILLILITER(S): 9 INJECTION INTRAMUSCULAR; INTRAVENOUS; SUBCUTANEOUS at 13:43

## 2022-10-27 RX ADMIN — Medication 1 PACKET(S): at 22:35

## 2022-10-27 RX ADMIN — Medication 650 MILLIGRAM(S): at 17:47

## 2022-10-27 RX ADMIN — TIOTROPIUM BROMIDE 1 CAPSULE(S): 18 CAPSULE ORAL; RESPIRATORY (INHALATION) at 10:17

## 2022-10-27 RX ADMIN — Medication 650 MILLIGRAM(S): at 06:06

## 2022-10-27 RX ADMIN — BUDESONIDE AND FORMOTEROL FUMARATE DIHYDRATE 2 PUFF(S): 160; 4.5 AEROSOL RESPIRATORY (INHALATION) at 20:27

## 2022-10-27 RX ADMIN — Medication 4 MILLIGRAM(S): at 22:36

## 2022-10-27 RX ADMIN — SODIUM CHLORIDE 3 MILLILITER(S): 9 INJECTION INTRAMUSCULAR; INTRAVENOUS; SUBCUTANEOUS at 23:25

## 2022-10-27 RX ADMIN — PIPERACILLIN AND TAZOBACTAM 25 GRAM(S): 4; .5 INJECTION, POWDER, LYOPHILIZED, FOR SOLUTION INTRAVENOUS at 01:53

## 2022-10-27 RX ADMIN — Medication 5 MILLIGRAM(S): at 22:34

## 2022-10-27 RX ADMIN — Medication 4 MILLIGRAM(S): at 15:42

## 2022-10-27 RX ADMIN — Medication 650 MILLIGRAM(S): at 06:31

## 2022-10-27 RX ADMIN — CHOLESTYRAMINE 4 GRAM(S): 4 POWDER, FOR SUSPENSION ORAL at 10:41

## 2022-10-27 RX ADMIN — TAMSULOSIN HYDROCHLORIDE 0.8 MILLIGRAM(S): 0.4 CAPSULE ORAL at 22:36

## 2022-10-27 RX ADMIN — APIXABAN 5 MILLIGRAM(S): 2.5 TABLET, FILM COATED ORAL at 10:38

## 2022-10-27 RX ADMIN — PIPERACILLIN AND TAZOBACTAM 25 GRAM(S): 4; .5 INJECTION, POWDER, LYOPHILIZED, FOR SOLUTION INTRAVENOUS at 22:33

## 2022-10-27 RX ADMIN — Medication 4 MILLIGRAM(S): at 10:38

## 2022-10-27 RX ADMIN — PANTOPRAZOLE SODIUM 40 MILLIGRAM(S): 20 TABLET, DELAYED RELEASE ORAL at 10:39

## 2022-10-27 RX ADMIN — Medication 2 TABLET(S): at 06:06

## 2022-10-27 RX ADMIN — Medication 2 TABLET(S): at 12:00

## 2022-10-27 RX ADMIN — PANTOPRAZOLE SODIUM 40 MILLIGRAM(S): 20 TABLET, DELAYED RELEASE ORAL at 22:35

## 2022-10-27 RX ADMIN — ATORVASTATIN CALCIUM 20 MILLIGRAM(S): 80 TABLET, FILM COATED ORAL at 22:35

## 2022-10-27 RX ADMIN — CHLORHEXIDINE GLUCONATE 1 APPLICATION(S): 213 SOLUTION TOPICAL at 10:51

## 2022-10-27 RX ADMIN — SODIUM CHLORIDE 3 MILLILITER(S): 9 INJECTION INTRAMUSCULAR; INTRAVENOUS; SUBCUTANEOUS at 06:05

## 2022-10-27 RX ADMIN — BUDESONIDE AND FORMOTEROL FUMARATE DIHYDRATE 2 PUFF(S): 160; 4.5 AEROSOL RESPIRATORY (INHALATION) at 10:19

## 2022-10-27 RX ADMIN — PIPERACILLIN AND TAZOBACTAM 25 GRAM(S): 4; .5 INJECTION, POWDER, LYOPHILIZED, FOR SOLUTION INTRAVENOUS at 15:42

## 2022-10-27 NOTE — PROGRESS NOTE ADULT - ASSESSMENT
78 yo M with above PMHx presents for colon resection complicated by acute hypoxemic resp failure 2/2 PNA and septic shock, now resolved course complicated by concerns for fluid overload/mild sCHF exacerbation.    -Clinically appears improved and feeling well  -F/U labs this AM and try to titrate off O2. If able to titrate off O2 and BUN increasing, recommend transitioning to lasix 20 PO Qday. If unable to wean and BUN/Cr stable vs improved, consider another day of IV lasix.  -This mild cardiomyopathy was known from his pre-op work up from his outside cardiologist with reported EF 44% with a stress test showing a prior infarct with no ischemia. C/W statin, no aspirin because high risk for bleeding while on eliquis  -Once transitioned to PO lasix, recommend starting on metoprolol succinate 25mg Qday  -Diastolics in the 40s but clinically does not appear this way, recommend checking a manual BP.  -I/O not accurate. Recommend making sure to get strict INTAKE in addition to his output. This includes what he drinks and IVs he gets.  -No ACE/ARB/Entresto due to his allergy  -EF >40% so not a candidate for SGLT2

## 2022-10-27 NOTE — PROGRESS NOTE ADULT - SUBJECTIVE AND OBJECTIVE BOX
SURGERY DAILY PROGRESS NOTE:     Subjective:  Patient seen and examined this AM at bedside. No acute events overnight and patient resting comfortably. Ostomy improving with bulking agents. Tolerating diet. Denies fever/chills, shortness of breath, chest pain. VS reviewed    Objective:    PHYSICAL EXAM   GENERAL: NAD, AOx3, well developed  HEAD: Atraumatic, normocephalic  EYES: EOMI, PERRLA, conjunctiva and sclera clear  ENT: moist mucous membrane  NECK: supple, No JVD, midline trachea  CHEST/LUNG: unlabored respirations b/l  Heart: S1, S2 normal  ABDOMEN: soft, nondistended, nontender. surgical site c/d/i.ostomy  functional  NERVOUS SYSTEM: AOx3, speech clear, no neuro-deficits  MSK: full ROM, no deformities  SKIN: warm to touch, no rash or lesions    Vitals:  T(C): 36.3 (10-27 @ 04:50), Max: 36.6 (10-26 @ 15:24)  HR: 78 (10-27 @ 04:50) (64 - 78)  BP: 106/49 (10-27 @ 04:50) (98/56 - 131/62)  RR: 16 (10-27 @ 04:50) (16 - 18)  SpO2: 94% (10-27 @ 04:50) (94% - 97%)    10-25 @ 07:  -  10-26 @ 07:00  --------------------------------------------------------  IN:  Total IN: 0 mL    OUT:    Bulb (mL): 9 mL    Ileostomy (mL): 1600 mL    Indwelling Catheter - Urethral (mL): 2900 mL  Total OUT: 4509 mL    Total NET: -4509 mL      10-26 @ 07:  -  10-27 @ 05:37  --------------------------------------------------------  IN:  Total IN: 0 mL    OUT:    Bulb (mL): 10 mL    Ileostomy (mL): 1250 mL    Indwelling Catheter - Urethral (mL): 2800 mL  Total OUT: 4060 mL    Total NET: -4060 mL      10-26 @ 07:30                    8.6  CBC: 7.36>)-------(<183                     29.1                 138 | 104 | 15    CMP:  ----------------------< 129               4.5 | 29 | 1.61                      Ca:8.7  Phos:3.2  M.9               -|      |-        LFTs:  ------|-|-----             -|      |-      Current Inpatient Medications:  acetaminophen     Tablet .. 650 milliGRAM(s) Oral every 6 hours PRN  acetaminophen     Tablet .. 650 milliGRAM(s) Oral every 6 hours  acetaminophen   IVPB .. 1000 milliGRAM(s) IV Intermittent once  ALBUTerol    90 MICROgram(s) HFA Inhaler 2 Puff(s) Inhalation every 6 hours PRN  albuterol/ipratropium for Nebulization 3 milliLiter(s) Nebulizer every 6 hours PRN  apixaban 5 milliGRAM(s) Oral every 12 hours  atorvastatin 20 milliGRAM(s) Oral at bedtime  budesonide 160 MICROgram(s)/formoterol 4.5 MICROgram(s) Inhaler 2 Puff(s) Inhalation two times a day  chlorhexidine 4% Liquid 1 Application(s) Topical <User Schedule>  cholestyramine Powder (Sugar-Free) 4 Gram(s) Oral daily  dextrose 50% Injectable 25 Gram(s) IV Push once  dextrose 50% Injectable 12.5 Gram(s) IV Push once  dextrose 50% Injectable 25 Gram(s) IV Push once  dextrose Oral Gel 15 Gram(s) Oral once  diphenoxylate/atropine 2 Tablet(s) Oral four times a day  finasteride 5 milliGRAM(s) Oral daily  glucagon  Injectable 1 milliGRAM(s) IntraMuscular once  influenza  Vaccine (HIGH DOSE) 0.7 milliLiter(s) IntraMuscular once  insulin lispro (ADMELOG) corrective regimen sliding scale   SubCutaneous every 6 hours  loperamide 4 milliGRAM(s) Oral <User Schedule>  melatonin 5 milliGRAM(s) Oral at bedtime  morphine  - Injectable 2 milliGRAM(s) IV Push every 4 hours PRN  naloxone Injectable 0.1 milliGRAM(s) IV Push every 3 minutes PRN  ondansetron Injectable 4 milliGRAM(s) IV Push every 6 hours PRN  oxybutynin 5 milliGRAM(s) Oral daily PRN  oxyCODONE    IR 5 milliGRAM(s) Oral every 4 hours PRN  oxyCODONE    IR 10 milliGRAM(s) Oral every 4 hours PRN  pantoprazole  Injectable 40 milliGRAM(s) IV Push every 12 hours  piperacillin/tazobactam IVPB.. 3.375 Gram(s) IV Intermittent every 8 hours  predniSONE   Tablet 4 milliGRAM(s) Oral daily  psyllium Powder 1 Packet(s) Oral two times a day  sodium chloride 0.9% lock flush 3 milliLiter(s) IV Push every 8 hours  tamsulosin 0.8 milliGRAM(s) Oral at bedtime  tiotropium 18 MICROgram(s) Capsule 1 Capsule(s) Inhalation daily

## 2022-10-27 NOTE — PROGRESS NOTE ADULT - ASSESSMENT
Patient is S/p proctocolectomy, IPAA, DLI for UC/Sigmoid CA, IR drain for collection. S/p cystoscopy w/ stent placement and [place] tip Zhao.   high output ostomy improving with lomotil, cholestyramine, imodium and psyllium. JOSE.  Possible CHF component due to elevation in BNP, lasix trial yesterday    Plan:  c/w regular diet  Continue Eliquis  appreciate card recc  Ambulation/PT  Pain control  Monitor ileostomy output - on loperamide, metamucil, lomotil, cholestyramine  trend labs  Appreciate  recs  Dispo PHILLIP  CM for placement    Plan discussed with colorectal team

## 2022-10-27 NOTE — PROGRESS NOTE ADULT - SUBJECTIVE AND OBJECTIVE BOX
76 yo male with h/o AF, PPM, HTN, UC, newly Dx colon CA, HTN, COPD (long smoking Hx), obesity and hyperlipidemia found to have a colon mass and CA and admitted for resection.  He underwent successful surgery on 10/6/22 complicated by acute hypoxic respiratory failure secondary to L sided pneumonia which improved with NIPPV, septic shock requiring pressors due to fecal spillage peritonitis, and JOSE likely from ATN. Was started on Zosyn. Off pressors 10/9. On 10/10 patient was transfused 2u pRBC after drop in h/h and noted with ?coffee ground content in ileostomy. Improved respiratory status, weaned to supplemental O2 via NC. Had urethral cath placed in OR by urology for urinary retention found to have bladder neck contracture. Underwent IR procedure for abd drain placement, found to have fluid collection, with 8cc aspirate, culture has been negative. Parenteral nutrition started on 10/17/22. Patient downgraded from ICU 10/21/22. Hospitalist consulted for continued medical management.     10/25 - no cp palps sob abdo pain, got PRBCs yesterday no lasix; Cr trending up today, BNP slightly elevated   10/26 - no cp palps sob abdo pain, able to lie flat in bed but BNP rising and he got IVFs yesterday, CR worse     Vital Signs Last 24 Hrs  T(C): 36.6 (26 Oct 2022 15:24), Max: 36.8 (25 Oct 2022 21:15)  T(F): 97.8 (26 Oct 2022 15:24), Max: 98.2 (25 Oct 2022 21:15)  HR: 66 (26 Oct 2022 15:24) (64 - 75)  BP: 114/54 (26 Oct 2022 15:24) (101/56 - 131/62)  RR: 16 (26 Oct 2022 15:24) (16 - 18)  SpO2: 95% (26 Oct 2022 15:24) (95% - 97%)  Parameters below as of 26 Oct 2022 15:24  Patient On (Oxygen Delivery Method): nasal cannula  GENERAL: NAD, lying in bed comfortably with NC on   HEAD:  Atraumatic, Normocephalic  EYES: conjunctiva and sclera clear  ENT: Moist mucous membranes  NECK: Supple, No JVD  CHEST/LUNG: few bibasilar crackles   HEART: Regular rate and rhythm; No murmurs, rubs, or gallops  ABDOMEN: Bowel sounds present; Soft, Nontender, + ostomy, midline incision w/ staples in place, C/D/I, no surrounding erythema   EXTREMITIES:  2+ Peripheral Pulses, brisk capillary refill. No clubbing, cyanosis, or edema  NERVOUS SYSTEM:  Alert & Oriented X3, speech clear. No deficits   MSK: FROM all 4 extremities, full and equal strength    LABS: All Labs Reviewed:                        8.6    7.36  )-----------( 183      ( 26 Oct 2022 07:30 )             29.1     10-26    138  |  104  |  15  ----------------------------<  129<H>  4.5   |  29  |  1.61<H>    MEDS:   acetaminophen     Tablet .. 650 milliGRAM(s) Oral every 6 hours PRN  acetaminophen     Tablet .. 650 milliGRAM(s) Oral every 6 hours  acetaminophen   IVPB .. 1000 milliGRAM(s) IV Intermittent once  ALBUTerol    90 MICROgram(s) HFA Inhaler 2 Puff(s) Inhalation every 6 hours PRN  albuterol/ipratropium for Nebulization 3 milliLiter(s) Nebulizer every 6 hours PRN  apixaban 5 milliGRAM(s) Oral every 12 hours  atorvastatin 20 milliGRAM(s) Oral at bedtime  budesonide 160 MICROgram(s)/formoterol 4.5 MICROgram(s) Inhaler 2 Puff(s) Inhalation two times a day  chlorhexidine 4% Liquid 1 Application(s) Topical <User Schedule>  cholestyramine Powder (Sugar-Free) 4 Gram(s) Oral daily  diphenoxylate/atropine 2 Tablet(s) Oral four times a day  finasteride 5 milliGRAM(s) Oral daily  furosemide   Injectable 20 milliGRAM(s) IV Push two times a day  influenza  Vaccine (HIGH DOSE) 0.7 milliLiter(s) IntraMuscular once  insulin lispro (ADMELOG) corrective regimen sliding scale   SubCutaneous every 6 hours  loperamide 4 milliGRAM(s) Oral <User Schedule>  melatonin 5 milliGRAM(s) Oral at bedtime  morphine  - Injectable 2 milliGRAM(s) IV Push every 4 hours PRN  naloxone Injectable 0.1 milliGRAM(s) IV Push every 3 minutes PRN  ondansetron Injectable 4 milliGRAM(s) IV Push every 6 hours PRN  oxybutynin 5 milliGRAM(s) Oral daily PRN  oxyCODONE    IR 5 milliGRAM(s) Oral every 4 hours PRN  oxyCODONE    IR 10 milliGRAM(s) Oral every 4 hours PRN  pantoprazole  Injectable 40 milliGRAM(s) IV Push every 12 hours  piperacillin/tazobactam IVPB.. 3.375 Gram(s) IV Intermittent every 8 hours  predniSONE   Tablet 4 milliGRAM(s) Oral daily  psyllium Powder 1 Packet(s) Oral two times a day  sodium chloride 0.9% lock flush 3 milliLiter(s) IV Push every 8 hours  tamsulosin 0.8 milliGRAM(s) Oral at bedtime  tiotropium 18 MICROgram(s) Capsule 1 Capsule(s) Inhalation daily                           cc - dyspnea             76 yo male with CAD, ICM with EF 40-45%  h/o AF, PPM, HTN, UC, newly Dx colon CA, HTN, COPD (long smoking Hx), obesity and hyperlipidemia found to have a colon mass and CA and admitted on 10/06/22  for resection.   10/06- s/p total proctocolectomy with J- pouch creation, open diverting loop ileostomy, 2 VINNY drains placed   Surgery complicated by acute hypoxic respiratory failure secondary to L sided pneumonia which improved with NIPPV, septic shock requiring pressors due to fecal spillage peritonitis, and JOSE likely from ATN. Was started on Zosyn. Off pressors 10/9. On 10/10 patient was transfused 2u pRBC after drop in h/h and noted with ?coffee ground content in ileostomy. Improved respiratory status, weaned to supplemental O2 via NC.   10/17 - s/p cystoscopy and urethral catheter placement in OR by urology for urinary retention found to have bladder neck contracture.   10/19 - s/p IR procedure for  LLQ abd drain placement, found to have fluid collection, with 8cc aspirate, culture has been negative. Parenteral nutrition started on 10/17/22. Patient downgraded from ICU 10/21/22. Hospitalist consulted for continued medical management.   10/25 - no cp palps sob abdo pain, got PRBCs yesterday no lasix; Cr trending up today, BNP slightly elevated   10/26 - no cp palps sob abdo pain, able to lie flat in bed but BNP rising and he got IVFs yesterday, CR worse     10/27 - chart reviewed. pt seen and examined, denies cp, dyspnea, abdominal pain, on O2 , afebrile, plan discussed    Review of system- Rest of the review of system are negative except mentioned in HPI    Vital Signs reviewed in  Last 24 Hrs  T(C): 36.7 (27 Oct 2022 21:14), Max: 37.2 (27 Oct 2022 08:48)  T(F): 98 (27 Oct 2022 21:14), Max: 98.9 (27 Oct 2022 08:48)  HR: 51 (27 Oct 2022 21:14) (51 - 78)  BP: 100/62 (27 Oct 2022 21:14) (100/62 - 116/54)  BP(mean): --  ABP: --  ABP(mean): --  RR: 18 (27 Oct 2022 21:14) (16 - 18)  SpO2: 98% (27 Oct 2022 21:14) (94% - 98%)  O2 Parameters below as of 27 Oct 2022 21:14  Patient On (Oxygen Delivery Method): nasal cannula        Physical exam :       GENERAL: NAD, lying in bed comfortably with NC on   HEAD:  Atraumatic, Normocephalic  EYES: conjunctiva and sclera clear  ENT: Moist mucous membranes  NECK: Supple, No JVD  CHEST/LUNG: few bibasilar crackles   HEART: Regular rate and rhythm; No murmurs, rubs, or gallops  ABDOMEN: Bowel sounds present; Soft, Nontender, + ileostomy, midline incision w/ staples in place, C/D/I, no surrounding erythema , + Zhao   EXTREMITIES:  2+ Peripheral Pulses, brisk capillary refill. No clubbing, cyanosis, or edema  NERVOUS SYSTEM:  Alert & Oriented X3, speech clear. No deficits   MSK: FROM all 4 extremities, full and equal strength    LABS: All Labs Reviewed:  10-27    135  |  101  |  20  ----------------------------<  144<H>  4.2   |  28  |  2.32<H>    Ca    8.4<L>      27 Oct 2022 07:19  Phos  3.1     10-27  Mg     1.7     10-27                          8.1    10.00 )-----------( 170      ( 27 Oct 2022 07:19 )             26.6                             8.6    7.36  )-----------( 183      ( 26 Oct 2022 07:30 )             29.1     10-26    138  |  104  |  15  ----------------------------<  129<H>  4.5   |  29  |  1.61<H>    Radiology reviewed :   < from: Xray Chest 1 View- PORTABLE-Routine (Xray Chest 1 View- PORTABLE-Routine in AM.) (10.26.22 @ 08:09) >  PULMONARY: Mild bilateral perihilar diffuse airspace disease..   No pneumothorax.    HEART/VASCULAR: The  heart is enlarged in transverse diameter. Cardiac   device wire leads are within right atrium and right ventricle.     BONES: Visualized osseous structures are intact.    IMPRESSION: Mild bilateral perihilar diffuse airspace disease.  -------------------------------------------  FOLLOW-UP AP PORTABLE CHEST RADIOGRAPH 10/26/2022 AT 8:03 AM:    INDICATION: Congestive heart failure.    FINDINGS: No interval change.     Xray Chest 1 View- PORTABLE-Routine (Xray Chest 1 View- PORTABLE-Routine in AM.) (10.25.22 @ 07:38) >  IMPRESSION: Mild bilateral perihilar diffuse airspace disease.    TTE Echo Complete w/ Contrast w/ Doppler (10.07.22 @ 10:28) >   Summary     Moderate mitral annular calcification is present.   The mitral valve leaflets appear thickened.   EA reversal of the mitral inflow consistent with reduced compliance of   the   left ventricle.   The aortic valve appears mildly calcified. Valve opening seems to be   restricted.   Peak and mean transaortic gradients are 25 and 14 mmHg respectively; this   finding is consistent with mild aortic stenosis.   The tricuspid valve leaflets appear mildly thickened and/or calcified,   but  open well.   Moderate (2+) tricuspid valve regurgitation is present.   Mild pulmonary hypertension.   Mild concentric left ventricular hypertrophy is present.   Left ventricle systolic function moderately decreased paradoxical septal   motion ,apical wall , anterior wall , apical lateral hypokinesis in the   presence of a cardiac arrhythmia.   Lumason was used to better define the endocardial border.   Visual estimation of left ventricle ejection fraction is 40-45 %.   Normal appearing right atrium.   A device wire is seen in the RV and RA.   Normal appearing right ventricle structure and function.    < from: 12 Lead ECG (10.06.22 @ 08:52) >  Ventricular Rate 73 BPM    Atrial Rate 73 BPM    P-R Interval 182 ms    QRS Duration 172 ms    Q-T Interval 462 ms    QTC Calculation(Bazett) 508 ms    P Axis -20 degrees    R Axis -76 degrees    T Axis 89 degrees    Diagnosis Line Atrial-sensed ventricular-paced rhythm with occasional Premature ventricular complexes  Abnormal ECG  When compared with ECG of 06-OCT-2022 08:52,  Vent. rate has decreased BY   9 BPM    < end of copied text >    Serum Pro-Brain Natriuretic Peptide: 2318 pg/mL (10-27-22 @ 07:19)  Serum Pro-Brain Natriuretic Peptide: 1647 pg/mL (10-26-22 @ 07:30)  Serum Pro-Brain Natriuretic Peptide: 1252 pg/mL (10-25-22 @ 08:27)  Serum Pro-Brain Natriuretic Peptide: 543 pg/mL (10-06-22 @ 16:10)      MEDS:   acetaminophen     Tablet .. 650 milliGRAM(s) Oral every 6 hours PRN  acetaminophen     Tablet .. 650 milliGRAM(s) Oral every 6 hours  acetaminophen   IVPB .. 1000 milliGRAM(s) IV Intermittent once  ALBUTerol    90 MICROgram(s) HFA Inhaler 2 Puff(s) Inhalation every 6 hours PRN  albuterol/ipratropium for Nebulization 3 milliLiter(s) Nebulizer every 6 hours PRN  apixaban 5 milliGRAM(s) Oral every 12 hours  atorvastatin 20 milliGRAM(s) Oral at bedtime  budesonide 160 MICROgram(s)/formoterol 4.5 MICROgram(s) Inhaler 2 Puff(s) Inhalation two times a day  chlorhexidine 4% Liquid 1 Application(s) Topical <User Schedule>  cholestyramine Powder (Sugar-Free) 4 Gram(s) Oral daily  diphenoxylate/atropine 2 Tablet(s) Oral four times a day  finasteride 5 milliGRAM(s) Oral daily  furosemide   Injectable 20 milliGRAM(s) IV Push two times a day  influenza  Vaccine (HIGH DOSE) 0.7 milliLiter(s) IntraMuscular once  insulin lispro (ADMELOG) corrective regimen sliding scale   SubCutaneous every 6 hours  loperamide 4 milliGRAM(s) Oral <User Schedule>  melatonin 5 milliGRAM(s) Oral at bedtime  morphine  - Injectable 2 milliGRAM(s) IV Push every 4 hours PRN  naloxone Injectable 0.1 milliGRAM(s) IV Push every 3 minutes PRN  ondansetron Injectable 4 milliGRAM(s) IV Push every 6 hours PRN  oxybutynin 5 milliGRAM(s) Oral daily PRN  oxyCODONE    IR 5 milliGRAM(s) Oral every 4 hours PRN  oxyCODONE    IR 10 milliGRAM(s) Oral every 4 hours PRN  pantoprazole  Injectable 40 milliGRAM(s) IV Push every 12 hours  piperacillin/tazobactam IVPB.. 3.375 Gram(s) IV Intermittent every 8 hours  predniSONE   Tablet 4 milliGRAM(s) Oral daily  psyllium Powder 1 Packet(s) Oral two times a day  sodium chloride 0.9% lock flush 3 milliLiter(s) IV Push every 8 hours  tamsulosin 0.8 milliGRAM(s) Oral at bedtime  tiotropium 18 MICROgram(s) Capsule 1 Capsule(s) Inhalation daily

## 2022-10-27 NOTE — PROGRESS NOTE ADULT - ATTENDING COMMENTS
Patient seen and examined at bedside this morning. No acute issues overnight reported. Patient is tolerating a diet, denies N/V having adequate ostomy output. Abdomen is soft, nondistended, appropriately tender, ostomy pink patent and productive of succus    I&O's Detail    26 Oct 2022 07:01  -  27 Oct 2022 07:00  --------------------------------------------------------  IN:  Total IN: 0 mL    OUT:    Bulb (mL): 10 mL    Ileostomy (mL): 1250 mL    Indwelling Catheter - Urethral (mL): 2800 mL  Total OUT: 4060 mL    Total NET: -4060 mL      27 Oct 2022 07:01  -  27 Oct 2022 13:32  --------------------------------------------------------  IN:  Total IN: 0 mL    OUT:    Ileostomy (mL): 625 mL  Total OUT: 625 mL    Total NET: -625 mL          Vital Signs Last 24 Hrs  T(C): 37.2 (27 Oct 2022 08:48), Max: 37.2 (27 Oct 2022 08:48)  T(F): 98.9 (27 Oct 2022 08:48), Max: 98.9 (27 Oct 2022 08:48)  HR: 70 (27 Oct 2022 08:48) (64 - 78)  BP: 116/54 (27 Oct 2022 08:48) (98/56 - 116/54)  BP(mean): --  RR: 18 (27 Oct 2022 08:48) (16 - 18)  SpO2: 96% (27 Oct 2022 08:48) (94% - 97%)    Parameters below as of 27 Oct 2022 08:48  Patient On (Oxygen Delivery Method): nasal cannula                          8.1    10.00 )-----------( 170      ( 27 Oct 2022 07:19 )             26.6   10-27    135  |  101  |  20  ----------------------------<  144<H>  4.2   |  28  |  2.32<H>    Ca    8.4<L>      27 Oct 2022 07:19  Phos  3.1     10-27  Mg     1.7     10-27      A/P  Continue to ensure adequate pain control  Continue diet as tolerated  Stoma output is appropriate, will continue with antidiarrheals to augment his output to paste like consistency  Cr continues to trend upwards, hospitalist evaluation requested  Continue with gentle fluids  Strict I's and O's  Zhao catheter to be maintained  Continue supportive care during recovery  Discharge to [facility] pending correction of lab abnormalities Patient seen and examined at bedside this morning. No acute issues overnight reported. Patient is tolerating a diet, denies N/V having adequate ostomy output. Abdomen is soft, nondistended, appropriately tender, ostomy pink patent and productive of succus    I&O's Detail    26 Oct 2022 07:01  -  27 Oct 2022 07:00  --------------------------------------------------------  IN:  Total IN: 0 mL    OUT:    Bulb (mL): 10 mL    Ileostomy (mL): 1250 mL    Indwelling Catheter - Urethral (mL): 2800 mL  Total OUT: 4060 mL    Total NET: -4060 mL      27 Oct 2022 07:01  -  27 Oct 2022 13:32  --------------------------------------------------------  IN:  Total IN: 0 mL    OUT:    Ileostomy (mL): 625 mL  Total OUT: 625 mL    Total NET: -625 mL          Vital Signs Last 24 Hrs  T(C): 37.2 (27 Oct 2022 08:48), Max: 37.2 (27 Oct 2022 08:48)  T(F): 98.9 (27 Oct 2022 08:48), Max: 98.9 (27 Oct 2022 08:48)  HR: 70 (27 Oct 2022 08:48) (64 - 78)  BP: 116/54 (27 Oct 2022 08:48) (98/56 - 116/54)  BP(mean): --  RR: 18 (27 Oct 2022 08:48) (16 - 18)  SpO2: 96% (27 Oct 2022 08:48) (94% - 97%)    Parameters below as of 27 Oct 2022 08:48  Patient On (Oxygen Delivery Method): nasal cannula                          8.1    10.00 )-----------( 170      ( 27 Oct 2022 07:19 )             26.6   10-27    135  |  101  |  20  ----------------------------<  144<H>  4.2   |  28  |  2.32<H>    Ca    8.4<L>      27 Oct 2022 07:19  Phos  3.1     10-27  Mg     1.7     10-27      A/P  Continue to ensure adequate pain control  Continue diet as tolerated  Stoma output is appropriate, will continue with antidiarrheals to augment his output to paste like consistency  Cr continues to trend upwards, hospitalist evaluation requested  Continue with gentle fluids  Cardiology input noted and appreciated  Strict I's and O's  Zhao catheter to be maintained  Continue supportive care during recovery  Discharge to [facility] pending correction of lab abnormalities

## 2022-10-27 NOTE — PROGRESS NOTE ADULT - SUBJECTIVE AND OBJECTIVE BOX
CHIEF COMPLAINT:  Patient is a 77y old  Male who presents with a chief complaint of Elective colectomy (07 Oct 2022 02:35)      HPI: 10/7/22:  76 yo male with h/o AF, PPM, HTN, UC, newly Dx colon CA, HTN, COPD (long smoking Hx), obesity and hyperlipidemia found to have a colon mass and CA and admitted for resection.  He underwent successful surgery on 10/6/22.  He is followed from a cardiac standpoint by his own cardiologist, Dr Sanjay Serna, in [place] and had pre-op evaluation and reportedly had an echo in 5/2022 showing LVEF=44% with a dilated RV and mildly elevated RVSP.  Lexiscan nuclear stress test reportedly showed evidence for a prior inferior/inferolateral MI but no new ischemia.  Cardiac cath in 2017 reportedly showed a <40% LAD narrowing.  He has no anginal chest pains or increased SOB at this time on O2 via face mask.    10/25: Patient had successful colon mass resection 10/6/22. Course complicated by acute hypoxic respiratory failure secondary to L sided pneumonia which improved with NIPPV, septic shock requiring pressors due to fecal spillage peritonitis, and JOSE likely from ATN. Off pressors 10/9. On 10/10 patient was transfused 2u pRBC after drop in h/h and noted with ?coffee ground content in ileostomy. Improved respiratory status, weaned to supplemental O2 via NC. Had urethral cath placed in OR by urology for urinary retention found to have bladder neck contracture. Underwent IR procedure for abd drain placement, found to have fluid collection, with 8cc aspirate, culture has been negative. Parenteral nutrition started on 10/17/22. Patient downgraded from ICU 10/21/22 and continues to improve. He is feeling much better and wants to know when he will be going home.    10/26: Patient's renal function was worsening and Pro-BNP increased with crackles at bases and unable to wean NC. There were concerns for fluid overload. He was given lasix 20 IV x1 and has been diuresing well. His main complaint today is wanting to go home and his HA.    10/27: Patient is feeling well this morning with no complaints. He received lasix 20 IV BID yesterday and diuresed well.    He denies any fever, chills, CP, SOB, abd pain, N/V, dizziness, syncope, orthopnea, PND.    PMHx:  PAST MEDICAL & SURGICAL HISTORY:  ICM EF 40-45%  CAD  Ulcerative colitis  Colon cancer  Obstructive sleep apnea  Obesity  HTN (hypertension)  Atrial fibrillation  HLD (hyperlipidemia)  BPH (benign prostatic hyperplasia)  COPD (chronic obstructive pulmonary disease)  COVID-19 virus infection  History of pneumonia  H/O hernia repair  History of hydrocelectomy  H/O colonoscopy  H/O cystoscopy-urethral stricture  History of total knee replacement, bilateral  S/P placement of cardiac pacemaker  History of cataract surgery      FAMILY HISTORY:   FAMILY HISTORY:  Family history of cardiomyopathy (Father)      ALLERGIES:  Allergies  ACE inhibitors (Swelling)      REVIEW OF SYSTEMS:  10 point ROS was obtained  Pertinent positives and negatives are as above  All other review of systems is negative unless indicated above      Vital Signs Last 24 Hrs  T(C): 36.3 (27 Oct 2022 04:50), Max: 36.6 (26 Oct 2022 15:24)  T(F): 97.3 (27 Oct 2022 04:50), Max: 97.9 (26 Oct 2022 21:10)  HR: 78 (27 Oct 2022 04:50) (64 - 78)  BP: 106/49 (27 Oct 2022 04:50) (98/56 - 131/62)  BP(mean): --  RR: 16 (27 Oct 2022 04:50) (16 - 18)  SpO2: 94% (27 Oct 2022 04:50) (94% - 97%)    Parameters below as of 27 Oct 2022 04:50  Patient On (Oxygen Delivery Method): nasal cannula  O2 Flow (L/min): 3    I&O's Summary    26 Oct 2022 07:01  -  27 Oct 2022 07:00  --------------------------------------------------------  IN: 0 mL / OUT: 4060 mL / NET: -4060 mL      PHYSICAL EXAM:   Constitutional: NAD, awake and alert, well-developed  HEENT: EOMI, Normal Hearing, MMM  Neck: Soft and supple, No LAD, No JVD  Respiratory: decreased BS at bases  Cardiovascular: S1 and S2, regular rate and rhythm, soft DONNY at LLSB and base as before, no gallops or rubs  Gastrointestinal: Bowel Sounds present, soft, distended, +ostomy with stool, Staples along incision site  Extremities: No peripheral edema  Vascular: 2+ peripheral pulses  Neurological: A/O x 3, no focal deficits  Skin: No rashes    MEDICATIONS  (STANDING):  acetaminophen     Tablet .. 650 milliGRAM(s) Oral every 6 hours  acetaminophen   IVPB .. 1000 milliGRAM(s) IV Intermittent once  apixaban 5 milliGRAM(s) Oral every 12 hours  atorvastatin 20 milliGRAM(s) Oral at bedtime  budesonide 160 MICROgram(s)/formoterol 4.5 MICROgram(s) Inhaler 2 Puff(s) Inhalation two times a day  chlorhexidine 4% Liquid 1 Application(s) Topical <User Schedule>  cholestyramine Powder (Sugar-Free) 4 Gram(s) Oral daily  dextrose 50% Injectable 25 Gram(s) IV Push once  dextrose 50% Injectable 12.5 Gram(s) IV Push once  dextrose 50% Injectable 25 Gram(s) IV Push once  dextrose Oral Gel 15 Gram(s) Oral once  diphenoxylate/atropine 2 Tablet(s) Oral four times a day  finasteride 5 milliGRAM(s) Oral daily  glucagon  Injectable 1 milliGRAM(s) IntraMuscular once  influenza  Vaccine (HIGH DOSE) 0.7 milliLiter(s) IntraMuscular once  insulin lispro (ADMELOG) corrective regimen sliding scale   SubCutaneous every 6 hours  loperamide 4 milliGRAM(s) Oral <User Schedule>  melatonin 5 milliGRAM(s) Oral at bedtime  pantoprazole  Injectable 40 milliGRAM(s) IV Push every 12 hours  piperacillin/tazobactam IVPB.. 3.375 Gram(s) IV Intermittent every 8 hours  predniSONE   Tablet 4 milliGRAM(s) Oral daily  psyllium Powder 1 Packet(s) Oral two times a day  sodium chloride 0.9% lock flush 3 milliLiter(s) IV Push every 8 hours  tamsulosin 0.8 milliGRAM(s) Oral at bedtime  tiotropium 18 MICROgram(s) Capsule 1 Capsule(s) Inhalation daily    MEDICATIONS  (PRN):  acetaminophen     Tablet .. 650 milliGRAM(s) Oral every 6 hours PRN Mild Pain (1 - 3)  ALBUTerol    90 MICROgram(s) HFA Inhaler 2 Puff(s) Inhalation every 6 hours PRN Shortness of Breath and/or Wheezing  albuterol/ipratropium for Nebulization 3 milliLiter(s) Nebulizer every 6 hours PRN Shortness of Breath and/or Wheezing  morphine  - Injectable 2 milliGRAM(s) IV Push every 4 hours PRN Moderate Pain (4 - 6)  naloxone Injectable 0.1 milliGRAM(s) IV Push every 3 minutes PRN For ANY of the following changes in patient status:  A. RR LESS THAN 10 breaths per minute, B. Oxygen saturation LESS THAN 90%, C. Sedation score of 6  ondansetron Injectable 4 milliGRAM(s) IV Push every 6 hours PRN Nausea and/or Vomiting  oxybutynin 5 milliGRAM(s) Oral daily PRN Bladder spasms  oxyCODONE    IR 5 milliGRAM(s) Oral every 4 hours PRN Moderate Pain (4 - 6)  oxyCODONE    IR 10 milliGRAM(s) Oral every 4 hours PRN Severe Pain (7 - 10)      LABS: All Labs Reviewed:                        8.6    7.36  )-----------( 183      ( 26 Oct 2022 07:30 )             29.1                          8.5    6.61  )-----------( 205      ( 25 Oct 2022 08:27 )             27.8                         7.6    6.86  )-----------( 195      ( 24 Oct 2022 07:55 )             24.8                       11.0   11.60 )-----------( 159      ( 07 Oct 2022 05:53 )             35.6     10-26    138  |  104  |  15  ----------------------------<  129<H>  4.5   |  29  |  1.61<H>    Ca    8.7      26 Oct 2022 07:30  Phos  3.2     10-26  Mg     1.9     10-26    10-25    135  |  100  |  18  ----------------------------<  126<H>  4.5   |  31  |  1.57<H>    Ca    8.7      25 Oct 2022 08:27  Phos  2.8     10-25  Mg     1.6     10-25    10-24    137  |  103  |  19  ----------------------------<  116<H>  4.4   |  29  |  1.33<H>    Ca    8.5      24 Oct 2022 07:55  Phos  2.6     10-24  Mg     1.8     10-24      10-07    140  |  105  |  26<H>  ----------------------------<  215<H>  5.8<H>   |  28  |  2.36<H>    Ca    8.1<L>      07 Oct 2022 05:53  Phos  5.9     10-07  Mg     1.6     10-07    TPro  5.0<L>  /  Alb  2.3<L>  /  TBili  0.6  /  DBili  x   /  AST  19  /  ALT  30  /  AlkPhos  43  10-07    A1C with Estimated Average Glucose (09.29.22 @ 14:21): 6.9  Serum Pro-Brain Natriuretic Peptide: 543 pg/mL (10-06 @ 16:10)    BLOOD CULTURES:   LIPID PROFILE     RADIOLOGY:   CXR: 9/29/22:  FINDINGS:  PULMONARY: The airway is midline.  There are no airspace consolidations. Cardiac device wire leads are   within right atrium and right ventricle.  No pleural effusion or pneumothorax.  HEART/VASCULAR: The heart size and mediastinum configuration are within the limits of normal.  BONES: The visualized osseus structures are intact.  IMPRESSION:  No radiographic evidence of active chest disease..       EKG:  10/6/22:  Atrial-sensed ventricular-paced rhythm with occasional Premature ventricular complexes    TELEMETRY:    Not on tele    ECHO:    ACC: [id number] EXAM:  ECHO TTE WITH CON COMP W DOPP                          PROCEDURE DATE:  10/07/2022          INTERPRETATION:  Transthoracic Echocardiography Report (TTE)     Demographics     Patient name         GITA BALLARD   Age76 year(s)     Med Rec #            [id number]          Gender        Male     Account #            [id number]       Date of Birth 1945     Interpreting         Brentonopal Palla,   Room Number   0077   Physician            MD     Referring Physician  mirna starr        Sonographer   Jamila Sloan                                                         [facility]     Date of study        10/07/2022 07:52                        AM     Height               70.87 in           Weight        277.78 pounds    Type of Study:     TTE procedure: ECHO TTE WO CON COMP W DOP, ECHO TTE W C COMP W DOPP     BP: 93/57 mmHg     Contrast Medium: Lumason.     Study Location: [facility] Quality: Technically Difficullt    Indications   1) I95.81 - Postprocedural hypotension    M-Mode Measurements (cm)     LVEDd: 5.44 cm            LVESd: 3.59 cm   IVSEd: 1.17 cm   LVPWd: 1.21 cm            AO Root Dimension: 3.5 cm                             ACS: 1 cm                             LA: 3.8 cm               LVOT: 2.2 cm    Doppler Measurements:     AV Velocity:248 cm/s                 MV Peak E-Wave: 70.2 cm/s   AV Peak Gradient: 24.6 mmHg          MV Peak A-Wave: 119 cm/s   AV Mean Gradient: 14 mmHg            MV E/A Ratio: 0.59 %   AV Area (Continuity):1.62 cm^2       MV Peak Gradient: 1.97 mmHg   TR Velocity:314 cm/s   TR Gradient:39.4384 mmHg   Estimated RAP:3 mmHg   RVSP:42 mmHg     Findings     Mitral Valve   Moderate mitral annular calcification is present.   The mitral valve leaflets appear thickened.   EA reversal of the mitral inflow consistent with reduced compliance of   the   left ventricle.     Aortic Valve   The aortic valve appears mildly calcified. Valve opening seems to be   restricted.   Peak and mean transaortic gradients are 25 and 14 mmHg respectively; this   finding is consistent with mild aortic stenosis.     Tricuspid Valve   The tricuspid valve leaflets appear mildly thickened and/or calcified,   but   open well.   Moderate (2+) tricuspid valve regurgitation is present.   Mild pulmonary hypertension.     Pulmonic Valve   Normal appearing pulmonic valve structure.   Trace pulmonic valvular regurgitation is present.     Left Atrium   Normal appearing left atrium.     Left Ventricle   Mild concentric left ventricular hypertrophy is present.   Left ventricle systolic function moderately decreased paradoxical septal   motion ,apical wall , anterior wall , apical lateral hypokinesis in the   presence of a cardiac arrhythmia.   Lumason was used to better define the endocardial border.   Visual estimation of left ventricle ejection fraction is 40-45 %.     Right Atrium   Normal appearing right atrium.   A device wire is seen in the RV and RA.     Right Ventricle   Normal appearing right ventricle structure and function.     Pericardial Effusion   No evidence of pericardial effusion.     Miscellaneous   The IVC is not visualized.     Impression     Summary     Moderate mitral annular calcification is present.   The mitral valve leaflets appear thickened.   EA reversal of the mitral inflow consistent with reduced compliance of the left ventricle.   The aortic valve appears mildly calcified. Valve opening seems to be restricted.   Peak and mean transaortic gradients are 25 and 14 mmHg respectively; this   finding is consistent with mild aortic stenosis.   The tricuspid valve leaflets appear mildly thickened and/or calcified, but open well.   Moderate (2+) tricuspid valve regurgitation is present.   Mild pulmonary hypertension.   Mild concentric left ventricular hypertrophy is present.   Left ventricle systolic function moderately decreased paradoxical septal motion ,apical wall , anterior wall , apical lateral hypokinesis in the presence of a cardiac arrhythmia.   Lumason was used to better define the endocardial border.   Visual estimation of left ventricle ejection fraction is 40-45 %.   Normal appearing right atrium.   A device wire is seen in the RV and RA.   Normal appearing right ventricle structure and function.

## 2022-10-27 NOTE — PROGRESS NOTE ADULT - ASSESSMENT
76 yo male with h/o AF, PPM, HTN, UC, newly Dx colon CA, HTN, COPD (long smoking Hx), obesity and hyperlipidemia found to have a colon mass and CA and admitted for resection.  He underwent successful surgery on 10/6/22 complicated by acute hypoxic respiratory failure secondary to L sided pneumonia which improved with NIPPV, septic shock requiring pressors due to fecal spillage peritonitis, and JOSE likely from ATN. Was started on Zosyn. Off pressors 10/9. On 10/10 patient was transfused 2u pRBC after drop in h/h and noted with ?coffee ground content in ileostomy. Improved respiratory status, weaned to supplemental O2 via NC. Had urethral cath placed in OR by urology for urinary retention found to have bladder neck contracture. Underwent IR procedure for abd drain placement, found to have fluid collection, with 8cc aspirate, culture has been negative. Parenteral nutrition started on 10/17/22. Patient downgraded from ICU 10/21/22. Hospitalist consulted for continued medical management.     #Colon mass   -S/P RCP-IPAA (restorative proctocolectomy with ileal pouch anal anastomosis), diverting loop ileostomy 10/6  -C/B acute hypoxic respiratory failure due to pneumonia/Septic shock/JOSE/fecal spillage peritonitis   -Off pressors, given stress dose steroids, now tapering, on prednisone 4mg daily   -Completed zosyn 10 days for PNA  -S/p IR drainage of fluid collection on 10/19, actually restarted zosyn 10/20, (cultures have been negative >48hrs)- day #4, will discontinue   -Started PPN on 10/17, now tolerating clear liquids, if pt does not tolerate advancing diet, may need PICC to continue PPB - today is day #7   -Advance diet as per primary team - tolerating diet well, once tolerating regular diet, d/c PPN    #Normocytic Anemia, multifactorial   10/24 PRBCS ordered by primary team - will hold off on lasix pre-med due to slight elevation in Cr   Monitor closely while on Eliquis     #Mild Systolic 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%  #mild JOSE  patient just got IVFs  due to high ostomy output   PLEASE DC IVFS, worsening  pulm vascular congestion and Cr  will give lasix 20 IV q12h   at this time rpt Serum Cr, BNP and CXR in AM   discuss Card Dr Barrett, optimize meds - ACEI and BB as toelrated; low Na diet   suspect drop in CHF In the setting of septic shock but will need repeat ECHO in 6 weeks     #HTN/AFIB/PPM/HLD  -Continue Eliquis 5mg q12hrs   -Continue lipitor 20mg qhs  -Off antihypertensives here, bp stable  -On amlodipine 2.5mg, Metoprolol ER 25mg daily as BP allows     #COPD  -Stable  -Continue Prednisone 4mg daily, Symbicort, Spiriva Albuterol HFA PRN   - before dc check Pulse Ox on ambulation to see how hes doing and ensure safety while in Rehab      #Urinary retention  -Alfaro's in place  -Continue tamsulosin 0.8mg qhs, finasteride 5mg daily   -Urology consult appreciated: d/c home with alfaro's    VTE prophylaxis  -On eliquis     Dispo: Pulse Ox on ambulation before dc to rehab to ensure a safe discharge, is now OFF IVFs, is on IV LASIX, CXR in AM, H&H, while on eliquis, monitor lytes and BNP, Card follow-up   discussed with Surgery  pls call with salvador    76 yo male with CAD, ICM with EF 40-45%  h/o AF, PPM, HTN, UC, newly Dx colon CA, HTN, COPD (long smoking Hx), obesity and hyperlipidemia found to have a colon mass and CA and admitted on 10/06/22  for resection.   10/06- s/p total proctocolectomy with J- pouch creation, open diverting loop ileostomy, 2 VINNY drains placed   Surgery complicated by acute hypoxic respiratory failure secondary to L sided pneumonia which improved with NIPPV, septic shock requiring pressors due to fecal spillage peritonitis, and JOSE likely from ATN. Was started on Zosyn. Off pressors 10/9. On 10/10 patient was transfused 2u pRBC after drop in h/h and noted with ?coffee ground content in ileostomy. Improved respiratory status, weaned to supplemental O2 via NC.   10/17 - s/p cystoscopy and urethral catheter placement in OR by urology for urinary retention found to have bladder neck contracture.   10/19 - s/p IR procedure for  LLQ abd drain placement, found to have fluid collection, with 8cc aspirate, culture has been negative. Parenteral nutrition started on 10/17/22. Patient downgraded from ICU 10/21/22. Hospitalist consulted for continued medical management.   10/24 - started on IV lasix for CHF     Colon mass consistent with adenocarcinoma  -S/P RCP-IPAA (restorative proctocolectomy with ileal pouch anal anastomosis), diverting loop ileostomy 10/6  - diet and pain management as per surgery team  - pathology reports noted - adenocarcinoma   - consider oncology consult for further management and monitoring of cancer     Acute hypoxic respiratory failure , multifactorial   - HCAP PNA, probable gram negative rods, septic shock , Fecal spillage peritonitis with abdominal fluids collections s/p drainage   Acute on chronic systolic HF   -Off pressors, given stress dose steroids, now tapering, on prednisone 4mg daily   -c/w zosyn  for PNA and intraabdominal collections, consider ID consult   -S/p IR drainage of fluid collection on 10/19, actually restarted zosyn 10/20, (cultures have been negative >48hrs)  -s/p  PPN 10/17 - 10/22   - tolerating diet   - s/p IV lasix 10/24-10/26   - 1500 cc fluids restriction, daily weights   - cardiology consult noted optimize meds - ACEI and BB as tolerated low Na diet   suspect drop in CHF In the setting of septic shock but will need repeat ECHO in 6 weeks   - CXR 10/27 - pending     JOSE with baseline Cr 1.1   Urinary retention s/p Zhao 10/17/22   - off iv fluids  - will hold lasix for now   - Creatinine Trend: 2.32<--, 1.61<--, 1.57<--, 1.33<--, 1.15<--, 1.11<--  -Continue tamsulosin 0.8mg qhs, finasteride 5mg daily   -Urology consult appreciated: d/c home with angelina's    COPD now on O2   -Continue Prednisone 4mg daily, Symbicort, Spiriva Albuterol HFA PRN   - before dc check Pulse Ox on ambulation to see how hes doing and ensure safety while in Rehab      Normocytic Anemia, multifactorial   - s/p 4 units PRBC 10/9, 10/10 , 10/12, 10/24,   - Monitor closely while on Eliquis   - check iron studies, B12, folate    HTN  Chronic  AFIB s/p PPM  HLD  -Continue Eliquis 5mg H  -Continue lipitor 20mg qhs  -Off antihypertensives here, bp stable  -On amlodipine 2.5mg, Metoprolol ER 25mg daily as BP allows       VTE prophylaxis  -On eliquis     Dispo: Pulse Ox on ambulation before dc to rehab to ensure a safe discharge

## 2022-10-28 LAB
24R-OH-CALCIDIOL SERPL-MCNC: 24.9 NG/ML — LOW (ref 30–80)
ANION GAP SERPL CALC-SCNC: 6 MMOL/L — SIGNIFICANT CHANGE UP (ref 5–17)
APPEARANCE UR: ABNORMAL
BASOPHILS # BLD AUTO: 0.03 K/UL — SIGNIFICANT CHANGE UP (ref 0–0.2)
BASOPHILS NFR BLD AUTO: 0.2 % — SIGNIFICANT CHANGE UP (ref 0–2)
BILIRUB UR-MCNC: NEGATIVE — SIGNIFICANT CHANGE UP
BUN SERPL-MCNC: 26 MG/DL — HIGH (ref 7–23)
CALCIUM SERPL-MCNC: 8.4 MG/DL — LOW (ref 8.5–10.1)
CHLORIDE SERPL-SCNC: 98 MMOL/L — SIGNIFICANT CHANGE UP (ref 96–108)
CK SERPL-CCNC: 299 U/L — SIGNIFICANT CHANGE UP (ref 26–308)
CO2 SERPL-SCNC: 26 MMOL/L — SIGNIFICANT CHANGE UP (ref 22–31)
COLOR SPEC: YELLOW — SIGNIFICANT CHANGE UP
CREAT SERPL-MCNC: 2.51 MG/DL — HIGH (ref 0.5–1.3)
CRP SERPL-MCNC: 190 MG/L — HIGH
DIFF PNL FLD: ABNORMAL
EGFR: 26 ML/MIN/1.73M2 — LOW
EOSINOPHIL # BLD AUTO: 0.04 K/UL — SIGNIFICANT CHANGE UP (ref 0–0.5)
EOSINOPHIL NFR BLD AUTO: 0.3 % — SIGNIFICANT CHANGE UP (ref 0–6)
FERRITIN SERPL-MCNC: 119 NG/ML — SIGNIFICANT CHANGE UP (ref 30–400)
FOLATE SERPL-MCNC: 10.2 NG/ML — SIGNIFICANT CHANGE UP
GLUCOSE SERPL-MCNC: 132 MG/DL — HIGH (ref 70–99)
GLUCOSE UR QL: NEGATIVE — SIGNIFICANT CHANGE UP
HCT VFR BLD CALC: 25.7 % — LOW (ref 39–50)
HGB BLD-MCNC: 8 G/DL — LOW (ref 13–17)
IMM GRANULOCYTES NFR BLD AUTO: 0.6 % — SIGNIFICANT CHANGE UP (ref 0–0.9)
IRON SATN MFR SERPL: 17 UG/DL — LOW (ref 45–165)
IRON SATN MFR SERPL: 7 % — LOW (ref 16–55)
KETONES UR-MCNC: ABNORMAL
LEUKOCYTE ESTERASE UR-ACNC: ABNORMAL
LYMPHOCYTES # BLD AUTO: 1.35 K/UL — SIGNIFICANT CHANGE UP (ref 1–3.3)
LYMPHOCYTES # BLD AUTO: 11 % — LOW (ref 13–44)
MAGNESIUM SERPL-MCNC: 1.5 MG/DL — LOW (ref 1.6–2.6)
MCHC RBC-ENTMCNC: 26.3 PG — LOW (ref 27–34)
MCHC RBC-ENTMCNC: 31.1 GM/DL — LOW (ref 32–36)
MCV RBC AUTO: 84.5 FL — SIGNIFICANT CHANGE UP (ref 80–100)
MONOCYTES # BLD AUTO: 0.9 K/UL — SIGNIFICANT CHANGE UP (ref 0–0.9)
MONOCYTES NFR BLD AUTO: 7.3 % — SIGNIFICANT CHANGE UP (ref 2–14)
NEUTROPHILS # BLD AUTO: 9.89 K/UL — HIGH (ref 1.8–7.4)
NEUTROPHILS NFR BLD AUTO: 80.6 % — HIGH (ref 43–77)
NITRITE UR-MCNC: NEGATIVE — SIGNIFICANT CHANGE UP
NT-PROBNP SERPL-SCNC: 2915 PG/ML — HIGH (ref 0–450)
PH UR: 5 — SIGNIFICANT CHANGE UP (ref 5–8)
PHOSPHATE SERPL-MCNC: 2.9 MG/DL — SIGNIFICANT CHANGE UP (ref 2.5–4.5)
PLATELET # BLD AUTO: 179 K/UL — SIGNIFICANT CHANGE UP (ref 150–400)
POTASSIUM SERPL-MCNC: 4.7 MMOL/L — SIGNIFICANT CHANGE UP (ref 3.5–5.3)
POTASSIUM SERPL-SCNC: 4.7 MMOL/L — SIGNIFICANT CHANGE UP (ref 3.5–5.3)
PROT UR-MCNC: 100
RBC # BLD: 3.04 M/UL — LOW (ref 4.2–5.8)
RBC # BLD: 3.04 M/UL — LOW (ref 4.2–5.8)
RBC # FLD: 17.9 % — HIGH (ref 10.3–14.5)
RETICS #: 131.5 K/UL — HIGH (ref 25–125)
RETICS/RBC NFR: 4.3 % — HIGH (ref 0.5–2.5)
SODIUM SERPL-SCNC: 130 MMOL/L — LOW (ref 135–145)
SP GR SPEC: 1.01 — SIGNIFICANT CHANGE UP (ref 1.01–1.02)
TIBC SERPL-MCNC: 256 UG/DL — SIGNIFICANT CHANGE UP (ref 220–430)
TSH SERPL-MCNC: 0.79 UU/ML — SIGNIFICANT CHANGE UP (ref 0.34–4.82)
UIBC SERPL-MCNC: 238 UG/DL — SIGNIFICANT CHANGE UP (ref 110–370)
UROBILINOGEN FLD QL: NEGATIVE — SIGNIFICANT CHANGE UP
VIT B12 SERPL-MCNC: 1125 PG/ML — SIGNIFICANT CHANGE UP (ref 232–1245)
WBC # BLD: 12.28 K/UL — HIGH (ref 3.8–10.5)
WBC # FLD AUTO: 12.28 K/UL — HIGH (ref 3.8–10.5)

## 2022-10-28 PROCEDURE — 99232 SBSQ HOSP IP/OBS MODERATE 35: CPT

## 2022-10-28 PROCEDURE — 76770 US EXAM ABDO BACK WALL COMP: CPT | Mod: 26

## 2022-10-28 RX ORDER — LOPERAMIDE HCL 2 MG
2 TABLET ORAL
Qty: 0 | Refills: 0 | DISCHARGE
Start: 2022-10-28

## 2022-10-28 RX ORDER — OXYBUTYNIN CHLORIDE 5 MG
1 TABLET ORAL
Qty: 0 | Refills: 0 | DISCHARGE
Start: 2022-10-28

## 2022-10-28 RX ORDER — DIPHENOXYLATE HCL/ATROPINE 2.5-.025MG
2 TABLET ORAL
Qty: 0 | Refills: 0 | DISCHARGE
Start: 2022-10-28

## 2022-10-28 RX ORDER — LANOLIN ALCOHOL/MO/W.PET/CERES
1 CREAM (GRAM) TOPICAL
Qty: 0 | Refills: 0 | DISCHARGE
Start: 2022-10-28

## 2022-10-28 RX ORDER — PSYLLIUM SEED (WITH DEXTROSE)
1 POWDER (GRAM) ORAL
Qty: 0 | Refills: 0 | DISCHARGE
Start: 2022-10-28

## 2022-10-28 RX ORDER — MORPHINE 10 MG/ML
6 SOLUTION ORAL
Refills: 0 | Status: DISCONTINUED | OUTPATIENT
Start: 2022-10-28 | End: 2022-10-30

## 2022-10-28 RX ORDER — PANTOPRAZOLE SODIUM 20 MG/1
1 TABLET, DELAYED RELEASE ORAL
Qty: 0 | Refills: 0 | DISCHARGE
Start: 2022-10-28

## 2022-10-28 RX ORDER — CHOLESTYRAMINE 4 G/9G
1 POWDER, FOR SUSPENSION ORAL
Qty: 0 | Refills: 0 | DISCHARGE
Start: 2022-10-28

## 2022-10-28 RX ORDER — OXYCODONE HYDROCHLORIDE 5 MG/1
1 TABLET ORAL
Qty: 0 | Refills: 0 | DISCHARGE
Start: 2022-10-28

## 2022-10-28 RX ORDER — PANTOPRAZOLE SODIUM 20 MG/1
40 TABLET, DELAYED RELEASE ORAL EVERY 12 HOURS
Refills: 0 | Status: DISCONTINUED | OUTPATIENT
Start: 2022-10-28 | End: 2022-11-02

## 2022-10-28 RX ORDER — IRON SUCROSE 20 MG/ML
100 INJECTION, SOLUTION INTRAVENOUS EVERY 24 HOURS
Refills: 0 | Status: COMPLETED | OUTPATIENT
Start: 2022-10-28 | End: 2022-10-30

## 2022-10-28 RX ORDER — MORPHINE 10 MG/ML
6 SOLUTION ORAL
Qty: 0 | Refills: 0 | DISCHARGE
Start: 2022-10-28

## 2022-10-28 RX ORDER — CHOLECALCIFEROL (VITAMIN D3) 125 MCG
2000 CAPSULE ORAL
Qty: 0 | Refills: 0 | DISCHARGE
Start: 2022-10-28

## 2022-10-28 RX ORDER — MAGNESIUM SULFATE 500 MG/ML
1 VIAL (ML) INJECTION ONCE
Refills: 0 | Status: COMPLETED | OUTPATIENT
Start: 2022-10-28 | End: 2022-10-28

## 2022-10-28 RX ORDER — CHOLECALCIFEROL (VITAMIN D3) 125 MCG
2000 CAPSULE ORAL DAILY
Refills: 0 | Status: DISCONTINUED | OUTPATIENT
Start: 2022-10-28 | End: 2022-11-02

## 2022-10-28 RX ADMIN — Medication 2: at 12:41

## 2022-10-28 RX ADMIN — FINASTERIDE 5 MILLIGRAM(S): 5 TABLET, FILM COATED ORAL at 11:16

## 2022-10-28 RX ADMIN — PIPERACILLIN AND TAZOBACTAM 25 GRAM(S): 4; .5 INJECTION, POWDER, LYOPHILIZED, FOR SOLUTION INTRAVENOUS at 06:32

## 2022-10-28 RX ADMIN — Medication 4 MILLIGRAM(S): at 12:29

## 2022-10-28 RX ADMIN — TAMSULOSIN HYDROCHLORIDE 0.8 MILLIGRAM(S): 0.4 CAPSULE ORAL at 21:33

## 2022-10-28 RX ADMIN — Medication 1 PACKET(S): at 11:17

## 2022-10-28 RX ADMIN — Medication 650 MILLIGRAM(S): at 12:29

## 2022-10-28 RX ADMIN — APIXABAN 5 MILLIGRAM(S): 2.5 TABLET, FILM COATED ORAL at 21:34

## 2022-10-28 RX ADMIN — CHOLESTYRAMINE 4 GRAM(S): 4 POWDER, FOR SUSPENSION ORAL at 10:33

## 2022-10-28 RX ADMIN — IRON SUCROSE 100 MILLIGRAM(S): 20 INJECTION, SOLUTION INTRAVENOUS at 13:21

## 2022-10-28 RX ADMIN — SODIUM CHLORIDE 3 MILLILITER(S): 9 INJECTION INTRAMUSCULAR; INTRAVENOUS; SUBCUTANEOUS at 13:29

## 2022-10-28 RX ADMIN — CHLORHEXIDINE GLUCONATE 1 APPLICATION(S): 213 SOLUTION TOPICAL at 10:42

## 2022-10-28 RX ADMIN — PANTOPRAZOLE SODIUM 40 MILLIGRAM(S): 20 TABLET, DELAYED RELEASE ORAL at 11:16

## 2022-10-28 RX ADMIN — Medication 2 TABLET(S): at 12:42

## 2022-10-28 RX ADMIN — SODIUM CHLORIDE 3 MILLILITER(S): 9 INJECTION INTRAMUSCULAR; INTRAVENOUS; SUBCUTANEOUS at 06:21

## 2022-10-28 RX ADMIN — Medication 1 PACKET(S): at 21:35

## 2022-10-28 RX ADMIN — BUDESONIDE AND FORMOTEROL FUMARATE DIHYDRATE 2 PUFF(S): 160; 4.5 AEROSOL RESPIRATORY (INHALATION) at 08:31

## 2022-10-28 RX ADMIN — APIXABAN 5 MILLIGRAM(S): 2.5 TABLET, FILM COATED ORAL at 13:21

## 2022-10-28 RX ADMIN — MORPHINE 6 MILLIGRAM(S): 10 SOLUTION ORAL at 21:40

## 2022-10-28 RX ADMIN — ATORVASTATIN CALCIUM 20 MILLIGRAM(S): 80 TABLET, FILM COATED ORAL at 21:33

## 2022-10-28 RX ADMIN — BUDESONIDE AND FORMOTEROL FUMARATE DIHYDRATE 2 PUFF(S): 160; 4.5 AEROSOL RESPIRATORY (INHALATION) at 20:19

## 2022-10-28 RX ADMIN — Medication 100 GRAM(S): at 13:21

## 2022-10-28 RX ADMIN — Medication 4 MILLIGRAM(S): at 06:33

## 2022-10-28 RX ADMIN — Medication 2000 UNIT(S): at 12:44

## 2022-10-28 RX ADMIN — Medication 4 MILLIGRAM(S): at 18:47

## 2022-10-28 RX ADMIN — Medication 2 TABLET(S): at 23:14

## 2022-10-28 RX ADMIN — Medication 5 MILLIGRAM(S): at 21:34

## 2022-10-28 RX ADMIN — Medication 2: at 06:33

## 2022-10-28 RX ADMIN — PIPERACILLIN AND TAZOBACTAM 25 GRAM(S): 4; .5 INJECTION, POWDER, LYOPHILIZED, FOR SOLUTION INTRAVENOUS at 21:33

## 2022-10-28 RX ADMIN — PIPERACILLIN AND TAZOBACTAM 25 GRAM(S): 4; .5 INJECTION, POWDER, LYOPHILIZED, FOR SOLUTION INTRAVENOUS at 15:27

## 2022-10-28 RX ADMIN — Medication 4 MILLIGRAM(S): at 11:16

## 2022-10-28 RX ADMIN — PANTOPRAZOLE SODIUM 40 MILLIGRAM(S): 20 TABLET, DELAYED RELEASE ORAL at 21:33

## 2022-10-28 RX ADMIN — TIOTROPIUM BROMIDE 1 CAPSULE(S): 18 CAPSULE ORAL; RESPIRATORY (INHALATION) at 08:32

## 2022-10-28 RX ADMIN — SODIUM CHLORIDE 3 MILLILITER(S): 9 INJECTION INTRAMUSCULAR; INTRAVENOUS; SUBCUTANEOUS at 21:35

## 2022-10-28 RX ADMIN — Medication 4 MILLIGRAM(S): at 21:33

## 2022-10-28 NOTE — PROGRESS NOTE ADULT - SUBJECTIVE AND OBJECTIVE BOX
cc - dyspnea             78 yo male with CAD, ICM with EF 40-45%  h/o AF, PPM, HTN, UC, newly Dx colon CA, HTN, COPD (long smoking Hx), obesity and hyperlipidemia found to have a colon mass and CA and admitted on 10/06/22  for resection.   10/06- s/p total proctocolectomy with J- pouch creation, open diverting loop ileostomy, 2 VINNY drains placed   Surgery complicated by acute hypoxic respiratory failure secondary to L sided pneumonia which improved with NIPPV, septic shock requiring pressors due to fecal spillage peritonitis, and JOSE likely from ATN. Was started on Zosyn. Off pressors 10/9. On 10/10 patient was transfused 2u pRBC after drop in h/h and noted with ?coffee ground content in ileostomy. Improved respiratory status, weaned to supplemental O2 via NC.   10/17 - s/p cystoscopy and urethral catheter placement in OR by urology for urinary retention found to have bladder neck contracture.   10/19 - s/p IR procedure for  LLQ abd drain placement, found to have fluid collection, with 8cc aspirate, culture has been negative. Parenteral nutrition started on 10/17/22. Patient downgraded from ICU 10/21/22. Hospitalist consulted for continued medical management.   10/25 - no cp palps sob abdo pain, got PRBCs yesterday no lasix; Cr trending up today, BNP slightly elevated   10/26 - no cp palps sob abdo pain, able to lie flat in bed but BNP rising and he got IVFs yesterday, CR worse     10/27 - chart reviewed. pt seen and examined, denies cp, dyspnea, abdominal pain, on O2 , afebrile, plan discussed  10/28 - pt seen and examined, denies cp, dyspnea, + alfaro draining dark urine, encouraged po liquid intake, afebrile, plan discussed    Review of system- Rest of the review of system are negative except mentioned in HPI    Vital sings reviewed for last 24 h  T(C): 36.8 (10-28-22 @ 16:05), Max: 36.9 (10-28-22 @ 08:47)  T(F): 98.2 (10-28-22 @ 16:05), Max: 98.4 (10-28-22 @ 08:47)  HR: 70 (10-28-22 @ 20:24) (51 - 70)  BP: 102/52 (10-28-22 @ 16:05) (96/45 - 102/52)  RR: 18 (10-28-22 @ 16:05) (17 - 18)  SpO2: 95% (10-28-22 @ 20:24) (95% - 98%)  Home Oxygen Evaluation:  Pulse ox (SpO2) on room air at rest:	94 %  Pulse ox (SpO2) on room air with exertion:	88 %  Pulse ox (SpO2) on	3 L/min  O2 with exertion:	95 %      Wt(kg): --  Daily     Daily   CAPILLARY BLOOD GLUCOSE      POCT Blood Glucose.: 138 mg/dL (28 Oct 2022 17:49)  POCT Blood Glucose.: 195 mg/dL (28 Oct 2022 11:58)  POCT Blood Glucose.: 131 mg/dL (28 Oct 2022 07:58)  POCT Blood Glucose.: 159 mg/dL (28 Oct 2022 06:25)  POCT Blood Glucose.: 135 mg/dL (27 Oct 2022 22:31)      Physical exam :       GENERAL: NAD, lying in bed comfortably with NC on   HEAD:  Atraumatic, Normocephalic  EYES: conjunctiva and sclera clear  ENT: Moist mucous membranes  NECK: Supple, No JVD  CHEST/LUNG: few bibasilar crackles   HEART: Regular rate and rhythm; No murmurs, rubs, or gallops  ABDOMEN: Bowel sounds present; Soft, Nontender, + ileostomy, midline incision w/ staples in place, C/D/I, no surrounding erythema , + Alfaro   EXTREMITIES:  2+ Peripheral Pulses, brisk capillary refill. No clubbing, cyanosis, or edema  NERVOUS SYSTEM:  Alert & Oriented X3, speech clear. No deficits   MSK: FROM all 4 extremities, full and equal strength    LABS: All Labs Reviewed:  10-28    130<L>  |  98  |  26<H>  ----------------------------<  132<H>  4.7   |  26  |  2.51<H>    Ca    8.4<L>      28 Oct 2022 07:24  Phos  2.9     10-28  Mg     1.5     10-28                              8.0    12.28 )-----------( 179      ( 28 Oct 2022 07:24 )             25.7       CARDIAC MARKERS ( 28 Oct 2022 07:24 )  x     / x     / 299 U/L / x     / x                    Urinalysis Basic - ( 28 Oct 2022 13:41 )    Color: Yellow / Appearance: very cloudy / S.015 / pH: x  Gluc: x / Ketone: Trace  / Bili: Negative / Urobili: Negative   Blood: x / Protein: 100 / Nitrite: Negative   Leuk Esterase: Moderate / RBC: >50 /HPF / WBC 26-50   Sq Epi: x / Non Sq Epi: Occasional / Bacteria: Moderate      10-27    135  |  101  |  20  ----------------------------<  144<H>  4.2   |  28  |  2.32<H>    Ca    8.4<L>      27 Oct 2022 07:19  Phos  3.1     1027  Mg     1.7     10-27                          8.1    10.00 )-----------( 170      ( 27 Oct 2022 07:19 )             26.6                             8.6    7.36  )-----------( 183      ( 26 Oct 2022 07:30 )             29.1     10-26    138  |  104  |  15  ----------------------------<  129<H>  4.5   |  29  |  1.61<H>    Radiology reviewed :   < from: Xray Chest 1 View- PORTABLE-Routine (Xray Chest 1 View- PORTABLE-Routine in AM.) (10.26.22 @ 08:09) >  PULMONARY: Mild bilateral perihilar diffuse airspace disease..   No pneumothorax.    HEART/VASCULAR: The  heart is enlarged in transverse diameter. Cardiac   device wire leads are within right atrium and right ventricle.     BONES: Visualized osseous structures are intact.    IMPRESSION: Mild bilateral perihilar diffuse airspace disease.  -------------------------------------------  FOLLOW-UP AP PORTABLE CHEST RADIOGRAPH 10/26/2022 AT 8:03 AM:    INDICATION: Congestive heart failure.    FINDINGS: No interval change.     Xray Chest 1 View- PORTABLE-Routine (Xray Chest 1 View- PORTABLE-Routine in AM.) (10.25.22 @ 07:38) >  IMPRESSION: Mild bilateral perihilar diffuse airspace disease.    TTE Echo Complete w/ Contrast w/ Doppler (10.07.22 @ 10:28) >   Summary     Moderate mitral annular calcification is present.   The mitral valve leaflets appear thickened.   EA reversal of the mitral inflow consistent with reduced compliance of   the   left ventricle.   The aortic valve appears mildly calcified. Valve opening seems to be   restricted.   Peak and mean transaortic gradients are 25 and 14 mmHg respectively; this   finding is consistent with mild aortic stenosis.   The tricuspid valve leaflets appear mildly thickened and/or calcified,   but  open well.   Moderate (2+) tricuspid valve regurgitation is present.   Mild pulmonary hypertension.   Mild concentric left ventricular hypertrophy is present.   Left ventricle systolic function moderately decreased paradoxical septal   motion ,apical wall , anterior wall , apical lateral hypokinesis in the   presence of a cardiac arrhythmia.   Lumason was used to better define the endocardial border.   Visual estimation of left ventricle ejection fraction is 40-45 %.   Normal appearing right atrium.   A device wire is seen in the RV and RA.   Normal appearing right ventricle structure and function.    < from: 12 Lead ECG (10.06.22 @ 08:52) >  Ventricular Rate 73 BPM    Atrial Rate 73 BPM    P-R Interval 182 ms    QRS Duration 172 ms    Q-T Interval 462 ms    QTC Calculation(Bazett) 508 ms    P Axis -20 degrees    R Axis -76 degrees    T Axis 89 degrees    Diagnosis Line Atrial-sensed ventricular-paced rhythm with occasional Premature ventricular complexes  Abnormal ECG  When compared with ECG of 06-OCT-2022 08:52,  Vent. rate has decreased BY   9 BPM    < end of copied text >  Serum Pro-Brain Natriuretic Peptide (10.28.22 @ 07:24)    Serum Pro-Brain Natriuretic Peptide: 2915 pg/mL  Serum Pro-Brain Natriuretic Peptide: 2318 pg/mL (10-27-22 @ 07:19)  Serum Pro-Brain Natriuretic Peptide: 1647 pg/mL (10-26-22 @ 07:30)  Serum Pro-Brain Natriuretic Peptide: 1252 pg/mL (10-25-22 @ 08:27)  Serum Pro-Brain Natriuretic Peptide: 543 pg/mL (10-06-22 @ 16:10)      MEDS:   acetaminophen     Tablet .. 650 milliGRAM(s) Oral every 6 hours PRN  acetaminophen     Tablet .. 650 milliGRAM(s) Oral every 6 hours  acetaminophen   IVPB .. 1000 milliGRAM(s) IV Intermittent once  ALBUTerol    90 MICROgram(s) HFA Inhaler 2 Puff(s) Inhalation every 6 hours PRN  albuterol/ipratropium for Nebulization 3 milliLiter(s) Nebulizer every 6 hours PRN  apixaban 5 milliGRAM(s) Oral every 12 hours  atorvastatin 20 milliGRAM(s) Oral at bedtime  budesonide 160 MICROgram(s)/formoterol 4.5 MICROgram(s) Inhaler 2 Puff(s) Inhalation two times a day  chlorhexidine 4% Liquid 1 Application(s) Topical <User Schedule>  cholestyramine Powder (Sugar-Free) 4 Gram(s) Oral daily  diphenoxylate/atropine 2 Tablet(s) Oral four times a day  finasteride 5 milliGRAM(s) Oral daily  furosemide   Injectable 20 milliGRAM(s) IV Push two times a day  influenza  Vaccine (HIGH DOSE) 0.7 milliLiter(s) IntraMuscular once  insulin lispro (ADMELOG) corrective regimen sliding scale   SubCutaneous every 6 hours  loperamide 4 milliGRAM(s) Oral <User Schedule>  melatonin 5 milliGRAM(s) Oral at bedtime  morphine  - Injectable 2 milliGRAM(s) IV Push every 4 hours PRN  naloxone Injectable 0.1 milliGRAM(s) IV Push every 3 minutes PRN  ondansetron Injectable 4 milliGRAM(s) IV Push every 6 hours PRN  oxybutynin 5 milliGRAM(s) Oral daily PRN  oxyCODONE    IR 5 milliGRAM(s) Oral every 4 hours PRN  oxyCODONE    IR 10 milliGRAM(s) Oral every 4 hours PRN  pantoprazole  Injectable 40 milliGRAM(s) IV Push every 12 hours  piperacillin/tazobactam IVPB.. 3.375 Gram(s) IV Intermittent every 8 hours  predniSONE   Tablet 4 milliGRAM(s) Oral daily  psyllium Powder 1 Packet(s) Oral two times a day  sodium chloride 0.9% lock flush 3 milliLiter(s) IV Push every 8 hours  tamsulosin 0.8 milliGRAM(s) Oral at bedtime  tiotropium 18 MICROgram(s) Capsule 1 Capsule(s) Inhalation daily

## 2022-10-28 NOTE — PROGRESS NOTE ADULT - SUBJECTIVE AND OBJECTIVE BOX
SURGERY DAILY PROGRESS NOTE:     Subjective:  Patient seen and examined this AM at bedside. No acute events overnight and patient resting comfortably. Toleraing diet, no n/v. ileostomy functioning. no active complaints. Denies fever/chills, shortness of breath, chest pain. VS reviewed    Objective:    MEDICATIONS  (STANDING):  acetaminophen     Tablet .. 650 milliGRAM(s) Oral every 6 hours  acetaminophen   IVPB .. 1000 milliGRAM(s) IV Intermittent once  apixaban 5 milliGRAM(s) Oral every 12 hours  atorvastatin 20 milliGRAM(s) Oral at bedtime  budesonide 160 MICROgram(s)/formoterol 4.5 MICROgram(s) Inhaler 2 Puff(s) Inhalation two times a day  chlorhexidine 4% Liquid 1 Application(s) Topical <User Schedule>  cholestyramine Powder (Sugar-Free) 4 Gram(s) Oral daily  dextrose 50% Injectable 25 Gram(s) IV Push once  dextrose 50% Injectable 25 Gram(s) IV Push once  dextrose 50% Injectable 12.5 Gram(s) IV Push once  dextrose Oral Gel 15 Gram(s) Oral once  diphenoxylate/atropine 2 Tablet(s) Oral four times a day  finasteride 5 milliGRAM(s) Oral daily  glucagon  Injectable 1 milliGRAM(s) IntraMuscular once  influenza  Vaccine (HIGH DOSE) 0.7 milliLiter(s) IntraMuscular once  insulin lispro (ADMELOG) corrective regimen sliding scale   SubCutaneous every 6 hours  loperamide 4 milliGRAM(s) Oral <User Schedule>  melatonin 5 milliGRAM(s) Oral at bedtime  pantoprazole  Injectable 40 milliGRAM(s) IV Push every 12 hours  piperacillin/tazobactam IVPB.. 3.375 Gram(s) IV Intermittent every 8 hours  predniSONE   Tablet 4 milliGRAM(s) Oral daily  psyllium Powder 1 Packet(s) Oral two times a day  sodium chloride 0.9% lock flush 3 milliLiter(s) IV Push every 8 hours  tamsulosin 0.8 milliGRAM(s) Oral at bedtime  tiotropium 18 MICROgram(s) Capsule 1 Capsule(s) Inhalation daily    MEDICATIONS  (PRN):  acetaminophen     Tablet .. 650 milliGRAM(s) Oral every 6 hours PRN Mild Pain (1 - 3)  ALBUTerol    90 MICROgram(s) HFA Inhaler 2 Puff(s) Inhalation every 6 hours PRN Shortness of Breath and/or Wheezing  albuterol/ipratropium for Nebulization 3 milliLiter(s) Nebulizer every 6 hours PRN Shortness of Breath and/or Wheezing  naloxone Injectable 0.1 milliGRAM(s) IV Push every 3 minutes PRN For ANY of the following changes in patient status:  A. RR LESS THAN 10 breaths per minute, B. Oxygen saturation LESS THAN 90%, C. Sedation score of 6  ondansetron Injectable 4 milliGRAM(s) IV Push every 6 hours PRN Nausea and/or Vomiting  oxybutynin 5 milliGRAM(s) Oral daily PRN Bladder spasms  oxyCODONE    IR 5 milliGRAM(s) Oral every 4 hours PRN Moderate Pain (4 - 6)  oxyCODONE    IR 10 milliGRAM(s) Oral every 4 hours PRN Severe Pain (7 - 10)      Vital Signs Last 24 Hrs  T(C): 36.7 (27 Oct 2022 21:14), Max: 37.2 (27 Oct 2022 08:48)  T(F): 98 (27 Oct 2022 21:14), Max: 98.9 (27 Oct 2022 08:48)  HR: 51 (27 Oct 2022 21:14) (51 - 73)  BP: 100/62 (27 Oct 2022 21:14) (100/62 - 116/54)  BP(mean): --  RR: 18 (27 Oct 2022 21:14) (18 - 18)  SpO2: 98% (27 Oct 2022 21:14) (95% - 98%)    Parameters below as of 27 Oct 2022 21:14  Patient On (Oxygen Delivery Method): nasal cannula          PHYSICAL EXAM   GENERAL: NAD, AOx3, well developed  HEAD: Atraumatic, normocephalic  EYES: EOMI, PERRLA, conjunctiva and sclera clear  ENT: moist mucous membrane  NECK: supple, No JVD, midline trachea  CHEST/LUNG: unlabored respirations b/l  Heart: S1, S2 normal  ABDOMEN: soft, nondistended, nontender. surgical site c/d/i. ileostomy intact, high output  EXTREMITIES: +2 peripheral pulses, brisk cap refill. no clubbing, cyanosis or edema  NERVOUS SYSTEM: AOx3, speech clear, no neuro-deficits  MSK: full ROM, no deformities  SKIN: warm to touch, no rash or lesions      I&O's Detail    27 Oct 2022 07:01  -  28 Oct 2022 07:00  --------------------------------------------------------  IN:  Total IN: 0 mL    OUT:    Ileostomy (mL): 2070 mL    Indwelling Catheter - Urethral (mL): 1100 mL  Total OUT: 3170 mL    Total NET: -3170 mL          Daily     Daily     LABS:                        8.0    12.28 )-----------( 179      ( 28 Oct 2022 07:24 )             25.7     10-28    130<L>  |  98  |  26<H>  ----------------------------<  132<H>  4.7   |  26  |  2.51<H>    Ca    8.4<L>      28 Oct 2022 07:24  Phos  2.9     10-28  Mg     1.5     10-28            RADIOLOGY & ADDITIONAL STUDIES:

## 2022-10-28 NOTE — PROGRESS NOTE ADULT - ASSESSMENT
Patient is S/p proctocolectomy, IPAA, DLI for UC/Sigmoid CA, IR drain for collection. S/p cystoscopy w/ stent placement and [place] tip Zhao.   high output ostomy improving with lomotil, cholestyramine, imodium and psyllium. Still high output despite high dose medications  JOSE worsening. no fluid for CHF.  Possible CHF component due to elevation in BNP, lasix trial yesterday    Plan:  Pain control  c/w regular diet  Continue Eliquis  appreciate card recc  Monitor ileostomy output - on loperamide, metamucil, lomotil, cholestyramine  trend labs  Appreciate  recs  Dispo PHILLIP  CM for placement    Plan discussed with colorectal team

## 2022-10-28 NOTE — PROGRESS NOTE ADULT - ATTENDING COMMENTS
Patient without new complaints; however ostomy output 2000 mL over last 24 hours despite maximal Imodium, Lomotil, as well as on fiber, cholestyramine, and PPI (IV).  Exam remains benign.  Labs with slight increase again to Cr.  May be related to high stoma output but options to bolus limited due to CHF.    -- Add tincture of opium to further decrease stoma output.  Will also convert PPI to oral and patient will need to be discharged with standing metamucil, loperamide, Lomotil, cholestyramine, PPI, and tincture of opium.  None of these medications will be PRN.  -- Aiming for ostomy output ~1L daily.  -- Continue regular consistent carbohydrate diet  -- Encourage ambulation, IS  -- Currently on anticoagulation  -- Pain control  -- Appreciate hospitalist recommendations.

## 2022-10-28 NOTE — PROGRESS NOTE ADULT - ASSESSMENT
78 yo male with CAD, ICM with EF 40-45%  h/o AF, PPM, HTN, UC, newly Dx colon CA, HTN, COPD (long smoking Hx), obesity and hyperlipidemia found to have a colon mass and CA and admitted on 10/06/22  for resection.   10/06- s/p total proctocolectomy with J- pouch creation, open diverting loop ileostomy, 2 VINNY drains placed   Surgery complicated by acute hypoxic respiratory failure secondary to L sided pneumonia which improved with NIPPV, septic shock requiring pressors due to fecal spillage peritonitis, and JOSE likely from ATN. Was started on Zosyn. Off pressors 10/9. On 10/10 patient was transfused 2u pRBC after drop in h/h and noted with ?coffee ground content in ileostomy. Improved respiratory status, weaned to supplemental O2 via NC.   10/17 - s/p cystoscopy and urethral catheter placement in OR by urology for urinary retention found to have bladder neck contracture.   10/19 - s/p IR procedure for  LLQ abd drain placement, found to have fluid collection, with 8cc aspirate, culture has been negative. Parenteral nutrition started on 10/17/22. Patient downgraded from ICU 10/21/22. Hospitalist consulted for continued medical management.   10/24 - started on IV lasix for CHF     Colon mass consistent with adenocarcinoma  -S/P RCP-IPAA (restorative proctocolectomy with ileal pouch anal anastomosis), diverting loop ileostomy 10/6  - diet and pain management as per surgery team  - pathology reports noted - adenocarcinoma   - consider oncology consult for further management and monitoring of cancer     Acute hypoxic respiratory failure , multifactorial   - HCAP PNA, probable gram negative rods, septic shock , Fecal spillage peritonitis with abdominal fluids collections s/p drainage   Acute on chronic systolic HF   -Off pressors, given stress dose steroids, now tapering, on prednisone 4mg daily   -c/w zosyn  for PNA and intraabdominal collections, consider ID consult   -S/p IR drainage of fluid collection on 10/19, actually restarted zosyn 10/20, (cultures have been negative >48hrs)  -s/p  PPN 10/17 - 10/22   - tolerating diet   - s/p IV lasix 10/24-10/26   - 1500 cc fluids restriction, daily weights   - cardiology consult noted optimize meds - ACEI and BB as tolerated low Na diet   suspect drop in CHF In the setting of septic shock but will need repeat ECHO in 6 weeks   - CXR 10/27 -decreased congestion, less atelectasis    JOSE with baseline Cr 1.1   Urinary retention s/p Zhao 10/17/22   - off iv fluids  - will hold lasix for now   - Creatinine Trend: 2.5<--2.32<--, 1.61<--, 1.57<--, 1.33<--, 1.15<--, 1.11<--  -Continue tamsulosin 0.8mg qhs, finasteride 5mg daily   -Urology consult appreciated: d/c home with [facility]    Hypomagnesemia - replace    Vitamin D deficiency - replace    COPD now on O2   -Continue Prednisone 4mg daily, Symbicort, Spiriva Albuterol HFA PRN   - before dc check Pulse Ox on ambulation to see how hes doing and ensure safety while in Rehab      Normocytic Anemia, multifactorial   - s/p 4 units PRBC 10/9, 10/10 , 10/12, 10/24,   - Monitor closely while on Eliquis   - check iron studies, B12, folate    HTN  Chronic  AFIB s/p PPM  HLD  -Continue Eliquis 5mg H  -Continue lipitor 20mg qhs  -Off antihypertensives here, bp stable  -On amlodipine 2.5mg, Metoprolol ER 25mg daily as BP allows       VTE prophylaxis  -On eliquis     Dispo: Pulse Ox on ambulation before dc to rehab to ensure a safe discharge

## 2022-10-28 NOTE — PROGRESS NOTE ADULT - SUBJECTIVE AND OBJECTIVE BOX
Called to see pt because of hematuria in alfaro.  Pt is well known to Urology.  Pt had 20 Fr [place] Catheter placed in the OR last week over a wire.  Pt resting in bed with no complaints. Does not have the urge to void with the alfaro catheter in.    Vital Signs Last 24 Hrs  T(C): 36.9 (28 Oct 2022 08:47), Max: 36.9 (28 Oct 2022 08:47)  T(F): 98.4 (28 Oct 2022 08:47), Max: 98.4 (28 Oct 2022 08:47)  HR: 59 (28 Oct 2022 08:47) (51 - 73)  BP: 96/45 (28 Oct 2022 08:47) (96/45 - 101/56)  BP(mean): --  RR: 17 (28 Oct 2022 08:47) (17 - 18)  SpO2: 96% (28 Oct 2022 08:47) (95% - 98%)    Parameters below as of 28 Oct 2022 08:47  Patient On (Oxygen Delivery Method): room air        I&O's Summary    27 Oct 2022 07:01  -  28 Oct 2022 07:00  --------------------------------------------------------  IN: 0 mL / OUT: 3170 mL / NET: -3170 mL    28 Oct 2022 07:01  -  28 Oct 2022 12:26  --------------------------------------------------------  IN: 0 mL / OUT: 250 mL / NET: -250 mL        Physical Exam  Gen: NAD, A&Ox3  Abd: Soft, NT, ND, no bladder palp  : Alfaro in place, had some blood tinged- urinel                              8.0    12.28 )-----------( 179      ( 28 Oct 2022 07:24 )             25.7       10-28    130<L>  |  98  |  26<H>  ----------------------------<  132<H>  4.7   |  26  |  2.51<H>    Ca    8.4<L>      28 Oct 2022 07:24  Phos  2.9     10-28  Mg     1.5     10-28

## 2022-10-28 NOTE — CONSULT NOTE ADULT - ASSESSMENT
76 yo wm presented for colon mass for ca s/p total proctocolonscopy   hosptal course complicated with Left pna tx with zosyn   and abdominal fluid collection with neg cx   urinary retention with alfaro in place   hx of systolic chf off lasix PTA for 6 months, diuresed with lasix iv in ICU  now with JOSE in setting of inc ostomy outpt and neg fluid balance    PLAN   - agree with dc of iv lasix and will hold   - alfaro in place and draining dark urine   - UA with micr  - r/o AIN while on zosyn with urine eos   - fu trend of scr off diuretics, prn ivf as needed   - ostomy outpt improving with more pasty output  cw lomotil and opium

## 2022-10-28 NOTE — PROGRESS NOTE ADULT - ASSESSMENT
78 yo M with above PMHx presents for colon resection complicated by acute hypoxemic resp failure 2/2 PNA and septic shock, now resolved course complicated by concerns for fluid overload/mild sCHF exacerbation.    -Clinically appears improved and feeling well  -BUN/Cr increasing. Clinically does not appear to be in CHF. ?if elevating Pro-BNP is due to a different cause than fluid overload. Has significant ostomy output and not completely accurate since is leaking. ?if actually he was overdiuresed. CXR about stable.  -Agree with holding diuretics. Still not getting strict intake to assess how much fluids patient is getting in to help determine if he is net negative or positive. With him being at least -3L he is likely net negative.  -His HRs are at goal in the 50s with lower BPs, so will hold off on BB  -No ACE/ARB/Entresto due to his allergy and now his worsening renal function  -EF >40% so not a candidate for SGLT2

## 2022-10-28 NOTE — PROGRESS NOTE ADULT - ASSESSMENT
A/P: 77y Male with false passage         Leave alfaro in for now.         Does not a alfaro change at this moment          Encourage po fluids         Irrigate alfaro PRN

## 2022-10-28 NOTE — PROGRESS NOTE ADULT - SUBJECTIVE AND OBJECTIVE BOX
CHIEF COMPLAINT:  Patient is a 77y old  Male who presents with a chief complaint of Elective colectomy (07 Oct 2022 02:35)      HPI: 10/7/22:  78 yo male with h/o AF, PPM, HTN, UC, newly Dx colon CA, HTN, COPD (long smoking Hx), obesity and hyperlipidemia found to have a colon mass and CA and admitted for resection.  He underwent successful surgery on 10/6/22.  He is followed from a cardiac standpoint by his own cardiologist, Dr Sanjay Serna, in [place] and had pre-op evaluation and reportedly had an echo in 5/2022 showing LVEF=44% with a dilated RV and mildly elevated RVSP.  Lexiscan nuclear stress test reportedly showed evidence for a prior inferior/inferolateral MI but no new ischemia.  Cardiac cath in 2017 reportedly showed a <40% LAD narrowing.  He has no anginal chest pains or increased SOB at this time on O2 via face mask.    10/25: Patient had successful colon mass resection 10/6/22. Course complicated by acute hypoxic respiratory failure secondary to L sided pneumonia which improved with NIPPV, septic shock requiring pressors due to fecal spillage peritonitis, and JOSE likely from ATN. Off pressors 10/9. On 10/10 patient was transfused 2u pRBC after drop in h/h and noted with ?coffee ground content in ileostomy. Improved respiratory status, weaned to supplemental O2 via NC. Had urethral cath placed in OR by urology for urinary retention found to have bladder neck contracture. Underwent IR procedure for abd drain placement, found to have fluid collection, with 8cc aspirate, culture has been negative. Parenteral nutrition started on 10/17/22. Patient downgraded from ICU 10/21/22 and continues to improve. He is feeling much better and wants to know when he will be going home.    10/26: Patient's renal function was worsening and Pro-BNP increased with crackles at bases and unable to wean NC. There were concerns for fluid overload. He was given lasix 20 IV x1 and has been diuresing well. His main complaint today is wanting to go home and his HA.    10/27: Patient is feeling well this morning with no complaints. He received lasix 20 IV BID yesterday and diuresed well.  10/28: BUN/Cr increased yesterday so no lasix was given. He is feeling well and wants to go home.    He denies any fever, chills, CP, SOB, abd pain, N/V, dizziness, syncope, orthopnea, PND.    PMHx:  PAST MEDICAL & SURGICAL HISTORY:  ICM EF 40-45%  CAD  Ulcerative colitis  Colon cancer  Obstructive sleep apnea  Obesity  HTN (hypertension)  Atrial fibrillation  HLD (hyperlipidemia)  BPH (benign prostatic hyperplasia)  COPD (chronic obstructive pulmonary disease)  COVID-19 virus infection  History of pneumonia  H/O hernia repair  History of hydrocelectomy  H/O colonoscopy  H/O cystoscopy-urethral stricture  History of total knee replacement, bilateral  S/P placement of cardiac pacemaker  History of cataract surgery      FAMILY HISTORY:   FAMILY HISTORY:  Family history of cardiomyopathy (Father)      ALLERGIES:  Allergies  ACE inhibitors (Swelling)      REVIEW OF SYSTEMS:  10 point ROS was obtained  Pertinent positives and negatives are as above  All other review of systems is negative unless indicated above    Vital Signs Last 24 Hrs  T(C): 36.7 (27 Oct 2022 21:14), Max: 37.2 (27 Oct 2022 08:48)  T(F): 98 (27 Oct 2022 21:14), Max: 98.9 (27 Oct 2022 08:48)  HR: 51 (27 Oct 2022 21:14) (51 - 73)  BP: 100/62 (27 Oct 2022 21:14) (100/62 - 116/54)  BP(mean): --  RR: 18 (27 Oct 2022 21:14) (18 - 18)  SpO2: 98% (27 Oct 2022 21:14) (95% - 98%)    Parameters below as of 27 Oct 2022 21:14  Patient On (Oxygen Delivery Method): nasal cannula      I&O's Detail    27 Oct 2022 07:01  -  28 Oct 2022 07:00  --------------------------------------------------------  IN:  Total IN: 0 mL    OUT:    Ileostomy (mL): 2070 mL    Indwelling Catheter - Urethral (mL): 1100 mL  Total OUT: 3170 mL    Total NET: -3170 mL      PHYSICAL EXAM:   Constitutional: NAD, awake and alert, well-developed  HEENT: EOMI, Normal Hearing, MMM  Neck: Soft and supple, No LAD, No JVD  Respiratory: decreased BS at bases  Cardiovascular: S1 and S2, regular rate and rhythm, soft DONNY at LLSB and base as before, no gallops or rubs  Gastrointestinal: Bowel Sounds present, soft, distended, +ostomy with stool, Staples along incision site  Extremities: No peripheral edema  Neurological: A/O x 3, no focal deficits  Skin: No rashes    MEDICATIONS  (STANDING):  acetaminophen     Tablet .. 650 milliGRAM(s) Oral every 6 hours  acetaminophen   IVPB .. 1000 milliGRAM(s) IV Intermittent once  apixaban 5 milliGRAM(s) Oral every 12 hours  atorvastatin 20 milliGRAM(s) Oral at bedtime  budesonide 160 MICROgram(s)/formoterol 4.5 MICROgram(s) Inhaler 2 Puff(s) Inhalation two times a day  chlorhexidine 4% Liquid 1 Application(s) Topical <User Schedule>  cholestyramine Powder (Sugar-Free) 4 Gram(s) Oral daily  dextrose 50% Injectable 25 Gram(s) IV Push once  dextrose 50% Injectable 12.5 Gram(s) IV Push once  dextrose 50% Injectable 25 Gram(s) IV Push once  dextrose Oral Gel 15 Gram(s) Oral once  diphenoxylate/atropine 2 Tablet(s) Oral four times a day  finasteride 5 milliGRAM(s) Oral daily  glucagon  Injectable 1 milliGRAM(s) IntraMuscular once  influenza  Vaccine (HIGH DOSE) 0.7 milliLiter(s) IntraMuscular once  insulin lispro (ADMELOG) corrective regimen sliding scale   SubCutaneous every 6 hours  loperamide 4 milliGRAM(s) Oral <User Schedule>  melatonin 5 milliGRAM(s) Oral at bedtime  pantoprazole  Injectable 40 milliGRAM(s) IV Push every 12 hours  piperacillin/tazobactam IVPB.. 3.375 Gram(s) IV Intermittent every 8 hours  predniSONE   Tablet 4 milliGRAM(s) Oral daily  psyllium Powder 1 Packet(s) Oral two times a day  sodium chloride 0.9% lock flush 3 milliLiter(s) IV Push every 8 hours  tamsulosin 0.8 milliGRAM(s) Oral at bedtime  tiotropium 18 MICROgram(s) Capsule 1 Capsule(s) Inhalation daily    MEDICATIONS  (PRN):  acetaminophen     Tablet .. 650 milliGRAM(s) Oral every 6 hours PRN Mild Pain (1 - 3)  ALBUTerol    90 MICROgram(s) HFA Inhaler 2 Puff(s) Inhalation every 6 hours PRN Shortness of Breath and/or Wheezing  albuterol/ipratropium for Nebulization 3 milliLiter(s) Nebulizer every 6 hours PRN Shortness of Breath and/or Wheezing  naloxone Injectable 0.1 milliGRAM(s) IV Push every 3 minutes PRN For ANY of the following changes in patient status:  A. RR LESS THAN 10 breaths per minute, B. Oxygen saturation LESS THAN 90%, C. Sedation score of 6  ondansetron Injectable 4 milliGRAM(s) IV Push every 6 hours PRN Nausea and/or Vomiting  oxybutynin 5 milliGRAM(s) Oral daily PRN Bladder spasms  oxyCODONE    IR 5 milliGRAM(s) Oral every 4 hours PRN Moderate Pain (4 - 6)  oxyCODONE    IR 10 milliGRAM(s) Oral every 4 hours PRN Severe Pain (7 - 10)      LABS: All Labs Reviewed:                          8.0    12.28 )-----------( 179      ( 28 Oct 2022 07:24 )             25.7                       8.6    7.36  )-----------( 183      ( 26 Oct 2022 07:30 )             29.1                          8.5    6.61  )-----------( 205      ( 25 Oct 2022 08:27 )             27.8                         7.6    6.86  )-----------( 195      ( 24 Oct 2022 07:55 )             24.8                       11.0   11.60 )-----------( 159      ( 07 Oct 2022 05:53 )             35.6     10-28    130<L>  |  98  |  26<H>  ----------------------------<  132<H>  4.7   |  26  |  2.51<H>    Ca    8.4<L>      28 Oct 2022 07:24  Phos  2.9     10-28  Mg     1.5     10-28    10-26    138  |  104  |  15  ----------------------------<  129<H>  4.5   |  29  |  1.61<H>    Ca    8.7      26 Oct 2022 07:30  Phos  3.2     10-26  Mg     1.9     10-26    10-25    135  |  100  |  18  ----------------------------<  126<H>  4.5   |  31  |  1.57<H>    Ca    8.7      25 Oct 2022 08:27  Phos  2.8     10-25  Mg     1.6     10-25    10-24    137  |  103  |  19  ----------------------------<  116<H>  4.4   |  29  |  1.33<H>    Ca    8.5      24 Oct 2022 07:55  Phos  2.6     10-24  Mg     1.8     10-24      10-07    140  |  105  |  26<H>  ----------------------------<  215<H>  5.8<H>   |  28  |  2.36<H>    Ca    8.1<L>      07 Oct 2022 05:53  Phos  5.9     10-07  Mg     1.6     10-07    TPro  5.0<L>  /  Alb  2.3<L>  /  TBili  0.6  /  DBili  x   /  AST  19  /  ALT  30  /  AlkPhos  43  10-07    A1C with Estimated Average Glucose (09.29.22 @ 14:21): 6.9  Serum Pro-Brain Natriuretic Peptide: 543 pg/mL (10-06 @ 16:10)    BLOOD CULTURES:   LIPID PROFILE     RADIOLOGY:   CXR: 9/29/22:  FINDINGS:  PULMONARY: The airway is midline.  There are no airspace consolidations. Cardiac device wire leads are   within right atrium and right ventricle.  No pleural effusion or pneumothorax.  HEART/VASCULAR: The heart size and mediastinum configuration are within the limits of normal.  BONES: The visualized osseus structures are intact.  IMPRESSION:  No radiographic evidence of active chest disease..       EKG:  10/6/22:  Atrial-sensed ventricular-paced rhythm with occasional Premature ventricular complexes    TELEMETRY:    Not on tele    ECHO:    ACC: [id number] EXAM:  ECHO TTE WITH CON COMP W DOPP                          PROCEDURE DATE:  10/07/2022          INTERPRETATION:  Transthoracic Echocardiography Report (TTE)     Demographics     Patient name         GITA BALLARD   Age76 year(s)     Med Rec #            [id number]          Gender        Male     Account #            [id number]       Date of Birth 1945     Interpreting         Brenton Palla,   Room Number   0077   Physician            MD     Referring Physician  mirna starr        Sonographer   Jamila Sloan                                                         [facility]     Date of study        10/07/2022 07:52                        AM     Height               70.87 in           Weight        277.78 pounds    Type of Study:     TTE procedure: ECHO TTE WO CON COMP W DOP, ECHO TTE W C COMP W DOPP     BP: 93/57 mmHg     Contrast Medium: Lumason.     Study Location: [facility] Quality: Technically Difficullt    Indications   1) I95.81 - Postprocedural hypotension    M-Mode Measurements (cm)     LVEDd: 5.44 cm            LVESd: 3.59 cm   IVSEd: 1.17 cm   LVPWd: 1.21 cm            AO Root Dimension: 3.5 cm                             ACS: 1 cm                             LA: 3.8 cm               LVOT: 2.2 cm    Doppler Measurements:     AV Velocity:248 cm/s                 MV Peak E-Wave: 70.2 cm/s   AV Peak Gradient: 24.6 mmHg          MV Peak A-Wave: 119 cm/s   AV Mean Gradient: 14 mmHg            MV E/A Ratio: 0.59 %   AV Area (Continuity):1.62 cm^2       MV Peak Gradient: 1.97 mmHg   TR Velocity:314 cm/s   TR Gradient:39.4384 mmHg   Estimated RAP:3 mmHg   RVSP:42 mmHg     Findings     Mitral Valve   Moderate mitral annular calcification is present.   The mitral valve leaflets appear thickened.   EA reversal of the mitral inflow consistent with reduced compliance of   the   left ventricle.     Aortic Valve   The aortic valve appears mildly calcified. Valve opening seems to be   restricted.   Peak and mean transaortic gradients are 25 and 14 mmHg respectively; this   finding is consistent with mild aortic stenosis.     Tricuspid Valve   The tricuspid valve leaflets appear mildly thickened and/or calcified,   but   open well.   Moderate (2+) tricuspid valve regurgitation is present.   Mild pulmonary hypertension.     Pulmonic Valve   Normal appearing pulmonic valve structure.   Trace pulmonic valvular regurgitation is present.     Left Atrium   Normal appearing left atrium.     Left Ventricle   Mild concentric left ventricular hypertrophy is present.   Left ventricle systolic function moderately decreased paradoxical septal   motion ,apical wall , anterior wall , apical lateral hypokinesis in the   presence of a cardiac arrhythmia.   Lumason was used to better define the endocardial border.   Visual estimation of left ventricle ejection fraction is 40-45 %.     Right Atrium   Normal appearing right atrium.   A device wire is seen in the RV and RA.     Right Ventricle   Normal appearing right ventricle structure and function.     Pericardial Effusion   No evidence of pericardial effusion.     Miscellaneous   The IVC is not visualized.     Impression     Summary     Moderate mitral annular calcification is present.   The mitral valve leaflets appear thickened.   EA reversal of the mitral inflow consistent with reduced compliance of the left ventricle.   The aortic valve appears mildly calcified. Valve opening seems to be restricted.   Peak and mean transaortic gradients are 25 and 14 mmHg respectively; this   finding is consistent with mild aortic stenosis.   The tricuspid valve leaflets appear mildly thickened and/or calcified, but open well.   Moderate (2+) tricuspid valve regurgitation is present.   Mild pulmonary hypertension.   Mild concentric left ventricular hypertrophy is present.   Left ventricle systolic function moderately decreased paradoxical septal motion ,apical wall , anterior wall , apical lateral hypokinesis in the presence of a cardiac arrhythmia.   Lumason was used to better define the endocardial border.   Visual estimation of left ventricle ejection fraction is 40-45 %.   Normal appearing right atrium.   A device wire is seen in the RV and RA.   Normal appearing right ventricle structure and function.

## 2022-10-29 LAB
ANION GAP SERPL CALC-SCNC: 7 MMOL/L — SIGNIFICANT CHANGE UP (ref 5–17)
APPEARANCE UR: ABNORMAL
BASOPHILS # BLD AUTO: 0.02 K/UL — SIGNIFICANT CHANGE UP (ref 0–0.2)
BASOPHILS NFR BLD AUTO: 0.2 % — SIGNIFICANT CHANGE UP (ref 0–2)
BILIRUB UR-MCNC: NEGATIVE — SIGNIFICANT CHANGE UP
BUN SERPL-MCNC: 29 MG/DL — HIGH (ref 7–23)
CALCIUM SERPL-MCNC: 8.8 MG/DL — SIGNIFICANT CHANGE UP (ref 8.5–10.1)
CHLORIDE SERPL-SCNC: 98 MMOL/L — SIGNIFICANT CHANGE UP (ref 96–108)
CO2 SERPL-SCNC: 26 MMOL/L — SIGNIFICANT CHANGE UP (ref 22–31)
COLOR SPEC: YELLOW — SIGNIFICANT CHANGE UP
CREAT ?TM UR-MCNC: 57 MG/DL — SIGNIFICANT CHANGE UP
CREAT SERPL-MCNC: 2.56 MG/DL — HIGH (ref 0.5–1.3)
DIFF PNL FLD: ABNORMAL
EGFR: 25 ML/MIN/1.73M2 — LOW
EOSINOPHIL # BLD AUTO: 0.03 K/UL — SIGNIFICANT CHANGE UP (ref 0–0.5)
EOSINOPHIL NFR BLD AUTO: 0.3 % — SIGNIFICANT CHANGE UP (ref 0–6)
GLUCOSE SERPL-MCNC: 127 MG/DL — HIGH (ref 70–99)
GLUCOSE UR QL: NEGATIVE — SIGNIFICANT CHANGE UP
HCT VFR BLD CALC: 28.3 % — LOW (ref 39–50)
HGB BLD-MCNC: 8.5 G/DL — LOW (ref 13–17)
IMM GRANULOCYTES NFR BLD AUTO: 0.5 % — SIGNIFICANT CHANGE UP (ref 0–0.9)
KETONES UR-MCNC: NEGATIVE — SIGNIFICANT CHANGE UP
LEUKOCYTE ESTERASE UR-ACNC: ABNORMAL
LYMPHOCYTES # BLD AUTO: 1.04 K/UL — SIGNIFICANT CHANGE UP (ref 1–3.3)
LYMPHOCYTES # BLD AUTO: 11.1 % — LOW (ref 13–44)
MAGNESIUM SERPL-MCNC: 1.8 MG/DL — SIGNIFICANT CHANGE UP (ref 1.6–2.6)
MCHC RBC-ENTMCNC: 25.7 PG — LOW (ref 27–34)
MCHC RBC-ENTMCNC: 30 GM/DL — LOW (ref 32–36)
MCV RBC AUTO: 85.5 FL — SIGNIFICANT CHANGE UP (ref 80–100)
MONOCYTES # BLD AUTO: 0.68 K/UL — SIGNIFICANT CHANGE UP (ref 0–0.9)
MONOCYTES NFR BLD AUTO: 7.2 % — SIGNIFICANT CHANGE UP (ref 2–14)
NEUTROPHILS # BLD AUTO: 7.56 K/UL — HIGH (ref 1.8–7.4)
NEUTROPHILS NFR BLD AUTO: 80.7 % — HIGH (ref 43–77)
NITRITE UR-MCNC: NEGATIVE — SIGNIFICANT CHANGE UP
OSMOLALITY UR: 187 MOSM/KG — SIGNIFICANT CHANGE UP (ref 50–1200)
PH UR: 5 — SIGNIFICANT CHANGE UP (ref 5–8)
PHOSPHATE SERPL-MCNC: 3.5 MG/DL — SIGNIFICANT CHANGE UP (ref 2.5–4.5)
PLATELET # BLD AUTO: 164 K/UL — SIGNIFICANT CHANGE UP (ref 150–400)
POTASSIUM SERPL-MCNC: 4.6 MMOL/L — SIGNIFICANT CHANGE UP (ref 3.5–5.3)
POTASSIUM SERPL-SCNC: 4.6 MMOL/L — SIGNIFICANT CHANGE UP (ref 3.5–5.3)
POTASSIUM UR-SCNC: 22 MMOL/L — SIGNIFICANT CHANGE UP
PROT ?TM UR-MCNC: 144 MG/DL — HIGH (ref 0–12)
PROT UR-MCNC: 100
PROT/CREAT UR-RTO: 2.5 RATIO — HIGH (ref 0–0.2)
RBC # BLD: 3.31 M/UL — LOW (ref 4.2–5.8)
RBC # FLD: 18 % — HIGH (ref 10.3–14.5)
SODIUM SERPL-SCNC: 131 MMOL/L — LOW (ref 135–145)
SODIUM UR-SCNC: <20 MMOL/L — SIGNIFICANT CHANGE UP
SP GR SPEC: 1.01 — SIGNIFICANT CHANGE UP (ref 1.01–1.02)
UROBILINOGEN FLD QL: NEGATIVE — SIGNIFICANT CHANGE UP
UUN UR-MCNC: 229 MG/DL — SIGNIFICANT CHANGE UP
WBC # BLD: 9.38 K/UL — SIGNIFICANT CHANGE UP (ref 3.8–10.5)
WBC # FLD AUTO: 9.38 K/UL — SIGNIFICANT CHANGE UP (ref 3.8–10.5)

## 2022-10-29 PROCEDURE — 99232 SBSQ HOSP IP/OBS MODERATE 35: CPT

## 2022-10-29 RX ORDER — INSULIN LISPRO 100/ML
VIAL (ML) SUBCUTANEOUS AT BEDTIME
Refills: 0 | Status: DISCONTINUED | OUTPATIENT
Start: 2022-10-29 | End: 2022-11-02

## 2022-10-29 RX ORDER — INSULIN LISPRO 100/ML
VIAL (ML) SUBCUTANEOUS
Refills: 0 | Status: DISCONTINUED | OUTPATIENT
Start: 2022-10-29 | End: 2022-11-02

## 2022-10-29 RX ORDER — INSULIN LISPRO 100/ML
1 VIAL (ML) SUBCUTANEOUS
Refills: 0 | Status: DISCONTINUED | OUTPATIENT
Start: 2022-10-29 | End: 2022-11-02

## 2022-10-29 RX ORDER — SODIUM CHLORIDE 9 MG/ML
1000 INJECTION, SOLUTION INTRAVENOUS
Refills: 0 | Status: DISCONTINUED | OUTPATIENT
Start: 2022-10-29 | End: 2022-11-02

## 2022-10-29 RX ORDER — INSULIN GLARGINE 100 [IU]/ML
3 INJECTION, SOLUTION SUBCUTANEOUS AT BEDTIME
Refills: 0 | Status: DISCONTINUED | OUTPATIENT
Start: 2022-10-29 | End: 2022-11-02

## 2022-10-29 RX ORDER — SODIUM CHLORIDE 9 MG/ML
1000 INJECTION INTRAMUSCULAR; INTRAVENOUS; SUBCUTANEOUS
Refills: 0 | Status: DISCONTINUED | OUTPATIENT
Start: 2022-10-29 | End: 2022-10-30

## 2022-10-29 RX ADMIN — Medication 4 MILLIGRAM(S): at 06:10

## 2022-10-29 RX ADMIN — CHLORHEXIDINE GLUCONATE 1 APPLICATION(S): 213 SOLUTION TOPICAL at 12:47

## 2022-10-29 RX ADMIN — Medication 2 TABLET(S): at 06:10

## 2022-10-29 RX ADMIN — PANTOPRAZOLE SODIUM 40 MILLIGRAM(S): 20 TABLET, DELAYED RELEASE ORAL at 21:50

## 2022-10-29 RX ADMIN — CHOLESTYRAMINE 4 GRAM(S): 4 POWDER, FOR SUSPENSION ORAL at 09:55

## 2022-10-29 RX ADMIN — SODIUM CHLORIDE 75 MILLILITER(S): 9 INJECTION INTRAMUSCULAR; INTRAVENOUS; SUBCUTANEOUS at 16:42

## 2022-10-29 RX ADMIN — INSULIN GLARGINE 3 UNIT(S): 100 INJECTION, SOLUTION SUBCUTANEOUS at 21:49

## 2022-10-29 RX ADMIN — APIXABAN 5 MILLIGRAM(S): 2.5 TABLET, FILM COATED ORAL at 21:50

## 2022-10-29 RX ADMIN — Medication 2 TABLET(S): at 17:34

## 2022-10-29 RX ADMIN — BUDESONIDE AND FORMOTEROL FUMARATE DIHYDRATE 2 PUFF(S): 160; 4.5 AEROSOL RESPIRATORY (INHALATION) at 20:09

## 2022-10-29 RX ADMIN — APIXABAN 5 MILLIGRAM(S): 2.5 TABLET, FILM COATED ORAL at 09:54

## 2022-10-29 RX ADMIN — Medication 1 PACKET(S): at 21:54

## 2022-10-29 RX ADMIN — Medication 1 UNIT(S): at 17:34

## 2022-10-29 RX ADMIN — BUDESONIDE AND FORMOTEROL FUMARATE DIHYDRATE 2 PUFF(S): 160; 4.5 AEROSOL RESPIRATORY (INHALATION) at 08:04

## 2022-10-29 RX ADMIN — SODIUM CHLORIDE 3 MILLILITER(S): 9 INJECTION INTRAMUSCULAR; INTRAVENOUS; SUBCUTANEOUS at 15:25

## 2022-10-29 RX ADMIN — IRON SUCROSE 100 MILLIGRAM(S): 20 INJECTION, SOLUTION INTRAVENOUS at 12:45

## 2022-10-29 RX ADMIN — Medication 2000 UNIT(S): at 09:54

## 2022-10-29 RX ADMIN — ATORVASTATIN CALCIUM 20 MILLIGRAM(S): 80 TABLET, FILM COATED ORAL at 21:50

## 2022-10-29 RX ADMIN — Medication 4 MILLIGRAM(S): at 21:50

## 2022-10-29 RX ADMIN — Medication 5 MILLIGRAM(S): at 21:50

## 2022-10-29 RX ADMIN — Medication 1 PACKET(S): at 09:55

## 2022-10-29 RX ADMIN — Medication 2 TABLET(S): at 21:48

## 2022-10-29 RX ADMIN — PIPERACILLIN AND TAZOBACTAM 25 GRAM(S): 4; .5 INJECTION, POWDER, LYOPHILIZED, FOR SOLUTION INTRAVENOUS at 21:53

## 2022-10-29 RX ADMIN — Medication 1 UNIT(S): at 12:44

## 2022-10-29 RX ADMIN — PANTOPRAZOLE SODIUM 40 MILLIGRAM(S): 20 TABLET, DELAYED RELEASE ORAL at 09:54

## 2022-10-29 RX ADMIN — SODIUM CHLORIDE 3 MILLILITER(S): 9 INJECTION INTRAMUSCULAR; INTRAVENOUS; SUBCUTANEOUS at 21:51

## 2022-10-29 RX ADMIN — Medication 2 TABLET(S): at 12:46

## 2022-10-29 RX ADMIN — MORPHINE 6 MILLIGRAM(S): 10 SOLUTION ORAL at 21:48

## 2022-10-29 RX ADMIN — PIPERACILLIN AND TAZOBACTAM 25 GRAM(S): 4; .5 INJECTION, POWDER, LYOPHILIZED, FOR SOLUTION INTRAVENOUS at 06:10

## 2022-10-29 RX ADMIN — Medication 650 MILLIGRAM(S): at 17:34

## 2022-10-29 RX ADMIN — TAMSULOSIN HYDROCHLORIDE 0.8 MILLIGRAM(S): 0.4 CAPSULE ORAL at 21:50

## 2022-10-29 RX ADMIN — FINASTERIDE 5 MILLIGRAM(S): 5 TABLET, FILM COATED ORAL at 09:53

## 2022-10-29 RX ADMIN — Medication 4 MILLIGRAM(S): at 16:43

## 2022-10-29 RX ADMIN — MORPHINE 6 MILLIGRAM(S): 10 SOLUTION ORAL at 09:54

## 2022-10-29 RX ADMIN — Medication 650 MILLIGRAM(S): at 12:46

## 2022-10-29 RX ADMIN — PIPERACILLIN AND TAZOBACTAM 25 GRAM(S): 4; .5 INJECTION, POWDER, LYOPHILIZED, FOR SOLUTION INTRAVENOUS at 15:30

## 2022-10-29 RX ADMIN — Medication 4 MILLIGRAM(S): at 09:54

## 2022-10-29 RX ADMIN — SODIUM CHLORIDE 3 MILLILITER(S): 9 INJECTION INTRAMUSCULAR; INTRAVENOUS; SUBCUTANEOUS at 06:10

## 2022-10-29 RX ADMIN — TIOTROPIUM BROMIDE 1 CAPSULE(S): 18 CAPSULE ORAL; RESPIRATORY (INHALATION) at 08:04

## 2022-10-29 RX ADMIN — Medication 4 MILLIGRAM(S): at 12:45

## 2022-10-29 NOTE — PROGRESS NOTE ADULT - SUBJECTIVE AND OBJECTIVE BOX
Patient seen and examined this AM at bedside. No acute events overnight and patient resting comfortably. Toleraing diet, no n/v. ileostomy functioning. no active complaints. Denies fever/chills, shortness of breath, chest pain. VS reviewed    Vital Signs Last 24 Hrs  T(C): 36.6 (28 Oct 2022 21:39), Max: 36.9 (28 Oct 2022 08:47)  T(F): 97.9 (28 Oct 2022 21:39), Max: 98.4 (28 Oct 2022 08:47)  HR: 82 (28 Oct 2022 21:39) (59 - 82)  BP: 110/70 (28 Oct 2022 21:39) (96/45 - 110/70)  BP(mean): --  RR: 18 (28 Oct 2022 21:39) (17 - 18)  SpO2: 98% (28 Oct 2022 21:39) (95% - 98%)    Parameters below as of 28 Oct 2022 21:39  Patient On (Oxygen Delivery Method): room air      MEDICATIONS  (STANDING):  acetaminophen     Tablet .. 650 milliGRAM(s) Oral every 6 hours  acetaminophen   IVPB .. 1000 milliGRAM(s) IV Intermittent once  apixaban 5 milliGRAM(s) Oral every 12 hours  atorvastatin 20 milliGRAM(s) Oral at bedtime  budesonide 160 MICROgram(s)/formoterol 4.5 MICROgram(s) Inhaler 2 Puff(s) Inhalation two times a day  chlorhexidine 4% Liquid 1 Application(s) Topical <User Schedule>  cholestyramine Powder (Sugar-Free) 4 Gram(s) Oral daily  dextrose 50% Injectable 25 Gram(s) IV Push once  dextrose 50% Injectable 25 Gram(s) IV Push once  dextrose 50% Injectable 12.5 Gram(s) IV Push once  dextrose Oral Gel 15 Gram(s) Oral once  diphenoxylate/atropine 2 Tablet(s) Oral four times a day  finasteride 5 milliGRAM(s) Oral daily  glucagon  Injectable 1 milliGRAM(s) IntraMuscular once  influenza  Vaccine (HIGH DOSE) 0.7 milliLiter(s) IntraMuscular once  insulin lispro (ADMELOG) corrective regimen sliding scale   SubCutaneous every 6 hours  loperamide 4 milliGRAM(s) Oral <User Schedule>  melatonin 5 milliGRAM(s) Oral at bedtime  pantoprazole  Injectable 40 milliGRAM(s) IV Push every 12 hours  piperacillin/tazobactam IVPB.. 3.375 Gram(s) IV Intermittent every 8 hours  predniSONE   Tablet 4 milliGRAM(s) Oral daily  psyllium Powder 1 Packet(s) Oral two times a day  sodium chloride 0.9% lock flush 3 milliLiter(s) IV Push every 8 hours  tamsulosin 0.8 milliGRAM(s) Oral at bedtime  tiotropium 18 MICROgram(s) Capsule 1 Capsule(s) Inhalation daily    MEDICATIONS  (PRN):  acetaminophen     Tablet .. 650 milliGRAM(s) Oral every 6 hours PRN Mild Pain (1 - 3)  ALBUTerol    90 MICROgram(s) HFA Inhaler 2 Puff(s) Inhalation every 6 hours PRN Shortness of Breath and/or Wheezing  albuterol/ipratropium for Nebulization 3 milliLiter(s) Nebulizer every 6 hours PRN Shortness of Breath and/or Wheezing  naloxone Injectable 0.1 milliGRAM(s) IV Push every 3 minutes PRN For ANY of the following changes in patient status:  A. RR LESS THAN 10 breaths per minute, B. Oxygen saturation LESS THAN 90%, C. Sedation score of 6  ondansetron Injectable 4 milliGRAM(s) IV Push every 6 hours PRN Nausea and/or Vomiting  oxybutynin 5 milliGRAM(s) Oral daily PRN Bladder spasms  oxyCODONE    IR 5 milliGRAM(s) Oral every 4 hours PRN Moderate Pain (4 - 6)  oxyCODONE    IR 10 milliGRAM(s) Oral every 4 hours PRN Severe Pain (7 - 10)    PHYSICAL EXAM   GENERAL: NAD, AOx3, well developed  HEAD: Atraumatic, normocephalic  CHEST/LUNG: unlabored respirations b/l  Heart: S1, S2 normal  ABDOMEN: soft, nondistended, nontender. surgical site c/d/i. ileostomy intact      I&O's Detail    27 Oct 2022 07:01  -  28 Oct 2022 07:00  --------------------------------------------------------  IN:  Total IN: 0 mL    OUT:    Ileostomy (mL): 2070 mL    Indwelling Catheter - Urethral (mL): 1100 mL  Total OUT: 3170 mL    Total NET: -3170 mL      28 Oct 2022 07:01  -  29 Oct 2022 06:31  --------------------------------------------------------  IN:    Oral Fluid: 1360 mL  Total IN: 1360 mL    OUT:    Ileostomy (mL): 1175 mL    Indwelling Catheter - Urethral (mL): 1600 mL  Total OUT: 2775 mL    Total NET: -1415 mL              Daily     Daily     LABS:                                   8.0    12.28 )-----------( 179      ( 28 Oct 2022 07:24 )             25.7   10-28    130<L>  |  98  |  26<H>  ----------------------------<  132<H>  4.7   |  26  |  2.51<H>    Ca    8.4<L>      28 Oct 2022 07:24  Phos  2.9     10-28  Mg     1.5     10-28            RADIOLOGY & ADDITIONAL STUDIES:

## 2022-10-29 NOTE — PROGRESS NOTE ADULT - SUBJECTIVE AND OBJECTIVE BOX
cc - dyspnea             76 yo male with CAD, ICM with EF 40-45%  h/o AF, PPM, HTN, UC, newly Dx colon CA, HTN, COPD (long smoking Hx), obesity and hyperlipidemia found to have a colon mass and CA and admitted on 10/06/22  for resection.   10/06- s/p total proctocolectomy with J- pouch creation, open diverting loop ileostomy, 2 VINNY drains placed   Surgery complicated by acute hypoxic respiratory failure secondary to L sided pneumonia which improved with NIPPV, septic shock requiring pressors due to fecal spillage peritonitis, and JOSE likely from ATN. Was started on Zosyn. Off pressors 10/9. On 10/10 patient was transfused 2u pRBC after drop in h/h and noted with ?coffee ground content in ileostomy. Improved respiratory status, weaned to supplemental O2 via NC.   10/17 - s/p cystoscopy and urethral catheter placement in OR by urology for urinary retention found to have bladder neck contracture.   10/19 - s/p IR procedure for  LLQ abd drain placement, found to have fluid collection, with 8cc aspirate, culture has been negative. Parenteral nutrition started on 10/17/22. Patient downgraded from ICU 10/21/22. Hospitalist consulted for continued medical management.   10/25 - no cp palps sob abdo pain, got PRBCs yesterday no lasix; Cr trending up today, BNP slightly elevated   10/26 - no cp palps sob abdo pain, able to lie flat in bed but BNP rising and he got IVFs yesterday, CR worse     10/27 - chart reviewed. pt seen and examined, denies cp, dyspnea, abdominal pain, on O2 , afebrile, plan discussed  10/28 - pt seen and examined, denies cp, dyspnea, + alfaro draining dark urine, encouraged po liquid intake, afebrile, plan discussed  10/29 - no events overnight, breathing stable ,denies cp, dyspnea, abdominal pain, was drinking a lot of water yesterday , afebrile    Review of system- Rest of the review of system are negative except mentioned in HPI    Vital sings reviewed for last 24 h  T(C): 36.8 (10-29-22 @ 11:33), Max: 37.2 (10-29-22 @ 10:03)  T(F): 98.3 (10-29-22 @ 11:33), Max: 99 (10-29-22 @ 10:03)  HR: 66 (10-29-22 @ 11:33) (62 - 102)  BP: 87/58 (10-29-22 @ 11:33) (87/58 - 110/70)  RR: 18 (10-29-22 @ 11:33) (18 - 18)  SpO2: 99% (10-29-22 @ 11:33) (92% - 99%)  Wt(kg): --  Daily     Daily Weight in k.9 (29 Oct 2022 05:00)  CAPILLARY BLOOD GLUCOSE      POCT Blood Glucose.: 188 mg/dL (29 Oct 2022 12:30)  POCT Blood Glucose.: 133 mg/dL (29 Oct 2022 08:08)  POCT Blood Glucose.: 135 mg/dL (28 Oct 2022 21:25)  POCT Blood Glucose.: 138 mg/dL (28 Oct 2022 17:49)    Home Oxygen Evaluation:  Pulse ox (SpO2) on room air at rest:	94 %  Pulse ox (SpO2) on room air with exertion:	88 %  Pulse ox (SpO2) on	3 L/min  O2 with exertion:	95 %        Physical exam :       GENERAL: NAD, lying in bed comfortably with NC on   HEAD:  Atraumatic, Normocephalic  EYES: conjunctiva and sclera clear  ENT: Moist mucous membranes  NECK: Supple, No JVD  CHEST/LUNG: few bibasilar crackles   HEART: Regular rate and rhythm; No murmurs, rubs, or gallops  ABDOMEN: Bowel sounds present; Soft, Nontender, + ileostomy, midline incision w/ staples in place, C/D/I, no surrounding erythema , + Alfaro   EXTREMITIES:  2+ Peripheral Pulses, brisk capillary refill. No clubbing, cyanosis, or edema  NERVOUS SYSTEM:  Alert & Oriented X3, speech clear. No deficits   MSK: FROM all 4 extremities, full and equal strength    LABS: All Labs Reviewed:  10-29    131<L>  |  98  |  29<H>  ----------------------------<  127<H>  4.6   |  26  |  2.56<H>    Ca    8.8      29 Oct 2022 07:13  Phos  3.5     10-29  Mg     1.8     10-29                        8.5    9.38  )-----------( 164      ( 29 Oct 2022 07:13 )             28.3       CARDIAC MARKERS ( 28 Oct 2022 07:24 )  x     / x     / 299 U/L / x     / x        Urinalysis Basic - ( 28 Oct 2022 23:00 )    Color: Yellow / Appearance: Slightly Turbid / S.010 / pH: x  Gluc: x / Ketone: Negative  / Bili: Negative / Urobili: Negative   Blood: x / Protein: 100 / Nitrite: Negative   Leuk Esterase: Moderate / RBC: >50 /HPF / WBC >50   Sq Epi: x / Non Sq Epi: Occasional / Bacteria: Moderate      Urinalysis Basic - ( 28 Oct 2022 13:41 )    Color: Yellow / Appearance: very cloudy / S.015 / pH: x  Gluc: x / Ketone: Trace  / Bili: Negative / Urobili: Negative   Blood: x / Protein: 100 / Nitrite: Negative   Leuk Esterase: Moderate / RBC: >50 /HPF / WBC 26-50   Sq Epi: x / Non Sq Epi: Occasional / Bacteria: Moderate    Radiology reviewed :   US Kidney and Bladder (10.28.22 @ 10:26) >  IMPRESSION:  Mild left hydronephrosis appears similar to prior CT.    Two, nonshadowing intrarenal echogenic foci at the interpolar region of   the right kidney measuring 9 mm and 5 mm, both likely representing   nonobstructing stones versus artifact. No right-sided hydronephrosis.       Xray Chest 1 View- PORTABLE-Routine (Xray Chest 1 View- PORTABLE-Routine in AM.) (10.27.22 @ 10:30) >    Frontal expiratory view of the chest shows the heart to be similar in   size. Left cardiac pacemaker is again noted.    The lungs show less pulmonary congestion and less left base atelectasis.   There is no evidence of pneumothorax nor pleural effusion.    IMPRESSION:  Decreased congestion.    < end of copied text >        Xray Chest 1 View- PORTABLE-Routine (Xray Chest 1 View- PORTABLE-Routine in AM.) (10.26.22 @ 08:09) >  PULMONARY: Mild bilateral perihilar diffuse airspace disease..   No pneumothorax.    HEART/VASCULAR: The  heart is enlarged in transverse diameter. Cardiac   device wire leads are within right atrium and right ventricle.     BONES: Visualized osseous structures are intact.    IMPRESSION: Mild bilateral perihilar diffuse airspace disease.  -------------------------------------------  FOLLOW-UP AP PORTABLE CHEST RADIOGRAPH 10/26/2022 AT 8:03 AM:    INDICATION: Congestive heart failure.    FINDINGS: No interval change.     Xray Chest 1 View- PORTABLE-Routine (Xray Chest 1 View- PORTABLE-Routine in AM.) (10.25.22 @ 07:38) >  IMPRESSION: Mild bilateral perihilar diffuse airspace disease.    TTE Echo Complete w/ Contrast w/ Doppler (10.07.22 @ 10:28) >   Summary     Moderate mitral annular calcification is present.   The mitral valve leaflets appear thickened.   EA reversal of the mitral inflow consistent with reduced compliance of   the   left ventricle.   The aortic valve appears mildly calcified. Valve opening seems to be   restricted.   Peak and mean transaortic gradients are 25 and 14 mmHg respectively; this   finding is consistent with mild aortic stenosis.   The tricuspid valve leaflets appear mildly thickened and/or calcified,   but  open well.   Moderate (2+) tricuspid valve regurgitation is present.   Mild pulmonary hypertension.   Mild concentric left ventricular hypertrophy is present.   Left ventricle systolic function moderately decreased paradoxical septal   motion ,apical wall , anterior wall , apical lateral hypokinesis in the   presence of a cardiac arrhythmia.   Lumason was used to better define the endocardial border.   Visual estimation of left ventricle ejection fraction is 40-45 %.   Normal appearing right atrium.   A device wire is seen in the RV and RA.   Normal appearing right ventricle structure and function.     12 Lead ECG (10.06.22 @ 08:52) >  Ventricular Rate 73 BPM  Diagnosis Line Atrial-sensed ventricular-paced rhythm with occasional Premature ventricular complexes  Abnormal ECG  When compared with ECG of 06-OCT-2022 08:52,  Vent. rate has decreased BY   9 BPM    Serum Pro-Brain Natriuretic Peptide (10.28.22 @ 07:24)    Serum Pro-Brain Natriuretic Peptide: 2915 pg/mL  Serum Pro-Brain Natriuretic Peptide: 2318 pg/mL (10-27-22 @ 07:19)  Serum Pro-Brain Natriuretic Peptide: 1647 pg/mL (10-26-22 @ 07:30)  Serum Pro-Brain Natriuretic Peptide: 1252 pg/mL (10-25-22 @ 08:27)  Serum Pro-Brain Natriuretic Peptide: 543 pg/mL (10-06-22 @ 16:10)      MEDS:   acetaminophen     Tablet .. 650 milliGRAM(s) Oral every 6 hours PRN  acetaminophen     Tablet .. 650 milliGRAM(s) Oral every 6 hours  acetaminophen   IVPB .. 1000 milliGRAM(s) IV Intermittent once  ALBUTerol    90 MICROgram(s) HFA Inhaler 2 Puff(s) Inhalation every 6 hours PRN  albuterol/ipratropium for Nebulization 3 milliLiter(s) Nebulizer every 6 hours PRN  apixaban 5 milliGRAM(s) Oral every 12 hours  atorvastatin 20 milliGRAM(s) Oral at bedtime  budesonide 160 MICROgram(s)/formoterol 4.5 MICROgram(s) Inhaler 2 Puff(s) Inhalation two times a day  chlorhexidine 4% Liquid 1 Application(s) Topical <User Schedule>  cholestyramine Powder (Sugar-Free) 4 Gram(s) Oral daily  diphenoxylate/atropine 2 Tablet(s) Oral four times a day  finasteride 5 milliGRAM(s) Oral daily    influenza  Vaccine (HIGH DOSE) 0.7 milliLiter(s) IntraMuscular once  insulin lispro (ADMELOG) corrective regimen sliding scale   SubCutaneous every 6 hours  loperamide 4 milliGRAM(s) Oral <User Schedule>  melatonin 5 milliGRAM(s) Oral at bedtime  morphine  - Injectable 2 milliGRAM(s) IV Push every 4 hours PRN  naloxone Injectable 0.1 milliGRAM(s) IV Push every 3 minutes PRN  ondansetron Injectable 4 milliGRAM(s) IV Push every 6 hours PRN  oxybutynin 5 milliGRAM(s) Oral daily PRN  oxyCODONE    IR 5 milliGRAM(s) Oral every 4 hours PRN  oxyCODONE    IR 10 milliGRAM(s) Oral every 4 hours PRN  pantoprazole  Injectable 40 milliGRAM(s) IV Push every 12 hours  piperacillin/tazobactam IVPB.. 3.375 Gram(s) IV Intermittent every 8 hours  predniSONE   Tablet 4 milliGRAM(s) Oral daily  psyllium Powder 1 Packet(s) Oral two times a day  sodium chloride 0.9% lock flush 3 milliLiter(s) IV Push every 8 hours  tamsulosin 0.8 milliGRAM(s) Oral at bedtime  tiotropium 18 MICROgram(s) Capsule 1 Capsule(s) Inhalation daily

## 2022-10-29 NOTE — PROGRESS NOTE ADULT - ASSESSMENT
Patient is S/p proctocolectomy, IPAA, DLI for UC/Sigmoid CA, IR drain for collection. S/p cystoscopy w/ stent placement and [place] tip Zhao.   High ileostomy output  JOSE    Plan:  Pain control  c/w regular diet  Continue Eliquis  appreciate cards recc  appreciate nephrology reccs  appreciate hospitalist reccs  Monitor ileostomy output - on loperamide, metamucil, lomotil, cholestyramine, tincture of opium  trend labs, monitor Cr  Appreciate  reccs - pt to be DC'd with leg bag  Dispo PHILLIP  CM for placement  encourage OOB/ambulation    Plan discussed with Dr. Tatmu

## 2022-10-29 NOTE — PROGRESS NOTE ADULT - ATTENDING COMMENTS
Patient without significant complaints.  Ostomy output 1100cc with addition of tincture of opium yesterday.  Currently on metamucil, cholestyramine, PPI, tincture of opium, and maximal Imodium and Lomotil.  Cr similar to yesterday at 2.56.  Abdomen remains soft, nondistended, ostomy with thickened brown output in appliance.    -- Continue diet, antidiarrheals  -- Ostomy care  -- Obtain further recommendations from Medicine and Nephrology regarding patient's renal function to determine next steps and discharge criteria  -- Patient expressed interest in potentially being discharged to home.  Will request PT re-evaluation.  He understands he may again be recommended for PHILLIP.  -- Will plan to DC esperanza prior to discharge

## 2022-10-29 NOTE — PROGRESS NOTE ADULT - ASSESSMENT
76 yo male with CAD, ICM with EF 40-45%  h/o AF, PPM, HTN, UC, newly Dx colon CA, HTN, COPD (long smoking Hx), obesity and hyperlipidemia found to have a colon mass and CA and admitted on 10/06/22  for resection.   10/06- s/p total proctocolectomy with J- pouch creation, open diverting loop ileostomy, 2 VINNY drains placed   Surgery complicated by acute hypoxic respiratory failure secondary to L sided pneumonia which improved with NIPPV, septic shock requiring pressors due to fecal spillage peritonitis, and JOSE likely from ATN. Was started on Zosyn. Off pressors 10/9. On 10/10 patient was transfused 2u pRBC after drop in h/h and noted with ?coffee ground content in ileostomy. Improved respiratory status, weaned to supplemental O2 via NC.   10/17 - s/p cystoscopy and urethral catheter placement in OR by urology for urinary retention found to have bladder neck contracture.   10/19 - s/p IR procedure for  LLQ abd drain placement, found to have fluid collection, with 8cc aspirate, culture has been negative. Parenteral nutrition started on 10/17/22. Patient downgraded from ICU 10/21/22. Hospitalist consulted for continued medical management.   10/24 - started on IV lasix for CHF     Colon mass consistent with adenocarcinoma  -S/P RCP-IPAA (restorative proctocolectomy with ileal pouch anal anastomosis), diverting loop ileostomy 10/6  - diet and pain management as per surgery team  - pathology reports noted - adenocarcinoma   - consider oncology consult for further management and monitoring of cancer     Acute hypoxic respiratory failure , multifactorial   - HCAP PNA, probable gram negative rods, septic shock , Fecal spillage peritonitis with abdominal fluids collections s/p drainage   Acute on chronic systolic HF , resolved   -Off pressors, given stress dose steroids, now tapering, on prednisone 4mg daily   -c/w zosyn  for PNA and intraabdominal collections, consider ID consult   -S/p IR drainage of fluid collection on 10/19, actually restarted zosyn 10/20, (cultures have been negative >48hrs)  -s/p  PPN 10/17 - 10/22   - tolerating diet   - s/p IV lasix 10/24-10/26   - 1500 cc fluids restriction, daily weights   - cardiology consult noted optimize meds - ACEI and BB as tolerated low Na diet   suspect drop in CHF In the setting of septic shock but will need repeat ECHO in 6 weeks   - CXR 10/27 -decreased congestion, less atelectasis    JOSE with baseline Cr 1.1   Urinary retention s/p Zhao 10/17/22   - off iv fluids  - will hold lasix for now   - Creatinine Trend: 2.5<--2.32<--, 1.61<--, 1.57<--, 1.33<--, 1.15<--, 1.11<--  -Continue tamsulosin 0.8mg qhs, finasteride 5mg daily   -Urology consult appreciated: d/c home with [facility]    Hypomagnesemia, resolved  - replace    Vitamin D deficiency - replace    COPD now on O2   -Continue Prednisone 4mg daily, Symbicort, Spiriva Albuterol HFA PRN   - before dc check Pulse Ox on ambulation to see how hes doing and ensure safety while in Rehab      Normocytic Anemia, multifactorial   - s/p 4 units PRBC 10/9, 10/10 , 10/12, 10/24,   - Monitor closely while on Eliquis   - check iron studies, B12, folate    HTN  Chronic  AFIB s/p PPM  HLD  -Continue Eliquis 5mg H  -Continue Lipitor 20mg qhs  -Off antihypertensives here, bp stable  - was On amlodipine 2.5mg, Metoprolol ER 25mg daily       VTE prophylaxis  -On eliquis     Dispo: Pulse Ox on ambulation before dc to rehab to ensure a safe discharge , as per surgery

## 2022-10-29 NOTE — PROGRESS NOTE ADULT - ASSESSMENT
76 yo wm presented for colon mass for ca s/p total proctocolonscopy   hosptal course complicated with Left pna tx with zosyn   and abdominal fluid collection with neg cx   urinary retention with alfaro in place   hx of systolic chf off lasix PTA for 6 months, diuresed with lasix iv in ICU  now with JOSE in setting of inc ostomy outpt and neg fluid balance    PLAN   - agree with dc of iv lasix and will hold   - alfaro in place and draining dark urine   - UA with micr  - r/o AIN while on zosyn with urine eos   - fu trend of scr off diuretics, prn ivf as needed   - ostomy outpt improving with more pasty output  cw lomotil and opium     10/29 SY       78 yo wm presented for colon mass for ca s/p total proctocolonscopy   hosptal course complicated with Left pna tx with zosyn   and abdominal fluid collection with neg cx   urinary retention with alfaro in place   hx of systolic chf off lasix PTA for 6 months, diuresed with lasix iv in ICU  now with JOSE in setting of inc ostomy outpt and neg fluid balance    PLAN   - agree with dc of iv lasix and will hold   - alfaro in place and draining dark urine   - UA with micr  - r/o AIN while on zosyn with urine eos   - fu trend of scr off diuretics, prn ivf as needed   - ostomy outpt improving with more pasty output  cw lomotil and opium     10/29 SY  --JOSE : creat slightly stabilized off IV Lasix.   I>>O due to ostomy output.    Gentle hydration to stabilize renal function as well as serum sodium.  --Monitor ostomy output.

## 2022-10-29 NOTE — PROGRESS NOTE ADULT - SUBJECTIVE AND OBJECTIVE BOX
NEPHROLOGY INTERVAL HPI/OVERNIGHT EVENTS:    Date of Service: 10-29-22 @ 12:20    10/29--    HPI:  78 yo wm presented for colon mass for ca   hx of cad, CM with ef 40-45%, off diuretics for 5 months   AF, PPM , htn   COPD with hx of smoking  10/06- s/p total proctocolectomy with J- pouch creation, open diverting loop ileostomy, 2 VINNY drains placed   Surgery complicated by acute hypoxic respiratory failure secondary to L sided pneumonia which improved with NIPPV, septic shock requiring pressors due to fecal spillage peritonitis, and JOSE likely from ATN. Was started on Zosyn. Off pressors 10/9. On 10/10 patient was transfused 2u pRBC after drop in h/h and noted with ?coffee ground content in ileostomy. Improved respiratory status, weaned to supplemental O2 via NC.   10/17 - s/p cystoscopy and urethral catheter placement in OR by urology for urinary retention found to have bladder neck contracture.   10/19 - s/p IR procedure for  LLQ abd drain placement, found to have fluid collection, with 8cc aspirate, culture has been negative. Parenteral nutrition started on 10/17/22.  10/17-21 ICU tx for PNA and NIV at night for his hx of FLORA   tx with zosyn    Patient downgraded from ICU 10/21/22.  10/25 - lasix held   10/26 - no lasix and ivf   10/27 continued rise in scr and renal consult requested   pt with 3L neg fluid balance with po intake " very good" as per pt   no nsaid  no ivc  only zosyn for abx, no vanc given     PAST MEDICAL & SURGICAL HISTORY:  - UC  - recent dx of colon ca s/p resection 10/6  - flora on cpap at home   - htn   - afib   - ppm   - hld  - cpod hx of smoking   - hernia repair  - urethral stricture now with alfaro in place   - b/l tkr     MEDICATIONS  (STANDING):  acetaminophen     Tablet .. 650 milliGRAM(s) Oral every 6 hours  acetaminophen   IVPB .. 1000 milliGRAM(s) IV Intermittent once  apixaban 5 milliGRAM(s) Oral every 12 hours  atorvastatin 20 milliGRAM(s) Oral at bedtime  budesonide 160 MICROgram(s)/formoterol 4.5 MICROgram(s) Inhaler 2 Puff(s) Inhalation two times a day  chlorhexidine 4% Liquid 1 Application(s) Topical <User Schedule>  cholecalciferol 2000 Unit(s) Oral daily  cholestyramine Powder (Sugar-Free) 4 Gram(s) Oral daily  dextrose 5%. 1000 milliLiter(s) (50 mL/Hr) IV Continuous <Continuous>  dextrose 5%. 1000 milliLiter(s) (100 mL/Hr) IV Continuous <Continuous>  dextrose 50% Injectable 25 Gram(s) IV Push once  dextrose 50% Injectable 12.5 Gram(s) IV Push once  dextrose 50% Injectable 25 Gram(s) IV Push once  dextrose Oral Gel 15 Gram(s) Oral once  diphenoxylate/atropine 2 Tablet(s) Oral four times a day  finasteride 5 milliGRAM(s) Oral daily  glucagon  Injectable 1 milliGRAM(s) IntraMuscular once  influenza  Vaccine (HIGH DOSE) 0.7 milliLiter(s) IntraMuscular once  insulin glargine Injectable (LANTUS) 3 Unit(s) SubCutaneous at bedtime  insulin lispro (ADMELOG) corrective regimen sliding scale   SubCutaneous at bedtime  insulin lispro (ADMELOG) corrective regimen sliding scale   SubCutaneous three times a day before meals  insulin lispro Injectable (ADMELOG) 1 Unit(s) SubCutaneous three times a day before meals  iron sucrose Injectable 100 milliGRAM(s) IV Push every 24 hours  loperamide 4 milliGRAM(s) Oral <User Schedule>  melatonin 5 milliGRAM(s) Oral at bedtime  opium Tincture 6 milliGRAM(s) Oral two times a day  pantoprazole    Tablet 40 milliGRAM(s) Oral every 12 hours  piperacillin/tazobactam IVPB.. 3.375 Gram(s) IV Intermittent every 8 hours  predniSONE   Tablet 4 milliGRAM(s) Oral daily  psyllium Powder 1 Packet(s) Oral two times a day  sodium chloride 0.9% lock flush 3 milliLiter(s) IV Push every 8 hours  tamsulosin 0.8 milliGRAM(s) Oral at bedtime  tiotropium 18 MICROgram(s) Capsule 1 Capsule(s) Inhalation daily    MEDICATIONS  (PRN):  acetaminophen     Tablet .. 650 milliGRAM(s) Oral every 6 hours PRN Mild Pain (1 - 3)  ALBUTerol    90 MICROgram(s) HFA Inhaler 2 Puff(s) Inhalation every 6 hours PRN Shortness of Breath and/or Wheezing  albuterol/ipratropium for Nebulization 3 milliLiter(s) Nebulizer every 6 hours PRN Shortness of Breath and/or Wheezing  naloxone Injectable 0.1 milliGRAM(s) IV Push every 3 minutes PRN For ANY of the following changes in patient status:  A. RR LESS THAN 10 breaths per minute, B. Oxygen saturation LESS THAN 90%, C. Sedation score of 6  ondansetron Injectable 4 milliGRAM(s) IV Push every 6 hours PRN Nausea and/or Vomiting  oxybutynin 5 milliGRAM(s) Oral daily PRN Bladder spasms  oxyCODONE    IR 5 milliGRAM(s) Oral every 4 hours PRN Moderate Pain (4 - 6)  oxyCODONE    IR 10 milliGRAM(s) Oral every 4 hours PRN Severe Pain (7 - 10)          Vital Signs Last 24 Hrs  T(C): 36.8 (29 Oct 2022 11:33), Max: 37.2 (29 Oct 2022 10:03)  T(F): 98.3 (29 Oct 2022 11:33), Max: 99 (29 Oct 2022 10:03)  HR: 66 (29 Oct 2022 11:33) (62 - 102)  BP: 87/58 (29 Oct 2022 11:33) (87/58 - 110/70)  BP(mean): --  RR: 18 (29 Oct 2022 11:33) (18 - 18)  SpO2: 99% (29 Oct 2022 11:33) (92% - 99%)    Parameters below as of 29 Oct 2022 11:33  Patient On (Oxygen Delivery Method): room air      Daily     Daily Weight in k.9 (29 Oct 2022 05:00)    10-28 @ :01  -  10-29 @ 07:00  --------------------------------------------------------  IN: 1360 mL / OUT: 2775 mL / NET: -1415 mL    10-29 @ :01  -  10-29 @ 12:20  --------------------------------------------------------  IN: 0 mL / OUT: 550 mL / NET: -550 mL        PHYSICAL EXAM:  GENERAL:   CHEST/LUNG:   HEART:   ABDOMEN:   EXTREMITIES:   SKIN:     LABS:                        8.5    9.38  )-----------( 164      ( 29 Oct 2022 07:13 )             28.3     10-29    131<L>  |  98  |  29<H>  ----------------------------<  127<H>  4.6   |  26  |  2.56<H>    Ca    8.8      29 Oct 2022 07:13  Phos  3.5     10-29  Mg     1.8     10-29        Urinalysis Basic - ( 28 Oct 2022 23:00 )    Color: Yellow / Appearance: Slightly Turbid / S.010 / pH: x  Gluc: x / Ketone: Negative  / Bili: Negative / Urobili: Negative   Blood: x / Protein: 100 / Nitrite: Negative   Leuk Esterase: Moderate / RBC: >50 /HPF / WBC >50   Sq Epi: x / Non Sq Epi: Occasional / Bacteria: Moderate      Magnesium, Serum: 1.8 mg/dL (10-29 @ 07:13)  Phosphorus Level, Serum: 3.5 mg/dL (10-29 @ 07:13)          RADIOLOGY & ADDITIONAL TESTS:   NEPHROLOGY INTERVAL HPI/OVERNIGHT EVENTS:    Date of Service: 10-29-22 @ 12:20    10/29--Resting comfortably.  Denies SOB.  Eating a little better today.  O>>I with 1.6 L UO and 1.1L ostomy output.    HPI:  78 yo wm presented for colon mass for ca   hx of cad, CM with ef 40-45%, off diuretics for 5 months   AF, PPM , htn   COPD with hx of smoking  10/06- s/p total proctocolectomy with J- pouch creation, open diverting loop ileostomy, 2 VINNY drains placed   Surgery complicated by acute hypoxic respiratory failure secondary to L sided pneumonia which improved with NIPPV, septic shock requiring pressors due to fecal spillage peritonitis, and JOSE likely from ATN. Was started on Zosyn. Off pressors 10/9. On 10/10 patient was transfused 2u pRBC after drop in h/h and noted with ?coffee ground content in ileostomy. Improved respiratory status, weaned to supplemental O2 via NC.   10/17 - s/p cystoscopy and urethral catheter placement in OR by urology for urinary retention found to have bladder neck contracture.   10/19 - s/p IR procedure for  LLQ abd drain placement, found to have fluid collection, with 8cc aspirate, culture has been negative. Parenteral nutrition started on 10/17/22.  10/17-21 ICU tx for PNA and NIV at night for his hx of FLORA   tx with zosyn    Patient downgraded from ICU 10/21/22.  10/25 - lasix held   10/26 - no lasix and ivf   10/27 continued rise in scr and renal consult requested   pt with 3L neg fluid balance with po intake " very good" as per pt   no nsaid  no ivc  only zosyn for abx, no vanc given     PAST MEDICAL & SURGICAL HISTORY:  - UC  - recent dx of colon ca s/p resection 10/6  - flora on cpap at home   - htn   - afib   - ppm   - hld  - cpod hx of smoking   - hernia repair  - urethral stricture now with alfaro in place   - b/l tkr     MEDICATIONS  (STANDING):  acetaminophen     Tablet .. 650 milliGRAM(s) Oral every 6 hours  acetaminophen   IVPB .. 1000 milliGRAM(s) IV Intermittent once  apixaban 5 milliGRAM(s) Oral every 12 hours  atorvastatin 20 milliGRAM(s) Oral at bedtime  budesonide 160 MICROgram(s)/formoterol 4.5 MICROgram(s) Inhaler 2 Puff(s) Inhalation two times a day  chlorhexidine 4% Liquid 1 Application(s) Topical <User Schedule>  cholecalciferol 2000 Unit(s) Oral daily  cholestyramine Powder (Sugar-Free) 4 Gram(s) Oral daily  dextrose 5%. 1000 milliLiter(s) (50 mL/Hr) IV Continuous <Continuous>  dextrose 5%. 1000 milliLiter(s) (100 mL/Hr) IV Continuous <Continuous>  dextrose 50% Injectable 25 Gram(s) IV Push once  dextrose 50% Injectable 12.5 Gram(s) IV Push once  dextrose 50% Injectable 25 Gram(s) IV Push once  dextrose Oral Gel 15 Gram(s) Oral once  diphenoxylate/atropine 2 Tablet(s) Oral four times a day  finasteride 5 milliGRAM(s) Oral daily  glucagon  Injectable 1 milliGRAM(s) IntraMuscular once  influenza  Vaccine (HIGH DOSE) 0.7 milliLiter(s) IntraMuscular once  insulin glargine Injectable (LANTUS) 3 Unit(s) SubCutaneous at bedtime  insulin lispro (ADMELOG) corrective regimen sliding scale   SubCutaneous at bedtime  insulin lispro (ADMELOG) corrective regimen sliding scale   SubCutaneous three times a day before meals  insulin lispro Injectable (ADMELOG) 1 Unit(s) SubCutaneous three times a day before meals  iron sucrose Injectable 100 milliGRAM(s) IV Push every 24 hours  loperamide 4 milliGRAM(s) Oral <User Schedule>  melatonin 5 milliGRAM(s) Oral at bedtime  opium Tincture 6 milliGRAM(s) Oral two times a day  pantoprazole    Tablet 40 milliGRAM(s) Oral every 12 hours  piperacillin/tazobactam IVPB.. 3.375 Gram(s) IV Intermittent every 8 hours  predniSONE   Tablet 4 milliGRAM(s) Oral daily  psyllium Powder 1 Packet(s) Oral two times a day  sodium chloride 0.9% lock flush 3 milliLiter(s) IV Push every 8 hours  tamsulosin 0.8 milliGRAM(s) Oral at bedtime  tiotropium 18 MICROgram(s) Capsule 1 Capsule(s) Inhalation daily    MEDICATIONS  (PRN):  acetaminophen     Tablet .. 650 milliGRAM(s) Oral every 6 hours PRN Mild Pain (1 - 3)  ALBUTerol    90 MICROgram(s) HFA Inhaler 2 Puff(s) Inhalation every 6 hours PRN Shortness of Breath and/or Wheezing  albuterol/ipratropium for Nebulization 3 milliLiter(s) Nebulizer every 6 hours PRN Shortness of Breath and/or Wheezing  naloxone Injectable 0.1 milliGRAM(s) IV Push every 3 minutes PRN For ANY of the following changes in patient status:  A. RR LESS THAN 10 breaths per minute, B. Oxygen saturation LESS THAN 90%, C. Sedation score of 6  ondansetron Injectable 4 milliGRAM(s) IV Push every 6 hours PRN Nausea and/or Vomiting  oxybutynin 5 milliGRAM(s) Oral daily PRN Bladder spasms  oxyCODONE    IR 5 milliGRAM(s) Oral every 4 hours PRN Moderate Pain (4 - 6)  oxyCODONE    IR 10 milliGRAM(s) Oral every 4 hours PRN Severe Pain (7 - 10)    Vital Signs Last 24 Hrs  T(C): 36.8 (29 Oct 2022 11:33), Max: 37.2 (29 Oct 2022 10:03)  T(F): 98.3 (29 Oct 2022 11:33), Max: 99 (29 Oct 2022 10:03)  HR: 66 (29 Oct 2022 11:33) (62 - 102)  BP: 87/58 (29 Oct 2022 11:33) (87/58 - 110/70)  BP(mean): --  RR: 18 (29 Oct 2022 11:33) (18 - 18)  SpO2: 99% (29 Oct 2022 11:33) (92% - 99%)    Parameters below as of 29 Oct 2022 11:33  Patient On (Oxygen Delivery Method): room air     Daily Weight in k.9 (29 Oct 2022 05:00)    10-28 @ 07:  -  10-29 @ 07:00  --------------------------------------------------------  IN: 1360 mL / OUT: 2775 mL / NET: -1415 mL    10-29 @ 07:01  -  10-29 @ 12:20  --------------------------------------------------------  IN: 0 mL / OUT: 550 mL / NET: -550 mL    PHYSICAL EXAM:  GENERAL: no distress  CHEST/LUNG: Clear to aus  HEART: S1S2 RRR  ABDOMEN: soft  EXTREMITIES: trace edema  SKIN:     LABS:                        8.5    9.38  )-----------( 164      ( 29 Oct 2022 07:13 )             28.3     10-29    131<L>  |  98  |  29<H>  ----------------------------<  127<H>  4.6   |  26  |  2.56<H>    Ca    8.8      29 Oct 2022 07:13  Phos  3.5     10-29  Mg     1.8     10-29        Urinalysis Basic - ( 28 Oct 2022 23:00 )    Color: Yellow / Appearance: Slightly Turbid / S.010 / pH: x  Gluc: x / Ketone: Negative  / Bili: Negative / Urobili: Negative   Blood: x / Protein: 100 / Nitrite: Negative   Leuk Esterase: Moderate / RBC: >50 /HPF / WBC >50   Sq Epi: x / Non Sq Epi: Occasional / Bacteria: Moderate      Magnesium, Serum: 1.8 mg/dL (10-29 @ 07:13)  Phosphorus Level, Serum: 3.5 mg/dL (10-29 @ 07:13)          RADIOLOGY & ADDITIONAL TESTS:

## 2022-10-30 LAB
ANION GAP SERPL CALC-SCNC: 7 MMOL/L — SIGNIFICANT CHANGE UP (ref 5–17)
APPEARANCE UR: ABNORMAL
BASOPHILS # BLD AUTO: 0.02 K/UL — SIGNIFICANT CHANGE UP (ref 0–0.2)
BASOPHILS NFR BLD AUTO: 0.3 % — SIGNIFICANT CHANGE UP (ref 0–2)
BILIRUB UR-MCNC: NEGATIVE — SIGNIFICANT CHANGE UP
BUN SERPL-MCNC: 33 MG/DL — HIGH (ref 7–23)
CALCIUM SERPL-MCNC: 8.5 MG/DL — SIGNIFICANT CHANGE UP (ref 8.5–10.1)
CHLORIDE SERPL-SCNC: 101 MMOL/L — SIGNIFICANT CHANGE UP (ref 96–108)
CO2 SERPL-SCNC: 23 MMOL/L — SIGNIFICANT CHANGE UP (ref 22–31)
COLOR SPEC: YELLOW — SIGNIFICANT CHANGE UP
CREAT SERPL-MCNC: 2.52 MG/DL — HIGH (ref 0.5–1.3)
DIFF PNL FLD: ABNORMAL
EGFR: 26 ML/MIN/1.73M2 — LOW
EOSINOPHIL # BLD AUTO: 0.04 K/UL — SIGNIFICANT CHANGE UP (ref 0–0.5)
EOSINOPHIL NFR BLD AUTO: 0.6 % — SIGNIFICANT CHANGE UP (ref 0–6)
GLUCOSE SERPL-MCNC: 135 MG/DL — HIGH (ref 70–99)
GLUCOSE UR QL: NEGATIVE — SIGNIFICANT CHANGE UP
HCT VFR BLD CALC: 26.1 % — LOW (ref 39–50)
HGB BLD-MCNC: 7.9 G/DL — LOW (ref 13–17)
IMM GRANULOCYTES NFR BLD AUTO: 0.7 % — SIGNIFICANT CHANGE UP (ref 0–0.9)
KETONES UR-MCNC: NEGATIVE — SIGNIFICANT CHANGE UP
LEUKOCYTE ESTERASE UR-ACNC: ABNORMAL
LYMPHOCYTES # BLD AUTO: 0.92 K/UL — LOW (ref 1–3.3)
LYMPHOCYTES # BLD AUTO: 13.6 % — SIGNIFICANT CHANGE UP (ref 13–44)
MAGNESIUM SERPL-MCNC: 1.8 MG/DL — SIGNIFICANT CHANGE UP (ref 1.6–2.6)
MCHC RBC-ENTMCNC: 26.1 PG — LOW (ref 27–34)
MCHC RBC-ENTMCNC: 30.3 GM/DL — LOW (ref 32–36)
MCV RBC AUTO: 86.1 FL — SIGNIFICANT CHANGE UP (ref 80–100)
MONOCYTES # BLD AUTO: 0.52 K/UL — SIGNIFICANT CHANGE UP (ref 0–0.9)
MONOCYTES NFR BLD AUTO: 7.7 % — SIGNIFICANT CHANGE UP (ref 2–14)
NEUTROPHILS # BLD AUTO: 5.22 K/UL — SIGNIFICANT CHANGE UP (ref 1.8–7.4)
NEUTROPHILS NFR BLD AUTO: 77.1 % — HIGH (ref 43–77)
NITRITE UR-MCNC: NEGATIVE — SIGNIFICANT CHANGE UP
NT-PROBNP SERPL-SCNC: 6358 PG/ML — HIGH (ref 0–450)
PH UR: 5 — SIGNIFICANT CHANGE UP (ref 5–8)
PHOSPHATE SERPL-MCNC: 4 MG/DL — SIGNIFICANT CHANGE UP (ref 2.5–4.5)
PLATELET # BLD AUTO: 155 K/UL — SIGNIFICANT CHANGE UP (ref 150–400)
POTASSIUM SERPL-MCNC: 4.5 MMOL/L — SIGNIFICANT CHANGE UP (ref 3.5–5.3)
POTASSIUM SERPL-SCNC: 4.5 MMOL/L — SIGNIFICANT CHANGE UP (ref 3.5–5.3)
PROT UR-MCNC: 100
RBC # BLD: 3.03 M/UL — LOW (ref 4.2–5.8)
RBC # FLD: 18.1 % — HIGH (ref 10.3–14.5)
SODIUM SERPL-SCNC: 131 MMOL/L — LOW (ref 135–145)
SP GR SPEC: 1.01 — SIGNIFICANT CHANGE UP (ref 1.01–1.02)
UROBILINOGEN FLD QL: NEGATIVE — SIGNIFICANT CHANGE UP
WBC # BLD: 6.77 K/UL — SIGNIFICANT CHANGE UP (ref 3.8–10.5)
WBC # FLD AUTO: 6.77 K/UL — SIGNIFICANT CHANGE UP (ref 3.8–10.5)

## 2022-10-30 PROCEDURE — 99232 SBSQ HOSP IP/OBS MODERATE 35: CPT

## 2022-10-30 RX ORDER — MORPHINE 10 MG/ML
6 SOLUTION ORAL
Qty: 0 | Refills: 0 | DISCHARGE
Start: 2022-10-30

## 2022-10-30 RX ORDER — LACTOBACILLUS ACIDOPHILUS 100MM CELL
1 CAPSULE ORAL
Refills: 0 | Status: DISCONTINUED | OUTPATIENT
Start: 2022-10-30 | End: 2022-11-02

## 2022-10-30 RX ORDER — MORPHINE 10 MG/ML
6 SOLUTION ORAL THREE TIMES A DAY
Refills: 0 | Status: DISCONTINUED | OUTPATIENT
Start: 2022-10-30 | End: 2022-11-02

## 2022-10-30 RX ORDER — SODIUM CHLORIDE 9 MG/ML
1000 INJECTION INTRAMUSCULAR; INTRAVENOUS; SUBCUTANEOUS
Refills: 0 | Status: DISCONTINUED | OUTPATIENT
Start: 2022-10-30 | End: 2022-11-01

## 2022-10-30 RX ADMIN — SODIUM CHLORIDE 3 MILLILITER(S): 9 INJECTION INTRAMUSCULAR; INTRAVENOUS; SUBCUTANEOUS at 22:07

## 2022-10-30 RX ADMIN — Medication 5 MILLIGRAM(S): at 22:31

## 2022-10-30 RX ADMIN — MORPHINE 6 MILLIGRAM(S): 10 SOLUTION ORAL at 22:35

## 2022-10-30 RX ADMIN — SODIUM CHLORIDE 3 MILLILITER(S): 9 INJECTION INTRAMUSCULAR; INTRAVENOUS; SUBCUTANEOUS at 05:40

## 2022-10-30 RX ADMIN — Medication 4 MILLIGRAM(S): at 05:39

## 2022-10-30 RX ADMIN — IRON SUCROSE 100 MILLIGRAM(S): 20 INJECTION, SOLUTION INTRAVENOUS at 10:28

## 2022-10-30 RX ADMIN — TIOTROPIUM BROMIDE 1 CAPSULE(S): 18 CAPSULE ORAL; RESPIRATORY (INHALATION) at 08:18

## 2022-10-30 RX ADMIN — BUDESONIDE AND FORMOTEROL FUMARATE DIHYDRATE 2 PUFF(S): 160; 4.5 AEROSOL RESPIRATORY (INHALATION) at 20:15

## 2022-10-30 RX ADMIN — PANTOPRAZOLE SODIUM 40 MILLIGRAM(S): 20 TABLET, DELAYED RELEASE ORAL at 10:27

## 2022-10-30 RX ADMIN — APIXABAN 5 MILLIGRAM(S): 2.5 TABLET, FILM COATED ORAL at 10:27

## 2022-10-30 RX ADMIN — MORPHINE 6 MILLIGRAM(S): 10 SOLUTION ORAL at 13:08

## 2022-10-30 RX ADMIN — Medication 4 MILLIGRAM(S): at 10:29

## 2022-10-30 RX ADMIN — PANTOPRAZOLE SODIUM 40 MILLIGRAM(S): 20 TABLET, DELAYED RELEASE ORAL at 22:34

## 2022-10-30 RX ADMIN — Medication 2 TABLET(S): at 22:37

## 2022-10-30 RX ADMIN — Medication 1 PACKET(S): at 22:23

## 2022-10-30 RX ADMIN — FINASTERIDE 5 MILLIGRAM(S): 5 TABLET, FILM COATED ORAL at 10:27

## 2022-10-30 RX ADMIN — Medication 4 MILLIGRAM(S): at 10:28

## 2022-10-30 RX ADMIN — Medication 1 PACKET(S): at 10:28

## 2022-10-30 RX ADMIN — SODIUM CHLORIDE 3 MILLILITER(S): 9 INJECTION INTRAMUSCULAR; INTRAVENOUS; SUBCUTANEOUS at 15:08

## 2022-10-30 RX ADMIN — Medication 2 TABLET(S): at 17:27

## 2022-10-30 RX ADMIN — PIPERACILLIN AND TAZOBACTAM 25 GRAM(S): 4; .5 INJECTION, POWDER, LYOPHILIZED, FOR SOLUTION INTRAVENOUS at 13:08

## 2022-10-30 RX ADMIN — PIPERACILLIN AND TAZOBACTAM 25 GRAM(S): 4; .5 INJECTION, POWDER, LYOPHILIZED, FOR SOLUTION INTRAVENOUS at 05:21

## 2022-10-30 RX ADMIN — Medication 1 UNIT(S): at 17:27

## 2022-10-30 RX ADMIN — Medication 4 MILLIGRAM(S): at 17:27

## 2022-10-30 RX ADMIN — Medication 2 TABLET(S): at 05:40

## 2022-10-30 RX ADMIN — APIXABAN 5 MILLIGRAM(S): 2.5 TABLET, FILM COATED ORAL at 22:31

## 2022-10-30 RX ADMIN — INSULIN GLARGINE 3 UNIT(S): 100 INJECTION, SOLUTION SUBCUTANEOUS at 22:24

## 2022-10-30 RX ADMIN — TAMSULOSIN HYDROCHLORIDE 0.8 MILLIGRAM(S): 0.4 CAPSULE ORAL at 22:29

## 2022-10-30 RX ADMIN — Medication 1 UNIT(S): at 08:02

## 2022-10-30 RX ADMIN — BUDESONIDE AND FORMOTEROL FUMARATE DIHYDRATE 2 PUFF(S): 160; 4.5 AEROSOL RESPIRATORY (INHALATION) at 08:19

## 2022-10-30 RX ADMIN — CHOLESTYRAMINE 4 GRAM(S): 4 POWDER, FOR SUSPENSION ORAL at 08:27

## 2022-10-30 RX ADMIN — Medication 1 UNIT(S): at 13:08

## 2022-10-30 RX ADMIN — PIPERACILLIN AND TAZOBACTAM 25 GRAM(S): 4; .5 INJECTION, POWDER, LYOPHILIZED, FOR SOLUTION INTRAVENOUS at 22:34

## 2022-10-30 RX ADMIN — Medication 2000 UNIT(S): at 10:27

## 2022-10-30 RX ADMIN — SODIUM CHLORIDE 75 MILLILITER(S): 9 INJECTION INTRAMUSCULAR; INTRAVENOUS; SUBCUTANEOUS at 05:21

## 2022-10-30 RX ADMIN — ATORVASTATIN CALCIUM 20 MILLIGRAM(S): 80 TABLET, FILM COATED ORAL at 22:30

## 2022-10-30 RX ADMIN — Medication 2 TABLET(S): at 12:19

## 2022-10-30 RX ADMIN — Medication 4 MILLIGRAM(S): at 22:23

## 2022-10-30 RX ADMIN — SODIUM CHLORIDE 100 MILLILITER(S): 9 INJECTION INTRAMUSCULAR; INTRAVENOUS; SUBCUTANEOUS at 17:25

## 2022-10-30 NOTE — PROGRESS NOTE ADULT - ATTENDING COMMENTS
Patient without significant complaints.  Ostomy with 1500cc output over last 24 hours.  Denies pain, N/V.  Tolerating diet.  On exam abdomen is soft, nondistended, minimally appropriately tender near incision without rebound/guarding.  Labs - electrolytes stable from yesterday, Cr still elevated at 2.52 from 2.56 yesterday.  Adequate UOP.      -- Appreciate input from Medicine and Nephrology teams  -- Will increase tincture of opium dose to TID - he is already on maximal doses of other antidiarrheals.  Administer with meals.  -- Multimodal pain control.  No nephrotoxic medications.  -- Encourage ambulation  -- PT re-evaluation  -- Eliquis  -- Ileostomy output still slightly too high and renal function not yet adequate for discharge.

## 2022-10-30 NOTE — PROGRESS NOTE ADULT - SUBJECTIVE AND OBJECTIVE BOX
Pt seen at bedside, resting comfortably.   Pt denies nausea, vomiting, fever, chills or pain.  No acute events overnight.     Vitals:  T(C): 36.4 (10-30 @ 05:51), Max: 37.2 (10-29 @ 10:03)  HR: 86 (10-30 @ 05:51) (66 - 102)  BP: 110/57 (10-30 @ 05:51) (87/58 - 110/57)  RR: 20 (10-30 @ 05:51) (18 - 20)  SpO2: 94% (10-30 @ 05:51) (92% - 99%)    10-29 @ 07:01  -  10-30 @ 07:00  --------------------------------------------------------  IN:    sodium chloride 0.9%: 1000 mL  Total IN: 1000 mL    OUT:    Ileostomy (mL): 1500 mL    Indwelling Catheter - Urethral (mL): 2050 mL  Total OUT: 3550 mL    Total NET: -2550 mL    Physical Exam:  General: AAOx3, Well developed, NAD  Chest: Normal respiratory effort  Heart: RRR  Abdomen: Soft, non tender, ostomy patent with thicker content, midline incision clean and dry, staples in place  Neuro/Psych: No localized deficits. Normal spech, normal tone  Skin: Normal, no rashes, no lesions noted.   Extremities: Warm, well perfused, no edema, Pulses intact    10-29 @ 07:13                    8.5  CBC: 9.38>)-------(<164                     28.3                 131 | 98 | 29    CMP:  ----------------------< 127               4.6 | 26 | 2.56                      Ca:8.8  Phos:3.5  M.8               -|      |-        LFTs:  ------|-|-----             -|      |-      Current Inpatient Medications:  acetaminophen     Tablet .. 650 milliGRAM(s) Oral every 6 hours PRN  acetaminophen     Tablet .. 650 milliGRAM(s) Oral every 6 hours  acetaminophen   IVPB .. 1000 milliGRAM(s) IV Intermittent once  ALBUTerol    90 MICROgram(s) HFA Inhaler 2 Puff(s) Inhalation every 6 hours PRN  albuterol/ipratropium for Nebulization 3 milliLiter(s) Nebulizer every 6 hours PRN  apixaban 5 milliGRAM(s) Oral every 12 hours  atorvastatin 20 milliGRAM(s) Oral at bedtime  budesonide 160 MICROgram(s)/formoterol 4.5 MICROgram(s) Inhaler 2 Puff(s) Inhalation two times a day  chlorhexidine 4% Liquid 1 Application(s) Topical <User Schedule>  cholecalciferol 2000 Unit(s) Oral daily  cholestyramine Powder (Sugar-Free) 4 Gram(s) Oral daily  dextrose 5%. 1000 milliLiter(s) (50 mL/Hr) IV Continuous <Continuous>  dextrose 5%. 1000 milliLiter(s) (100 mL/Hr) IV Continuous <Continuous>  dextrose 50% Injectable 25 Gram(s) IV Push once  dextrose 50% Injectable 12.5 Gram(s) IV Push once  dextrose 50% Injectable 25 Gram(s) IV Push once  dextrose Oral Gel 15 Gram(s) Oral once  diphenoxylate/atropine 2 Tablet(s) Oral four times a day  finasteride 5 milliGRAM(s) Oral daily  glucagon  Injectable 1 milliGRAM(s) IntraMuscular once  influenza  Vaccine (HIGH DOSE) 0.7 milliLiter(s) IntraMuscular once  insulin glargine Injectable (LANTUS) 3 Unit(s) SubCutaneous at bedtime  insulin lispro (ADMELOG) corrective regimen sliding scale   SubCutaneous at bedtime  insulin lispro (ADMELOG) corrective regimen sliding scale   SubCutaneous three times a day before meals  insulin lispro Injectable (ADMELOG) 1 Unit(s) SubCutaneous three times a day before meals  iron sucrose Injectable 100 milliGRAM(s) IV Push every 24 hours  loperamide 4 milliGRAM(s) Oral <User Schedule>  melatonin 5 milliGRAM(s) Oral at bedtime  naloxone Injectable 0.1 milliGRAM(s) IV Push every 3 minutes PRN  ondansetron Injectable 4 milliGRAM(s) IV Push every 6 hours PRN  opium Tincture 6 milliGRAM(s) Oral two times a day  oxybutynin 5 milliGRAM(s) Oral daily PRN  oxyCODONE    IR 5 milliGRAM(s) Oral every 4 hours PRN  oxyCODONE    IR 10 milliGRAM(s) Oral every 4 hours PRN  pantoprazole    Tablet 40 milliGRAM(s) Oral every 12 hours  piperacillin/tazobactam IVPB.. 3.375 Gram(s) IV Intermittent every 8 hours  predniSONE   Tablet 4 milliGRAM(s) Oral daily  psyllium Powder 1 Packet(s) Oral two times a day  sodium chloride 0.9% lock flush 3 milliLiter(s) IV Push every 8 hours  sodium chloride 0.9%. 1000 milliLiter(s) (75 mL/Hr) IV Continuous <Continuous>  tamsulosin 0.8 milliGRAM(s) Oral at bedtime  tiotropium 18 MICROgram(s) Capsule 1 Capsule(s) Inhalation daily

## 2022-10-30 NOTE — PROGRESS NOTE ADULT - SUBJECTIVE AND OBJECTIVE BOX
NEPHROLOGY INTERVAL HPI/OVERNIGHT EVENTS:    Date of Service: 10-30-22 @ 12:21    10/30--  10/29--Resting comfortably.  Denies SOB.  Eating a little better today.  O>>I with 1.6 L UO and 1.1L ostomy output.    HPI:  78 yo wm presented for colon mass for ca   hx of cad, CM with ef 40-45%, off diuretics for 5 months   AF, PPM , htn   COPD with hx of smoking  10/06- s/p total proctocolectomy with J- pouch creation, open diverting loop ileostomy, 2 VINNY drains placed   Surgery complicated by acute hypoxic respiratory failure secondary to L sided pneumonia which improved with NIPPV, septic shock requiring pressors due to fecal spillage peritonitis, and JOSE likely from ATN. Was started on Zosyn. Off pressors 10/9. On 10/10 patient was transfused 2u pRBC after drop in h/h and noted with ?coffee ground content in ileostomy. Improved respiratory status, weaned to supplemental O2 via NC.   10/17 - s/p cystoscopy and urethral catheter placement in OR by urology for urinary retention found to have bladder neck contracture.   10/19 - s/p IR procedure for  LLQ abd drain placement, found to have fluid collection, with 8cc aspirate, culture has been negative. Parenteral nutrition started on 10/17/22.  10/17-21 ICU tx for PNA and NIV at night for his hx of FLORA   tx with zosyn    Patient downgraded from ICU 10/21/22.  10/25 - lasix held   10/26 - no lasix and ivf   10/27 continued rise in scr and renal consult requested   pt with 3L neg fluid balance with po intake " very good" as per pt   no nsaid  no ivc  only zosyn for abx, no vanc given     PAST MEDICAL & SURGICAL HISTORY:  - UC  - recent dx of colon ca s/p resection 10/6  - flora on cpap at home   - htn   - afib   - ppm   - hld  - cpod hx of smoking   - hernia repair  - urethral stricture now with alfaro in place   - b/l tkr     MEDICATIONS  (STANDING):  acetaminophen     Tablet .. 650 milliGRAM(s) Oral every 6 hours  acetaminophen   IVPB .. 1000 milliGRAM(s) IV Intermittent once  apixaban 5 milliGRAM(s) Oral every 12 hours  atorvastatin 20 milliGRAM(s) Oral at bedtime  budesonide 160 MICROgram(s)/formoterol 4.5 MICROgram(s) Inhaler 2 Puff(s) Inhalation two times a day  chlorhexidine 4% Liquid 1 Application(s) Topical <User Schedule>  cholecalciferol 2000 Unit(s) Oral daily  cholestyramine Powder (Sugar-Free) 4 Gram(s) Oral daily  dextrose 5%. 1000 milliLiter(s) (50 mL/Hr) IV Continuous <Continuous>  dextrose 5%. 1000 milliLiter(s) (100 mL/Hr) IV Continuous <Continuous>  dextrose 50% Injectable 25 Gram(s) IV Push once  dextrose 50% Injectable 25 Gram(s) IV Push once  dextrose 50% Injectable 12.5 Gram(s) IV Push once  dextrose Oral Gel 15 Gram(s) Oral once  diphenoxylate/atropine 2 Tablet(s) Oral four times a day  finasteride 5 milliGRAM(s) Oral daily  glucagon  Injectable 1 milliGRAM(s) IntraMuscular once  influenza  Vaccine (HIGH DOSE) 0.7 milliLiter(s) IntraMuscular once  insulin glargine Injectable (LANTUS) 3 Unit(s) SubCutaneous at bedtime  insulin lispro (ADMELOG) corrective regimen sliding scale   SubCutaneous at bedtime  insulin lispro (ADMELOG) corrective regimen sliding scale   SubCutaneous three times a day before meals  insulin lispro Injectable (ADMELOG) 1 Unit(s) SubCutaneous three times a day before meals  loperamide 4 milliGRAM(s) Oral <User Schedule>  melatonin 5 milliGRAM(s) Oral at bedtime  opium Tincture 6 milliGRAM(s) Oral three times a day  pantoprazole    Tablet 40 milliGRAM(s) Oral every 12 hours  piperacillin/tazobactam IVPB.. 3.375 Gram(s) IV Intermittent every 8 hours  predniSONE   Tablet 4 milliGRAM(s) Oral daily  psyllium Powder 1 Packet(s) Oral two times a day  sodium chloride 0.9% lock flush 3 milliLiter(s) IV Push every 8 hours  sodium chloride 0.9%. 1000 milliLiter(s) (75 mL/Hr) IV Continuous <Continuous>  tamsulosin 0.8 milliGRAM(s) Oral at bedtime  tiotropium 18 MICROgram(s) Capsule 1 Capsule(s) Inhalation daily    MEDICATIONS  (PRN):  acetaminophen     Tablet .. 650 milliGRAM(s) Oral every 6 hours PRN Mild Pain (1 - 3)  ALBUTerol    90 MICROgram(s) HFA Inhaler 2 Puff(s) Inhalation every 6 hours PRN Shortness of Breath and/or Wheezing  albuterol/ipratropium for Nebulization 3 milliLiter(s) Nebulizer every 6 hours PRN Shortness of Breath and/or Wheezing  naloxone Injectable 0.1 milliGRAM(s) IV Push every 3 minutes PRN For ANY of the following changes in patient status:  A. RR LESS THAN 10 breaths per minute, B. Oxygen saturation LESS THAN 90%, C. Sedation score of 6  ondansetron Injectable 4 milliGRAM(s) IV Push every 6 hours PRN Nausea and/or Vomiting  oxybutynin 5 milliGRAM(s) Oral daily PRN Bladder spasms  oxyCODONE    IR 5 milliGRAM(s) Oral every 4 hours PRN Moderate Pain (4 - 6)  oxyCODONE    IR 10 milliGRAM(s) Oral every 4 hours PRN Severe Pain (7 - 10)    Vital Signs Last 24 Hrs  T(C): 36.4 (30 Oct 2022 08:55), Max: 37 (29 Oct 2022 16:12)  T(F): 97.5 (30 Oct 2022 08:55), Max: 98.6 (29 Oct 2022 16:12)  HR: 63 (30 Oct 2022 08:55) (63 - 86)  BP: 94/62 (30 Oct 2022 08:55) (94/62 - 110/57)  BP(mean): --  RR: 18 (30 Oct 2022 08:55) (18 - 20)  SpO2: 98% (30 Oct 2022 08:55) (94% - 99%)    Parameters below as of 30 Oct 2022 08:55  Patient On (Oxygen Delivery Method): nasal cannula  O2 Flow (L/min): 3    Daily     Daily Weight in k.1 (30 Oct 2022 05:44)    10-29 @ 07:01  -  10-30 @ 07:00  --------------------------------------------------------  IN: 1000 mL / OUT: 3550 mL / NET: -2550 mL    10-30 @ 07:01  -  10-30 @ 12:21  --------------------------------------------------------  IN: 0 mL / OUT: 400 mL / NET: -400 mL    PHYSICAL EXAM:  GENERAL:   CHEST/LUNG:   HEART:   ABDOMEN:   EXTREMITIES:   SKIN:     LABS:                        7.9    6.77  )-----------( 155      ( 30 Oct 2022 07:46 )             26.1     10-30    131<L>  |  101  |  33<H>  ----------------------------<  135<H>  4.5   |  23  |  2.52<H>    Ca    8.5      30 Oct 2022 07:46  Phos  4.0     10-30  Mg     1.8     10-30        Urinalysis Basic - ( 30 Oct 2022 02:50 )    Color: Yellow / Appearance: Slightly Turbid / S.010 / pH: x  Gluc: x / Ketone: Negative  / Bili: Negative / Urobili: Negative   Blood: x / Protein: 100 / Nitrite: Negative   Leuk Esterase: Moderate / RBC: 6-10 /HPF / WBC 11-25   Sq Epi: x / Non Sq Epi: Occasional / Bacteria: Moderate      Magnesium, Serum: 1.8 mg/dL (10-30 @ 07:46)  Phosphorus Level, Serum: 4.0 mg/dL (10-30 @ 07:46)          RADIOLOGY & ADDITIONAL TESTS:   NEPHROLOGY INTERVAL HPI/OVERNIGHT EVENTS:    Date of Service: 10-30-22 @ 12:21    10/30--Feeling well.  Reports increased po intake.  UO increased to 2 L ostomy 1.5L  10/29--Resting comfortably.  Denies SOB.  Eating a little better today.  O>>I with 1.6 L UO and 1.1L ostomy output.    HPI:  78 yo wm presented for colon mass for ca   hx of cad, CM with ef 40-45%, off diuretics for 5 months   AF, PPM , htn   COPD with hx of smoking  10/06- s/p total proctocolectomy with J- pouch creation, open diverting loop ileostomy, 2 VINNY drains placed   Surgery complicated by acute hypoxic respiratory failure secondary to L sided pneumonia which improved with NIPPV, septic shock requiring pressors due to fecal spillage peritonitis, and JOSE likely from ATN. Was started on Zosyn. Off pressors 10/9. On 10/10 patient was transfused 2u pRBC after drop in h/h and noted with ?coffee ground content in ileostomy. Improved respiratory status, weaned to supplemental O2 via NC.   10/17 - s/p cystoscopy and urethral catheter placement in OR by urology for urinary retention found to have bladder neck contracture.   10/19 - s/p IR procedure for  LLQ abd drain placement, found to have fluid collection, with 8cc aspirate, culture has been negative. Parenteral nutrition started on 10/17/22.  10/17-21 ICU tx for PNA and NIV at night for his hx of FLORA   tx with zosyn    Patient downgraded from ICU 10/21/22.  10/25 - lasix held   10/26 - no lasix and ivf   10/27 continued rise in scr and renal consult requested   pt with 3L neg fluid balance with po intake " very good" as per pt   no nsaid  no ivc  only zosyn for abx, no vanc given     PAST MEDICAL & SURGICAL HISTORY:  - UC  - recent dx of colon ca s/p resection 10/6  - flora on cpap at home   - htn   - afib   - ppm   - hld  - cpod hx of smoking   - hernia repair  - urethral stricture now with alfaro in place   - b/l tkr     MEDICATIONS  (STANDING):  acetaminophen     Tablet .. 650 milliGRAM(s) Oral every 6 hours  acetaminophen   IVPB .. 1000 milliGRAM(s) IV Intermittent once  apixaban 5 milliGRAM(s) Oral every 12 hours  atorvastatin 20 milliGRAM(s) Oral at bedtime  budesonide 160 MICROgram(s)/formoterol 4.5 MICROgram(s) Inhaler 2 Puff(s) Inhalation two times a day  chlorhexidine 4% Liquid 1 Application(s) Topical <User Schedule>  cholecalciferol 2000 Unit(s) Oral daily  cholestyramine Powder (Sugar-Free) 4 Gram(s) Oral daily  dextrose 5%. 1000 milliLiter(s) (50 mL/Hr) IV Continuous <Continuous>  dextrose 5%. 1000 milliLiter(s) (100 mL/Hr) IV Continuous <Continuous>  dextrose 50% Injectable 25 Gram(s) IV Push once  dextrose 50% Injectable 25 Gram(s) IV Push once  dextrose 50% Injectable 12.5 Gram(s) IV Push once  dextrose Oral Gel 15 Gram(s) Oral once  diphenoxylate/atropine 2 Tablet(s) Oral four times a day  finasteride 5 milliGRAM(s) Oral daily  glucagon  Injectable 1 milliGRAM(s) IntraMuscular once  influenza  Vaccine (HIGH DOSE) 0.7 milliLiter(s) IntraMuscular once  insulin glargine Injectable (LANTUS) 3 Unit(s) SubCutaneous at bedtime  insulin lispro (ADMELOG) corrective regimen sliding scale   SubCutaneous at bedtime  insulin lispro (ADMELOG) corrective regimen sliding scale   SubCutaneous three times a day before meals  insulin lispro Injectable (ADMELOG) 1 Unit(s) SubCutaneous three times a day before meals  loperamide 4 milliGRAM(s) Oral <User Schedule>  melatonin 5 milliGRAM(s) Oral at bedtime  opium Tincture 6 milliGRAM(s) Oral three times a day  pantoprazole    Tablet 40 milliGRAM(s) Oral every 12 hours  piperacillin/tazobactam IVPB.. 3.375 Gram(s) IV Intermittent every 8 hours  predniSONE   Tablet 4 milliGRAM(s) Oral daily  psyllium Powder 1 Packet(s) Oral two times a day  sodium chloride 0.9% lock flush 3 milliLiter(s) IV Push every 8 hours  sodium chloride 0.9%. 1000 milliLiter(s) (75 mL/Hr) IV Continuous <Continuous>  tamsulosin 0.8 milliGRAM(s) Oral at bedtime  tiotropium 18 MICROgram(s) Capsule 1 Capsule(s) Inhalation daily    MEDICATIONS  (PRN):  acetaminophen     Tablet .. 650 milliGRAM(s) Oral every 6 hours PRN Mild Pain (1 - 3)  ALBUTerol    90 MICROgram(s) HFA Inhaler 2 Puff(s) Inhalation every 6 hours PRN Shortness of Breath and/or Wheezing  albuterol/ipratropium for Nebulization 3 milliLiter(s) Nebulizer every 6 hours PRN Shortness of Breath and/or Wheezing  naloxone Injectable 0.1 milliGRAM(s) IV Push every 3 minutes PRN For ANY of the following changes in patient status:  A. RR LESS THAN 10 breaths per minute, B. Oxygen saturation LESS THAN 90%, C. Sedation score of 6  ondansetron Injectable 4 milliGRAM(s) IV Push every 6 hours PRN Nausea and/or Vomiting  oxybutynin 5 milliGRAM(s) Oral daily PRN Bladder spasms  oxyCODONE    IR 5 milliGRAM(s) Oral every 4 hours PRN Moderate Pain (4 - 6)  oxyCODONE    IR 10 milliGRAM(s) Oral every 4 hours PRN Severe Pain (7 - 10)    Vital Signs Last 24 Hrs  T(C): 36.4 (30 Oct 2022 08:55), Max: 37 (29 Oct 2022 16:12)  T(F): 97.5 (30 Oct 2022 08:55), Max: 98.6 (29 Oct 2022 16:12)  HR: 63 (30 Oct 2022 08:55) (63 - 86)  BP: 94/62 (30 Oct 2022 08:55) (94/62 - 110/57)  BP(mean): --  RR: 18 (30 Oct 2022 08:55) (18 - 20)  SpO2: 98% (30 Oct 2022 08:55) (94% - 99%)    Parameters below as of 30 Oct 2022 08:55  Patient On (Oxygen Delivery Method): nasal cannula  O2 Flow (L/min): 3    Daily     Daily Weight in k.1 (30 Oct 2022 05:44)    10-29 @ 07:01  -  10-30 @ 07:00  --------------------------------------------------------  IN: 1000 mL / OUT: 3550 mL / NET: -2550 mL    10-30 @ 07:01  -  10-30 @ 12:21  --------------------------------------------------------  IN: 0 mL / OUT: 400 mL / NET: -400 mL    PHYSICAL EXAM:  GENERAL: Comfortable  CHEST/LUNG: Fair air entry  HEART: S1S2 RRR  ABDOMEN: soft  EXTREMITIES: trace edema  SKIN:     LABS:                        7.9    6.77  )-----------( 155      ( 30 Oct 2022 07:46 )             26.1     10-30    131<L>  |  101  |  33<H>  ----------------------------<  135<H>  4.5   |  23  |  2.52<H>    Ca    8.5      30 Oct 2022 07:46  Phos  4.0     10-30  Mg     1.8     10-30        Urinalysis Basic - ( 30 Oct 2022 02:50 )    Color: Yellow / Appearance: Slightly Turbid / S.010 / pH: x  Gluc: x / Ketone: Negative  / Bili: Negative / Urobili: Negative   Blood: x / Protein: 100 / Nitrite: Negative   Leuk Esterase: Moderate / RBC: 6-10 /HPF / WBC 11-25   Sq Epi: x / Non Sq Epi: Occasional / Bacteria: Moderate      Magnesium, Serum: 1.8 mg/dL (10-30 @ 07:46)  Phosphorus Level, Serum: 4.0 mg/dL (10-30 @ 07:46)          RADIOLOGY & ADDITIONAL TESTS:

## 2022-10-30 NOTE — PROGRESS NOTE ADULT - ASSESSMENT
76 yo male with CAD, ICM with EF 40-45%  h/o AF, PPM, HTN, UC, newly Dx colon CA, HTN, COPD (long smoking Hx), obesity and hyperlipidemia found to have a colon mass and CA and admitted on 10/06/22  for resection.   10/06- s/p total proctocolectomy with J- pouch creation, open diverting loop ileostomy, 2 VINNY drains placed   Surgery complicated by acute hypoxic respiratory failure secondary to L sided pneumonia which improved with NIPPV, septic shock requiring pressors due to fecal spillage peritonitis, and JOSE likely from ATN. Was started on Zosyn. Off pressors 10/9. On 10/10 patient was transfused 2u pRBC after drop in h/h and noted with ?coffee ground content in ileostomy. Improved respiratory status, weaned to supplemental O2 via NC.   10/17 - s/p cystoscopy and urethral catheter placement in OR by urology for urinary retention found to have bladder neck contracture.   10/19 - s/p IR procedure for  LLQ abd drain placement, found to have fluid collection, with 8cc aspirate, culture has been negative. Parenteral nutrition started on 10/17/22. Patient downgraded from ICU 10/21/22. Hospitalist consulted for continued medical management.   10/24 - started on IV lasix for CHF     Colon mass consistent with adenocarcinoma  -S/P RCP-IPAA (restorative proctocolectomy with ileal pouch anal anastomosis), diverting loop ileostomy 10/6  - diet and pain management as per surgery team  - pathology reports noted - adenocarcinoma   - consider oncology consult for further management and monitoring of cancer     Acute hypoxic respiratory failure , multifactorial   - HCAP PNA, probable gram negative rods, septic shock , Fecal spillage peritonitis with abdominal fluids collections s/p drainage   Acute on chronic systolic HF , resolved   -Off pressors, given stress dose steroids, now tapering, on prednisone 4mg daily   -c/w zosyn  for PNA and intraabdominal collections, consider ID consult   -S/p IR drainage of fluid collection on 10/19, actually restarted zosyn 10/20, (cultures have been negative >48hrs)  -s/p  PPN 10/17 - 10/22   - tolerating diet   - s/p IV lasix 10/24-10/26   - 1500 cc fluids restriction, daily weights   - cardiology consult noted optimize meds - ACEI and BB as tolerated low Na diet   suspect drop in CHF In the setting of septic shock but will need repeat ECHO in 6 weeks   - CXR 10/27 -decreased congestion, less atelectasis  - 10/30 - reconsult ID - budding yeast in UA , can not send the culture s/p Alfaro , ? need for fluconasole, add probiotics    JOSE with baseline Cr 1.1   Urinary retention s/p Alfaro 10/17/22   - off iv fluids  - will hold lasix for now   - Creatinine Trend: 2.5<--2.32<--, 1.61<--, 1.57<--, 1.33<--, 1.15<--, 1.11<--  -Continue tamsulosin 0.8mg qhs, finasteride 5mg daily   -Urology consult appreciated: d/c home with alfaro's    Hypomagnesemia, resolved  - replace    Vitamin D deficiency - replace    COPD now on O2   -Continue Prednisone 4mg daily, Symbicort, Spiriva Albuterol HFA PRN   - before dc check Pulse Ox on ambulation to see how hes doing and ensure safety while in Rehab      Normocytic Anemia, multifactorial   - s/p 4 units PRBC 10/9, 10/10 , 10/12, 10/24,   - Monitor closely while on Eliquis   - check iron studies, B12, folate    HTN  Chronic  AFIB s/p PPM  HLD  -Continue Eliquis 5mg H  -Continue Lipitor 20mg qhs  -Off antihypertensives here, bp stable  - was On amlodipine 2.5mg, Metoprolol ER 25mg daily       VTE prophylaxis  -On eliquis     Dispo: Pulse Ox on ambulation before dc to rehab to ensure a safe discharge , as per surgery

## 2022-10-30 NOTE — PROGRESS NOTE ADULT - ASSESSMENT
76 yo wm presented for colon mass for ca s/p total proctocolonscopy   hosptal course complicated with Left pna tx with zosyn   and abdominal fluid collection with neg cx   urinary retention with alfaro in place   hx of systolic chf off lasix PTA for 6 months, diuresed with lasix iv in ICU  now with JOSE in setting of inc ostomy outpt and neg fluid balance    PLAN   - agree with dc of iv lasix and will hold   - alfaro in place and draining dark urine   - UA with micr  - r/o AIN while on zosyn with urine eos   - fu trend of scr off diuretics, prn ivf as needed   - ostomy outpt improving with more pasty output  cw lomotil and opium     10/29 SY  --JOSE : creat slightly stabilized off IV Lasix.   I>>O due to ostomy output.    Gentle hydration to stabilize renal function as well as serum sodium.  --Monitor ostomy output.    10/30 SY  --JOSE : creat remains elevated with increased urine and ostomy output.      BP borderline.  Increase IVF for now  --Urine with budding yeast--check urine culture  --Unable to clearly rule in or out interstitial nephritis : continue

## 2022-10-30 NOTE — PROGRESS NOTE ADULT - ASSESSMENT
Patient is S/p proctocolectomy, IPAA, DLI for UC/Sigmoid CA, IR drain for collection. S/p cystoscopy w/ stent placement and [place] tip Zhao.   High ileostomy output  JOSE    Plan:    Pain control  c/w regular diet  Continue Eliquis  appreciate cards recc  appreciate nephrology reccs:    - r/o AIN while on zosyn with urine eos   - fu trend of scr off diuretics, prn ivf as needed     appreciate hospitalist reccs  Monitor ileostomy output, decreased - on loperamide, metamucil, lomotil, cholestyramine, tincture of opium  trend labs, monitor Cr  Appreciate  reccs - pt to be DC'd with leg bag  Dispo PHILLIP vs PT  CM for discharge planning when pt is medically stable  encourage OOB/ambulation    Plan discussed with Dr. Tatum

## 2022-10-30 NOTE — PROGRESS NOTE ADULT - SUBJECTIVE AND OBJECTIVE BOX
cc - dyspnea             78 yo male with CAD, ICM with EF 40-45%  h/o AF, PPM, HTN, UC, newly Dx colon CA, HTN, COPD (long smoking Hx), obesity and hyperlipidemia found to have a colon mass and CA and admitted on 10/06/22  for resection.   10/06- s/p total proctocolectomy with J- pouch creation, open diverting loop ileostomy, 2 VINNY drains placed   Surgery complicated by acute hypoxic respiratory failure secondary to L sided pneumonia which improved with NIPPV, septic shock requiring pressors due to fecal spillage peritonitis, and JOSE likely from ATN. Was started on Zosyn. Off pressors 10/9. On 10/10 patient was transfused 2u pRBC after drop in h/h and noted with ?coffee ground content in ileostomy. Improved respiratory status, weaned to supplemental O2 via NC.   10/17 - s/p cystoscopy and urethral catheter placement in OR by urology for urinary retention found to have bladder neck contracture.   10/19 - s/p IR procedure for  LLQ abd drain placement, found to have fluid collection, with 8cc aspirate, culture has been negative. Parenteral nutrition started on 10/17/22. Patient downgraded from ICU 10/21/22. Hospitalist consulted for continued medical management.   10/25 - no cp palps sob abdo pain, got PRBCs yesterday no lasix; Cr trending up today, BNP slightly elevated   10/26 - no cp palps sob abdo pain, able to lie flat in bed but BNP rising and he got IVFs yesterday, CR worse     10/27 - chart reviewed. pt seen and examined, denies cp, dyspnea, abdominal pain, on O2 , afebrile, plan discussed  10/28 - pt seen and examined, denies cp, dyspnea, + alfaro draining dark urine, encouraged po liquid intake, afebrile, plan discussed  10/29 - no events overnight, breathing stable ,denies cp, dyspnea, abdominal pain, was drinking a lot of water yesterday , afebrile  10/30 - no events, denies chest pain, dyspnea , tolerating po intake, wants to go home, afebrile, plan     Review of system- Rest of the review of system are negative except mentioned in HPI    Vital sings reviewed for last 24 h  T(C): 36.3 (10-30-22 @ 15:18), Max: 36.8 (10-29-22 @ 21:02)  T(F): 97.4 (10-30-22 @ 15:18), Max: 98.2 (10-29-22 @ 21:02)  HR: 81 (10-30-22 @ 15:18) (63 - 86)  BP: 120/56 (10-30-22 @ 15:18) (94/62 - 120/56)  RR: 18 (10-30-22 @ 15:18) (18 - 20)  SpO2: 100% (10-30-22 @ 15:18) (94% - 100%)  Wt(kg): --  Daily     Daily Weight in k.1 (30 Oct 2022 05:44)  CAPILLARY BLOOD GLUCOSE      POCT Blood Glucose.: 144 mg/dL (30 Oct 2022 17:11)  POCT Blood Glucose.: 147 mg/dL (30 Oct 2022 13:01)  POCT Blood Glucose.: 139 mg/dL (30 Oct 2022 07:52)  POCT Blood Glucose.: 140 mg/dL (29 Oct 2022 21:44)      Home Oxygen Evaluation:  Pulse ox (SpO2) on room air at rest:	94 %  Pulse ox (SpO2) on room air with exertion:	88 %  Pulse ox (SpO2) on	3 L/min  O2 with exertion:	95 %        Physical exam :       GENERAL: NAD, lying in bed comfortably with NC on   HEAD:  Atraumatic, Normocephalic  EYES: conjunctiva and sclera clear  ENT: Moist mucous membranes  NECK: Supple, No JVD  CHEST/LUNG: few bibasilar crackles   HEART: Regular rate and rhythm; No murmurs, rubs, or gallops  ABDOMEN: Bowel sounds present; Soft, Nontender, + ileostomy, midline incision w/ staples in place, C/D/I, no surrounding erythema , + Alfaro   EXTREMITIES:  2+ Peripheral Pulses, brisk capillary refill. No clubbing, cyanosis, or edema  NERVOUS SYSTEM:  Alert & Oriented X3, speech clear. No deficits   MSK: FROM all 4 extremities, full and equal strength    LABS: All Labs Reviewed:  10-30    131<L>  |  101  |  33<H>  ----------------------------<  135<H>  4.5   |  23  |  2.52<H>    Ca    8.5      30 Oct 2022 07:46  Phos  4.0     10-30  Mg     1.8     10-30                          7.9    6.77  )-----------( 155      ( 30 Oct 2022 07:46 )             26.1   Urinalysis (10.30.22 @ 02:50)    pH Urine: 5.0    Glucose Qualitative, Urine: Negative    Blood, Urine: Large    Color: Yellow    Urine Appearance: Slightly Turbid    Bilirubin: Negative    Ketone - Urine: Negative    Specific Gravity: 1.010    Protein, Urine: 100    Urobilinogen: Negative    Nitrite: Negative    Leukocyte Esterase Concentration: Moderate  Urine Microscopic-Add On (NC) (10.30.22 @ 02:50)    Red Blood Cell - Urine: 6-10 /HPF    White Blood Cell - Urine: 11-25    Bacteria: Moderate    Comment - Urine: buddibg yeast    Epithelial Cells: Occasional          Radiology reviewed :     US Kidney and Bladder (10.28.22 @ 10:26) >  IMPRESSION:  Mild left hydronephrosis appears similar to prior CT.    Two, nonshadowing intrarenal echogenic foci at the interpolar region of   the right kidney measuring 9 mm and 5 mm, both likely representing   nonobstructing stones versus artifact. No right-sided hydronephrosis.       Xray Chest 1 View- PORTABLE-Routine (Xray Chest 1 View- PORTABLE-Routine in AM.) (10.27.22 @ 10:30) >    Frontal expiratory view of the chest shows the heart to be similar in   size. Left cardiac pacemaker is again noted.    The lungs show less pulmonary congestion and less left base atelectasis.   There is no evidence of pneumothorax nor pleural effusion.    IMPRESSION:  Decreased congestion.    < end of copied text >        Xray Chest 1 View- PORTABLE-Routine (Xray Chest 1 View- PORTABLE-Routine in AM.) (10.26.22 @ 08:09) >  PULMONARY: Mild bilateral perihilar diffuse airspace disease..   No pneumothorax.    HEART/VASCULAR: The  heart is enlarged in transverse diameter. Cardiac   device wire leads are within right atrium and right ventricle.     BONES: Visualized osseous structures are intact.    IMPRESSION: Mild bilateral perihilar diffuse airspace disease.  -------------------------------------------  FOLLOW-UP AP PORTABLE CHEST RADIOGRAPH 10/26/2022 AT 8:03 AM:    INDICATION: Congestive heart failure.    FINDINGS: No interval change.     Xray Chest 1 View- PORTABLE-Routine (Xray Chest 1 View- PORTABLE-Routine in AM.) (10.25.22 @ 07:38) >  IMPRESSION: Mild bilateral perihilar diffuse airspace disease.    TTE Echo Complete w/ Contrast w/ Doppler (10.07.22 @ 10:28) >   Summary     Moderate mitral annular calcification is present.   The mitral valve leaflets appear thickened.   EA reversal of the mitral inflow consistent with reduced compliance of   the   left ventricle.   The aortic valve appears mildly calcified. Valve opening seems to be   restricted.   Peak and mean transaortic gradients are 25 and 14 mmHg respectively; this   finding is consistent with mild aortic stenosis.   The tricuspid valve leaflets appear mildly thickened and/or calcified,   but  open well.   Moderate (2+) tricuspid valve regurgitation is present.   Mild pulmonary hypertension.   Mild concentric left ventricular hypertrophy is present.   Left ventricle systolic function moderately decreased paradoxical septal   motion ,apical wall , anterior wall , apical lateral hypokinesis in the   presence of a cardiac arrhythmia.   Lumason was used to better define the endocardial border.   Visual estimation of left ventricle ejection fraction is 40-45 %.   Normal appearing right atrium.   A device wire is seen in the RV and RA.   Normal appearing right ventricle structure and function.     12 Lead ECG (10.06.22 @ 08:52) >  Ventricular Rate 73 BPM  Diagnosis Line Atrial-sensed ventricular-paced rhythm with occasional Premature ventricular complexes  Abnormal ECG  When compared with ECG of 06-OCT-2022 08:52,  Vent. rate has decreased BY   9 BPM    Serum Pro-Brain Natriuretic Peptide (10.28.22 @ 07:24)    Serum Pro-Brain Natriuretic Peptide: 2915 pg/mL  Serum Pro-Brain Natriuretic Peptide: 2318 pg/mL (10-27-22 @ 07:19)  Serum Pro-Brain Natriuretic Peptide: 1647 pg/mL (10-26-22 @ 07:30)  Serum Pro-Brain Natriuretic Peptide: 1252 pg/mL (10-25-22 @ 08:27)  Serum Pro-Brain Natriuretic Peptide: 543 pg/mL (10-06-22 @ 16:10)      MEDS:   acetaminophen     Tablet .. 650 milliGRAM(s) Oral every 6 hours PRN  acetaminophen     Tablet .. 650 milliGRAM(s) Oral every 6 hours  acetaminophen   IVPB .. 1000 milliGRAM(s) IV Intermittent once  ALBUTerol    90 MICROgram(s) HFA Inhaler 2 Puff(s) Inhalation every 6 hours PRN  albuterol/ipratropium for Nebulization 3 milliLiter(s) Nebulizer every 6 hours PRN  apixaban 5 milliGRAM(s) Oral every 12 hours  atorvastatin 20 milliGRAM(s) Oral at bedtime  budesonide 160 MICROgram(s)/formoterol 4.5 MICROgram(s) Inhaler 2 Puff(s) Inhalation two times a day  chlorhexidine 4% Liquid 1 Application(s) Topical <User Schedule>  cholestyramine Powder (Sugar-Free) 4 Gram(s) Oral daily  diphenoxylate/atropine 2 Tablet(s) Oral four times a day  finasteride 5 milliGRAM(s) Oral daily    influenza  Vaccine (HIGH DOSE) 0.7 milliLiter(s) IntraMuscular once  insulin lispro (ADMELOG) corrective regimen sliding scale   SubCutaneous every 6 hours  loperamide 4 milliGRAM(s) Oral <User Schedule>  melatonin 5 milliGRAM(s) Oral at bedtime  morphine  - Injectable 2 milliGRAM(s) IV Push every 4 hours PRN  naloxone Injectable 0.1 milliGRAM(s) IV Push every 3 minutes PRN  ondansetron Injectable 4 milliGRAM(s) IV Push every 6 hours PRN  oxybutynin 5 milliGRAM(s) Oral daily PRN  oxyCODONE    IR 5 milliGRAM(s) Oral every 4 hours PRN  oxyCODONE    IR 10 milliGRAM(s) Oral every 4 hours PRN  pantoprazole  Injectable 40 milliGRAM(s) IV Push every 12 hours  piperacillin/tazobactam IVPB.. 3.375 Gram(s) IV Intermittent every 8 hours  predniSONE   Tablet 4 milliGRAM(s) Oral daily  psyllium Powder 1 Packet(s) Oral two times a day  sodium chloride 0.9% lock flush 3 milliLiter(s) IV Push every 8 hours  tamsulosin 0.8 milliGRAM(s) Oral at bedtime  tiotropium 18 MICROgram(s) Capsule 1 Capsule(s) Inhalation daily

## 2022-10-31 LAB
ALBUMIN SERPL ELPH-MCNC: 1.9 G/DL — LOW (ref 3.3–5)
ANION GAP SERPL CALC-SCNC: 7 MMOL/L — SIGNIFICANT CHANGE UP (ref 5–17)
BUN SERPL-MCNC: 31 MG/DL — HIGH (ref 7–23)
CALCIUM SERPL-MCNC: 8.7 MG/DL — SIGNIFICANT CHANGE UP (ref 8.5–10.1)
CHLORIDE SERPL-SCNC: 104 MMOL/L — SIGNIFICANT CHANGE UP (ref 96–108)
CO2 SERPL-SCNC: 24 MMOL/L — SIGNIFICANT CHANGE UP (ref 22–31)
CREAT SERPL-MCNC: 2.26 MG/DL — HIGH (ref 0.5–1.3)
EGFR: 30 ML/MIN/1.73M2 — LOW
GLUCOSE SERPL-MCNC: 134 MG/DL — HIGH (ref 70–99)
HCT VFR BLD CALC: 25.2 % — LOW (ref 39–50)
HCT VFR BLD CALC: 26.5 % — LOW (ref 39–50)
HGB BLD-MCNC: 7.8 G/DL — LOW (ref 13–17)
HGB BLD-MCNC: 7.8 G/DL — LOW (ref 13–17)
MAGNESIUM SERPL-MCNC: 1.6 MG/DL — SIGNIFICANT CHANGE UP (ref 1.6–2.6)
MCHC RBC-ENTMCNC: 25.7 PG — LOW (ref 27–34)
MCHC RBC-ENTMCNC: 29.4 GM/DL — LOW (ref 32–36)
MCV RBC AUTO: 87.2 FL — SIGNIFICANT CHANGE UP (ref 80–100)
PHOSPHATE SERPL-MCNC: 3.4 MG/DL — SIGNIFICANT CHANGE UP (ref 2.5–4.5)
PLATELET # BLD AUTO: 159 K/UL — SIGNIFICANT CHANGE UP (ref 150–400)
POTASSIUM SERPL-MCNC: 4.9 MMOL/L — SIGNIFICANT CHANGE UP (ref 3.5–5.3)
POTASSIUM SERPL-SCNC: 4.9 MMOL/L — SIGNIFICANT CHANGE UP (ref 3.5–5.3)
RBC # BLD: 3.04 M/UL — LOW (ref 4.2–5.8)
RBC # FLD: 17.9 % — HIGH (ref 10.3–14.5)
SARS-COV-2 RNA SPEC QL NAA+PROBE: SIGNIFICANT CHANGE UP
SODIUM SERPL-SCNC: 135 MMOL/L — SIGNIFICANT CHANGE UP (ref 135–145)
WBC # BLD: 6.15 K/UL — SIGNIFICANT CHANGE UP (ref 3.8–10.5)
WBC # FLD AUTO: 6.15 K/UL — SIGNIFICANT CHANGE UP (ref 3.8–10.5)

## 2022-10-31 PROCEDURE — 99232 SBSQ HOSP IP/OBS MODERATE 35: CPT

## 2022-10-31 RX ORDER — FLUCONAZOLE 150 MG/1
100 TABLET ORAL DAILY
Refills: 0 | Status: DISCONTINUED | OUTPATIENT
Start: 2022-10-31 | End: 2022-11-02

## 2022-10-31 RX ADMIN — Medication 0: at 17:14

## 2022-10-31 RX ADMIN — Medication 2 TABLET(S): at 12:13

## 2022-10-31 RX ADMIN — SODIUM CHLORIDE 100 MILLILITER(S): 9 INJECTION INTRAMUSCULAR; INTRAVENOUS; SUBCUTANEOUS at 13:54

## 2022-10-31 RX ADMIN — MORPHINE 6 MILLIGRAM(S): 10 SOLUTION ORAL at 21:51

## 2022-10-31 RX ADMIN — Medication 1 TABLET(S): at 18:13

## 2022-10-31 RX ADMIN — TIOTROPIUM BROMIDE 1 CAPSULE(S): 18 CAPSULE ORAL; RESPIRATORY (INHALATION) at 08:52

## 2022-10-31 RX ADMIN — INSULIN GLARGINE 3 UNIT(S): 100 INJECTION, SOLUTION SUBCUTANEOUS at 22:03

## 2022-10-31 RX ADMIN — Medication 1 TABLET(S): at 09:11

## 2022-10-31 RX ADMIN — Medication 2 TABLET(S): at 18:12

## 2022-10-31 RX ADMIN — Medication 4 MILLIGRAM(S): at 10:45

## 2022-10-31 RX ADMIN — PANTOPRAZOLE SODIUM 40 MILLIGRAM(S): 20 TABLET, DELAYED RELEASE ORAL at 10:41

## 2022-10-31 RX ADMIN — SODIUM CHLORIDE 3 MILLILITER(S): 9 INJECTION INTRAMUSCULAR; INTRAVENOUS; SUBCUTANEOUS at 21:57

## 2022-10-31 RX ADMIN — MORPHINE 6 MILLIGRAM(S): 10 SOLUTION ORAL at 13:46

## 2022-10-31 RX ADMIN — Medication 4 MILLIGRAM(S): at 21:52

## 2022-10-31 RX ADMIN — PIPERACILLIN AND TAZOBACTAM 25 GRAM(S): 4; .5 INJECTION, POWDER, LYOPHILIZED, FOR SOLUTION INTRAVENOUS at 05:25

## 2022-10-31 RX ADMIN — SODIUM CHLORIDE 100 MILLILITER(S): 9 INJECTION INTRAMUSCULAR; INTRAVENOUS; SUBCUTANEOUS at 22:02

## 2022-10-31 RX ADMIN — Medication 5 MILLIGRAM(S): at 21:52

## 2022-10-31 RX ADMIN — Medication 2 TABLET(S): at 05:27

## 2022-10-31 RX ADMIN — FLUCONAZOLE 100 MILLIGRAM(S): 150 TABLET ORAL at 13:46

## 2022-10-31 RX ADMIN — Medication 1 UNIT(S): at 09:02

## 2022-10-31 RX ADMIN — Medication 2000 UNIT(S): at 10:42

## 2022-10-31 RX ADMIN — MORPHINE 6 MILLIGRAM(S): 10 SOLUTION ORAL at 05:27

## 2022-10-31 RX ADMIN — PANTOPRAZOLE SODIUM 40 MILLIGRAM(S): 20 TABLET, DELAYED RELEASE ORAL at 21:52

## 2022-10-31 RX ADMIN — Medication 1 PACKET(S): at 10:45

## 2022-10-31 RX ADMIN — BUDESONIDE AND FORMOTEROL FUMARATE DIHYDRATE 2 PUFF(S): 160; 4.5 AEROSOL RESPIRATORY (INHALATION) at 08:54

## 2022-10-31 RX ADMIN — Medication 4 MILLIGRAM(S): at 18:12

## 2022-10-31 RX ADMIN — Medication 4 MILLIGRAM(S): at 10:44

## 2022-10-31 RX ADMIN — ATORVASTATIN CALCIUM 20 MILLIGRAM(S): 80 TABLET, FILM COATED ORAL at 21:52

## 2022-10-31 RX ADMIN — Medication 4 MILLIGRAM(S): at 05:26

## 2022-10-31 RX ADMIN — APIXABAN 5 MILLIGRAM(S): 2.5 TABLET, FILM COATED ORAL at 21:52

## 2022-10-31 RX ADMIN — Medication 1 TABLET(S): at 12:14

## 2022-10-31 RX ADMIN — Medication 1 PACKET(S): at 21:52

## 2022-10-31 RX ADMIN — SODIUM CHLORIDE 3 MILLILITER(S): 9 INJECTION INTRAMUSCULAR; INTRAVENOUS; SUBCUTANEOUS at 13:20

## 2022-10-31 RX ADMIN — CHLORHEXIDINE GLUCONATE 1 APPLICATION(S): 213 SOLUTION TOPICAL at 10:21

## 2022-10-31 RX ADMIN — Medication 1 UNIT(S): at 17:14

## 2022-10-31 RX ADMIN — FINASTERIDE 5 MILLIGRAM(S): 5 TABLET, FILM COATED ORAL at 10:43

## 2022-10-31 RX ADMIN — CHOLESTYRAMINE 4 GRAM(S): 4 POWDER, FOR SUSPENSION ORAL at 09:11

## 2022-10-31 RX ADMIN — TAMSULOSIN HYDROCHLORIDE 0.8 MILLIGRAM(S): 0.4 CAPSULE ORAL at 21:52

## 2022-10-31 RX ADMIN — SODIUM CHLORIDE 3 MILLILITER(S): 9 INJECTION INTRAMUSCULAR; INTRAVENOUS; SUBCUTANEOUS at 05:03

## 2022-10-31 RX ADMIN — BUDESONIDE AND FORMOTEROL FUMARATE DIHYDRATE 2 PUFF(S): 160; 4.5 AEROSOL RESPIRATORY (INHALATION) at 20:16

## 2022-10-31 RX ADMIN — PIPERACILLIN AND TAZOBACTAM 25 GRAM(S): 4; .5 INJECTION, POWDER, LYOPHILIZED, FOR SOLUTION INTRAVENOUS at 13:34

## 2022-10-31 RX ADMIN — PIPERACILLIN AND TAZOBACTAM 25 GRAM(S): 4; .5 INJECTION, POWDER, LYOPHILIZED, FOR SOLUTION INTRAVENOUS at 21:53

## 2022-10-31 RX ADMIN — APIXABAN 5 MILLIGRAM(S): 2.5 TABLET, FILM COATED ORAL at 10:42

## 2022-10-31 RX ADMIN — Medication 1 UNIT(S): at 12:14

## 2022-10-31 NOTE — CONSULT NOTE ADULT - CONSULT REQUESTED DATE/TIME
31-Oct-2022 12:05
11-Oct-2022
18-Oct-2022 12:42
22-Oct-2022 12:22
06-Oct-2022 16:12
07-Oct-2022 07:23
28-Oct-2022 12:43

## 2022-10-31 NOTE — PROGRESS NOTE ADULT - SUBJECTIVE AND OBJECTIVE BOX
NEPHROLOGY INTERVAL HPI/OVERNIGHT EVENTS:    Date of Service: 10-30-22 @ 12:21  10/31-no new events, alfaro 2300 ml, ostomy 1400 ml  10/30--Feeling well.  Reports increased po intake.  UO increased to 2 L ostomy 1.5L  10/29--Resting comfortably.  Denies SOB.  Eating a little better today.  O>>I with 1.6 L UO and 1.1L ostomy output.    HPI:  76 yo wm presented for colon mass for ca   hx of cad, CM with ef 40-45%, off diuretics for 5 months   AF, PPM , htn   COPD with hx of smoking  10/06- s/p total proctocolectomy with J- pouch creation, open diverting loop ileostomy, 2 VINNY drains placed   Surgery complicated by acute hypoxic respiratory failure secondary to L sided pneumonia which improved with NIPPV, septic shock requiring pressors due to fecal spillage peritonitis, and JOSE likely from ATN. Was started on Zosyn. Off pressors 10/9. On 10/10 patient was transfused 2u pRBC after drop in h/h and noted with ?coffee ground content in ileostomy. Improved respiratory status, weaned to supplemental O2 via NC.   10/17 - s/p cystoscopy and urethral catheter placement in OR by urology for urinary retention found to have bladder neck contracture.   10/19 - s/p IR procedure for  LLQ abd drain placement, found to have fluid collection, with 8cc aspirate, culture has been negative. Parenteral nutrition started on 10/17/22.  10/17-21 ICU tx for PNA and NIV at night for his hx of FLORA   tx with zosyn    Patient downgraded from ICU 10/21/22.  10/25 - lasix held   10/26 - no lasix and ivf   10/27 continued rise in scr and renal consult requested   pt with 3L neg fluid balance with po intake " very good" as per pt   no nsaid  no ivc  only zosyn for abx, no vanc given     PAST MEDICAL & SURGICAL HISTORY:  - UC  - recent dx of colon ca s/p resection 10/6  - flora on cpap at home   - htn   - afib   - ppm   - hld  - cpod hx of smoking   - hernia repair  - urethral stricture now with alfaro in place   - b/l tkr     MEDICATIONS  (STANDING):  acetaminophen     Tablet .. 650 milliGRAM(s) Oral every 6 hours  acetaminophen   IVPB .. 1000 milliGRAM(s) IV Intermittent once  apixaban 5 milliGRAM(s) Oral every 12 hours  atorvastatin 20 milliGRAM(s) Oral at bedtime  budesonide 160 MICROgram(s)/formoterol 4.5 MICROgram(s) Inhaler 2 Puff(s) Inhalation two times a day  chlorhexidine 4% Liquid 1 Application(s) Topical <User Schedule>  cholecalciferol 2000 Unit(s) Oral daily  cholestyramine Powder (Sugar-Free) 4 Gram(s) Oral daily  dextrose 5%. 1000 milliLiter(s) (50 mL/Hr) IV Continuous <Continuous>  dextrose 5%. 1000 milliLiter(s) (100 mL/Hr) IV Continuous <Continuous>  dextrose 50% Injectable 25 Gram(s) IV Push once  dextrose 50% Injectable 25 Gram(s) IV Push once  dextrose 50% Injectable 12.5 Gram(s) IV Push once  dextrose Oral Gel 15 Gram(s) Oral once  diphenoxylate/atropine 2 Tablet(s) Oral four times a day  finasteride 5 milliGRAM(s) Oral daily  glucagon  Injectable 1 milliGRAM(s) IntraMuscular once  influenza  Vaccine (HIGH DOSE) 0.7 milliLiter(s) IntraMuscular once  insulin glargine Injectable (LANTUS) 3 Unit(s) SubCutaneous at bedtime  insulin lispro (ADMELOG) corrective regimen sliding scale   SubCutaneous at bedtime  insulin lispro (ADMELOG) corrective regimen sliding scale   SubCutaneous three times a day before meals  insulin lispro Injectable (ADMELOG) 1 Unit(s) SubCutaneous three times a day before meals  loperamide 4 milliGRAM(s) Oral <User Schedule>  melatonin 5 milliGRAM(s) Oral at bedtime  opium Tincture 6 milliGRAM(s) Oral three times a day  pantoprazole    Tablet 40 milliGRAM(s) Oral every 12 hours  piperacillin/tazobactam IVPB.. 3.375 Gram(s) IV Intermittent every 8 hours  predniSONE   Tablet 4 milliGRAM(s) Oral daily  psyllium Powder 1 Packet(s) Oral two times a day  sodium chloride 0.9% lock flush 3 milliLiter(s) IV Push every 8 hours  sodium chloride 0.9%. 1000 milliLiter(s) (75 mL/Hr) IV Continuous <Continuous>  tamsulosin 0.8 milliGRAM(s) Oral at bedtime  tiotropium 18 MICROgram(s) Capsule 1 Capsule(s) Inhalation daily    MEDICATIONS  (PRN):  acetaminophen     Tablet .. 650 milliGRAM(s) Oral every 6 hours PRN Mild Pain (1 - 3)  ALBUTerol    90 MICROgram(s) HFA Inhaler 2 Puff(s) Inhalation every 6 hours PRN Shortness of Breath and/or Wheezing  albuterol/ipratropium for Nebulization 3 milliLiter(s) Nebulizer every 6 hours PRN Shortness of Breath and/or Wheezing  naloxone Injectable 0.1 milliGRAM(s) IV Push every 3 minutes PRN For ANY of the following changes in patient status:  A. RR LESS THAN 10 breaths per minute, B. Oxygen saturation LESS THAN 90%, C. Sedation score of 6  ondansetron Injectable 4 milliGRAM(s) IV Push every 6 hours PRN Nausea and/or Vomiting  oxybutynin 5 milliGRAM(s) Oral daily PRN Bladder spasms  oxyCODONE    IR 5 milliGRAM(s) Oral every 4 hours PRN Moderate Pain (4 - 6)  oxyCODONE    IR 10 milliGRAM(s) Oral every 4 hours PRN Severe Pain (7 - 10)    I&O's Detail    30 Oct 2022 07:  -  31 Oct 2022 07:00  --------------------------------------------------------  IN:  Total IN: 0 mL    OUT:    Ileostomy (mL): 1400 mL    Indwelling Catheter - Urethral (mL): 2350 mL  Total OUT: 3750 mL    Total NET: -3750 mL      31 Oct 2022 07:01  -  31 Oct 2022 19:11  --------------------------------------------------------  IN:  Total IN: 0 mL    OUT:    Ileostomy (mL): 400 mL    Indwelling Catheter - Urethral (mL): 850 mL  Total OUT: 1250 mL    Total NET: -1250 mL    Vital Signs Last 24 Hrs  T(C): 36.4 (31 Oct 2022 15:00), Max: 36.9 (30 Oct 2022 21:36)  T(F): 97.6 (31 Oct 2022 15:00), Max: 98.4 (30 Oct 2022 21:36)  HR: 68 (31 Oct 2022 15:00) (68 - 111)  BP: 94/48 (31 Oct 2022 15:00) (94/48 - 100/53)  BP(mean): --  RR: 19 (31 Oct 2022 15:00) (18 - 19)  SpO2: 93% (31 Oct 2022 15:00) (93% - 100%)    Parameters below as of 31 Oct 2022 15:00  Patient On (Oxygen Delivery Method): nasal cannula    PHYSICAL EXAM: sleeping drowsy   GENERAL: Comfortable  CHEST/LUNG: Fair air entry  HEART: S1S2 RRR  ABDOMEN: soft  EXTREMITIES: trace edema  SKIN:     LABS:                          7.8    x     )-----------( x        ( 31 Oct 2022 09:43 )             25.2                           7.9    6.77  )-----------( 155      ( 30 Oct 2022 07:46 )             26.1     10-31    135  |  104  |  31<H>  ----------------------------<  134<H>  4.9   |  24  |  2.26<H>    Ca    8.7      31 Oct 2022 06:33  Phos  3.4     10-31  Mg     1.6     10-31    TPro  x   /  Alb  1.9<L>  /  TBili  x   /  DBili  x   /  AST  x   /  ALT  x   /  AlkPhos  x   10      10-30    131<L>  |  101  |  33<H>  ----------------------------<  135<H>  4.5   |  23  |  2.52<H>    Ca    8.5      30 Oct 2022 07:46  Phos  4.0     10-30  Mg     1.8     10-30        Urinalysis Basic - ( 30 Oct 2022 02:50 )    Color: Yellow / Appearance: Slightly Turbid / S.010 / pH: x  Gluc: x / Ketone: Negative  / Bili: Negative / Urobili: Negative   Blood: x / Protein: 100 / Nitrite: Negative   Leuk Esterase: Moderate / RBC: 6-10 /HPF / WBC 11-25   Sq Epi: x / Non Sq Epi: Occasional / Bacteria: Moderate      Magnesium, Serum: 1.8 mg/dL (10-30 @ 07:46)  Phosphorus Level, Serum: 4.0 mg/dL (10-30 @ 07:46)          RADIOLOGY & ADDITIONAL TESTS:

## 2022-10-31 NOTE — PROGRESS NOTE ADULT - ATTENDING COMMENTS
Seen and examined.  Sleeping in chair, not awakened for my visit.  Ileostomy output thicker this AM, 1400cc overnight.  Cr remains mid 2.  AFVSS  NAD  dressing in place, soft, NT, baseline distention  Labs reveiwed  A/P -  Cr remains elevated. Cont IVF per med/renal.  ? need to treat yeast in urine.  Cont antidiarrheals for now.  D/C pending improvement in renal parameters.

## 2022-10-31 NOTE — PROGRESS NOTE ADULT - PROBLEM SELECTOR PROBLEM 7
R/O Atrial fibrillation

## 2022-10-31 NOTE — PROGRESS NOTE ADULT - ASSESSMENT
Patient is S/p proctocolectomy, IPAA, DLI for UC/Sigmoid CA, IR drain for collection. S/p cystoscopy w/ stent placement and [place] tip Zhao.   High ileostomy output  JOSE    Plan:  Pain control  c/w regular diet  Continue Eliquis  appreciate cards recc  appreciate nephrology reccs:  - r/o AIN while on zosyn with urine eos   - fu trend of scr off diuretics, prn ivf as needed   appreciate hospitalist reccs  monitor ileostomy output, decreased - on loperamide, metamucil, lomotil, cholestyramine, tincture of opium  trend labs, monitor Cr  Appreciate  reccs - pt to be DC'd with leg bag  Dispo PHILLIP vs PT  CM for discharge planning when pt is medically stable  encourage OOB/ambulation    Plan discussed with the colorectal surgery team

## 2022-10-31 NOTE — PROGRESS NOTE ADULT - ASSESSMENT
76 yo male with CAD, ICM with EF 40-45%  h/o AF, PPM, HTN, UC, newly Dx colon CA, HTN, COPD (long smoking Hx), obesity and hyperlipidemia found to have a colon mass and CA and admitted on 10/06/22  for resection.   10/06- s/p total proctocolectomy with J- pouch creation, open diverting loop ileostomy, 2 VINNY drains placed   Surgery complicated by acute hypoxic respiratory failure secondary to L sided pneumonia which improved with NIPPV, septic shock requiring pressors due to fecal spillage peritonitis, and JOSE likely from ATN. Was started on Zosyn. Off pressors 10/9. On 10/10 patient was transfused 2u pRBC after drop in h/h and noted with ?coffee ground content in ileostomy. Improved respiratory status, weaned to supplemental O2 via NC.   10/17 - s/p cystoscopy and urethral catheter placement in OR by urology for urinary retention found to have bladder neck contracture.   10/19 - s/p IR procedure for  LLQ abd drain placement, found to have fluid collection, with 8cc aspirate, culture has been negative. Parenteral nutrition started on 10/17/22. Patient downgraded from ICU 10/21/22. Hospitalist consulted for continued medical management.   10/24 - started on IV lasix for CHF     Colon mass consistent with adenocarcinoma  -S/P RCP-IPAA (restorative proctocolectomy with ileal pouch anal anastomosis), diverting loop ileostomy 10/6  - diet and pain management as per surgery team  - pathology reports noted - adenocarcinoma   - consider oncology consult for further management and monitoring of cancer     Acute hypoxic respiratory failure , multifactorial   - HCAP PNA, probable gram negative rods, septic shock , Fecal spillage peritonitis with abdominal fluids collections s/p drainage   Acute on chronic systolic HF , resolved   Funguria   -Off pressors, given stress dose steroids, now tapering, on prednisone 4mg daily   -c/w zosyn  for PNA and intraabdominal collections, consider ID consult   -S/p IR drainage of fluid collection on 10/19, actually restarted zosyn 10/20, (cultures have been negative >48hrs)  -s/p  PPN 10/17 - 10/22   - tolerating diet   - s/p IV lasix 10/24-10/26   - 1500 cc fluids restriction, daily weights   - cardiology consult noted optimize meds - ACEI and BB as tolerated low Na diet   suspect drop in CHF In the setting of septic shock but will need repeat ECHO in 6 weeks   - CXR 10/27 -decreased congestion, less atelectasis  - 10/30 - reconsult ID - budding yeast in UA , can not send the culture s/p Alfaro , ? need for fluconasole, add probiotics  - 10/31 - start fluconasole 100 qd for Funguria     JOSE with baseline Cr 1.1   Urinary retention s/p Alfaro 10/17/22   - off iv fluids  - will hold lasix for now   - Creatinine Trend: 2.2<--2.5<--2.32<--, 1.61<--, 1.57<--, 1.33<--, 1.15<--, 1.11<--  -Continue tamsulosin 0.8mg qhs, finasteride 5mg daily   -Urology consult appreciated: d/c home with alfaro's    Hypomagnesemia, resolved  - replace    Vitamin D deficiency - replace    COPD now on O2   -Continue Prednisone 4mg daily, Symbicort, Spiriva Albuterol HFA PRN   - before dc check Pulse Ox on ambulation to see how hes doing and ensure safety while in Rehab      Normocytic Anemia, multifactorial   - s/p 4 units PRBC 10/9, 10/10 , 10/12, 10/24   - Monitor closely while on Eliquis   - check iron studies, B12, folate  - Hb 8.5--> 7.8     HTN  Chronic  AFIB s/p PPM  HLD  -Continue Eliquis 5mg q12h  -Continue Lipitor 20mg qhs  -Off antihypertensives here, bp stable  - was On amlodipine 2.5mg, Metoprolol ER 25mg daily       VTE prophylaxis  -On eliquis     Dispo: Pulse Ox on ambulation before dc to home with home O2 or  rehab  as per surgery

## 2022-10-31 NOTE — PROGRESS NOTE ADULT - SUBJECTIVE AND OBJECTIVE BOX
CHIEF COMPLAINT:  Patient is a 77y old  Male who presents with a chief complaint of Elective colectomy (07 Oct 2022 02:35)      HPI: 10/7/22:  76 yo male with h/o AF, PPM, HTN, UC, newly Dx colon CA, HTN, COPD (long smoking Hx), obesity and hyperlipidemia found to have a colon mass and CA and admitted for resection.  He underwent successful surgery on 10/6/22.  He is followed from a cardiac standpoint by his own cardiologist, Dr Sanjay Serna, in [place] and had pre-op evaluation and reportedly had an echo in 5/2022 showing LVEF=44% with a dilated RV and mildly elevated RVSP.  Lexiscan nuclear stress test reportedly showed evidence for a prior inferior/inferolateral MI but no new ischemia.  Cardiac cath in 2017 reportedly showed a <40% LAD narrowing.  He has no anginal chest pains or increased SOB at this time on O2 via face mask.    10/25: Patient had successful colon mass resection 10/6/22. Course complicated by acute hypoxic respiratory failure secondary to L sided pneumonia which improved with NIPPV, septic shock requiring pressors due to fecal spillage peritonitis, and JOSE likely from ATN. Off pressors 10/9. On 10/10 patient was transfused 2u pRBC after drop in h/h and noted with ?coffee ground content in ileostomy. Improved respiratory status, weaned to supplemental O2 via NC. Had urethral cath placed in OR by urology for urinary retention found to have bladder neck contracture. Underwent IR procedure for abd drain placement, found to have fluid collection, with 8cc aspirate, culture has been negative. Parenteral nutrition started on 10/17/22. Patient downgraded from ICU 10/21/22 and continues to improve. He is feeling much better and wants to know when he will be going home.    10/26: Patient's renal function was worsening and Pro-BNP increased with crackles at bases and unable to wean NC. There were concerns for fluid overload. He was given lasix 20 IV x1 and has been diuresing well. His main complaint today is wanting to go home and his HA.    10/27: Patient is feeling well this morning with no complaints. He received lasix 20 IV BID yesterday and diuresed well.  10/28: BUN/Cr increased yesterday so no lasix was given. He is feeling well and wants to go home.  10/31: Lasix has been on hold and IVF started with slight improvement in renal function. He has no current complaints, feels well.    He denies any fever, chills, CP, SOB, abd pain, N/V, dizziness, syncope, orthopnea, PND.    PMHx:  PAST MEDICAL & SURGICAL HISTORY:  ICM EF 40-45%  CAD  Ulcerative colitis  Colon cancer  Obstructive sleep apnea  Obesity  HTN (hypertension)  Atrial fibrillation  HLD (hyperlipidemia)  BPH (benign prostatic hyperplasia)  COPD (chronic obstructive pulmonary disease)  COVID-19 virus infection  History of pneumonia  H/O hernia repair  History of hydrocelectomy  H/O colonoscopy  H/O cystoscopy-urethral stricture  History of total knee replacement, bilateral  S/P placement of cardiac pacemaker  History of cataract surgery    FAMILY HISTORY:   FAMILY HISTORY:  Family history of cardiomyopathy (Father)    ALLERGIES:  Allergies  ACE inhibitors (Swelling)    REVIEW OF SYSTEMS:  10 point ROS was obtained  Pertinent positives and negatives are as above  All other review of systems is negative unless indicated above    Vital Signs Last 24 Hrs  T(C): 36.9 (30 Oct 2022 21:36), Max: 36.9 (30 Oct 2022 21:36)  T(F): 98.4 (30 Oct 2022 21:36), Max: 98.4 (30 Oct 2022 21:36)  HR: 70 (30 Oct 2022 21:36) (63 - 81)  BP: 97/67 (30 Oct 2022 21:36) (94/62 - 120/56)  BP(mean): --  RR: 18 (30 Oct 2022 21:36) (18 - 18)  SpO2: 97% (30 Oct 2022 21:36) (97% - 100%)    Parameters below as of 30 Oct 2022 21:36  Patient On (Oxygen Delivery Method): nasal cannula    I&O's Detail    30 Oct 2022 07:01  -  31 Oct 2022 07:00  --------------------------------------------------------  IN:  Total IN: 0 mL    OUT:    Ileostomy (mL): 1400 mL    Indwelling Catheter - Urethral (mL): 2350 mL  Total OUT: 3750 mL    Total NET: -3750 mL      PHYSICAL EXAM:   Constitutional: NAD, awake and alert, well-developed  HEENT: EOMI, Normal Hearing, MMM  Neck: Soft and supple, No LAD, No JVD  Respiratory: decreased BS at bases  Cardiovascular: S1 and S2, regular rate and rhythm, soft DONNY at LLSB and base as before, no gallops or rubs  Gastrointestinal: Bowel Sounds present, soft, distended, +ostomy with stool, Staples along incision site  Extremities: No peripheral edema  Neurological: A/O x 3, no focal deficits  Skin: No rashes    MEDICATIONS  (STANDING):  acetaminophen     Tablet .. 650 milliGRAM(s) Oral every 6 hours  acetaminophen   IVPB .. 1000 milliGRAM(s) IV Intermittent once  apixaban 5 milliGRAM(s) Oral every 12 hours  atorvastatin 20 milliGRAM(s) Oral at bedtime  budesonide 160 MICROgram(s)/formoterol 4.5 MICROgram(s) Inhaler 2 Puff(s) Inhalation two times a day  chlorhexidine 4% Liquid 1 Application(s) Topical <User Schedule>  cholecalciferol 2000 Unit(s) Oral daily  cholestyramine Powder (Sugar-Free) 4 Gram(s) Oral daily  dextrose 5%. 1000 milliLiter(s) (50 mL/Hr) IV Continuous <Continuous>  dextrose 5%. 1000 milliLiter(s) (100 mL/Hr) IV Continuous <Continuous>  dextrose 50% Injectable 25 Gram(s) IV Push once  dextrose 50% Injectable 12.5 Gram(s) IV Push once  dextrose 50% Injectable 25 Gram(s) IV Push once  dextrose Oral Gel 15 Gram(s) Oral once  diphenoxylate/atropine 2 Tablet(s) Oral four times a day  finasteride 5 milliGRAM(s) Oral daily  glucagon  Injectable 1 milliGRAM(s) IntraMuscular once  influenza  Vaccine (HIGH DOSE) 0.7 milliLiter(s) IntraMuscular once  insulin glargine Injectable (LANTUS) 3 Unit(s) SubCutaneous at bedtime  insulin lispro (ADMELOG) corrective regimen sliding scale   SubCutaneous at bedtime  insulin lispro (ADMELOG) corrective regimen sliding scale   SubCutaneous three times a day before meals  insulin lispro Injectable (ADMELOG) 1 Unit(s) SubCutaneous three times a day before meals  lactobacillus acidophilus 1 Tablet(s) Oral three times a day with meals  loperamide 4 milliGRAM(s) Oral <User Schedule>  melatonin 5 milliGRAM(s) Oral at bedtime  opium Tincture 6 milliGRAM(s) Oral three times a day  pantoprazole    Tablet 40 milliGRAM(s) Oral every 12 hours  piperacillin/tazobactam IVPB.. 3.375 Gram(s) IV Intermittent every 8 hours  predniSONE   Tablet 4 milliGRAM(s) Oral daily  psyllium Powder 1 Packet(s) Oral two times a day  sodium chloride 0.9% lock flush 3 milliLiter(s) IV Push every 8 hours  sodium chloride 0.9%. 1000 milliLiter(s) (100 mL/Hr) IV Continuous <Continuous>  tamsulosin 0.8 milliGRAM(s) Oral at bedtime  tiotropium 18 MICROgram(s) Capsule 1 Capsule(s) Inhalation daily    MEDICATIONS  (PRN):  acetaminophen     Tablet .. 650 milliGRAM(s) Oral every 6 hours PRN Mild Pain (1 - 3)  ALBUTerol    90 MICROgram(s) HFA Inhaler 2 Puff(s) Inhalation every 6 hours PRN Shortness of Breath and/or Wheezing  albuterol/ipratropium for Nebulization 3 milliLiter(s) Nebulizer every 6 hours PRN Shortness of Breath and/or Wheezing  naloxone Injectable 0.1 milliGRAM(s) IV Push every 3 minutes PRN For ANY of the following changes in patient status:  A. RR LESS THAN 10 breaths per minute, B. Oxygen saturation LESS THAN 90%, C. Sedation score of 6  ondansetron Injectable 4 milliGRAM(s) IV Push every 6 hours PRN Nausea and/or Vomiting  oxybutynin 5 milliGRAM(s) Oral daily PRN Bladder spasms  oxyCODONE    IR 5 milliGRAM(s) Oral every 4 hours PRN Moderate Pain (4 - 6)  oxyCODONE    IR 10 milliGRAM(s) Oral every 4 hours PRN Severe Pain (7 - 10)    LABS: All Labs Reviewed:                         7.8    6.15  )-----------( 159      ( 31 Oct 2022 06:33 )             26.5                          8.0    12.28 )-----------( 179      ( 28 Oct 2022 07:24 )             25.7                       8.6    7.36  )-----------( 183      ( 26 Oct 2022 07:30 )             29.1               10-31    135  |  104  |  31<H>  ----------------------------<  134<H>  4.9   |  24  |  2.26<H>    Ca    8.7      31 Oct 2022 06:33  Phos  3.4     10-31  Mg     1.6     10-31    TPro  x   /  Alb  1.9<L>  /  TBili  x   /  DBili  x   /  AST  x   /  ALT  x   /  AlkPhos  x   10-31    10-28    130<L>  |  98  |  26<H>  ----------------------------<  132<H>  4.7   |  26  |  2.51<H>    Ca    8.4<L>      28 Oct 2022 07:24  Phos  2.9     10-28  Mg     1.5     10-28    10-26    138  |  104  |  15  ----------------------------<  129<H>  4.5   |  29  |  1.61<H>    Ca    8.7      26 Oct 2022 07:30  Phos  3.2     10-26  Mg     1.9     10-26    10-25    135  |  100  |  18  ----------------------------<  126<H>  4.5   |  31  |  1.57<H>    Ca    8.7      25 Oct 2022 08:27  Phos  2.8     10-25  Mg     1.6     10-25    10-07    140  |  105  |  26<H>  ----------------------------<  215<H>  5.8<H>   |  28  |  2.36<H>    Ca    8.1<L>      07 Oct 2022 05:53  Phos  5.9     10-07  Mg     1.6     10-07    TPro  5.0<L>  /  Alb  2.3<L>  /  TBili  0.6  /  DBili  x   /  AST  19  /  ALT  30  /  AlkPhos  43  10-07    A1C with Estimated Average Glucose (09.29.22 @ 14:21): 6.9  Serum Pro-Brain Natriuretic Peptide: 543 pg/mL (10-06 @ 16:10)    BLOOD CULTURES:   LIPID PROFILE     RADIOLOGY:   CXR: 9/29/22:  FINDINGS:  PULMONARY: The airway is midline.  There are no airspace consolidations. Cardiac device wire leads are   within right atrium and right ventricle.  No pleural effusion or pneumothorax.  HEART/VASCULAR: The heart size and mediastinum configuration are within the limits of normal.  BONES: The visualized osseus structures are intact.  IMPRESSION:  No radiographic evidence of active chest disease..       EKG:  10/6/22:  Atrial-sensed ventricular-paced rhythm with occasional Premature ventricular complexes    TELEMETRY:    Not on tele    ECHO:    ACC: [id number] EXAM:  ECHO TTE WITH CON COMP W DOPP                          PROCEDURE DATE:  10/07/2022          INTERPRETATION:  Transthoracic Echocardiography Report (TTE)     Demographics     Patient name         GITA BALLARD   Age76 year(s)     Med Rec #            [id number]          Gender        Male     Account #            [id number]       Date of Birth 1945     Interpreting         Brenton Palla,   Room Number   0077   Physician            MD     Referring Physician  mirna starr        Sonographer   Jamila Sloan,                                                         [facility]     Date of study        10/07/2022 07:52                        AM     Height               70.87 in           Weight        277.78 pounds    Type of Study:     TTE procedure: ECHO TTE WO CON COMP W DOP, ECHO TTE W C COMP W DOPP     BP: 93/57 mmHg     Contrast Medium: Lumason.     Study Location: [facility] Quality: Technically Difficullt    Indications   1) I95.81 - Postprocedural hypotension    M-Mode Measurements (cm)     LVEDd: 5.44 cm            LVESd: 3.59 cm   IVSEd: 1.17 cm   LVPWd: 1.21 cm            AO Root Dimension: 3.5 cm                             ACS: 1 cm                             LA: 3.8 cm               LVOT: 2.2 cm    Doppler Measurements:     AV Velocity:248 cm/s                 MV Peak E-Wave: 70.2 cm/s   AV Peak Gradient: 24.6 mmHg          MV Peak A-Wave: 119 cm/s   AV Mean Gradient: 14 mmHg            MV E/A Ratio: 0.59 %   AV Area (Continuity):1.62 cm^2       MV Peak Gradient: 1.97 mmHg   TR Velocity:314 cm/s   TR Gradient:39.4384 mmHg   Estimated RAP:3 mmHg   RVSP:42 mmHg     Findings     Mitral Valve   Moderate mitral annular calcification is present.   The mitral valve leaflets appear thickened.   EA reversal of the mitral inflow consistent with reduced compliance of   the   left ventricle.     Aortic Valve   The aortic valve appears mildly calcified. Valve opening seems to be   restricted.   Peak and mean transaortic gradients are 25 and 14 mmHg respectively; this   finding is consistent with mild aortic stenosis.     Tricuspid Valve   The tricuspid valve leaflets appear mildly thickened and/or calcified,   but   open well.   Moderate (2+) tricuspid valve regurgitation is present.   Mild pulmonary hypertension.     Pulmonic Valve   Normal appearing pulmonic valve structure.   Trace pulmonic valvular regurgitation is present.     Left Atrium   Normal appearing left atrium.     Left Ventricle   Mild concentric left ventricular hypertrophy is present.   Left ventricle systolic function moderately decreased paradoxical septal   motion ,apical wall , anterior wall , apical lateral hypokinesis in the   presence of a cardiac arrhythmia.   Lumason was used to better define the endocardial border.   Visual estimation of left ventricle ejection fraction is 40-45 %.     Right Atrium   Normal appearing right atrium.   A device wire is seen in the RV and RA.     Right Ventricle   Normal appearing right ventricle structure and function.     Pericardial Effusion   No evidence of pericardial effusion.     Miscellaneous   The IVC is not visualized.     Impression     Summary     Moderate mitral annular calcification is present.   The mitral valve leaflets appear thickened.   EA reversal of the mitral inflow consistent with reduced compliance of the left ventricle.   The aortic valve appears mildly calcified. Valve opening seems to be restricted.   Peak and mean transaortic gradients are 25 and 14 mmHg respectively; this   finding is consistent with mild aortic stenosis.   The tricuspid valve leaflets appear mildly thickened and/or calcified, but open well.   Moderate (2+) tricuspid valve regurgitation is present.   Mild pulmonary hypertension.   Mild concentric left ventricular hypertrophy is present.   Left ventricle systolic function moderately decreased paradoxical septal motion ,apical wall , anterior wall , apical lateral hypokinesis in the presence of a cardiac arrhythmia.   Lumason was used to better define the endocardial border.   Visual estimation of left ventricle ejection fraction is 40-45 %.   Normal appearing right atrium.   A device wire is seen in the RV and RA.   Normal appearing right ventricle structure and function.

## 2022-10-31 NOTE — CONSULT NOTE ADULT - ASSESSMENT
76 y/o male with h/o newly diagnosed colon Ca, AF, PPM, HTN, UC, HTN, COPD, obesity and hyperlipidemia was admitted on 10/6 for increased SOB. He underwent successful colon surgery with resection and iliostoymy on 10/6/22. Since hospitalization the patient is s/p proctocolectomy, IR drain for collection, s/p cystoscopy w/ stent placement and urinary catheter and was treated with zosyn since 10/20.     1. Abdominal abscess s/p IR drainage. Pyuria. Possible UTI. Colon Ca s/p resection and iliostoymy. Urinary retention s/p urinary catheter. ARF on CRF stage 3.   -noted with yeast on UA  -abdominal collection shows no growth  -on zosyn 3.375 gm IV q8h # 11  -give fluconazole 100 mg PO qd  -reason for abx use and side effects reviewed with patient; monitor BMP   -surgical evaluation appreciated  -monitor abdomen  -old chart reviewed to assess prior cultures  -monitor temps  -f/u CBC  -supportive care  2. Other issues:   -care per medicine

## 2022-10-31 NOTE — CONSULT NOTE ADULT - SUBJECTIVE AND OBJECTIVE BOX
NEPHROLOGY INTERVAL HPI/OVERNIGHT EVENTS:  ALEX [id number]  HPI:  78 yo wm presented for colon mass for ca   hx of cad, CM with ef 40-45%, off diuretics for 5 months   AF, PPM , htn   COPD with hx of smoking  10/06- s/p total proctocolectomy with J- pouch creation, open diverting loop ileostomy, 2 VINNY drains placed   Surgery complicated by acute hypoxic respiratory failure secondary to L sided pneumonia which improved with NIPPV, septic shock requiring pressors due to fecal spillage peritonitis, and JOSE likely from ATN. Was started on Zosyn. Off pressors 10/9. On 10/10 patient was transfused 2u pRBC after drop in h/h and noted with ?coffee ground content in ileostomy. Improved respiratory status, weaned to supplemental O2 via NC.   10/17 - s/p cystoscopy and urethral catheter placement in OR by urology for urinary retention found to have bladder neck contracture.   10/19 - s/p IR procedure for  LLQ abd drain placement, found to have fluid collection, with 8cc aspirate, culture has been negative. Parenteral nutrition started on 10/17/22.  10/17-21 ICU tx for PNA and NIV at night for his hx of FLORA   tx with zosyn    Patient downgraded from ICU 10/21/22.  10/25 - lasix held   10/26 - no lasix and ivf   10/27 continued rise in scr and renal consult requested   pt with 3L neg fluid balance with po intake " very good" as per pt   no nsaid  no ivc  only zosyn for abx, no vanc given         PAST MEDICAL & SURGICAL HISTORY:  - UC  - recent dx of colon ca s/p resection 10/6  - flora on cpap at home   - htn   - afib   - ppm   - hld  - cpod hx of smoking   - hernia repair  - urethral stricture now with alfaro in place   - b/l tkr             FAMILY HISTORY:  Family history of cardiomyopathy (Father)        MEDICATIONS  (STANDING):  acetaminophen     Tablet .. 650 milliGRAM(s) Oral every 6 hours  acetaminophen   IVPB .. 1000 milliGRAM(s) IV Intermittent once  apixaban 5 milliGRAM(s) Oral every 12 hours  atorvastatin 20 milliGRAM(s) Oral at bedtime  budesonide 160 MICROgram(s)/formoterol 4.5 MICROgram(s) Inhaler 2 Puff(s) Inhalation two times a day  chlorhexidine 4% Liquid 1 Application(s) Topical <User Schedule>  cholecalciferol 2000 Unit(s) Oral daily  cholestyramine Powder (Sugar-Free) 4 Gram(s) Oral daily  dextrose 50% Injectable 25 Gram(s) IV Push once  dextrose 50% Injectable 12.5 Gram(s) IV Push once  dextrose 50% Injectable 25 Gram(s) IV Push once  dextrose Oral Gel 15 Gram(s) Oral once  diphenoxylate/atropine 2 Tablet(s) Oral four times a day  finasteride 5 milliGRAM(s) Oral daily  glucagon  Injectable 1 milliGRAM(s) IntraMuscular once  influenza  Vaccine (HIGH DOSE) 0.7 milliLiter(s) IntraMuscular once  insulin lispro (ADMELOG) corrective regimen sliding scale   SubCutaneous every 6 hours  iron sucrose Injectable 100 milliGRAM(s) IV Push every 24 hours  loperamide 4 milliGRAM(s) Oral <User Schedule>  magnesium sulfate  IVPB 1 Gram(s) IV Intermittent once  melatonin 5 milliGRAM(s) Oral at bedtime  opium Tincture 6 milliGRAM(s) Oral two times a day  pantoprazole  Injectable 40 milliGRAM(s) IV Push every 12 hours  piperacillin/tazobactam IVPB.. 3.375 Gram(s) IV Intermittent every 8 hours  predniSONE   Tablet 4 milliGRAM(s) Oral daily  psyllium Powder 1 Packet(s) Oral two times a day  sodium chloride 0.9% lock flush 3 milliLiter(s) IV Push every 8 hours  tamsulosin 0.8 milliGRAM(s) Oral at bedtime  tiotropium 18 MICROgram(s) Capsule 1 Capsule(s) Inhalation daily    MEDICATIONS  (PRN):  acetaminophen     Tablet .. 650 milliGRAM(s) Oral every 6 hours PRN Mild Pain (1 - 3)  ALBUTerol    90 MICROgram(s) HFA Inhaler 2 Puff(s) Inhalation every 6 hours PRN Shortness of Breath and/or Wheezing  albuterol/ipratropium for Nebulization 3 milliLiter(s) Nebulizer every 6 hours PRN Shortness of Breath and/or Wheezing  naloxone Injectable 0.1 milliGRAM(s) IV Push every 3 minutes PRN For ANY of the following changes in patient status:  A. RR LESS THAN 10 breaths per minute, B. Oxygen saturation LESS THAN 90%, C. Sedation score of 6  ondansetron Injectable 4 milliGRAM(s) IV Push every 6 hours PRN Nausea and/or Vomiting  oxybutynin 5 milliGRAM(s) Oral daily PRN Bladder spasms  oxyCODONE    IR 5 milliGRAM(s) Oral every 4 hours PRN Moderate Pain (4 - 6)  oxyCODONE    IR 10 milliGRAM(s) Oral every 4 hours PRN Severe Pain (7 - 10)      Allergies    ACE inhibitors (Swelling)    Intolerances        I&O's Summary    27 Oct 2022 07:01  -  28 Oct 2022 07:00  --------------------------------------------------------  IN: 0 mL / OUT: 3170 mL / NET: -3170 mL    28 Oct 2022 07:01  -  28 Oct 2022 12:43  --------------------------------------------------------  IN: 0 mL / OUT: 250 mL / NET: -250 mL        Home Medications:  acetaminophen 325 mg oral tablet: 2 tab(s) orally every 6 hours, As needed, Mild Pain (1 - 3) (14 Oct 2022 10:40)  Albuterol (Eqv-ProAir HFA) 90 mcg/inh inhalation aerosol: 2 puff(s) inhaled every 6 hours, As Needed (06 Oct 2022 08:39)  apixaban 5 mg oral tablet: 1 tab(s) orally 2 times a day- cardio for management  (06 Oct 2022 08:39)  atorvastatin 20 mg oral tablet: 1 tab(s) orally once a day (06 Oct 2022 08:39)  finasteride 5 mg oral tablet: 1 tab(s) orally once a day (14 Oct 2022 10:40)  loperamide 2 mg oral capsule: 2 cap(s) orally 4 times a day (14 Oct 2022 10:40)  predniSONE 1 mg oral tablet: 4 tab(s) orally once a day (06 Oct 2022 08:39)  Spiriva Respimat 10 ACT 2.5 mcg/inh inhalation aerosol: 2 puff(s) inhaled once a day (06 Oct 2022 08:39)  tamsulosin 0.4 mg oral capsule: 2 cap(s) orally once a day (at bedtime) (14 Oct 2022 10:40)          Vital Signs Last 24 Hrs  T(C): 36.9 (28 Oct 2022 08:47), Max: 36.9 (28 Oct 2022 08:47)  T(F): 98.4 (28 Oct 2022 08:47), Max: 98.4 (28 Oct 2022 08:47)  HR: 59 (28 Oct 2022 08:47) (51 - 73)  BP: 96/45 (28 Oct 2022 08:47) (96/45 - 101/56)  BP(mean): --  RR: 17 (28 Oct 2022 08:47) (17 - 18)  SpO2: 96% (28 Oct 2022 08:47) (95% - 98%)    Parameters below as of 28 Oct 2022 08:47  Patient On (Oxygen Delivery Method): room air      Daily     Daily   I&O's Summary    27 Oct 2022 07:01  -  28 Oct 2022 07:00  --------------------------------------------------------  IN: 0 mL / OUT: 3170 mL / NET: -3170 mL    28 Oct 2022 07:01  -  28 Oct 2022 12:43  --------------------------------------------------------  IN: 0 mL / OUT: 250 mL / NET: -250 mL        PHYSICAL EXAM:  GEN: alert awake O X 3  HEENT: MMM  NECK supple no jvd  CV: RRR s1s2  LUNGS: b/l CTA  ABD: +BS soft,,   EXT: no edema    LABS:                        8.0    12.28 )-----------( 179      ( 28 Oct 2022 07:24 )             25.7     10-28    130<L>  |  98  |  26<H>  ----------------------------<  132<H>  4.7   |  26  |  2.51<H>    Ca    8.4<L>      28 Oct 2022 07:24  Phos  2.9     10-28  Mg     1.5     10-28          Magnesium, Serum: 1.5 mg/dL (10-28 @ 07:24)  Phosphorus Level, Serum: 2.9 mg/dL (10-28 @ 07:24)  Vitamin D, 25-Hydroxy: 24.9 ng/mL (10-28 @ 07:24)      
Patient is a 77y old  Male who presents with a chief complaint of surgery for colon mass/CA (31 Oct 2022 08:13)    HPI:  76 y/o male with h/o newly diagnosed colon Ca, AF, PPM, HTN, UC, HTN, COPD, obesity and hyperlipidemia was admitted on 10/6 for increased SOB. He underwent successful colon surgery with resection and iliostoymy on 10/6/22. Since hospitalization the patient is s/p proctocolectomy, IR drain for collection, s/p cystoscopy w/ stent placement and urinary catheter and was treated with zosyn since 10/20. Reported with yeast on UA.   Concern for urinary infection is raised.          PMHx:  PAST MEDICAL & SURGICAL HISTORY:  Ulcerative colitis  Colon cancer  Obstructive sleep apnea  Obesity  HTN (hypertension)  Atrial fibrillation  HLD (hyperlipidemia)  BPH (benign prostatic hyperplasia)  COPD (chronic obstructive pulmonary disease)  COVID-19 virus infection  History of pneumonia  H/O hernia repair  History of hydrocelectomy  H/O colonoscopy  H/O cystoscopy-urethral stricture  History of total knee replacement, bilateral  S/P placement of cardiac pacemaker  History of cataract surgery    Meds: per reconciliation sheet, noted below  MEDICATIONS  (STANDING):  acetaminophen     Tablet .. 650 milliGRAM(s) Oral every 6 hours  acetaminophen   IVPB .. 1000 milliGRAM(s) IV Intermittent once  apixaban 5 milliGRAM(s) Oral every 12 hours  atorvastatin 20 milliGRAM(s) Oral at bedtime  budesonide 160 MICROgram(s)/formoterol 4.5 MICROgram(s) Inhaler 2 Puff(s) Inhalation two times a day  chlorhexidine 4% Liquid 1 Application(s) Topical <User Schedule>  cholecalciferol 2000 Unit(s) Oral daily  cholestyramine Powder (Sugar-Free) 4 Gram(s) Oral daily  dextrose 5%. 1000 milliLiter(s) (50 mL/Hr) IV Continuous <Continuous>  dextrose 5%. 1000 milliLiter(s) (100 mL/Hr) IV Continuous <Continuous>  dextrose 50% Injectable 25 Gram(s) IV Push once  dextrose 50% Injectable 12.5 Gram(s) IV Push once  dextrose 50% Injectable 25 Gram(s) IV Push once  dextrose Oral Gel 15 Gram(s) Oral once  diphenoxylate/atropine 2 Tablet(s) Oral four times a day  finasteride 5 milliGRAM(s) Oral daily  glucagon  Injectable 1 milliGRAM(s) IntraMuscular once  influenza  Vaccine (HIGH DOSE) 0.7 milliLiter(s) IntraMuscular once  insulin glargine Injectable (LANTUS) 3 Unit(s) SubCutaneous at bedtime  insulin lispro (ADMELOG) corrective regimen sliding scale   SubCutaneous at bedtime  insulin lispro (ADMELOG) corrective regimen sliding scale   SubCutaneous three times a day before meals  insulin lispro Injectable (ADMELOG) 1 Unit(s) SubCutaneous three times a day before meals  lactobacillus acidophilus 1 Tablet(s) Oral three times a day with meals  loperamide 4 milliGRAM(s) Oral <User Schedule>  melatonin 5 milliGRAM(s) Oral at bedtime  opium Tincture 6 milliGRAM(s) Oral three times a day  pantoprazole    Tablet 40 milliGRAM(s) Oral every 12 hours  piperacillin/tazobactam IVPB.. 3.375 Gram(s) IV Intermittent every 8 hours  predniSONE   Tablet 4 milliGRAM(s) Oral daily  psyllium Powder 1 Packet(s) Oral two times a day  sodium chloride 0.9% lock flush 3 milliLiter(s) IV Push every 8 hours  sodium chloride 0.9%. 1000 milliLiter(s) (100 mL/Hr) IV Continuous <Continuous>  tamsulosin 0.8 milliGRAM(s) Oral at bedtime  tiotropium 18 MICROgram(s) Capsule 1 Capsule(s) Inhalation daily    MEDICATIONS  (PRN):  acetaminophen     Tablet .. 650 milliGRAM(s) Oral every 6 hours PRN Mild Pain (1 - 3)  ALBUTerol    90 MICROgram(s) HFA Inhaler 2 Puff(s) Inhalation every 6 hours PRN Shortness of Breath and/or Wheezing  albuterol/ipratropium for Nebulization 3 milliLiter(s) Nebulizer every 6 hours PRN Shortness of Breath and/or Wheezing  naloxone Injectable 0.1 milliGRAM(s) IV Push every 3 minutes PRN For ANY of the following changes in patient status:  A. RR LESS THAN 10 breaths per minute, B. Oxygen saturation LESS THAN 90%, C. Sedation score of 6  ondansetron Injectable 4 milliGRAM(s) IV Push every 6 hours PRN Nausea and/or Vomiting  oxybutynin 5 milliGRAM(s) Oral daily PRN Bladder spasms  oxyCODONE    IR 5 milliGRAM(s) Oral every 4 hours PRN Moderate Pain (4 - 6)  oxyCODONE    IR 10 milliGRAM(s) Oral every 4 hours PRN Severe Pain (7 - 10)    Allergies    ACE inhibitors (Swelling)    Intolerances      Social: no smoking, no alcohol, no illegal drugs; no recent travel, no exposure to TB  FAMILY HISTORY:  Family history of cardiomyopathy (Father)      no history of premature cardiovascular disease in first degree relatives    ROS: the patient denies fever, no chills, no HA, no seizures, no dizziness, no sore throat, no nasal congestion, no blurry vision, no CP, no palpitations, no SOB, no cough, no abdominal pain, no diarrhea, no N/V, no dysuria, no leg pain, no claudication, no rash, no joint aches, no rectal pain or bleeding, no night sweats  All other systems reviewed and are negative    Vital Signs Last 24 Hrs  T(C): 36.4 (31 Oct 2022 08:50), Max: 36.9 (30 Oct 2022 21:36)  T(F): 97.6 (31 Oct 2022 08:50), Max: 98.4 (30 Oct 2022 21:36)  HR: 99 (31 Oct 2022 08:50) (70 - 99)  BP: 100/53 (31 Oct 2022 08:50) (97/67 - 120/56)  BP(mean): --  RR: 18 (31 Oct 2022 08:50) (18 - 18)  SpO2: 94% (31 Oct 2022 08:50) (94% - 100%)    Parameters below as of 31 Oct 2022 08:50  Patient On (Oxygen Delivery Method): room air      Daily     Daily Weight in k.4 (31 Oct 2022 05:23)    PE:    Constitutional:  No acute distress  HEENT: NC/AT, EOMI, PERRLA, conjunctivae clear; ears and nose atraumatic; pharynx benign  Neck: supple; thyroid not palpable  Back: no tenderness  Respiratory: respiratory effort normal; clear to auscultation  Cardiovascular: S1S2 regular, no murmurs  Abdomen: soft, not tender, not distended, positive BS; no liver or spleen organomegaly; iliostomy  Genitourinary: no suprapubic tenderness  Lymphatic: no LN palpable  Musculoskeletal: no muscle tenderness, no joint swelling or tenderness  Extremities: no pedal edema  Neurological/ Psychiatric: AxOx3, judgement and insight normal; moving all extremities  Skin: no rashes; no palpable lesions    Labs: all available labs reviewed                        7.8    x     )-----------( x        ( 31 Oct 2022 09:43 )             25.2     10-31    135  |  104  |  31<H>  ----------------------------<  134<H>  4.9   |  24  |  2.26<H>    Ca    8.7      31 Oct 2022 06:33  Phos  3.4     10-31  Mg     1.6     10-31    TPro  x   /  Alb  1.9<L>  /  TBili  x   /  DBili  x   /  AST  x   /  ALT  x   /  AlkPhos  x   10-31     LIVER FUNCTIONS - ( 31 Oct 2022 06:33 )  Alb: 1.9 g/dL / Pro: x     / ALK PHOS: x     / ALT: x     / AST: x     / GGT: x           Urinalysis Basic - ( 30 Oct 2022 02:50 )    Color: Yellow / Appearance: Slightly Turbid / S.010 / pH: x  Gluc: x / Ketone: Negative  / Bili: Negative / Urobili: Negative   Blood: x / Protein: 100 / Nitrite: Negative   Leuk Esterase: Moderate / RBC: 6-10 /HPF / WBC 11-25   Sq Epi: x / Non Sq Epi: Occasional / Bacteria: Moderate    COVID-19 PCR: NotDetec (10-31-22 @ 06:30)        Radiology: all available radiological tests reviewed    < from: US Kidney and Bladder (10.28.22 @ 10:26) >  Mild left hydronephrosis appears similar to prior CT.    Two, nonshadowing intrarenal echogenic foci at the interpolar region of   the right kidney measuring 9 mm and 5 mm, both likely representing   nonobstructing stones versus artifact. No right-sided hydronephrosis.    < end of copied text >  < from: Xray Chest 1 View- PORTABLE-Routine (Xray Chest 1 View- PORTABLE-Routine in AM.) (10.27.22 @ 10:30) >  Decreased congestion.    < end of copied text >  < from: CT Abdomen and Pelvis w/ Oral Cont (10.19.22 @ 20:28) >  Persistent bibasilar atelectasis.  Resolution ofright hydronephrosis with persistent left hydronephrosis  Zhao catheter is now seen with the balloon in the bladder. Small   extraluminal collection with fluid and air is is seen in the pelvis just   to the right of midline.  Persistent but decreased left lower quadrant collection in the presence   of a drainage catheter    < end of copied text >      Advanced directives addressed: full resuscitation
76 yo male with h/o AF, PPM, HTN, UC, newly Dx colon CA, HTN, COPD (long smoking Hx), obesity and hyperlipidemia found to have a colon mass and CA and admitted for resection.  He underwent successful surgery on 10/6/22 complicated by acute hypoxic respiratory failure secondary to L sided pneumonia which improved with NIPPV, septic shock requiring pressors due to fecal spillage peritonitis, and JOSE likely from ATN. Was started on Zosyn. Off pressors 10/9. On 10/10 patient was transfused 2u pRBC after drop in h/h and noted with ?coffee ground content in ileostomy. Improved respiratory status, weaned to supplemental O2 via NC. Had urethral cath placed in OR by urology for urinary retention found to have bladder neck contracture. Underwent IR procedure for abd drain placement, found to have fluid collection, with 8cc aspirate, culture has been negative. Parenteral nutrition started on 10/17/22. Patient downgraded from ICU 10/21/22. Hospitalist consulted for continued medical management.     Subjective: patient seen at bedside, awake, alert, has no complaints, started clear liquid diet, tolerating so far     REVIEW OF SYSTEMS:    CONSTITUTIONAL: No weakness, fevers or chills  EYES/ENT: No visual changes;  No vertigo or throat pain   NECK: No pain or stiffness  RESPIRATORY: No cough, wheezing, hemoptysis; No shortness of breath  CARDIOVASCULAR: No chest pain or palpitations  GASTROINTESTINAL: No abdominal or epigastric pain. No nausea, vomiting, or hematemesis; No diarrhea or constipation. No melena or hematochezia.  GENITOURINARY: No dysuria, frequency or hematuria  NEUROLOGICAL: No numbness or weakness  SKIN: No itching, rashes    Vital Signs Last 24 Hrs  T(C): 36.3 (22 Oct 2022 08:32), Max: 36.8 (22 Oct 2022 04:32)  T(F): 97.3 (22 Oct 2022 08:32), Max: 98.3 (22 Oct 2022 04:32)  HR: 70 (22 Oct 2022 08:32) (66 - 87)  BP: 113/61 (22 Oct 2022 08:32) (110/76 - 122/73)  BP(mean): 71 (21 Oct 2022 13:00) (71 - 71)  RR: 16 (22 Oct 2022 08:32) (16 - 18)  SpO2: 96% (22 Oct 2022 08:32) (94% - 98%)    Parameters below as of 22 Oct 2022 08:32  Patient On (Oxygen Delivery Method): nasal cannula  O2 Flow (L/min): 4    PHYSICAL EXAM:  GENERAL: NAD, lying in bed comfortably  HEAD:  Atraumatic, Normocephalic  EYES: conjunctiva and sclera clear  ENT: Moist mucous membranes  NECK: Supple, No JVD  CHEST/LUNG: Clear to auscultation bilaterally; No rales, rhonchi, wheezing, or rubs. Unlabored respirations  HEART: Regular rate and rhythm; No murmurs, rubs, or gallops  ABDOMEN: Bowel sounds present; Soft, Nontender, + ostomy, midline incision w/ staples in place, C/D/I, no surrounding erythema   EXTREMITIES:  2+ Peripheral Pulses, brisk capillary refill. No clubbing, cyanosis, or edema  NERVOUS SYSTEM:  Alert & Oriented X3, speech clear. No deficits   MSK: FROM all 4 extremities, full and equal strength    MEDICATIONS  (STANDING):  acetaminophen   IVPB .. 1000 milliGRAM(s) IV Intermittent once  apixaban 5 milliGRAM(s) Oral every 12 hours  atorvastatin 20 milliGRAM(s) Oral at bedtime  budesonide 160 MICROgram(s)/formoterol 4.5 MICROgram(s) Inhaler 2 Puff(s) Inhalation two times a day  chlorhexidine 4% Liquid 1 Application(s) Topical <User Schedule>  dextrose 50% Injectable 25 Gram(s) IV Push once  dextrose 50% Injectable 12.5 Gram(s) IV Push once  dextrose 50% Injectable 25 Gram(s) IV Push once  dextrose Oral Gel 15 Gram(s) Oral once  fat emulsion (Fish Oil and Plant Based) 20% Infusion 0.79 Gm/kG/Day (41.67 mL/Hr) IV Continuous <Continuous>  fat emulsion (Fish Oil and Plant Based) 20% Infusion 0.79 Gm/kG/Day (41.5 mL/Hr) IV Continuous <Continuous>  finasteride 5 milliGRAM(s) Oral daily  glucagon  Injectable 1 milliGRAM(s) IntraMuscular once  influenza  Vaccine (HIGH DOSE) 0.7 milliLiter(s) IntraMuscular once  insulin lispro (ADMELOG) corrective regimen sliding scale   SubCutaneous every 6 hours  loperamide 4 milliGRAM(s) Oral <User Schedule>  melatonin 5 milliGRAM(s) Oral at bedtime  pantoprazole  Injectable 40 milliGRAM(s) IV Push every 12 hours  Parenteral Nutrition - Adult 1 Each (83 mL/Hr) TPN Continuous <Continuous>  Parenteral Nutrition - Adult 1 Each (83 mL/Hr) TPN Continuous <Continuous>  piperacillin/tazobactam IVPB.. 3.375 Gram(s) IV Intermittent every 8 hours  predniSONE   Tablet 4 milliGRAM(s) Oral daily  sodium chloride 0.9% lock flush 3 milliLiter(s) IV Push every 8 hours  tamsulosin 0.8 milliGRAM(s) Oral at bedtime  tiotropium 18 MICROgram(s) Capsule 1 Capsule(s) Inhalation daily    MEDICATIONS  (PRN):  acetaminophen     Tablet .. 650 milliGRAM(s) Oral every 6 hours PRN Mild Pain (1 - 3)  ALBUTerol    90 MICROgram(s) HFA Inhaler 2 Puff(s) Inhalation every 6 hours PRN Shortness of Breath and/or Wheezing  albuterol/ipratropium for Nebulization 3 milliLiter(s) Nebulizer every 6 hours PRN Shortness of Breath and/or Wheezing  morphine  - Injectable 2 milliGRAM(s) IV Push every 4 hours PRN Moderate Pain (4 - 6)  naloxone Injectable 0.1 milliGRAM(s) IV Push every 3 minutes PRN For ANY of the following changes in patient status:  A. RR LESS THAN 10 breaths per minute, B. Oxygen saturation LESS THAN 90%, C. Sedation score of 6  ondansetron Injectable 4 milliGRAM(s) IV Push every 6 hours PRN Nausea and/or Vomiting  oxybutynin 5 milliGRAM(s) Oral daily PRN Bladder spasms      Labs:                         7.8    5.82  )-----------( 271      ( 22 Oct 2022 04:57 )             25.5   10-22    139  |  104  |  20  ----------------------------<  153<H>  3.6   |  29  |  1.11    Ca    8.2<L>      22 Oct 2022 04:57  Phos  3.0     10-22  Mg     1.6     10-22    
PT STATES SHE SLIPPED AT HOME WHICH AGGRAVATED HER L LEG
ICU Consult Note    HPI:    S:    Pt seen and examined  HD # 1  FULL CODE  PMHx extensive including AF, HTN, UC, newly Dx colon CA, HTN, COPD (long smoking Hx), obesity  Pt here for elective CRS    POD # 0 s/p --->    RCP-IPAA (restorative proctocolectomy with ileal pouch anal anastomosis), diverting loop ileostomy    ~ 5 hours case  Outputs:    EBL 250cc  UOP ~ 100cc    Input: 3200cc crystalloid    Not on continuous pressors throughout  Extubated  Started on neosynephrine in PACU for post op hypotension    10/6: In PACU. Awake, still drowsy but arousable. Ac drip ongoing. Small air leak from wound vac, surgery aware. IVF ongoing.    ROS: Unable to obtain 2/2 drowsiness    Allergies    ACE inhibitors (Swelling)    Intolerances    MEDICATIONS  (STANDING):    atorvastatin 20 milliGRAM(s) Oral at bedtime  HYDROmorphone PCA (1 mG/mL) 30 milliLiter(s) PCA Continuous PCA Continuous  influenza  Vaccine (HIGH DOSE) 0.7 milliLiter(s) IntraMuscular once  lactated ringers. 1000 milliLiter(s) (75 mL/Hr) IV Continuous <Continuous>  phenylephrine    Infusion 0.1 MICROgram(s)/kG/Min (4.73 mL/Hr) IV Continuous <Continuous>  tiotropium 18 MICROgram(s) Capsule 1 Capsule(s) Inhalation daily    MEDICATIONS  (PRN):    ALBUTerol    90 MICROgram(s) HFA Inhaler 2 Puff(s) Inhalation every 6 hours PRN Shortness of Breath and/or Wheezing  HYDROmorphone PCA (1 mG/mL) Rescue Clinician Bolus 0.5 milliGRAM(s) IV Push every 15 minutes PRN for Pain Scale GREATER THAN 6  naloxone Injectable 0.1 milliGRAM(s) IV Push every 3 minutes PRN For ANY of the following changes in patient status:  A. RR LESS THAN 10 breaths per minute, B. Oxygen saturation LESS THAN 90%, C. Sedation score of 6  ondansetron Injectable 4 milliGRAM(s) IV Push every 6 hours PRN Nausea    Drug Dosing Weight    Height (cm): 180.3 (06 Oct 2022 08:40)  Weight (kg): 126.1 (06 Oct 2022 08:40)  BMI (kg/m2): 38.8 (06 Oct 2022 08:40)  BSA (m2): 2.43 (06 Oct 2022 08:40)    PAST MEDICAL & SURGICAL HISTORY:    Ulcerative colitis  Colon cancer  Obstructive sleep apnea  Obesity  HTN (hypertension)  Atrial fibrillation  HLD (hyperlipidemia)  BPH (benign prostatic hyperplasia)  COPD (chronic obstructive pulmonary disease)  COVID-19 virus infection  History of pneumonia  H/O hernia repair  History of hydrocelectomy  H/O colonoscopy  H/O cystoscopy  urethral stricture  History of total knee replacement, bilateral  S/P placement of cardiac pacemaker  History of cataract surgery    FAMILY HISTORY:    Family history of cardiomyopathy (Father)    ROS: See HPI; otherwise, all systems reviewed and negative.    O:    ICU Vital Signs Last 24 Hrs  T(C): 36.4 (06 Oct 2022 15:04), Max: 36.4 (06 Oct 2022 15:04)  T(F): 97.6 (06 Oct 2022 15:04), Max: 97.6 (06 Oct 2022 15:04)  HR: 96 (06 Oct 2022 15:50) (52 - 96)  BP: 108/57 (06 Oct 2022 15:50) (62/30 - 108/57)  BP(mean): 69 (06 Oct 2022 15:50) (37 - 72)  ABP: --  ABP(mean): --  RR: 22 (06 Oct 2022 15:50) (18 - 25)  SpO2: 96% (06 Oct 2022 15:50) (95% - 96%)    O2 Parameters below as of 06 Oct 2022 15:50  Patient On (Oxygen Delivery Method): mask, nonrebreather    O2 Concentration (%): 100    I&O's Detail    06 Oct 2022 07:01  -  06 Oct 2022 16:13  --------------------------------------------------------  IN:    Other (mL): 3200 mL  Total IN: 3200 mL    OUT:    Bulb (mL): 65 mL    Other (mL): 100 mL  Total OUT: 165 mL    Total NET: 3035 mL    PE:    Elderly M lying in bed  No JVD trachea midline  + NRB in place  S1S2+  Coarse BS B/L  Abd---> midline wound noted hooked to prevena vac; abd softly distended  No leg swelling/edema noted  Awake but drowsy  Skin pink and well perfused    LABS:    PENDING    CAPILLARY BLOOD GLUCOSE      POCT Blood Glucose.: 217 mg/dL (06 Oct 2022 14:52)  POCT Blood Glucose.: 121 mg/dL (06 Oct 2022 09:56)  POCT Blood Glucose.: 152 mg/dL (06 Oct 2022 08:21)              
HPI:  Patient is a 77y old  Male who presents with a chief complaint of Malignant neoplasm of sigmoid colon  Ulcerative colitis without complications  (07 Oct 2022 12:41)      76 yo male s/p proctocolectomy, ileoanal anastomosis, loop ileostomy 10/6. Urology consulted for pt with urinary retention requiring alfaro placement and failed trial of void with subsequent alfaro placement. Patient seen at bedside reports he used to follow up with a urologist few years ago but stopped following up due to the pandemic and was on Flomax 0.8 mg QD but stopped taking it because his Rx ran out. He reports he notes some urinary hesitancy, frequency and nocturia.    PAST MEDICAL & SURGICAL HISTORY:  Ulcerative colitis      Colon cancer      Obstructive sleep apnea      Obesity      HTN (hypertension)      Atrial fibrillation      HLD (hyperlipidemia)      BPH (benign prostatic hyperplasia)      COPD (chronic obstructive pulmonary disease)      COVID-19 virus infection      History of pneumonia      H/O hernia repair      History of hydrocelectomy      H/O colonoscopy      H/O cystoscopy  urethral stricture      History of total knee replacement, bilateral      S/P placement of cardiac pacemaker      History of cataract surgery          REVIEW OF SYSTEMS  All other review of systems neg, except as noted in HPI    MEDICATIONS  (STANDING):  acetaminophen   IVPB .. 1000 milliGRAM(s) IV Intermittent once  acetaminophen   IVPB .. 1000 milliGRAM(s) IV Intermittent once  atorvastatin 20 milliGRAM(s) Oral at bedtime  budesonide 160 MICROgram(s)/formoterol 4.5 MICROgram(s) Inhaler 2 Puff(s) Inhalation two times a day  chlorhexidine 4% Liquid 1 Application(s) Topical <User Schedule>  dextrose 50% Injectable 25 Gram(s) IV Push once  dextrose 50% Injectable 12.5 Gram(s) IV Push once  dextrose 50% Injectable 25 Gram(s) IV Push once  dextrose Oral Gel 15 Gram(s) Oral once  glucagon  Injectable 1 milliGRAM(s) IntraMuscular once  hydrocortisone sodium succinate Injectable 50 milliGRAM(s) IV Push daily  influenza  Vaccine (HIGH DOSE) 0.7 milliLiter(s) IntraMuscular once  insulin lispro (ADMELOG) corrective regimen sliding scale   SubCutaneous Before meals and at bedtime  lactated ringers. 1000 milliLiter(s) (50 mL/Hr) IV Continuous <Continuous>  lactated ringers. 1000 milliLiter(s) (50 mL/Hr) IV Continuous <Continuous>  loperamide 2 milliGRAM(s) Oral three times a day  pantoprazole  Injectable 40 milliGRAM(s) IV Push every 12 hours  piperacillin/tazobactam IVPB.. 3.375 Gram(s) IV Intermittent every 8 hours  psyllium Powder 1 Packet(s) Oral daily  sodium chloride 0.9% lock flush 3 milliLiter(s) IV Push every 8 hours  tamsulosin 0.4 milliGRAM(s) Oral at bedtime  tiotropium 18 MICROgram(s) Capsule 1 Capsule(s) Inhalation daily    MEDICATIONS  (PRN):  acetaminophen     Tablet .. 650 milliGRAM(s) Oral every 6 hours PRN Mild Pain (1 - 3)  ALBUTerol    90 MICROgram(s) HFA Inhaler 2 Puff(s) Inhalation every 6 hours PRN Shortness of Breath and/or Wheezing  albuterol/ipratropium for Nebulization 3 milliLiter(s) Nebulizer every 6 hours PRN Shortness of Breath and/or Wheezing  HYDROmorphone  Injectable 0.5 milliGRAM(s) IV Push every 6 hours PRN Severe Pain (7 - 10)  morphine  - Injectable 2 milliGRAM(s) IV Push every 4 hours PRN Moderate Pain (4 - 6)  naloxone Injectable 0.1 milliGRAM(s) IV Push every 3 minutes PRN For ANY of the following changes in patient status:  A. RR LESS THAN 10 breaths per minute, B. Oxygen saturation LESS THAN 90%, C. Sedation score of 6  ondansetron Injectable 4 milliGRAM(s) IV Push every 6 hours PRN Nausea and/or Vomiting      Allergies    ACE inhibitors (Swelling)    Intolerances        SOCIAL HISTORY:    FAMILY HISTORY:  Family history of cardiomyopathy (Father)        Vital Signs Last 24 Hrs  T(C): 37 (11 Oct 2022 06:00), Max: 37 (11 Oct 2022 02:00)  T(F): 98.6 (11 Oct 2022 06:00), Max: 98.6 (11 Oct 2022 02:00)  HR: 76 (11 Oct 2022 12:03) (59 - 97)  BP: 123/54 (11 Oct 2022 08:00) (87/60 - 130/65)  BP(mean): 71 (11 Oct 2022 08:00) (47 - 81)  RR: 18 (11 Oct 2022 08:00) (13 - 22)  SpO2: 91% (11 Oct 2022 08:00) (88% - 100%)    Parameters below as of 11 Oct 2022 08:00  Patient On (Oxygen Delivery Method): nasal cannula  O2 Flow (L/min): 5      PHYSICAL EXAM:    General: No distress, No anxiety  VITALS  T(C): 37 (10-11-22 @ 06:00), Max: 37 (10-11-22 @ 02:00)  HR: 76 (10-11-22 @ 12:03) (59 - 97)  BP: 123/54 (10-11-22 @ 08:00) (87/60 - 130/65)  RR: 18 (10-11-22 @ 08:00) (13 - 22)  SpO2: 91% (10-11-22 @ 08:00) (88% - 100%)            Skin     : No jaundice   HEENT: Normocephalic, no icterus , EOM full , No epistaxis  Lung    : No resp distress  Abdo:   : obese abd, Non tender, No guarding, No distension   Genitalia Male: alfaro with yellow urine  Neuro   : A&Ox3        LABS:                        8.2    9.47  )-----------( 172      ( 11 Oct 2022 11:32 )             26.3     10-11    135  |  101  |  70<H>  ----------------------------<  151<H>  4.5   |  27  |  1.93<H>    Ca    8.7      11 Oct 2022 05:24  Phos  3.3     10-11  Mg     2.4     10-11    TPro  5.7<L>  /  Alb  2.0<L>  /  TBili  0.5  /  DBili  x   /  AST  13<L>  /  ALT  17  /  AlkPhos  42  10-11    PTT - ( 10 Oct 2022 06:46 )  PTT:23.1 sec         
Chief complaint:  Patient is a 77y old  Male who presents with a chief complaint of LUQ abdominal fluid    HPI:  77 male w/pmhx a fib on eliquis, HTN HLD obesity FLORA COPD ex smoker  ulcerative colitis and colon cancer, now s/p proctocolectomy with ileostomy complicated by hypotension. Pt managed in ICU, initially required pressors, now stabilized. Recently had NGT placed as well.  Ct demonstrated a LUQ fluid collection. IR consulted for drainage. Pt seen at bedside, reported feeling well, denied fever, CP, SOB, NVD.     Allergies  ACE inhibitors (Swelling)    MEDICATIONS  (STANDING):  acetaminophen   IVPB .. 1000 milliGRAM(s) IV Intermittent once  apixaban 5 milliGRAM(s) Oral every 12 hours  atorvastatin 20 milliGRAM(s) Oral at bedtime  budesonide 160 MICROgram(s)/formoterol 4.5 MICROgram(s) Inhaler 2 Puff(s) Inhalation two times a day  chlorhexidine 4% Liquid 1 Application(s) Topical <User Schedule>  dextrose 50% Injectable 25 Gram(s) IV Push once  dextrose 50% Injectable 12.5 Gram(s) IV Push once  dextrose 50% Injectable 25 Gram(s) IV Push once  dextrose Oral Gel 15 Gram(s) Oral once  finasteride 5 milliGRAM(s) Oral daily  glucagon  Injectable 1 milliGRAM(s) IntraMuscular once  influenza  Vaccine (HIGH DOSE) 0.7 milliLiter(s) IntraMuscular once  insulin lispro (ADMELOG) corrective regimen sliding scale   SubCutaneous every 6 hours  pantoprazole  Injectable 40 milliGRAM(s) IV Push every 12 hours  Parenteral Nutrition - Adult 1 Each (83 mL/Hr) TPN Continuous <Continuous>  Parenteral Nutrition - Adult 1 Each (83 mL/Hr) TPN Continuous <Continuous>  predniSONE   Tablet 4 milliGRAM(s) Oral daily  sodium chloride 0.9% lock flush 3 milliLiter(s) IV Push every 8 hours  tamsulosin 0.8 milliGRAM(s) Oral at bedtime  tiotropium 18 MICROgram(s) Capsule 1 Capsule(s) Inhalation daily    MEDICATIONS  (PRN):  acetaminophen     Tablet .. 650 milliGRAM(s) Oral every 6 hours PRN Mild Pain (1 - 3)  ALBUTerol    90 MICROgram(s) HFA Inhaler 2 Puff(s) Inhalation every 6 hours PRN Shortness of Breath and/or Wheezing  albuterol/ipratropium for Nebulization 3 milliLiter(s) Nebulizer every 6 hours PRN Shortness of Breath and/or Wheezing  naloxone Injectable 0.1 milliGRAM(s) IV Push every 3 minutes PRN For ANY of the following changes in patient status:  A. RR LESS THAN 10 breaths per minute, B. Oxygen saturation LESS THAN 90%, C. Sedation score of 6  ondansetron Injectable 4 milliGRAM(s) IV Push every 6 hours PRN Nausea and/or Vomiting  oxybutynin 5 milliGRAM(s) Oral daily PRN Bladder spasms  QUEtiapine 50 milliGRAM(s) Oral at bedtime PRN agitated delirium      PAST MEDICAL & SURGICAL HISTORY:  Ulcerative colitis      Colon cancer      Obstructive sleep apnea      Obesity      HTN (hypertension)      Atrial fibrillation      HLD (hyperlipidemia)      BPH (benign prostatic hyperplasia)      COPD (chronic obstructive pulmonary disease)      COVID-19 virus infection      History of pneumonia      H/O hernia repair      History of hydrocelectomy      H/O colonoscopy      H/O cystoscopy  urethral stricture      History of total knee replacement, bilateral      S/P placement of cardiac pacemaker      History of cataract surgery          FAMILY HISTORY:  Family history of cardiomyopathy (Father)        Review of Systems:  As per HPI    Vital Signs Last 24 Hrs  T(C): 36.7 (18 Oct 2022 08:00), Max: 36.9 (17 Oct 2022 21:10)  T(F): 98 (18 Oct 2022 08:00), Max: 98.5 (17 Oct 2022 21:10)  HR: 89 (18 Oct 2022 11:00) (66 - 90)  BP: 122/59 (18 Oct 2022 11:00) (80/65 - 135/56)  BP(mean): 79 (18 Oct 2022 11:00) (65 - 90)  RR: 18 (18 Oct 2022 11:00) (13 - 25)  SpO2: 97% (18 Oct 2022 11:00) (92% - 100%)    Parameters below as of 18 Oct 2022 11:00    O2 Flow (L/min): 4      GENERAL APPEARANCE: Well developed, in no acute distress.    LUNGS: Auscultation of the lungs revealed normal breath sounds without any other adventitious sounds or rubs.    NEUROLOGIC: Alert and oriented x 3. Normal affect.     CBC                        8.6    7.68  )-----------( 329      ( 18 Oct 2022 05:22 )             28.6       Chemistry  10-18    136  |  103  |  37<H>  ----------------------------<  217<H>  5.3   |  28  |  1.42<H>    Ca    8.8      18 Oct 2022 05:22  Phos  3.8     10-18  Mg     2.2     10-18    TPro  6.3  /  Alb  2.0<L>  /  TBili  0.2  /  DBili  x   /  AST  12<L>  /  ALT  17  /  AlkPhos  60  10-18    Coags  PT/INR - ( 18 Oct 2022 09:55 )   PT: 14.9 sec;   INR: 1.28 ratio    PTT - ( 18 Oct 2022 09:55 )  PTT:27.6 sec    < from: CT Abdomen and Pelvis No Cont (10.17.22 @ 15:02) >  10.5 x 3.0 x 8.4 cm loculated collection in the anterior upper abdomen   with adjacent fat stranding suspicious for abscess.    < end of copied text >  
  CHIEF COMPLAINT:  Patient is a 77y old  Male who presents with a chief complaint of Elective colectomy (07 Oct 2022 02:35)      HPI: 10/7/22:  78 yo male with h/o AF, PPM, HTN, UC, newly Dx colon CA, HTN, COPD (long smoking Hx), obesity and hyperlipidemia found to have a colon mass and CA and admitted for resection.  He underwent successful surgery on 10/6/22.  He is followed from a cardiac standpoint by his own cardiologist, Dr Sanjay Serna, in [place] and had pre-op evaluation and reportedly had an echo in 5/2022 showing LVEF=44% with a dilated RV and mildly elevated RVSP.  Lexiscan nuclear stress test reportedly showed evidence for a prior inferior/inferolateral MI but no new ischemia.  Cardiac cath in 2017 reportedly showed a <40% LAD narrowing.  He has no anginal chest pains or increased SOB at this time on O2 via face mask.        PMHx:  PAST MEDICAL & SURGICAL HISTORY:  Ulcerative colitis  Colon cancer  Obstructive sleep apnea  Obesity  HTN (hypertension)  Atrial fibrillation  HLD (hyperlipidemia)  BPH (benign prostatic hyperplasia)  COPD (chronic obstructive pulmonary disease)  COVID-19 virus infection  History of pneumonia  H/O hernia repair  History of hydrocelectomy  H/O colonoscopy  H/O cystoscopy-urethral stricture  History of total knee replacement, bilateral  S/P placement of cardiac pacemaker  History of cataract surgery      FAMILY HISTORY:   FAMILY HISTORY:  Family history of cardiomyopathy (Father)      ALLERGIES:  Allergies  ACE inhibitors (Swelling)      REVIEW OF SYSTEMS:  10 point ROS was obtained  Pertinent positives and negatives are as above  All other review of systems is negative unless indicated above      Vital Signs Last 24 Hrs  T(C): 36.9 (07 Oct 2022 00:00), Max: 37 (06 Oct 2022 20:00)  T(F): 98.4 (07 Oct 2022 00:00), Max: 98.6 (06 Oct 2022 20:00)  HR: 90 (07 Oct 2022 05:35) (52 - 112)  BP: 97/61 (07 Oct 2022 04:00) (62/30 - 129/98)  BP(mean): 70 (07 Oct 2022 04:00) (37 - 105)  RR: 16 (07 Oct 2022 04:00) (9 - 25)  SpO2: 93% (07 Oct 2022 05:35) (91% - 100%): BiPAP/CPAP        I&O's Summary    06 Oct 2022 07:01  -  07 Oct 2022 07:00  --------------------------------------------------------  IN: 5622 mL / OUT: 505 mL / NET: 5117 mL      CAPILLARY BLOOD GLUCOSE  POCT Blood Glucose.: 229 mg/dL (07 Oct 2022 06:05)  POCT Blood Glucose.: 228 mg/dL (06 Oct 2022 23:09)  POCT Blood Glucose.: 239 mg/dL (06 Oct 2022 21:46)  POCT Blood Glucose.: 217 mg/dL (06 Oct 2022 14:52)  POCT Blood Glucose.: 121 mg/dL (06 Oct 2022 09:56)  POCT Blood Glucose.: 152 mg/dL (06 Oct 2022 08:21)      PHYSICAL EXAM:   Constitutional: NAD, awake and alert, well-developed  HEENT: PERR, EOMI, Normal Hearing, MMM  Neck: Soft and supple, No LAD, No JVD  Respiratory: Breath sounds are clear bilaterally, No wheezing, rales or rhonchi  Cardiovascular: S1 and S2, regular rate and rhythm, soft DONNY at LLSB and base as before, no gallops or rubs  Gastrointestinal: Bowel Sounds present, soft, nontender, nondistended, no guarding, no rebound  Extremities: No peripheral edema  Vascular: 2+ peripheral pulses  Neurological: A/O x 3, no focal deficits  Musculoskeletal: 5/5 strength b/l upper and lower extremities  Skin: No rashes      MEDICATIONS  (STANDING):  atorvastatin 20 milliGRAM(s) Oral at bedtime  cefoTEtan  IVPB 2 Gram(s) IV Intermittent every 12 hours  cefoTEtan  IVPB      cefoTEtan  IVPB 1 Gram(s) IV Intermittent every 12 hours  chlorhexidine 4% Liquid 1 Application(s) Topical <User Schedule>  dextrose 50% Injectable 25 Gram(s) IV Push once  dextrose 50% Injectable 12.5 Gram(s) IV Push once  dextrose 50% Injectable 25 Gram(s) IV Push once  dextrose Oral Gel 15 Gram(s) Oral once  glucagon  Injectable 1 milliGRAM(s) IntraMuscular once  heparin   Injectable 5000 Unit(s) SubCutaneous every 8 hours  hydrocortisone sodium succinate Injectable 100 milliGRAM(s) IV Push every 8 hours  HYDROmorphone PCA (1 mG/mL) 30 milliLiter(s) PCA Continuous PCA Continuous  influenza  Vaccine (HIGH DOSE) 0.7 milliLiter(s) IntraMuscular once  insulin lispro (ADMELOG) corrective regimen sliding scale   SubCutaneous every 6 hours  lactated ringers. 1000 milliLiter(s) (999 mL/Hr) IV Continuous <Continuous>  lactated ringers. 1000 milliLiter(s) (75 mL/Hr) IV Continuous <Continuous>  phenylephrine    Infusion 0.1 MICROgram(s)/kG/Min (4.73 mL/Hr) IV Continuous <Continuous>  sodium chloride 0.9% lock flush 3 milliLiter(s) IV Push every 8 hours  tiotropium 18 MICROgram(s) Capsule 1 Capsule(s) Inhalation daily    MEDICATIONS  (PRN):  ALBUTerol    90 MICROgram(s) HFA Inhaler 2 Puff(s) Inhalation every 6 hours PRN Shortness of Breath and/or Wheezing  HYDROmorphone PCA (1 mG/mL) Rescue Clinician Bolus 0.5 milliGRAM(s) IV Push every 15 minutes PRN for Pain Scale GREATER THAN 6  naloxone Injectable 0.1 milliGRAM(s) IV Push every 3 minutes PRN For ANY of the following changes in patient status:  A. RR LESS THAN 10 breaths per minute, B. Oxygen saturation LESS THAN 90%, C. Sedation score of 6  ondansetron Injectable 4 milliGRAM(s) IV Push every 6 hours PRN Nausea  ondansetron Injectable 4 milliGRAM(s) IV Push every 6 hours PRN Nausea and/or Vomiting      LABS: All Labs Reviewed:                        11.0   11.60 )-----------( 159      ( 07 Oct 2022 05:53 )             35.6     10-07    140  |  105  |  26<H>  ----------------------------<  215<H>  5.8<H>   |  28  |  2.36<H>    Ca    8.1<L>      07 Oct 2022 05:53  Phos  5.9     10-07  Mg     1.6     10-07    TPro  5.0<L>  /  Alb  2.3<L>  /  TBili  0.6  /  DBili  x   /  AST  19  /  ALT  30  /  AlkPhos  43  10-07    A1C with Estimated Average Glucose (09.29.22 @ 14:21): 6.9    Serum Pro-Brain Natriuretic Peptide: 543 pg/mL (10-06 @ 16:10)    BLOOD CULTURES:   LIPID PROFILE     RADIOLOGY:   CXR: 9/29/22:  FINDINGS:  PULMONARY: The airway is midline.  There are no airspace consolidations. Cardiac device wire leads are   within right atrium and right ventricle.  No pleural effusion or pneumothorax.  HEART/VASCULAR: The heart size and mediastinum configuration are within the limits of normal.  BONES: The visualized osseus structures are intact.  IMPRESSION:  No radiographic evidence of active chest disease..       EKG:  10/6/22:  Atrial-sensed ventricular-paced rhythm with occasional Premature ventricular complexes    TELEMETRY:      ECHO:      
PT STATES SHE SLIPPED AT HOME WHICH AGGRAVATED HER L LEG, C/O OF BACK PAIN AS WELL.

## 2022-10-31 NOTE — PROGRESS NOTE ADULT - SUBJECTIVE AND OBJECTIVE BOX
cc - dyspnea             76 yo male with CAD, ICM with EF 40-45%  h/o AF, PPM, HTN, UC, newly Dx colon CA, HTN, COPD (long smoking Hx), obesity and hyperlipidemia found to have a colon mass and CA and admitted on 10/06/22  for resection.   10/06- s/p total proctocolectomy with J- pouch creation, open diverting loop ileostomy, 2 VINNY drains placed   Surgery complicated by acute hypoxic respiratory failure secondary to L sided pneumonia which improved with NIPPV, septic shock requiring pressors due to fecal spillage peritonitis, and JOSE likely from ATN. Was started on Zosyn. Off pressors 10/9. On 10/10 patient was transfused 2u pRBC after drop in h/h and noted with ?coffee ground content in ileostomy. Improved respiratory status, weaned to supplemental O2 via NC.   10/17 - s/p cystoscopy and urethral catheter placement in OR by urology for urinary retention found to have bladder neck contracture.   10/19 - s/p IR procedure for  LLQ abd drain placement, found to have fluid collection, with 8cc aspirate, culture has been negative. Parenteral nutrition started on 10/17/22. Patient downgraded from ICU 10/21/22. Hospitalist consulted for continued medical management.   10/25 - no cp palps sob abdo pain, got PRBCs yesterday no lasix; Cr trending up today, BNP slightly elevated   10/26 - no cp palps sob abdo pain, able to lie flat in bed but BNP rising and he got IVFs yesterday, CR worse     10/27 - chart reviewed. pt seen and examined, denies cp, dyspnea, abdominal pain, on O2 , afebrile, plan discussed  10/28 - pt seen and examined, denies cp, dyspnea, + alfaro draining dark urine, encouraged po liquid intake, afebrile, plan discussed  10/29 - no events overnight, breathing stable ,denies cp, dyspnea, abdominal pain, was drinking a lot of water yesterday , afebrile  10/30 - no events, denies chest pain, dyspnea , tolerating po intake, wants to go home, afebrile, plan   10/31 - pt seen and examined, no events, denies cp, dyspnea , abdominal pain, tolerating Iv fluids, IV abx, plan discussed     Review of system- Rest of the review of system are negative except mentioned in HPI    Vital sings reviewed for last 24 h  T(C): 36.4 (10-31-22 @ 08:50), Max: 36.9 (10-30-22 @ 21:36)  T(F): 97.6 (10-31-22 @ 08:50), Max: 98.4 (10-30-22 @ 21:36)  HR: 111 (10-31-22 @ 13:42) (70 - 111)  BP: 99/65 (10-31-22 @ 13:42) (97/67 - 120/56)  RR: 18 (10-31-22 @ 13:42) (18 - 18)  SpO2: 100% (10-31-22 @ 13:42) (94% - 100%)  Wt(kg): --  Daily     Daily Weight in k.4 (31 Oct 2022 05:23)  CAPILLARY BLOOD GLUCOSE      POCT Blood Glucose.: 136 mg/dL (31 Oct 2022 12:07)  POCT Blood Glucose.: 129 mg/dL (31 Oct 2022 08:20)  POCT Blood Glucose.: 149 mg/dL (30 Oct 2022 22:11)  POCT Blood Glucose.: 144 mg/dL (30 Oct 2022 17:11)        Home Oxygen Evaluation:  Pulse ox (SpO2) on room air at rest:	94 %  Pulse ox (SpO2) on room air with exertion:	88 %  Pulse ox (SpO2) on	3 L/min  O2 with exertion:	95 %        Physical exam :       GENERAL: NAD, lying in bed comfortably with NC on   HEAD:  Atraumatic, Normocephalic  EYES: conjunctiva and sclera clear  ENT: Moist mucous membranes  NECK: Supple, No JVD  CHEST/LUNG: few bibasilar crackles   HEART: Regular rate and rhythm; No murmurs, rubs, or gallops  ABDOMEN: Bowel sounds present; Soft, Nontender, + ileostomy, midline incision w/ staples in place, C/D/I, no surrounding erythema , + Alfaro   EXTREMITIES:  2+ Peripheral Pulses, brisk capillary refill. No clubbing, cyanosis, or edema  NERVOUS SYSTEM:  Alert & Oriented X3, speech clear. No deficits   MSK: FROM all 4 extremities, full and equal strength    LABS: All Labs Reviewed:  10-31    135  |  104  |  31<H>  ----------------------------<  134<H>  4.9   |  24  |  2.26<H>    Ca    8.7      31 Oct 2022 06:33  Phos  3.4     10-31  Mg     1.6     10-31    TPro  x   /  Alb  1.9<L>  /  TBili  x   /  DBili  x   /  AST  x   /  ALT  x   /  AlkPhos  x   10-31                            7.8    x     )-----------( x        ( 31 Oct 2022 09:43 )             25.2             LIVER FUNCTIONS - ( 31 Oct 2022 06:33 )  Alb: 1.9 g/dL / Pro: x     / ALK PHOS: x     / ALT: x     / AST: x     / GGT: x                 Urinalysis Basic - ( 30 Oct 2022 02:50 )    Color: Yellow / Appearance: Slightly Turbid / S.010 / pH: x  Gluc: x / Ketone: Negative  / Bili: Negative / Urobili: Negative   Blood: x / Protein: 100 / Nitrite: Negative   Leuk Esterase: Moderate / RBC: 6-10 /HPF / WBC 11-25   Sq Epi: x / Non Sq Epi: Occasional / Bacteria: Moderate      10    131<L>  |  101  |  33<H>  ----------------------------<  135<H>  4.5   |  23  |  2.52<H>    Ca    8.5      30 Oct 2022 07:46  Phos  4.0     10-  Mg     1.8     10-30                          7.9    6.77  )-----------( 155      ( 30 Oct 2022 07:46 )             26.1   Urinalysis (10.30.22 @ 02:50)    pH Urine: 5.0    Glucose Qualitative, Urine: Negative    Blood, Urine: Large    Color: Yellow    Urine Appearance: Slightly Turbid    Bilirubin: Negative    Ketone - Urine: Negative    Specific Gravity: 1.010    Protein, Urine: 100    Urobilinogen: Negative    Nitrite: Negative    Leukocyte Esterase Concentration: Moderate  Urine Microscopic-Add On (NC) (10.30.22 @ 02:50)    Red Blood Cell - Urine: 6-10 /HPF    White Blood Cell - Urine: 11-25    Bacteria: Moderate    Comment - Urine: buddibg yeast    Epithelial Cells: Occasional          Radiology reviewed :     US Kidney and Bladder (10.28.22 @ 10:26) >  IMPRESSION:  Mild left hydronephrosis appears similar to prior CT.    Two, nonshadowing intrarenal echogenic foci at the interpolar region of   the right kidney measuring 9 mm and 5 mm, both likely representing   nonobstructing stones versus artifact. No right-sided hydronephrosis.       Xray Chest 1 View- PORTABLE-Routine (Xray Chest 1 View- PORTABLE-Routine in AM.) (10.27.22 @ 10:30) >    Frontal expiratory view of the chest shows the heart to be similar in   size. Left cardiac pacemaker is again noted.    The lungs show less pulmonary congestion and less left base atelectasis.   There is no evidence of pneumothorax nor pleural effusion.    IMPRESSION:  Decreased congestion.    < end of copied text >        Xray Chest 1 View- PORTABLE-Routine (Xray Chest 1 View- PORTABLE-Routine in AM.) (10.26.22 @ 08:09) >  PULMONARY: Mild bilateral perihilar diffuse airspace disease..   No pneumothorax.    HEART/VASCULAR: The  heart is enlarged in transverse diameter. Cardiac   device wire leads are within right atrium and right ventricle.     BONES: Visualized osseous structures are intact.    IMPRESSION: Mild bilateral perihilar diffuse airspace disease.  -------------------------------------------  FOLLOW-UP AP PORTABLE CHEST RADIOGRAPH 10/26/2022 AT 8:03 AM:    INDICATION: Congestive heart failure.    FINDINGS: No interval change.     Xray Chest 1 View- PORTABLE-Routine (Xray Chest 1 View- PORTABLE-Routine in AM.) (10.25.22 @ 07:38) >  IMPRESSION: Mild bilateral perihilar diffuse airspace disease.    TTE Echo Complete w/ Contrast w/ Doppler (10.07.22 @ 10:28) >   Summary     Moderate mitral annular calcification is present.   The mitral valve leaflets appear thickened.   EA reversal of the mitral inflow consistent with reduced compliance of   the   left ventricle.   The aortic valve appears mildly calcified. Valve opening seems to be   restricted.   Peak and mean transaortic gradients are 25 and 14 mmHg respectively; this   finding is consistent with mild aortic stenosis.   The tricuspid valve leaflets appear mildly thickened and/or calcified,   but  open well.   Moderate (2+) tricuspid valve regurgitation is present.   Mild pulmonary hypertension.   Mild concentric left ventricular hypertrophy is present.   Left ventricle systolic function moderately decreased paradoxical septal   motion ,apical wall , anterior wall , apical lateral hypokinesis in the   presence of a cardiac arrhythmia.   Lumason was used to better define the endocardial border.   Visual estimation of left ventricle ejection fraction is 40-45 %.   Normal appearing right atrium.   A device wire is seen in the RV and RA.   Normal appearing right ventricle structure and function.     12 Lead ECG (10.06.22 @ 08:52) >  Ventricular Rate 73 BPM  Diagnosis Line Atrial-sensed ventricular-paced rhythm with occasional Premature ventricular complexes  Abnormal ECG  When compared with ECG of 06-OCT-2022 08:52,  Vent. rate has decreased BY   9 BPM    Serum Pro-Brain Natriuretic Peptide (10.28.22 @ 07:24)    Serum Pro-Brain Natriuretic Peptide: 2915 pg/mL  Serum Pro-Brain Natriuretic Peptide: 2318 pg/mL (10-27-22 @ 07:19)  Serum Pro-Brain Natriuretic Peptide: 1647 pg/mL (10-26-22 @ 07:30)  Serum Pro-Brain Natriuretic Peptide: 1252 pg/mL (10-25-22 @ 08:27)  Serum Pro-Brain Natriuretic Peptide: 543 pg/mL (10-06-22 @ 16:10)      MEDS:   acetaminophen     Tablet .. 650 milliGRAM(s) Oral every 6 hours PRN  acetaminophen     Tablet .. 650 milliGRAM(s) Oral every 6 hours  acetaminophen   IVPB .. 1000 milliGRAM(s) IV Intermittent once  ALBUTerol    90 MICROgram(s) HFA Inhaler 2 Puff(s) Inhalation every 6 hours PRN  albuterol/ipratropium for Nebulization 3 milliLiter(s) Nebulizer every 6 hours PRN  apixaban 5 milliGRAM(s) Oral every 12 hours  atorvastatin 20 milliGRAM(s) Oral at bedtime  budesonide 160 MICROgram(s)/formoterol 4.5 MICROgram(s) Inhaler 2 Puff(s) Inhalation two times a day  chlorhexidine 4% Liquid 1 Application(s) Topical <User Schedule>  cholestyramine Powder (Sugar-Free) 4 Gram(s) Oral daily  diphenoxylate/atropine 2 Tablet(s) Oral four times a day  finasteride 5 milliGRAM(s) Oral daily    influenza  Vaccine (HIGH DOSE) 0.7 milliLiter(s) IntraMuscular once  insulin lispro (ADMELOG) corrective regimen sliding scale   SubCutaneous every 6 hours  loperamide 4 milliGRAM(s) Oral <User Schedule>  melatonin 5 milliGRAM(s) Oral at bedtime  morphine  - Injectable 2 milliGRAM(s) IV Push every 4 hours PRN  naloxone Injectable 0.1 milliGRAM(s) IV Push every 3 minutes PRN  ondansetron Injectable 4 milliGRAM(s) IV Push every 6 hours PRN  oxybutynin 5 milliGRAM(s) Oral daily PRN  oxyCODONE    IR 5 milliGRAM(s) Oral every 4 hours PRN  oxyCODONE    IR 10 milliGRAM(s) Oral every 4 hours PRN  pantoprazole  Injectable 40 milliGRAM(s) IV Push every 12 hours  piperacillin/tazobactam IVPB.. 3.375 Gram(s) IV Intermittent every 8 hours  predniSONE   Tablet 4 milliGRAM(s) Oral daily  psyllium Powder 1 Packet(s) Oral two times a day  sodium chloride 0.9% lock flush 3 milliLiter(s) IV Push every 8 hours  tamsulosin 0.8 milliGRAM(s) Oral at bedtime  tiotropium 18 MICROgram(s) Capsule 1 Capsule(s) Inhalation daily

## 2022-10-31 NOTE — PROGRESS NOTE ADULT - ASSESSMENT
78 yo wm presented for colon mass for ca s/p total proctocolonscopy   hosptal course complicated with Left pna tx with zosyn   and abdominal fluid collection with neg cx   urinary retention with alfaro in place   hx of systolic chf off lasix PTA for 6 months, diuresed with lasix iv in ICU  now with JOSE in setting of inc ostomy outpt and neg fluid balance    PLAN   - agree with dc of iv lasix and will hold   - alfaro in place and draining dark urine   - UA with micr  - r/o AIN while on zosyn with urine eos   - fu trend of scr off diuretics, prn ivf as needed   - ostomy outpt improving with more pasty output  cw lomotil and opium     10/29 SY  --JOSE : creat slightly stabilized off IV Lasix.   I>>O due to ostomy output.    Gentle hydration to stabilize renal function as well as serum sodium.  --Monitor ostomy output.    10/30 SY  --JOSE : creat remains elevated with increased urine and ostomy output.      BP borderline.  Increase IVF for now  --Urine with budding yeast--check urine culture  --Unable to clearly rule in or out interstitial nephritis : continue     10/31  Creat improving  excellent UO  Ostomy @ 1400 ml  yeast reported on UA but not on cultures yet

## 2022-10-31 NOTE — PROGRESS NOTE ADULT - SUBJECTIVE AND OBJECTIVE BOX
Pt seen at bedside, resting comfortably. Pt denies nausea, vomiting, fever, chills or pain. No acute events overnight. Eager to go home. VS reviewed.    Vital Signs Last 24 Hrs  T(C): 36.9 (30 Oct 2022 21:36), Max: 36.9 (30 Oct 2022 21:36)  T(F): 98.4 (30 Oct 2022 21:36), Max: 98.4 (30 Oct 2022 21:36)  HR: 70 (30 Oct 2022 21:36) (63 - 81)  BP: 97/67 (30 Oct 2022 21:36) (94/62 - 120/56)  BP(mean): --  RR: 18 (30 Oct 2022 21:36) (18 - 18)  SpO2: 97% (30 Oct 2022 21:36) (97% - 100%)    Parameters below as of 30 Oct 2022 21:36  Patient On (Oxygen Delivery Method): nasal cannula    Physical Exam:  General: AAOx3, Well developed, NAD  Chest: Normal respiratory effort  Heart: RRR  Abdomen: Soft, non tender, ostomy patent with thicker content, midline incision clean and dry, staples in place  Neuro/Psych: No localized deficits. Normal spech, normal tone                        7.8    6.15  )-----------( 159      ( 31 Oct 2022 06:33 )             26.5   10-31    135  |  104  |  31<H>  ----------------------------<  134<H>  4.9   |  24  |  2.26<H>    Ca    8.7      31 Oct 2022 06:33  Phos  3.4     10-31  Mg     1.6     10-31    TPro  x   /  Alb  1.9<L>  /  TBili  x   /  DBili  x   /  AST  x   /  ALT  x   /  AlkPhos  x   10-31    I&O's Detail    30 Oct 2022 07:01  -  31 Oct 2022 07:00  --------------------------------------------------------  IN:  Total IN: 0 mL    OUT:    Ileostomy (mL): 1400 mL    Indwelling Catheter - Urethral (mL): 2350 mL  Total OUT: 3750 mL    Total NET: -3750 mL      Current Inpatient Medications:  acetaminophen     Tablet .. 650 milliGRAM(s) Oral every 6 hours PRN  acetaminophen     Tablet .. 650 milliGRAM(s) Oral every 6 hours  acetaminophen   IVPB .. 1000 milliGRAM(s) IV Intermittent once  ALBUTerol    90 MICROgram(s) HFA Inhaler 2 Puff(s) Inhalation every 6 hours PRN  albuterol/ipratropium for Nebulization 3 milliLiter(s) Nebulizer every 6 hours PRN  apixaban 5 milliGRAM(s) Oral every 12 hours  atorvastatin 20 milliGRAM(s) Oral at bedtime  budesonide 160 MICROgram(s)/formoterol 4.5 MICROgram(s) Inhaler 2 Puff(s) Inhalation two times a day  chlorhexidine 4% Liquid 1 Application(s) Topical <User Schedule>  cholecalciferol 2000 Unit(s) Oral daily  cholestyramine Powder (Sugar-Free) 4 Gram(s) Oral daily  dextrose 5%. 1000 milliLiter(s) (50 mL/Hr) IV Continuous <Continuous>  dextrose 5%. 1000 milliLiter(s) (100 mL/Hr) IV Continuous <Continuous>  dextrose 50% Injectable 25 Gram(s) IV Push once  dextrose 50% Injectable 12.5 Gram(s) IV Push once  dextrose 50% Injectable 25 Gram(s) IV Push once  dextrose Oral Gel 15 Gram(s) Oral once  diphenoxylate/atropine 2 Tablet(s) Oral four times a day  finasteride 5 milliGRAM(s) Oral daily  glucagon  Injectable 1 milliGRAM(s) IntraMuscular once  influenza  Vaccine (HIGH DOSE) 0.7 milliLiter(s) IntraMuscular once  insulin glargine Injectable (LANTUS) 3 Unit(s) SubCutaneous at bedtime  insulin lispro (ADMELOG) corrective regimen sliding scale   SubCutaneous at bedtime  insulin lispro (ADMELOG) corrective regimen sliding scale   SubCutaneous three times a day before meals  insulin lispro Injectable (ADMELOG) 1 Unit(s) SubCutaneous three times a day before meals  iron sucrose Injectable 100 milliGRAM(s) IV Push every 24 hours  loperamide 4 milliGRAM(s) Oral <User Schedule>  melatonin 5 milliGRAM(s) Oral at bedtime  naloxone Injectable 0.1 milliGRAM(s) IV Push every 3 minutes PRN  ondansetron Injectable 4 milliGRAM(s) IV Push every 6 hours PRN  opium Tincture 6 milliGRAM(s) Oral two times a day  oxybutynin 5 milliGRAM(s) Oral daily PRN  oxyCODONE    IR 5 milliGRAM(s) Oral every 4 hours PRN  oxyCODONE    IR 10 milliGRAM(s) Oral every 4 hours PRN  pantoprazole    Tablet 40 milliGRAM(s) Oral every 12 hours  piperacillin/tazobactam IVPB.. 3.375 Gram(s) IV Intermittent every 8 hours  predniSONE   Tablet 4 milliGRAM(s) Oral daily  psyllium Powder 1 Packet(s) Oral two times a day  sodium chloride 0.9% lock flush 3 milliLiter(s) IV Push every 8 hours  sodium chloride 0.9%. 1000 milliLiter(s) (75 mL/Hr) IV Continuous <Continuous>  tamsulosin 0.8 milliGRAM(s) Oral at bedtime  tiotropium 18 MICROgram(s) Capsule 1 Capsule(s) Inhalation daily

## 2022-10-31 NOTE — PROGRESS NOTE ADULT - ASSESSMENT
76 yo M with above PMHx presents for colon resection complicated by acute hypoxemic resp failure 2/2 PNA and septic shock, now resolved course complicated by concerns for fluid overload/mild sCHF exacerbation and then further complicated by JOSE/ATN, now improving.    -BUN/CR now improved off lasix and requiring IVFs. Likely had ATN now improving.  -Avoid hypotension.  -Recommend monitoring patient's intake as well as output to see if he is keeping up with his output. This will help determine when IVFs can be D/Adarsh.  -With his low BP and HRs usually closer to the 60s, will hold off on BB for now and monitor  -No ACE/ARB/Entresto due to his allergy and now his worsening renal function  -EF >40% so not a candidate for SGLT2  -C/W hydration as per primary team and nephrology. With his very minimally reduced LVSF no need to start on lasix one he is euvolemic. Recommend low salt diet and monitoring closely with his outside cardiologist.    Thank you for allowing me to partake in the care of this patient. I will continue to follow with you intermittently/PRN. If you have any questions or concerns, please do not hesitate to call me or my service.

## 2022-10-31 NOTE — PROGRESS NOTE ADULT - TIME BILLING
see above
see above
as above
see above
see above
as above
as above
see above
as above

## 2022-10-31 NOTE — PROGRESS NOTE ADULT - REASON FOR ADMISSION
surgery for colon mass/CA
Elective colectomy
surgery for colon mass/CA

## 2022-10-31 NOTE — CONSULT NOTE ADULT - CONSULT REASON
S/p proctocolectomy, IPAA, DLI for UC/Sigmoid CA, IR drain for collection. S/p cystoscopy w/ stent placement and [place] Zhao
Urinary retention
jose juan
78yo M with hx of colon CA, recent proctocolectomy w/ ileostomy, referred to IR for drainage of LUQ abdominal fluid collection
abx management
Post op hypotension
h/o AF and PPM

## 2022-11-01 ENCOUNTER — TRANSCRIPTION ENCOUNTER (OUTPATIENT)
Age: 77
End: 2022-11-01

## 2022-11-01 LAB
ANION GAP SERPL CALC-SCNC: 6 MMOL/L — SIGNIFICANT CHANGE UP (ref 5–17)
BASOPHILS # BLD AUTO: 0.03 K/UL — SIGNIFICANT CHANGE UP (ref 0–0.2)
BASOPHILS NFR BLD AUTO: 0.6 % — SIGNIFICANT CHANGE UP (ref 0–2)
BUN SERPL-MCNC: 26 MG/DL — HIGH (ref 7–23)
CALCIUM SERPL-MCNC: 8.6 MG/DL — SIGNIFICANT CHANGE UP (ref 8.5–10.1)
CHLORIDE SERPL-SCNC: 108 MMOL/L — SIGNIFICANT CHANGE UP (ref 96–108)
CO2 SERPL-SCNC: 21 MMOL/L — LOW (ref 22–31)
CREAT SERPL-MCNC: 2.36 MG/DL — HIGH (ref 0.5–1.3)
EGFR: 28 ML/MIN/1.73M2 — LOW
EOSINOPHIL # BLD AUTO: 0.08 K/UL — SIGNIFICANT CHANGE UP (ref 0–0.5)
EOSINOPHIL NFR BLD AUTO: 1.6 % — SIGNIFICANT CHANGE UP (ref 0–6)
GLUCOSE SERPL-MCNC: 105 MG/DL — HIGH (ref 70–99)
HCT VFR BLD CALC: 25 % — LOW (ref 39–50)
HCT VFR BLD CALC: 27.9 % — LOW (ref 39–50)
HGB BLD-MCNC: 7.3 G/DL — LOW (ref 13–17)
HGB BLD-MCNC: 8.4 G/DL — LOW (ref 13–17)
IMM GRANULOCYTES NFR BLD AUTO: 1.6 % — HIGH (ref 0–0.9)
LYMPHOCYTES # BLD AUTO: 0.95 K/UL — LOW (ref 1–3.3)
LYMPHOCYTES # BLD AUTO: 18.8 % — SIGNIFICANT CHANGE UP (ref 13–44)
MAGNESIUM SERPL-MCNC: 1.6 MG/DL — SIGNIFICANT CHANGE UP (ref 1.6–2.6)
MCHC RBC-ENTMCNC: 25.3 PG — LOW (ref 27–34)
MCHC RBC-ENTMCNC: 29.2 GM/DL — LOW (ref 32–36)
MCV RBC AUTO: 86.8 FL — SIGNIFICANT CHANGE UP (ref 80–100)
MONOCYTES # BLD AUTO: 0.45 K/UL — SIGNIFICANT CHANGE UP (ref 0–0.9)
MONOCYTES NFR BLD AUTO: 8.9 % — SIGNIFICANT CHANGE UP (ref 2–14)
NEUTROPHILS # BLD AUTO: 3.47 K/UL — SIGNIFICANT CHANGE UP (ref 1.8–7.4)
NEUTROPHILS NFR BLD AUTO: 68.5 % — SIGNIFICANT CHANGE UP (ref 43–77)
NT-PROBNP SERPL-SCNC: HIGH PG/ML (ref 0–450)
PHOSPHATE SERPL-MCNC: 3.3 MG/DL — SIGNIFICANT CHANGE UP (ref 2.5–4.5)
PLATELET # BLD AUTO: 153 K/UL — SIGNIFICANT CHANGE UP (ref 150–400)
POTASSIUM SERPL-MCNC: 5.2 MMOL/L — SIGNIFICANT CHANGE UP (ref 3.5–5.3)
POTASSIUM SERPL-SCNC: 5.2 MMOL/L — SIGNIFICANT CHANGE UP (ref 3.5–5.3)
RBC # BLD: 2.88 M/UL — LOW (ref 4.2–5.8)
RBC # FLD: 18.4 % — HIGH (ref 10.3–14.5)
SODIUM SERPL-SCNC: 135 MMOL/L — SIGNIFICANT CHANGE UP (ref 135–145)
TROPONIN I, HIGH SENSITIVITY RESULT: 71.29 NG/L — SIGNIFICANT CHANGE UP
WBC # BLD: 5.06 K/UL — SIGNIFICANT CHANGE UP (ref 3.8–10.5)
WBC # FLD AUTO: 5.06 K/UL — SIGNIFICANT CHANGE UP (ref 3.8–10.5)

## 2022-11-01 PROCEDURE — 99232 SBSQ HOSP IP/OBS MODERATE 35: CPT

## 2022-11-01 RX ORDER — SODIUM CHLORIDE 9 MG/ML
1000 INJECTION INTRAMUSCULAR; INTRAVENOUS; SUBCUTANEOUS
Refills: 0 | Status: DISCONTINUED | OUTPATIENT
Start: 2022-11-01 | End: 2022-11-01

## 2022-11-01 RX ADMIN — PIPERACILLIN AND TAZOBACTAM 25 GRAM(S): 4; .5 INJECTION, POWDER, LYOPHILIZED, FOR SOLUTION INTRAVENOUS at 06:17

## 2022-11-01 RX ADMIN — Medication 4 MILLIGRAM(S): at 10:43

## 2022-11-01 RX ADMIN — Medication 1 TABLET(S): at 17:40

## 2022-11-01 RX ADMIN — Medication 650 MILLIGRAM(S): at 21:04

## 2022-11-01 RX ADMIN — FLUCONAZOLE 100 MILLIGRAM(S): 150 TABLET ORAL at 10:44

## 2022-11-01 RX ADMIN — Medication 1 TABLET(S): at 08:09

## 2022-11-01 RX ADMIN — SODIUM CHLORIDE 75 MILLILITER(S): 9 INJECTION INTRAMUSCULAR; INTRAVENOUS; SUBCUTANEOUS at 11:00

## 2022-11-01 RX ADMIN — PANTOPRAZOLE SODIUM 40 MILLIGRAM(S): 20 TABLET, DELAYED RELEASE ORAL at 10:42

## 2022-11-01 RX ADMIN — INSULIN GLARGINE 3 UNIT(S): 100 INJECTION, SOLUTION SUBCUTANEOUS at 21:03

## 2022-11-01 RX ADMIN — Medication 1 UNIT(S): at 08:09

## 2022-11-01 RX ADMIN — Medication 4 MILLIGRAM(S): at 17:40

## 2022-11-01 RX ADMIN — Medication 1 TABLET(S): at 13:13

## 2022-11-01 RX ADMIN — Medication 1 PACKET(S): at 10:43

## 2022-11-01 RX ADMIN — APIXABAN 5 MILLIGRAM(S): 2.5 TABLET, FILM COATED ORAL at 21:06

## 2022-11-01 RX ADMIN — PANTOPRAZOLE SODIUM 40 MILLIGRAM(S): 20 TABLET, DELAYED RELEASE ORAL at 21:04

## 2022-11-01 RX ADMIN — SODIUM CHLORIDE 3 MILLILITER(S): 9 INJECTION INTRAMUSCULAR; INTRAVENOUS; SUBCUTANEOUS at 21:21

## 2022-11-01 RX ADMIN — Medication 4 MILLIGRAM(S): at 21:04

## 2022-11-01 RX ADMIN — APIXABAN 5 MILLIGRAM(S): 2.5 TABLET, FILM COATED ORAL at 10:43

## 2022-11-01 RX ADMIN — Medication 5 MILLIGRAM(S): at 21:04

## 2022-11-01 RX ADMIN — MORPHINE 6 MILLIGRAM(S): 10 SOLUTION ORAL at 13:14

## 2022-11-01 RX ADMIN — Medication 1 UNIT(S): at 17:40

## 2022-11-01 RX ADMIN — MORPHINE 6 MILLIGRAM(S): 10 SOLUTION ORAL at 06:17

## 2022-11-01 RX ADMIN — TAMSULOSIN HYDROCHLORIDE 0.8 MILLIGRAM(S): 0.4 CAPSULE ORAL at 21:04

## 2022-11-01 RX ADMIN — Medication 1 UNIT(S): at 13:13

## 2022-11-01 RX ADMIN — CHOLESTYRAMINE 4 GRAM(S): 4 POWDER, FOR SUSPENSION ORAL at 08:10

## 2022-11-01 RX ADMIN — BUDESONIDE AND FORMOTEROL FUMARATE DIHYDRATE 2 PUFF(S): 160; 4.5 AEROSOL RESPIRATORY (INHALATION) at 21:31

## 2022-11-01 RX ADMIN — MORPHINE 6 MILLIGRAM(S): 10 SOLUTION ORAL at 21:04

## 2022-11-01 RX ADMIN — ATORVASTATIN CALCIUM 20 MILLIGRAM(S): 80 TABLET, FILM COATED ORAL at 21:04

## 2022-11-01 RX ADMIN — Medication 4 MILLIGRAM(S): at 10:45

## 2022-11-01 RX ADMIN — SODIUM CHLORIDE 3 MILLILITER(S): 9 INJECTION INTRAMUSCULAR; INTRAVENOUS; SUBCUTANEOUS at 06:22

## 2022-11-01 RX ADMIN — Medication 1 PACKET(S): at 21:05

## 2022-11-01 RX ADMIN — FINASTERIDE 5 MILLIGRAM(S): 5 TABLET, FILM COATED ORAL at 10:43

## 2022-11-01 RX ADMIN — Medication 4 MILLIGRAM(S): at 06:17

## 2022-11-01 RX ADMIN — SODIUM CHLORIDE 3 MILLILITER(S): 9 INJECTION INTRAMUSCULAR; INTRAVENOUS; SUBCUTANEOUS at 13:10

## 2022-11-01 RX ADMIN — BUDESONIDE AND FORMOTEROL FUMARATE DIHYDRATE 2 PUFF(S): 160; 4.5 AEROSOL RESPIRATORY (INHALATION) at 08:24

## 2022-11-01 RX ADMIN — TIOTROPIUM BROMIDE 1 CAPSULE(S): 18 CAPSULE ORAL; RESPIRATORY (INHALATION) at 08:23

## 2022-11-01 RX ADMIN — Medication 2000 UNIT(S): at 10:44

## 2022-11-01 NOTE — PROGRESS NOTE ADULT - ATTENDING COMMENTS
Patient seen and evaluated at bedside. No acute issues overnight. Abdomen is soft, nondistended, appropriately tender, ileostomy output is appropriate continue to augment output. Cr remains elevated, continue IVF per renal. Follow up their recommendations. Patient requests another ostomy lesson. Continue supportive care during recovery, D/C pending improvement in renal parameters.    Vital Signs Last 24 Hrs  T(C): 37 (01 Nov 2022 12:49), Max: 37 (01 Nov 2022 12:49)  T(F): 98.6 (01 Nov 2022 12:49), Max: 98.6 (01 Nov 2022 12:49)  HR: 59 (01 Nov 2022 12:49) (59 - 111)  BP: 100/79 (01 Nov 2022 12:49) (94/48 - 125/62)  BP(mean): --  RR: 19 (01 Nov 2022 12:49) (18 - 19)  SpO2: 99% (01 Nov 2022 12:49) (93% - 100%)    Parameters below as of 01 Nov 2022 12:49  Patient On (Oxygen Delivery Method): nasal cannula                          7.3    5.06  )-----------( 153      ( 01 Nov 2022 07:07 )             25.0 Patient seen and evaluated at bedside. No acute issues overnight. Abdomen is soft, nondistended, appropriately tender, ileostomy output is appropriate continue to augment output. Cr remains elevated, continue IVF per renal. Follow up their recommendations. Patient requests another ostomy lesson. Continue IV antibiotics, and antifungal per ID. Continue supportive care during recovery, D/C pending improvement in renal parameters.    Vital Signs Last 24 Hrs  T(C): 37 (01 Nov 2022 12:49), Max: 37 (01 Nov 2022 12:49)  T(F): 98.6 (01 Nov 2022 12:49), Max: 98.6 (01 Nov 2022 12:49)  HR: 59 (01 Nov 2022 12:49) (59 - 111)  BP: 100/79 (01 Nov 2022 12:49) (94/48 - 125/62)  BP(mean): --  RR: 19 (01 Nov 2022 12:49) (18 - 19)  SpO2: 99% (01 Nov 2022 12:49) (93% - 100%)    Parameters below as of 01 Nov 2022 12:49  Patient On (Oxygen Delivery Method): nasal cannula                          7.3    5.06  )-----------( 153      ( 01 Nov 2022 07:07 )             25.0

## 2022-11-01 NOTE — ADVANCED PRACTICE NURSE CONSULT - ASSESSMENT
This is a 77 year old male admitted on 10/6/2022 for restorative proctocolectomy with IPAA, diverting loop ileostomy. PMH: COVID -19, COPD, BPH, HLD, A-Fib, FLORA, Colon cancer, Ulcerative colitis.     Assessment:    Received patient sitting up in chair awake but tired,  with recognition of CWON , made aware of purpose of CWON, agreeable to pouch change & ostomy teaching. Loop Ileostomy to the Right Upper Quadrant  w/ [place] High drainable pouching system applied  in place, barrier intact, no leakage. Pouch gently removed from pt's abdomen w/ water moistened gauze.     Type of ostomy:  Loop Ileostomy   Location: RUQ  Len: n/a  Size: oval in shape, unable to be rounded out, measuring 1"x 1 5/8" Protruding 1/4"  and the inferior portion of the stoma is flat with skin  Mucosa: red, moist,    Peristomal skin: intact, mild erythema. No STing Barrier and Powder applied   Mucocutaneous junction: intact  Abdominal contours: Round /Soft /Obese  Output: ~Applesauce consistency stool noted      Received patient sitting up in bed wife at bedside, awake, with recognition of CWON , made aware of purpose of CWON, agreeable to pouch change & ostomy teaching. Loop Ileostomy  to the Right Upper  Quadrant  w/ [place] drainable pouching system applied  in place, barrier intact, no leakage. Pouch gently removed from pt's abdomen w/ water moistened gauze.       Patient re-pouched w/ Curt 2 1/4” CeraPlus flat barrier #71718 (cut to 1" x 1 5/8"), and [place]  2 1/4” drainable pouch w/ lock & roll closure #16104.  Full ostomy lesson provided to patient.     Impression:  Ostomy to Right Upper Quadrent-given pt's stomal characteristics and abdominal topography, pt still requires flat pouching system to protect peristomal skin w/ shrinking post-op stomal size. Patient may benefit with a Convex system. Initially I did not use because of the patients rounded firm abdomen. His abdomen has become softer.     Patient more ready & willing to learn ostomy care this visit, observed entire pouch change, looking directly at stoma, asking  appropriate questions & able to verbalize key ostomy points. Patient again observed pouch opening/closing, again reviewed w/ patient that his stool consistency will require him to empty pouch several times each day & again strongly encouraged patient start practicing pouch opening/closing & emptying as this skill is required prior to d/c home, patient agreeable to start practicing pouch emptying. Patient expressed feeling overwhelmed with everything,  much emotional support & encouragement provided to patient.     Reviewed with patient dietary restrictions, s/s & prevention of food obstruction & constipation. Also reviewed w/ patient lifestyle modifications (clothing, bathing/showering, physical activity). Written information regarding all above topics provided to patient.        Patient Enrolled in [employer] and will require 2 1/4 " flat wafer and pouch. I did ask for them to send a convex system as well.     
This is a 77 year old male admitted on 10/6/2022 for restorative proctocolectomy with IPAA, diverting loop ileostomy. PMH: COVID -19, COPD, BPH, HLD, A-Fib, FLORA, Colon cancer, Ulcerative colitis.     Assessment:    Received patient sitting up in chair awake but tired,  with recognition of CWON , made aware of purpose of CWON, agreeable to pouch change & ostomy teaching. Loop IleOstomy to the Right Upper Quadrent  w/ [place] High drainable pouching system applied  in place, barrier intact, no leakage. Pouch gently removed from pt's abdomen w/ water moistened gauze.     Type of ostomy:  Loop Ileostomy   Location: RUQ  Len: n/a  Size: oval in shape, unable to be rounded out, measuring 1"x 1 1/2" Protruding 1/4"    Mucosa: red, moist, remains edematous & slightly translucent   Peristomal skin: intact   Mucocutaneous junction: intact  Abdominal contours: Round /Firm/Obese  Output: ~high output of Blackened liquid stool  Midline/lap sites/ drains: midline incision  -c/d/i; VINNY drain LLQ in place   Patient re-pouched w/ Curt 2 1/4” CeraPlus flat barrier #36329 (cut to 1 1/2" ), and Curt  2 1/4” drainable high output pouch.      Unable to provide Full ostomy lesson provided to patient.    Impression:  Ostomy to Quadrent-given pt's stomal characteristics and abdominal topography, pt still requires flat pouching system, if stoma flattens patient may require a soft convex to protect peristomal skin w/ shrinking post-op stomal size     patient not agreeable to start practicing pouch emptying. Patient expressed feeling overwhelmed with everything,  much emotional support & encouragement provided to patient.     Will review further when patient is more awake and wife at bedside.

## 2022-11-01 NOTE — DISCHARGE NOTE NURSING/CASE MANAGEMENT/SOCIAL WORK - NSDCPEFALRISK_GEN_ALL_CORE
For information on Fall & Injury Prevention, visit: [email or web address] OR  [email or web address] OR  https://www.cdc.gov/steadi/patient.html

## 2022-11-01 NOTE — PROGRESS NOTE ADULT - SUBJECTIVE AND OBJECTIVE BOX
cc - dyspnea             76 yo male with CAD, ICM with EF 40-45%  h/o AF, PPM, HTN, UC, newly Dx colon CA, HTN, COPD (long smoking Hx), obesity and hyperlipidemia found to have a colon mass and CA and admitted on 10/06/22  for resection.   10/06- s/p total proctocolectomy with J- pouch creation, open diverting loop ileostomy, 2 VINNY drains placed   Surgery complicated by acute hypoxic respiratory failure secondary to L sided pneumonia which improved with NIPPV, septic shock requiring pressors due to fecal spillage peritonitis, and JOSE likely from ATN. Was started on Zosyn. Off pressors 10/9. On 10/10 patient was transfused 2u pRBC after drop in h/h and noted with ?coffee ground content in ileostomy. Improved respiratory status, weaned to supplemental O2 via NC.   10/17 - s/p cystoscopy and urethral catheter placement in OR by urology for urinary retention found to have bladder neck contracture.   10/19 - s/p IR procedure for  LLQ abd drain placement, found to have fluid collection, with 8cc aspirate, culture has been negative. Parenteral nutrition started on 10/17/22. Patient downgraded from ICU 10/21/22. Hospitalist consulted for continued medical management.   10/25 - no cp palps sob abdo pain, got PRBCs yesterday no lasix; Cr trending up today, BNP slightly elevated   10/26 - no cp palps sob abdo pain, able to lie flat in bed but BNP rising and he got IVFs yesterday, CR worse     10/27 - chart reviewed. pt seen and examined, denies cp, dyspnea, abdominal pain, on O2 , afebrile, plan discussed  10/28 - pt seen and examined, denies cp, dyspnea, + alfaro draining dark urine, encouraged po liquid intake, afebrile, plan discussed  10/29 - no events overnight, breathing stable ,denies cp, dyspnea, abdominal pain, was drinking a lot of water yesterday , afebrile  10/30 - no events, denies chest pain, dyspnea , tolerating po intake, wants to go home, afebrile, plan   10/31 - pt seen and examined, no events, denies cp, dyspnea , abdominal pain, tolerating Iv fluids, IV abx, plan discussed    -pt seen and examined in am, denies cp, dyspnea, pale, tolerating blood transfusion, denies cp, dyspnea, abdominal pain    Review of system- Rest of the review of system are negative except mentioned in HPI    Vital sings reviewed for last 24 h  T(C): 36.3 (22 @ 15:45), Max: 37 (22 @ 12:49)  T(F): 97.4 (22 @ 15:45), Max: 98.6 (22 @ 12:49)  HR: 87 (22 @ 15:45) (59 - 87)  BP: 121/66 (22 @ 15:45) (100/79 - 125/62)  RR: 18 (22 @ 15:45) (18 - 19)  SpO2: 97% (22 @ 15:45) (94% - 99%)  Wt(kg): --  Daily     Daily   CAPILLARY BLOOD GLUCOSE      POCT Blood Glucose.: 135 mg/dL (2022 17:36)  POCT Blood Glucose.: 126 mg/dL (2022 13:07)  POCT Blood Glucose.: 115 mg/dL (2022 07:34)  POCT Blood Glucose.: 114 mg/dL (2022 06:20)  POCT Blood Glucose.: 144 mg/dL (31 Oct 2022 22:05)          Home Oxygen Evaluation:  Pulse ox (SpO2) on room air at rest:	94 %  Pulse ox (SpO2) on room air with exertion:	88 %  Pulse ox (SpO2) on	3 L/min  O2 with exertion:	95 %        Physical exam :       GENERAL: NAD, lying in bed comfortably with NC on   HEAD:  Atraumatic, Normocephalic  EYES: conjunctiva and sclera clear  ENT: Moist mucous membranes  NECK: Supple, No JVD  CHEST/LUNG: few bibasilar crackles   HEART: Regular rate and rhythm; No murmurs, rubs, or gallops  ABDOMEN: Bowel sounds present; Soft, Nontender, + ileostomy, midline incision w/ staples in place, C/D/I, no surrounding erythema , + Alfaro   EXTREMITIES:  2+ Peripheral Pulses, brisk capillary refill. No clubbing, cyanosis, or edema  NERVOUS SYSTEM:  Alert & Oriented X3, speech clear. No deficits   MSK: FROM all 4 extremities, full and equal strength    LABS: All Labs Reviewed:      135  |  108  |  26<H>  ----------------------------<  105<H>  5.2   |  21<L>  |  2.36<H>    Ca    8.6      2022 07:07  Phos  3.3       Mg     1.6         TPro  x   /  Alb  1.9<L>  /  TBili  x   /  DBili  x   /  AST  x   /  ALT  x   /  AlkPhos  x   10-31                        7.3    5.06  )-----------( 153      ( 2022 07:07 )             25.0   LIVER FUNCTIONS - ( 31 Oct 2022 06:33 )  Alb: 1.9 g/dL / Pro: x     / ALK PHOS: x     / ALT: x     / AST: x     / GGT: x               Urinalysis Basic - ( 30 Oct 2022 02:50 )    Color: Yellow / Appearance: Slightly Turbid / S.010 / pH: x  Gluc: x / Ketone: Negative  / Bili: Negative / Urobili: Negative   Blood: x / Protein: 100 / Nitrite: Negative   Leuk Esterase: Moderate / RBC: 6-10 /HPF / WBC 11-25   Sq Epi: x / Non Sq Epi: Occasional / Bacteria: Moderate  Urinalysis (10.30.22 @ 02:50)    pH Urine: 5.0    Glucose Qualitative, Urine: Negative    Blood, Urine: Large    Color: Yellow    Urine Appearance: Slightly Turbid    Bilirubin: Negative    Ketone - Urine: Negative    Specific Gravity: 1.010    Protein, Urine: 100    Urobilinogen: Negative    Nitrite: Negative    Leukocyte Esterase Concentration: Moderate  Urine Microscopic-Add On (NC) (10.30.22 @ 02:50)    Red Blood Cell - Urine: 6-10 /HPF    White Blood Cell - Urine: 11-25    Bacteria: Moderate    Comment - Urine: buddibg yeast    Epithelial Cells: Occasional          Radiology reviewed :     US Kidney and Bladder (10.28.22 @ 10:26) >  IMPRESSION:  Mild left hydronephrosis appears similar to prior CT.    Two, nonshadowing intrarenal echogenic foci at the interpolar region of   the right kidney measuring 9 mm and 5 mm, both likely representing   nonobstructing stones versus artifact. No right-sided hydronephrosis.       Xray Chest 1 View- PORTABLE-Routine (Xray Chest 1 View- PORTABLE-Routine in AM.) (10.27.22 @ 10:30) >    Frontal expiratory view of the chest shows the heart to be similar in   size. Left cardiac pacemaker is again noted.    The lungs show less pulmonary congestion and less left base atelectasis.   There is no evidence of pneumothorax nor pleural effusion.    IMPRESSION:  Decreased congestion.    < end of copied text >        Xray Chest 1 View- PORTABLE-Routine (Xray Chest 1 View- PORTABLE-Routine in AM.) (10.26.22 @ 08:09) >  PULMONARY: Mild bilateral perihilar diffuse airspace disease..   No pneumothorax.    HEART/VASCULAR: The  heart is enlarged in transverse diameter. Cardiac   device wire leads are within right atrium and right ventricle.     BONES: Visualized osseous structures are intact.    IMPRESSION: Mild bilateral perihilar diffuse airspace disease.  -------------------------------------------  FOLLOW-UP AP PORTABLE CHEST RADIOGRAPH 10/26/2022 AT 8:03 AM:    INDICATION: Congestive heart failure.    FINDINGS: No interval change.     Xray Chest 1 View- PORTABLE-Routine (Xray Chest 1 View- PORTABLE-Routine in AM.) (10.25.22 @ 07:38) >  IMPRESSION: Mild bilateral perihilar diffuse airspace disease.    TTE Echo Complete w/ Contrast w/ Doppler (10.07.22 @ 10:28) >   Summary     Moderate mitral annular calcification is present.   The mitral valve leaflets appear thickened.   EA reversal of the mitral inflow consistent with reduced compliance of   the   left ventricle.   The aortic valve appears mildly calcified. Valve opening seems to be   restricted.   Peak and mean transaortic gradients are 25 and 14 mmHg respectively; this   finding is consistent with mild aortic stenosis.   The tricuspid valve leaflets appear mildly thickened and/or calcified,   but  open well.   Moderate (2+) tricuspid valve regurgitation is present.   Mild pulmonary hypertension.   Mild concentric left ventricular hypertrophy is present.   Left ventricle systolic function moderately decreased paradoxical septal   motion ,apical wall , anterior wall , apical lateral hypokinesis in the   presence of a cardiac arrhythmia.   Lumason was used to better define the endocardial border.   Visual estimation of left ventricle ejection fraction is 40-45 %.   Normal appearing right atrium.   A device wire is seen in the RV and RA.   Normal appearing right ventricle structure and function.     12 Lead ECG (10.06.22 @ 08:52) >  Ventricular Rate 73 BPM  Diagnosis Line Atrial-sensed ventricular-paced rhythm with occasional Premature ventricular complexes  Abnormal ECG  When compared with ECG of 06-OCT-2022 08:52,  Vent. rate has decreased BY   9 BPM    Serum Pro-Brain Natriuretic Peptide (10.28.22 @ 07:24)    Serum Pro-Brain Natriuretic Peptide: 2915 pg/mL  Serum Pro-Brain Natriuretic Peptide: 2318 pg/mL (10-27-22 @ 07:19)  Serum Pro-Brain Natriuretic Peptide: 1647 pg/mL (10-26-22 @ 07:30)  Serum Pro-Brain Natriuretic Peptide: 1252 pg/mL (10-25-22 @ 08:27)  Serum Pro-Brain Natriuretic Peptide: 543 pg/mL (10-06-22 @ 16:10)      MEDS:   acetaminophen     Tablet .. 650 milliGRAM(s) Oral every 6 hours PRN  acetaminophen     Tablet .. 650 milliGRAM(s) Oral every 6 hours  acetaminophen   IVPB .. 1000 milliGRAM(s) IV Intermittent once  ALBUTerol    90 MICROgram(s) HFA Inhaler 2 Puff(s) Inhalation every 6 hours PRN  albuterol/ipratropium for Nebulization 3 milliLiter(s) Nebulizer every 6 hours PRN  apixaban 5 milliGRAM(s) Oral every 12 hours  atorvastatin 20 milliGRAM(s) Oral at bedtime  budesonide 160 MICROgram(s)/formoterol 4.5 MICROgram(s) Inhaler 2 Puff(s) Inhalation two times a day  chlorhexidine 4% Liquid 1 Application(s) Topical <User Schedule>  cholestyramine Powder (Sugar-Free) 4 Gram(s) Oral daily  diphenoxylate/atropine 2 Tablet(s) Oral four times a day  finasteride 5 milliGRAM(s) Oral daily    influenza  Vaccine (HIGH DOSE) 0.7 milliLiter(s) IntraMuscular once  insulin lispro (ADMELOG) corrective regimen sliding scale   SubCutaneous every 6 hours  loperamide 4 milliGRAM(s) Oral <User Schedule>  melatonin 5 milliGRAM(s) Oral at bedtime  morphine  - Injectable 2 milliGRAM(s) IV Push every 4 hours PRN  naloxone Injectable 0.1 milliGRAM(s) IV Push every 3 minutes PRN  ondansetron Injectable 4 milliGRAM(s) IV Push every 6 hours PRN  oxybutynin 5 milliGRAM(s) Oral daily PRN  oxyCODONE    IR 5 milliGRAM(s) Oral every 4 hours PRN  oxyCODONE    IR 10 milliGRAM(s) Oral every 4 hours PRN  pantoprazole  Injectable 40 milliGRAM(s) IV Push every 12 hours  piperacillin/tazobactam IVPB.. 3.375 Gram(s) IV Intermittent every 8 hours  predniSONE   Tablet 4 milliGRAM(s) Oral daily  psyllium Powder 1 Packet(s) Oral two times a day  sodium chloride 0.9% lock flush 3 milliLiter(s) IV Push every 8 hours  tamsulosin 0.8 milliGRAM(s) Oral at bedtime  tiotropium 18 MICROgram(s) Capsule 1 Capsule(s) Inhalation daily

## 2022-11-01 NOTE — PROGRESS NOTE ADULT - ASSESSMENT
76 y/o male with h/o newly diagnosed colon Ca, AF, PPM, HTN, UC, HTN, COPD, obesity and hyperlipidemia was admitted on 10/6 for increased SOB. He underwent successful colon surgery with resection and iliostoymy on 10/6/22. Since hospitalization the patient is s/p proctocolectomy, IR drain for collection, s/p cystoscopy w/ stent placement and urinary catheter and was treated with zosyn since 10/20.     1. Abdominal abscess s/p IR drainage resolving. Pyuria. Possible UTI. Colon Ca s/p resection and iliostoymy. Urinary retention s/p urinary catheter. ARF on CRF stage 3.   -noted with yeast on UA  -abdominal collection shows no growth  -on zosyn 3.375 gm IV q8h # 12  -on fluconazole 100 mg PO qd # 2  -tolerating abx well so far; no side effects noted  -continue antifungal coverage for a few days  -complete abx therapy with zosyn  -surgical evaluation appreciated  -monitor abdomen  -old chart reviewed to assess prior cultures  -monitor temps  -f/u CBC  -supportive care  2. Other issues:   -care per medicine

## 2022-11-01 NOTE — DISCHARGE NOTE NURSING/CASE MANAGEMENT/SOCIAL WORK - PATIENT PORTAL LINK FT
You can access the FollowMyHealth Patient Portal offered by [facility] by registering at the following website: [email or web address] By joining [email or web address] [email or web address] portal, you will also be able to view your health information using other applications (apps) compatible with our system.

## 2022-11-01 NOTE — ADVANCED PRACTICE NURSE CONSULT - RECOMMEDATIONS
Pouch change: 	  -Gently remove pouch water moistened gauze   -Cleanse stoma site with water moistened gauze, gently pat dry  -Measure stoma, currently 1" x  1 5/8"   -Trace and cut current size on Curt 2 1/4” CeraPlus flat barrier (#24837)  -Apply barrier to patient's skin  -Attach [place] 2 1/4” High Output drainable pouch onto barrier  Empty pouch when 1/3 to no more than 1/2 full of effluent/flatus. Change pouching system q 3 - 4 days and prn for leaking. Next pouch change- 11/4/2022  
  Pouch change: 	  -Gently remove pouch water moistened gauze   -Cleanse stoma site with water moistened gauze, gently pat dry  -Measure stoma, currently 1 1/2"  -Trace and cut current size on [place] 2 1/4” CeraPlus flat barrier (#53868)  -Apply barrier to patient's skin  -Attach [place] 2 1/4” High Output drainable pouch onto barrier  Empty pouch when 1/3 to no more than 1/2 full of effluent/flatus. Change pouching system q 3 - 4 days and prn for leaking. Next pouch change- 10/14

## 2022-11-01 NOTE — PROGRESS NOTE ADULT - SUBJECTIVE AND OBJECTIVE BOX
NEPHROLOGY INTERVAL HPI/OVERNIGHT EVENTS:    Date of Service: 10-30-22 @ 12:21  - feels well, no new events  10/31-no new events, alfaro 2300 ml, ostomy 1400 ml  10/30--Feeling well.  Reports increased po intake.  UO increased to 2 L ostomy 1.5L  10/29--Resting comfortably.  Denies SOB.  Eating a little better today.  O>>I with 1.6 L UO and 1.1L ostomy output.    HPI:  78 yo wm presented for colon mass for ca   hx of cad, CM with ef 40-45%, off diuretics for 5 months   AF, PPM , htn   COPD with hx of smoking  10/06- s/p total proctocolectomy with J- pouch creation, open diverting loop ileostomy, 2 VINNY drains placed   Surgery complicated by acute hypoxic respiratory failure secondary to L sided pneumonia which improved with NIPPV, septic shock requiring pressors due to fecal spillage peritonitis, and JOSE likely from ATN. Was started on Zosyn. Off pressors 10/9. On 10/10 patient was transfused 2u pRBC after drop in h/h and noted with ?coffee ground content in ileostomy. Improved respiratory status, weaned to supplemental O2 via NC.   10/17 - s/p cystoscopy and urethral catheter placement in OR by urology for urinary retention found to have bladder neck contracture.   10/19 - s/p IR procedure for  LLQ abd drain placement, found to have fluid collection, with 8cc aspirate, culture has been negative. Parenteral nutrition started on 10/17/22.  10/17-21 ICU tx for PNA and NIV at night for his hx of FLORA   tx with zosyn    Patient downgraded from ICU 10/21/22.  10/25 - lasix held   10/26 - no lasix and ivf   10/27 continued rise in scr and renal consult requested   pt with 3L neg fluid balance with po intake " very good" as per pt   no nsaid  no ivc  only zosyn for abx, no vanc given     PAST MEDICAL & SURGICAL HISTORY:  - UC  - recent dx of colon ca s/p resection 10/6  - flora on cpap at home   - htn   - afib   - ppm   - hld  - cpod hx of smoking   - hernia repair  - urethral stricture now with alfaro in place   - b/l tkr     MEDICATIONS  (STANDING):  acetaminophen     Tablet .. 650 milliGRAM(s) Oral every 6 hours  acetaminophen   IVPB .. 1000 milliGRAM(s) IV Intermittent once  apixaban 5 milliGRAM(s) Oral every 12 hours  atorvastatin 20 milliGRAM(s) Oral at bedtime  budesonide 160 MICROgram(s)/formoterol 4.5 MICROgram(s) Inhaler 2 Puff(s) Inhalation two times a day  chlorhexidine 4% Liquid 1 Application(s) Topical <User Schedule>  cholecalciferol 2000 Unit(s) Oral daily  cholestyramine Powder (Sugar-Free) 4 Gram(s) Oral daily  dextrose 5%. 1000 milliLiter(s) (50 mL/Hr) IV Continuous <Continuous>  dextrose 5%. 1000 milliLiter(s) (100 mL/Hr) IV Continuous <Continuous>  dextrose 50% Injectable 25 Gram(s) IV Push once  dextrose 50% Injectable 25 Gram(s) IV Push once  dextrose 50% Injectable 12.5 Gram(s) IV Push once  dextrose Oral Gel 15 Gram(s) Oral once  diphenoxylate/atropine 2 Tablet(s) Oral four times a day  finasteride 5 milliGRAM(s) Oral daily  glucagon  Injectable 1 milliGRAM(s) IntraMuscular once  influenza  Vaccine (HIGH DOSE) 0.7 milliLiter(s) IntraMuscular once  insulin glargine Injectable (LANTUS) 3 Unit(s) SubCutaneous at bedtime  insulin lispro (ADMELOG) corrective regimen sliding scale   SubCutaneous at bedtime  insulin lispro (ADMELOG) corrective regimen sliding scale   SubCutaneous three times a day before meals  insulin lispro Injectable (ADMELOG) 1 Unit(s) SubCutaneous three times a day before meals  loperamide 4 milliGRAM(s) Oral <User Schedule>  melatonin 5 milliGRAM(s) Oral at bedtime  opium Tincture 6 milliGRAM(s) Oral three times a day  pantoprazole    Tablet 40 milliGRAM(s) Oral every 12 hours  piperacillin/tazobactam IVPB.. 3.375 Gram(s) IV Intermittent every 8 hours  predniSONE   Tablet 4 milliGRAM(s) Oral daily  psyllium Powder 1 Packet(s) Oral two times a day  sodium chloride 0.9% lock flush 3 milliLiter(s) IV Push every 8 hours  sodium chloride 0.9%. 1000 milliLiter(s) (75 mL/Hr) IV Continuous <Continuous>  tamsulosin 0.8 milliGRAM(s) Oral at bedtime  tiotropium 18 MICROgram(s) Capsule 1 Capsule(s) Inhalation daily    MEDICATIONS  (PRN):  acetaminophen     Tablet .. 650 milliGRAM(s) Oral every 6 hours PRN Mild Pain (1 - 3)  ALBUTerol    90 MICROgram(s) HFA Inhaler 2 Puff(s) Inhalation every 6 hours PRN Shortness of Breath and/or Wheezing  albuterol/ipratropium for Nebulization 3 milliLiter(s) Nebulizer every 6 hours PRN Shortness of Breath and/or Wheezing  naloxone Injectable 0.1 milliGRAM(s) IV Push every 3 minutes PRN For ANY of the following changes in patient status:  A. RR LESS THAN 10 breaths per minute, B. Oxygen saturation LESS THAN 90%, C. Sedation score of 6  ondansetron Injectable 4 milliGRAM(s) IV Push every 6 hours PRN Nausea and/or Vomiting  oxybutynin 5 milliGRAM(s) Oral daily PRN Bladder spasms  oxyCODONE    IR 5 milliGRAM(s) Oral every 4 hours PRN Moderate Pain (4 - 6)  oxyCODONE    IR 10 milliGRAM(s) Oral every 4 hours PRN Severe Pain (7 - 10)    I&O's Detail    31 Oct 2022 07:  -  2022 07:00  --------------------------------------------------------  IN:  Total IN: 0 mL    OUT:    Ileostomy (mL): 1300 mL    Indwelling Catheter - Urethral (mL): 3200 mL  Total OUT: 4500 mL    Total NET: -4500 mL      2022 07:01  -  2022 21:16  --------------------------------------------------------  IN:  Total IN: 0 mL    OUT:    Colostomy (mL): 400 mL    Indwelling Catheter - Urethral (mL): 2300 mL  Total OUT: 2700 mL    Total NET: -2700 mL    Vital Signs Last 24 Hrs  T(C): 36.6 (:13), Max: 37 (2022 12:49)  T(F): 97.8 (:13), Max: 98.6 (2022 12:49)  HR: 86 (:13) (59 - 87)  BP: 135/70 (:) (100/79 - 135/70)  BP(mean): --  RR: 18 (:) (18 - 19)  SpO2: 97% (:) (94% - 99%)    Parameters below as of :  Patient On (Oxygen Delivery Method): room air    PHYSICAL EXAM: sleeping drowsy   GENERAL: Comfortable  CHEST/LUNG: Fair air entry  HEART: S1S2 RRR  ABDOMEN: soft  EXTREMITIES: trace edema  SKIN:     LABS:                        7.3    5.06  )-----------( 153      ( 2022 07:07 )             25.0                             7.8    x     )-----------( x        ( 31 Oct 2022 09:43 )             25.2                               7.9    6.77  )-----------( 155      ( 30 Oct 2022 07:46 )             26.1        135  |  108  |  26<H>  ----------------------------<  105<H>  5.2   |  21<L>  |  2.36<H>    Ca    8.6      2022 07:07  Phos  3.3     11-  Mg     1.6         TPro  x   /  Alb  1.9<L>  /  TBili  x   /  DBili  x   /  AST  x   /  ALT  x   /  AlkPhos  x   10-31      10-31    135  |  104  |  31<H>  ----------------------------<  134<H>  4.9   |  24  |  2.26<H>    Ca    8.7      31 Oct 2022 06:33  Phos  3.4     10-31  Mg     1.6     10-31    TPro  x   /  Alb  1.9<L>  /  TBili  x   /  DBili  x   /  AST  x   /  ALT  x   /  AlkPhos  x   10-31      10-30    131<L>  |  101  |  33<H>  ----------------------------<  135<H>  4.5   |  23  |  2.52<H>    Ca    8.5      30 Oct 2022 07:46  Phos  4.0     10-30  Mg     1.8     10-30        Urinalysis Basic - ( 30 Oct 2022 02:50 )    Color: Yellow / Appearance: Slightly Turbid / S.010 / pH: x  Gluc: x / Ketone: Negative  / Bili: Negative / Urobili: Negative   Blood: x / Protein: 100 / Nitrite: Negative   Leuk Esterase: Moderate / RBC: 6-10 /HPF / WBC 11-25   Sq Epi: x / Non Sq Epi: Occasional / Bacteria: Moderate      Magnesium, Serum: 1.8 mg/dL (10-30 @ 07:46)  Phosphorus Level, Serum: 4.0 mg/dL (10-30 @ 07:46)          RADIOLOGY & ADDITIONAL TESTS:

## 2022-11-01 NOTE — PROGRESS NOTE ADULT - ASSESSMENT
78 yo wm presented for colon mass for ca s/p total proctocolonscopy   hosptal course complicated with Left pna tx with zosyn   and abdominal fluid collection with neg cx   urinary retention with alfaro in place   hx of systolic chf off lasix PTA for 6 months, diuresed with lasix iv in ICU  now with JOSE in setting of inc ostomy outpt and neg fluid balance    PLAN   - agree with dc of iv lasix and will hold   - alfaro in place and draining dark urine   - UA with micr  - r/o AIN while on zosyn with urine eos   - fu trend of scr off diuretics, prn ivf as needed   - ostomy outpt improving with more pasty output  cw lomotil and opium     10/29 SY  --JOSE : creat slightly stabilized off IV Lasix.   I>>O due to ostomy output.    Gentle hydration to stabilize renal function as well as serum sodium.  --Monitor ostomy output.    10/30 SY  --JOSE : creat remains elevated with increased urine and ostomy output.      BP borderline.  Increase IVF for now  --Urine with budding yeast--check urine culture  --Unable to clearly rule in or out interstitial nephritis : continue     10/31  Creat improving  excellent UO  Ostomy @ 1400 ml  yeast reported on UA but not on cultures yet    11/1  Creat stable  UA still not reported as yeast  monitor CBC , transfuse as needed  as per medicine  Good uo   ileal output notsed

## 2022-11-01 NOTE — PROGRESS NOTE ADULT - ASSESSMENT
Patient is S/p proctocolectomy, IPAA, DLI for UC/Sigmoid CA, IR drain for collection. S/p cystoscopy w/ stent placement and Creek tip Zhao.   High ileostomy output  JOSE    Plan:  Pain control  Regular diet  Continue Eliquis  Appreciate cards recc  Appreciate nephrology reccs  Appreciate hospitalist reccs  Appreciate ID reccs, continue on IV antibiotics, follow up urine cultures  Monitor ileostomy output, decreased - on loperamide, metamucil, lomotil, cholestyramine, tincture of opium  Trend labs, monitor Cr  Appreciate  reccs - pt to be DC'd with leg bag  Dispo PHILLIP vs PT with home oxygen  CM for discharge planning  encourage OOB/ambulation    Plan discussed with the colorectal surgery team

## 2022-11-01 NOTE — PROGRESS NOTE ADULT - SUBJECTIVE AND OBJECTIVE BOX
Patient was seen and examined at the bedside this morning  No acute events overnight  Pain is better controlled  No nausea or vomiting  Tolerating diet and passing gas, no bowel movements yet    O/E:  T(C): 36.8 (11-01-22 @ 08:40), Max: 36.8 (11-01-22 @ 08:40)  HR: 83 (11-01-22 @ 08:40) (68 - 111)  BP: 125/62 (11-01-22 @ 08:40) (94/48 - 125/62)  RR: 18 (11-01-22 @ 08:40) (18 - 19)  SpO2: 99% (11-01-22 @ 08:40) (93% - 100%)  Alert, conscious, oriented  No pallor, jaundice or cyanosis  Not in pain or distress  Chest clear, equal air entry bilaterally  Abdomen soft and lax, no tenderness, incisions clean and dry, Ileostomy with 1300 cc output. Zhao catheter in place  Extremities: No swelling or tenderness    10-31-22 @ 07:01  -  11-01-22 @ 07:00  --------------------------------------------------------  IN: 0 mL / OUT: 4500 mL / NET: -4500 mL                          7.3    5.06  )-----------( 153      ( 01 Nov 2022 07:07 )             25.0   11-01    135  |  108  |  26<H>  ----------------------------<  105<H>  5.2   |  21<L>  |  2.36<H>    Ca    8.6      01 Nov 2022 07:07  Phos  3.3     11-01  Mg     1.6     11-01    TPro  x   /  Alb  1.9<L>  /  TBili  x   /  DBili  x   /  AST  x   /  ALT  x   /  AlkPhos  x   10-31  MEDICATIONS  (STANDING):  acetaminophen     Tablet .. 650 milliGRAM(s) Oral every 6 hours  acetaminophen   IVPB .. 1000 milliGRAM(s) IV Intermittent once  apixaban 5 milliGRAM(s) Oral every 12 hours  atorvastatin 20 milliGRAM(s) Oral at bedtime  budesonide 160 MICROgram(s)/formoterol 4.5 MICROgram(s) Inhaler 2 Puff(s) Inhalation two times a day  chlorhexidine 4% Liquid 1 Application(s) Topical <User Schedule>  cholecalciferol 2000 Unit(s) Oral daily  cholestyramine Powder (Sugar-Free) 4 Gram(s) Oral daily  dextrose 5%. 1000 milliLiter(s) (50 mL/Hr) IV Continuous <Continuous>  dextrose 5%. 1000 milliLiter(s) (100 mL/Hr) IV Continuous <Continuous>  dextrose 50% Injectable 25 Gram(s) IV Push once  dextrose 50% Injectable 12.5 Gram(s) IV Push once  dextrose 50% Injectable 25 Gram(s) IV Push once  dextrose Oral Gel 15 Gram(s) Oral once  finasteride 5 milliGRAM(s) Oral daily  fluconAZOLE   Tablet 100 milliGRAM(s) Oral daily  glucagon  Injectable 1 milliGRAM(s) IntraMuscular once  influenza  Vaccine (HIGH DOSE) 0.7 milliLiter(s) IntraMuscular once  insulin glargine Injectable (LANTUS) 3 Unit(s) SubCutaneous at bedtime  insulin lispro (ADMELOG) corrective regimen sliding scale   SubCutaneous at bedtime  insulin lispro (ADMELOG) corrective regimen sliding scale   SubCutaneous three times a day before meals  insulin lispro Injectable (ADMELOG) 1 Unit(s) SubCutaneous three times a day before meals  lactobacillus acidophilus 1 Tablet(s) Oral three times a day with meals  loperamide 4 milliGRAM(s) Oral <User Schedule>  melatonin 5 milliGRAM(s) Oral at bedtime  opium Tincture 6 milliGRAM(s) Oral three times a day  pantoprazole    Tablet 40 milliGRAM(s) Oral every 12 hours  predniSONE   Tablet 4 milliGRAM(s) Oral daily  psyllium Powder 1 Packet(s) Oral two times a day  sodium chloride 0.9% lock flush 3 milliLiter(s) IV Push every 8 hours  sodium chloride 0.9%. 1000 milliLiter(s) (75 mL/Hr) IV Continuous <Continuous>  tamsulosin 0.8 milliGRAM(s) Oral at bedtime  tiotropium 18 MICROgram(s) Capsule 1 Capsule(s) Inhalation daily    MEDICATIONS  (PRN):  acetaminophen     Tablet .. 650 milliGRAM(s) Oral every 6 hours PRN Mild Pain (1 - 3)  ALBUTerol    90 MICROgram(s) HFA Inhaler 2 Puff(s) Inhalation every 6 hours PRN Shortness of Breath and/or Wheezing  albuterol/ipratropium for Nebulization 3 milliLiter(s) Nebulizer every 6 hours PRN Shortness of Breath and/or Wheezing  naloxone Injectable 0.1 milliGRAM(s) IV Push every 3 minutes PRN For ANY of the following changes in patient status:  A. RR LESS THAN 10 breaths per minute, B. Oxygen saturation LESS THAN 90%, C. Sedation score of 6  ondansetron Injectable 4 milliGRAM(s) IV Push every 6 hours PRN Nausea and/or Vomiting  oxybutynin 5 milliGRAM(s) Oral daily PRN Bladder spasms  oxyCODONE    IR 5 milliGRAM(s) Oral every 4 hours PRN Moderate Pain (4 - 6)  oxyCODONE    IR 10 milliGRAM(s) Oral every 4 hours PRN Severe Pain (7 - 10)

## 2022-11-01 NOTE — PROGRESS NOTE ADULT - SUBJECTIVE AND OBJECTIVE BOX
Date of service: 22 @ 09:06    OOB to chair  Zhao in place  No fever  Denies abdominal pain    ROS: no fever or chills; denies dizziness, no HA, no SOB or cough, no abdominal pain, no diarrhea or constipation; no legs pain, no rashes    MEDICATIONS  (STANDING):  acetaminophen     Tablet .. 650 milliGRAM(s) Oral every 6 hours  acetaminophen   IVPB .. 1000 milliGRAM(s) IV Intermittent once  apixaban 5 milliGRAM(s) Oral every 12 hours  atorvastatin 20 milliGRAM(s) Oral at bedtime  budesonide 160 MICROgram(s)/formoterol 4.5 MICROgram(s) Inhaler 2 Puff(s) Inhalation two times a day  chlorhexidine 4% Liquid 1 Application(s) Topical <User Schedule>  cholecalciferol 2000 Unit(s) Oral daily  cholestyramine Powder (Sugar-Free) 4 Gram(s) Oral daily  dextrose 5%. 1000 milliLiter(s) (50 mL/Hr) IV Continuous <Continuous>  dextrose 5%. 1000 milliLiter(s) (100 mL/Hr) IV Continuous <Continuous>  dextrose 50% Injectable 25 Gram(s) IV Push once  dextrose 50% Injectable 12.5 Gram(s) IV Push once  dextrose 50% Injectable 25 Gram(s) IV Push once  dextrose Oral Gel 15 Gram(s) Oral once  finasteride 5 milliGRAM(s) Oral daily  fluconAZOLE   Tablet 100 milliGRAM(s) Oral daily  glucagon  Injectable 1 milliGRAM(s) IntraMuscular once  influenza  Vaccine (HIGH DOSE) 0.7 milliLiter(s) IntraMuscular once  insulin glargine Injectable (LANTUS) 3 Unit(s) SubCutaneous at bedtime  insulin lispro (ADMELOG) corrective regimen sliding scale   SubCutaneous at bedtime  insulin lispro (ADMELOG) corrective regimen sliding scale   SubCutaneous three times a day before meals  insulin lispro Injectable (ADMELOG) 1 Unit(s) SubCutaneous three times a day before meals  lactobacillus acidophilus 1 Tablet(s) Oral three times a day with meals  loperamide 4 milliGRAM(s) Oral <User Schedule>  melatonin 5 milliGRAM(s) Oral at bedtime  opium Tincture 6 milliGRAM(s) Oral three times a day  pantoprazole    Tablet 40 milliGRAM(s) Oral every 12 hours  predniSONE   Tablet 4 milliGRAM(s) Oral daily  psyllium Powder 1 Packet(s) Oral two times a day  sodium chloride 0.9% lock flush 3 milliLiter(s) IV Push every 8 hours  sodium chloride 0.9%. 1000 milliLiter(s) (100 mL/Hr) IV Continuous <Continuous>  tamsulosin 0.8 milliGRAM(s) Oral at bedtime  tiotropium 18 MICROgram(s) Capsule 1 Capsule(s) Inhalation daily    Vital Signs Last 24 Hrs  T(C): 36.8 (2022 08:40), Max: 36.8 (2022 08:40)  T(F): 98.2 (2022 08:40), Max: 98.2 (2022 08:40)  HR: 83 (2022 08:40) (68 - 111)  BP: 125/62 (2022 08:40) (94/48 - 125/62)  BP(mean): --  RR: 18 (2022 08:40) (18 - 19)  SpO2: 99% (2022 08:40) (93% - 100%)    Parameters below as of 2022 08:40  Patient On (Oxygen Delivery Method): nasal cannula         Physical exam:    Constitutional:  No acute distress  HEENT: NC/AT, EOMI, PERRLA, conjunctivae clear; ears and nose atraumatic  Neck: supple; thyroid not palpable  Back: no tenderness  Respiratory: respiratory effort normal; clear to auscultation  Cardiovascular: S1S2 regular, no murmurs  Abdomen: soft, not tender, not distended, positive BS; iliostomy  Genitourinary: no suprapubic tenderness  Lymphatic: no LN palpable  Musculoskeletal: no muscle tenderness, no joint swelling or tenderness  Extremities: no pedal edema  Neurological/ Psychiatric: AxOx3, judgement and insight normal; moving all extremities  Skin: no rashes; no palpable lesions    Labs: reviewed                        7.3    5.06  )-----------( 153      ( 2022 07:07 )             25.0     11    135  |  108  |  26<H>  ----------------------------<  105<H>  5.2   |  21<L>  |  2.36<H>    Ca    8.6      2022 07:07  Phos  3.3       Mg     1.6         TPro  x   /  Alb  1.9<L>  /  TBili  x   /  DBili  x   /  AST  x   /  ALT  x   /  AlkPhos  x   10-31    C-Reactive Protein, Serum: 190 mg/L (10-28-22 @ 07:24)  Ferritin, Serum: 119 ng/mL (10-28-22 @ 07:24)                        7.8    x     )-----------( x        ( 31 Oct 2022 09:43 )             25.2     10-31    135  |  104  |  31<H>  ----------------------------<  134<H>  4.9   |  24  |  2.26<H>    Ca    8.7      31 Oct 2022 06:33  Phos  3.4     10-31  Mg     1.6     10-31    TPro  x   /  Alb  1.9<L>  /  TBili  x   /  DBili  x   /  AST  x   /  ALT  x   /  AlkPhos  x   10-31     LIVER FUNCTIONS - ( 31 Oct 2022 06:33 )  Alb: 1.9 g/dL / Pro: x     / ALK PHOS: x     / ALT: x     / AST: x     / GGT: x           Urinalysis Basic - ( 30 Oct 2022 02:50 )    Color: Yellow / Appearance: Slightly Turbid / S.010 / pH: x  Gluc: x / Ketone: Negative  / Bili: Negative / Urobili: Negative   Blood: x / Protein: 100 / Nitrite: Negative   Leuk Esterase: Moderate / RBC: 6-10 /HPF / WBC 11-25   Sq Epi: x / Non Sq Epi: Occasional / Bacteria: Moderate    COVID-19 PCR: NotDetec (10-31-22 @ 06:30)        Radiology: all available radiological tests reviewed    < from: US Kidney and Bladder (10.28.22 @ 10:26) >  Mild left hydronephrosis appears similar to prior CT.    Two, nonshadowing intrarenal echogenic foci at the interpolar region of   the right kidney measuring 9 mm and 5 mm, both likely representing   nonobstructing stones versus artifact. No right-sided hydronephrosis.    < end of copied text >  < from: Xray Chest 1 View- PORTABLE-Routine (Xray Chest 1 View- PORTABLE-Routine in AM.) (10.27.22 @ 10:30) >  Decreased congestion.    < end of copied text >  < from: CT Abdomen and Pelvis w/ Oral Cont (10.19.22 @ 20:28) >  Persistent bibasilar atelectasis.  Resolution ofright hydronephrosis with persistent left hydronephrosis  Zhao catheter is now seen with the balloon in the bladder. Small   extraluminal collection with fluid and air is is seen in the pelvis just   to the right of midline.  Persistent but decreased left lower quadrant collection in the presence   of a drainage catheter    < end of copied text >      Advanced directives addressed: full resuscitation

## 2022-11-01 NOTE — PROGRESS NOTE ADULT - ASSESSMENT
78 yo male with CAD, ICM with EF 40-45%  h/o AF, PPM, HTN, UC, newly Dx colon CA, HTN, COPD (long smoking Hx), obesity and hyperlipidemia found to have a colon mass and CA and admitted on 10/06/22  for resection.   10/06- s/p total proctocolectomy with J- pouch creation, open diverting loop ileostomy, 2 VINNY drains placed   Surgery complicated by acute hypoxic respiratory failure secondary to L sided pneumonia which improved with NIPPV, septic shock requiring pressors due to fecal spillage peritonitis, and JOSE likely from ATN. Was started on Zosyn. Off pressors 10/9. On 10/10 patient was transfused 2u pRBC after drop in h/h and noted with ?coffee ground content in ileostomy. Improved respiratory status, weaned to supplemental O2 via NC.   10/17 - s/p cystoscopy and urethral catheter placement in OR by urology for urinary retention found to have bladder neck contracture.   10/19 - s/p IR procedure for  LLQ abd drain placement, found to have fluid collection, with 8cc aspirate, culture has been negative. Parenteral nutrition started on 10/17/22. Patient downgraded from ICU 10/21/22. Hospitalist consulted for continued medical management.   10/24 - started on IV lasix for CHF   10/26 - lasix stopped    Colon mass consistent with adenocarcinoma  -S/P RCP-IPAA (restorative proctocolectomy with ileal pouch anal anastomosis), diverting loop ileostomy 10/6  - diet and pain management as per surgery team  - pathology reports noted - adenocarcinoma   - consider oncology consult for further management and monitoring of cancer     Acute hypoxic respiratory failure , multifactorial   - HCAP PNA, probable gram negative rods, septic shock  -  Fecal spillage peritonitis with abdominal fluids collections s/p drainage   - Funguria   - COPD  on  4 L home O2   -Continue Prednisone 4mg daily with PPI bid , Symbicort, Spiriva Albuterol HFA PRN   -Off pressors, given stress dose steroids, now tapering, on prednisone 4mg daily   -c/w zosyn  for PNA and intraabdominal collections, consider ID consult   -S/p IR drainage of fluid collection on 10/19, actually restarted zosyn 10/20, (cultures have been negative >48hrs)  -s/p  PPN 10/17 - 10/22   - 10/30 - reconsult ID - budding yeast in UA , can not send the culture s/p Alfaro , ? need for fluconasole, add probiotics  - 10/31 - start fluconasole 100 qd for Funguria     Acute on chronic systolic HF , resolved   Chronic  AFIB s/p PPM    -Continue Eliquis 5mg q12h  - s/p IV lasix 10/24-10/26   -  daily weights - pending  - 2 d echo 10/7 - EF 40-45%, diastolic dysfunction, 2 + MR , LV hypokinesis   - will check troponin   - cardiology consult noted optimize meds - ACEI and BB as tolerated low Na diet   - suspect drop in CHF In the setting of septic shock but will need repeat ECHO in 6 weeks   - CXR 10/27 -decreased congestion, less atelectasis  - repeat CXR in am    JOSE with baseline Cr 1.1   Urinary retention s/p Alfaro 10/17/22   Mild left hydronephrosis , Right nephrolithiasis  - s/p IV lasix, than IV fluids , will stop IV fluids   - 11/1- s/1 1 unit PRBC  - Creatinine Trend: 2.3 <--2.2<--2.5<--2.32<--, 1.61<--, 1.57<--, 1.33<--, 1.15<--, 1.11<--  -Continue tamsulosin 0.8mg qhs, finasteride 5mg daily   -Urology consult appreciated: d/c home with alfaro's    Normocytic Anemia, multifactorial   - s/p 4 units PRBC 10/9, 10/10 , 10/12, 10/24   - Monitor closely while on Eliquis   - check iron studies, B12, folate , FOBT neg   - Hb 8.5--> 7.8 --> 7.5   - 11/1 - 1 unit PRBC     HTN overcontrolled -Off antihypertensives here, bp stable  (  was On amlodipine 2.5mg, Metoprolol ER 25mg daily     HLD -Continue Lipitor 20mg qhs    Hypomagnesemia, resolved  - replace    Vitamin D deficiency - replace    VTE prophylaxis  -On eliquis     Dispo: Pulse Ox on ambulation before dc to home with home O2 or  rehab  as per surgery

## 2022-11-02 VITALS
DIASTOLIC BLOOD PRESSURE: 64 MMHG | SYSTOLIC BLOOD PRESSURE: 115 MMHG | HEART RATE: 65 BPM | RESPIRATION RATE: 18 BRPM | OXYGEN SATURATION: 95 % | TEMPERATURE: 98 F

## 2022-11-02 LAB
ANION GAP SERPL CALC-SCNC: 5 MMOL/L — SIGNIFICANT CHANGE UP (ref 5–17)
BASOPHILS # BLD AUTO: 0 K/UL — SIGNIFICANT CHANGE UP (ref 0–0.2)
BASOPHILS NFR BLD AUTO: 0 % — SIGNIFICANT CHANGE UP (ref 0–2)
BUN SERPL-MCNC: 24 MG/DL — HIGH (ref 7–23)
CALCIUM SERPL-MCNC: 9.1 MG/DL — SIGNIFICANT CHANGE UP (ref 8.5–10.1)
CHLORIDE SERPL-SCNC: 106 MMOL/L — SIGNIFICANT CHANGE UP (ref 96–108)
CO2 SERPL-SCNC: 25 MMOL/L — SIGNIFICANT CHANGE UP (ref 22–31)
CREAT SERPL-MCNC: 1.99 MG/DL — HIGH (ref 0.5–1.3)
EGFR: 34 ML/MIN/1.73M2 — LOW
EOSINOPHIL # BLD AUTO: 0.11 K/UL — SIGNIFICANT CHANGE UP (ref 0–0.5)
EOSINOPHIL NFR BLD AUTO: 2 % — SIGNIFICANT CHANGE UP (ref 0–6)
GLUCOSE SERPL-MCNC: 117 MG/DL — HIGH (ref 70–99)
HCT VFR BLD CALC: 28.8 % — LOW (ref 39–50)
HGB BLD-MCNC: 8.5 G/DL — LOW (ref 13–17)
LYMPHOCYTES # BLD AUTO: 1.34 K/UL — SIGNIFICANT CHANGE UP (ref 1–3.3)
LYMPHOCYTES # BLD AUTO: 24 % — SIGNIFICANT CHANGE UP (ref 13–44)
MAGNESIUM SERPL-MCNC: 1.6 MG/DL — SIGNIFICANT CHANGE UP (ref 1.6–2.6)
MCHC RBC-ENTMCNC: 25.9 PG — LOW (ref 27–34)
MCHC RBC-ENTMCNC: 29.5 GM/DL — LOW (ref 32–36)
MCV RBC AUTO: 87.8 FL — SIGNIFICANT CHANGE UP (ref 80–100)
MONOCYTES # BLD AUTO: 0.34 K/UL — SIGNIFICANT CHANGE UP (ref 0–0.9)
MONOCYTES NFR BLD AUTO: 6 % — SIGNIFICANT CHANGE UP (ref 2–14)
NEUTROPHILS # BLD AUTO: 3.7 K/UL — SIGNIFICANT CHANGE UP (ref 1.8–7.4)
NEUTROPHILS NFR BLD AUTO: 66 % — SIGNIFICANT CHANGE UP (ref 43–77)
NRBC # BLD: SIGNIFICANT CHANGE UP /100 WBCS (ref 0–0)
NT-PROBNP SERPL-SCNC: HIGH PG/ML (ref 0–450)
PHOSPHATE SERPL-MCNC: 3.2 MG/DL — SIGNIFICANT CHANGE UP (ref 2.5–4.5)
PLATELET # BLD AUTO: 164 K/UL — SIGNIFICANT CHANGE UP (ref 150–400)
POTASSIUM SERPL-MCNC: 5.2 MMOL/L — SIGNIFICANT CHANGE UP (ref 3.5–5.3)
POTASSIUM SERPL-SCNC: 5.2 MMOL/L — SIGNIFICANT CHANGE UP (ref 3.5–5.3)
RBC # BLD: 3.28 M/UL — LOW (ref 4.2–5.8)
RBC # FLD: 18.1 % — HIGH (ref 10.3–14.5)
SODIUM SERPL-SCNC: 136 MMOL/L — SIGNIFICANT CHANGE UP (ref 135–145)
TROPONIN I, HIGH SENSITIVITY RESULT: 80.88 NG/L — HIGH
WBC # BLD: 5.6 K/UL — SIGNIFICANT CHANGE UP (ref 3.8–10.5)
WBC # FLD AUTO: 5.6 K/UL — SIGNIFICANT CHANGE UP (ref 3.8–10.5)

## 2022-11-02 PROCEDURE — 99232 SBSQ HOSP IP/OBS MODERATE 35: CPT

## 2022-11-02 PROCEDURE — 71045 X-RAY EXAM CHEST 1 VIEW: CPT | Mod: 26

## 2022-11-02 RX ORDER — FINASTERIDE 5 MG/1
1 TABLET, FILM COATED ORAL
Qty: 30 | Refills: 0
Start: 2022-11-02

## 2022-11-02 RX ORDER — DIPHENOXYLATE HCL/ATROPINE 2.5-.025MG
2 TABLET ORAL
Qty: 240 | Refills: 3
Start: 2022-11-02

## 2022-11-02 RX ORDER — CHOLESTYRAMINE 4 G/9G
4 POWDER, FOR SUSPENSION ORAL
Qty: 120 | Refills: 3
Start: 2022-11-02

## 2022-11-02 RX ORDER — MORPHINE 10 MG/ML
0.6 SOLUTION ORAL
Qty: 60 | Refills: 0
Start: 2022-11-02

## 2022-11-02 RX ORDER — LOPERAMIDE HCL 2 MG
2 TABLET ORAL
Qty: 240 | Refills: 3
Start: 2022-11-02

## 2022-11-02 RX ORDER — OXYBUTYNIN CHLORIDE 5 MG
1 TABLET ORAL
Qty: 30 | Refills: 0
Start: 2022-11-02

## 2022-11-02 RX ORDER — TAMSULOSIN HYDROCHLORIDE 0.4 MG/1
2 CAPSULE ORAL
Qty: 30 | Refills: 0
Start: 2022-11-02

## 2022-11-02 RX ORDER — FLUCONAZOLE 150 MG/1
1 TABLET ORAL
Qty: 7 | Refills: 0
Start: 2022-11-02 | End: 2022-11-08

## 2022-11-02 RX ADMIN — MORPHINE 6 MILLIGRAM(S): 10 SOLUTION ORAL at 06:16

## 2022-11-02 RX ADMIN — CHOLESTYRAMINE 4 GRAM(S): 4 POWDER, FOR SUSPENSION ORAL at 10:15

## 2022-11-02 RX ADMIN — CHLORHEXIDINE GLUCONATE 1 APPLICATION(S): 213 SOLUTION TOPICAL at 12:11

## 2022-11-02 RX ADMIN — Medication 650 MILLIGRAM(S): at 12:10

## 2022-11-02 RX ADMIN — BUDESONIDE AND FORMOTEROL FUMARATE DIHYDRATE 2 PUFF(S): 160; 4.5 AEROSOL RESPIRATORY (INHALATION) at 07:59

## 2022-11-02 RX ADMIN — Medication 1 UNIT(S): at 12:05

## 2022-11-02 RX ADMIN — Medication 4 MILLIGRAM(S): at 12:04

## 2022-11-02 RX ADMIN — FLUCONAZOLE 100 MILLIGRAM(S): 150 TABLET ORAL at 10:17

## 2022-11-02 RX ADMIN — Medication 4 MILLIGRAM(S): at 06:17

## 2022-11-02 RX ADMIN — Medication 2000 UNIT(S): at 10:16

## 2022-11-02 RX ADMIN — Medication 1 TABLET(S): at 08:11

## 2022-11-02 RX ADMIN — SODIUM CHLORIDE 3 MILLILITER(S): 9 INJECTION INTRAMUSCULAR; INTRAVENOUS; SUBCUTANEOUS at 14:52

## 2022-11-02 RX ADMIN — Medication 650 MILLIGRAM(S): at 12:40

## 2022-11-02 RX ADMIN — FINASTERIDE 5 MILLIGRAM(S): 5 TABLET, FILM COATED ORAL at 10:16

## 2022-11-02 RX ADMIN — APIXABAN 5 MILLIGRAM(S): 2.5 TABLET, FILM COATED ORAL at 10:16

## 2022-11-02 RX ADMIN — PANTOPRAZOLE SODIUM 40 MILLIGRAM(S): 20 TABLET, DELAYED RELEASE ORAL at 10:17

## 2022-11-02 RX ADMIN — MORPHINE 6 MILLIGRAM(S): 10 SOLUTION ORAL at 14:42

## 2022-11-02 RX ADMIN — Medication 1 UNIT(S): at 08:11

## 2022-11-02 RX ADMIN — Medication 1 PACKET(S): at 10:16

## 2022-11-02 RX ADMIN — SODIUM CHLORIDE 3 MILLILITER(S): 9 INJECTION INTRAMUSCULAR; INTRAVENOUS; SUBCUTANEOUS at 06:18

## 2022-11-02 RX ADMIN — Medication 4 MILLIGRAM(S): at 10:16

## 2022-11-02 RX ADMIN — Medication 1 TABLET(S): at 12:05

## 2022-11-02 RX ADMIN — TIOTROPIUM BROMIDE 1 CAPSULE(S): 18 CAPSULE ORAL; RESPIRATORY (INHALATION) at 07:59

## 2022-11-02 NOTE — PROGRESS NOTE ADULT - ASSESSMENT
76 yo wm presented for colon mass for ca s/p total proctocolonscopy   hosptal course complicated with Left pna tx with zosyn   and abdominal fluid collection with neg cx   urinary retention with alfaro in place   hx of systolic chf off lasix PTA for 6 months, diuresed with lasix iv in ICU  now with JOSE in setting of inc ostomy outpt and neg fluid balance    PLAN   - agree with dc of iv lasix and will hold   - alfaro in place and draining dark urine   - UA with micr  - r/o AIN while on zosyn with urine eos   - fu trend of scr off diuretics, prn ivf as needed   - ostomy outpt improving with more pasty output  cw lomotil and opium     10/29 SY  --JOSE : creat slightly stabilized off IV Lasix.   I>>O due to ostomy output.    Gentle hydration to stabilize renal function as well as serum sodium.  --Monitor ostomy output.    10/30 SY  --JOSE : creat remains elevated with increased urine and ostomy output.      BP borderline.  Increase IVF for now  --Urine with budding yeast--check urine culture  --Unable to clearly rule in or out interstitial nephritis : continue     10/31  Creat improving  excellent UO  Ostomy @ 1400 ml  yeast reported on UA but not on cultures yet    11/1  Creat stable  UA still not reported as yeast  monitor CBC , transfuse as needed  as per medicine  Good uo   ileal output notsed    11/2 SY           76 yo wm presented for colon mass for ca s/p total proctocolonscopy   hosptal course complicated with Left pna tx with zosyn   and abdominal fluid collection with neg cx   urinary retention with alfaro in place   hx of systolic chf off lasix PTA for 6 months, diuresed with lasix iv in ICU  now with JOSE in setting of inc ostomy outpt and neg fluid balance    PLAN   - agree with dc of iv lasix and will hold   - alfaro in place and draining dark urine   - UA with micr  - r/o AIN while on zosyn with urine eos   - fu trend of scr off diuretics, prn ivf as needed   - ostomy outpt improving with more pasty output  cw lomotil and opium     10/29 SY  --JOSE : creat slightly stabilized off IV Lasix.   I>>O due to ostomy output.    Gentle hydration to stabilize renal function as well as serum sodium.  --Monitor ostomy output.    10/30 SY  --JOSE : creat remains elevated with increased urine and ostomy output.      BP borderline.  Increase IVF for now  --Urine with budding yeast--check urine culture  --Unable to clearly rule in or out interstitial nephritis : continue     10/31  Creat improving  excellent UO  Ostomy @ 1400 ml  yeast reported on UA but not on cultures yet    11/1  Creat stable  UA still not reported as yeast  monitor CBC , transfuse as needed  as per medicine  Good uo   ileal output notsed    11/2 SY  --JOSE : now continues to improve and stable.  --Fluid balance much improved with decreased ostomy output.  --Ok for d/c from renal standpoint, with much improved po intake.  --On fungal coverage for yeast in UA.

## 2022-11-02 NOTE — PROGRESS NOTE ADULT - PROVIDER SPECIALTY LIST ADULT
Colorectal Surgery
Colorectal Surgery
Critical Care
Hospitalist
Hospitalist
Colorectal Surgery
Critical Care
Hospitalist
Infectious Disease
Nephrology
SICU
Urology
Urology
Colorectal Surgery
Urology
Cardiology
Colorectal Surgery
Colorectal Surgery
Critical Care
Hospitalist
Cardiology
Critical Care
Cardiology
Cardiology
Critical Care
Cardiology
SICU
Critical Care

## 2022-11-02 NOTE — PROGRESS NOTE ADULT - SUBJECTIVE AND OBJECTIVE BOX
76 yo male with CAD, ICM with EF 40-45%  h/o AF, PPM, HTN, UC, newly Dx colon CA, HTN, COPD (long smoking Hx), obesity and hyperlipidemia found to have a colon mass and CA and admitted on 10/06/22  for resection.   10/06- s/p total proctocolectomy with J- pouch creation, open diverting loop ileostomy, 2 VINNY drains placed   Surgery complicated by acute hypoxic respiratory failure secondary to L sided pneumonia which improved with NIPPV, septic shock requiring pressors due to fecal spillage peritonitis, and JOSE likely from ATN. Was started on Zosyn. Off pressors 10/9. On 10/10 patient was transfused 2u pRBC after drop in h/h and noted with ?coffee ground content in ileostomy. Improved respiratory status, weaned to supplemental O2 via NC.   10/17 - s/p cystoscopy and urethral catheter placement in OR by urology for urinary retention found to have bladder neck contracture.   10/19 - s/p IR procedure for  LLQ abd drain placement, found to have fluid collection, with 8cc aspirate, culture has been negative. Parenteral nutrition started on 10/17/22. Patient downgraded from ICU 10/21/22. Hospitalist consulted for continued medical management.   10/25 - no cp palps sob abdo pain, got PRBCs yesterday no lasix; Cr trending up today, BNP slightly elevated   10/26 - no cp palps sob abdo pain, able to lie flat in bed but BNP rising and he got IVFs yesterday, CR worse     10/27 - chart reviewed. pt seen and examined, denies cp, dyspnea, abdominal pain, on O2 , afebrile, plan discussed  10/28 - pt seen and examined, denies cp, dyspnea, + alfaro draining dark urine, encouraged po liquid intake, afebrile, plan discussed  10/29 - no events overnight, breathing stable ,denies cp, dyspnea, abdominal pain, was drinking a lot of water yesterday , afebrile  10/30 - no events, denies chest pain, dyspnea , tolerating po intake, wants to go home, afebrile, plan   10/31 - pt seen and examined, no events, denies cp, dyspnea , abdominal pain, tolerating Iv fluids, IV abx, plan discussed   11/1 -pt seen and examined in am, denies cp, dyspnea, pale, tolerating blood transfusion, denies cp, dyspnea, abdominal pain  11/2 - no cp palps sob abdo pain tinlging or numbness; for dc today, pt counseled re CHF, to check daily weights, low Na diet, see Card as OP       Home Oxygen Evaluation:  Pulse ox (SpO2) on room air at rest:	94 %  Pulse ox (SpO2) on room air with exertion:	88 %  Pulse ox (SpO2) on	3 L/min  O2 with exertion:	95 %    Physical exam :   T(F): 98.1 (02 Nov 2022 15:03), Max: 98.1 (02 Nov 2022 15:03)  HR: 65 (02 Nov 2022 15:03) (60 - 86)  BP: 115/64 (02 Nov 2022 15:03) (115/64 - 135/70)  RR: 18 (02 Nov 2022 15:03) (18 - 18)  SpO2: 95% (02 Nov 2022 15:03) (93% - 97%)  Parameters below as of 02 Nov 2022 15:03  Patient On (Oxygen Delivery Method): nasal cannula O2 Flow (L/min): 3  GENERAL: NAD, sitting up in bed, breathing comfortably   HEAD:  Atraumatic, Normocephalic  EYES: conjunctiva and sclera clear  ENT: Moist mucous membranes  NECK: Supple, No JVD  CHEST/LUNG: lungs are clear   HEART: Regular rate and rhythm; No murmurs, rubs, or gallops  ABDOMEN: Bowel sounds present; Soft, Nontender, + ileostomy, midline incision w/ staples in place, C/D/I, no surrounding erythema , + Alfaro   EXTREMITIES:  2+ Peripheral Pulses, brisk capillary refill. No clubbing, cyanosis, or edema  NERVOUS SYSTEM:  Alert & Oriented X3, speech clear. No deficits   MSK: FROM all 4 extremities, full and equal strength    LABS: All Labs Reviewed:  Urinalysis (10.30.22 @ 02:50)    Comment - Urine: buddibg yeast    US Kidney and Bladder (10.28.22 @ 10:26) >  Mild left hydronephrosis appears similar to prior CT.  Two, nonshadowing intrarenal echogenic foci at the interpolar region of   the right kidney measuring 9 mm and 5 mm, both likely representing   nonobstructing stones versus artifact. No right-sided hydronephrosis.   Xray Chest 1 View- PORTABLE-Routine (Xray Chest 1 View- PORTABLE-Routine in AM.) (10.27.22 @ 10:30) >  Decreased congestion.  TTE Echo Complete w/ Contrast w/ Doppler (10.07.22 @ 10:28) >   Moderate mitral annular calcification is present.   The mitral valve leaflets appear thickened.   EA reversal of the mitral inflow consistent with reduced compliance of   the left ventricle.   Visual estimation of left ventricle ejection fraction is 40-45 %.   Normal appearing right atrium.   A device wire is seen in the RV and RA.   Normal appearing right ventricle structure and function.    LABS: All Labs Reviewed:                      8.5    5.60  )-----------( 164      ( 02 Nov 2022 07:38 )             28.8   136  |  106  |  24<H>  ----------------------------<  117<H>  5.2   |  25  |  1.99<H>    MEDS:   acetaminophen     Tablet .. 650 milliGRAM(s) Oral every 6 hours PRN  acetaminophen     Tablet .. 650 milliGRAM(s) Oral every 6 hours  acetaminophen   IVPB .. 1000 milliGRAM(s) IV Intermittent once  ALBUTerol    90 MICROgram(s) HFA Inhaler 2 Puff(s) Inhalation every 6 hours PRN  albuterol/ipratropium for Nebulization 3 milliLiter(s) Nebulizer every 6 hours PRN  apixaban 5 milliGRAM(s) Oral every 12 hours  atorvastatin 20 milliGRAM(s) Oral at bedtime  budesonide 160 MICROgram(s)/formoterol 4.5 MICROgram(s) Inhaler 2 Puff(s) Inhalation two times a day  chlorhexidine 4% Liquid 1 Application(s) Topical <User Schedule>  cholecalciferol 2000 Unit(s) Oral daily  cholestyramine Powder (Sugar-Free) 4 Gram(s) Oral daily  finasteride 5 milliGRAM(s) Oral daily  fluconAZOLE   Tablet 100 milliGRAM(s) Oral daily  glucagon  Injectable 1 milliGRAM(s) IntraMuscular once  insulin glargine Injectable (LANTUS) 3 Unit(s) SubCutaneous at bedtime  insulin lispro (ADMELOG) corrective regimen sliding scale   SubCutaneous at bedtime  insulin lispro (ADMELOG) corrective regimen sliding scale   SubCutaneous three times a day before meals  insulin lispro Injectable (ADMELOG) 1 Unit(s) SubCutaneous three times a day before meals  lactobacillus acidophilus 1 Tablet(s) Oral three times a day with meals  loperamide 4 milliGRAM(s) Oral <User Schedule>  melatonin 5 milliGRAM(s) Oral at bedtime  naloxone Injectable 0.1 milliGRAM(s) IV Push every 3 minutes PRN  ondansetron Injectable 4 milliGRAM(s) IV Push every 6 hours PRN  opium Tincture 6 milliGRAM(s) Oral three times a day  oxybutynin 5 milliGRAM(s) Oral daily PRN  oxyCODONE    IR 5 milliGRAM(s) Oral every 4 hours PRN  oxyCODONE    IR 10 milliGRAM(s) Oral every 4 hours PRN  pantoprazole    Tablet 40 milliGRAM(s) Oral every 12 hours  predniSONE   Tablet 4 milliGRAM(s) Oral daily  psyllium Powder 1 Packet(s) Oral two times a day  sodium chloride 0.9% lock flush 3 milliLiter(s) IV Push every 8 hours  tamsulosin 0.8 milliGRAM(s) Oral at bedtime  tiotropium 18 MICROgram(s) Capsule 1 Capsule(s) Inhalation daily

## 2022-11-02 NOTE — PROGRESS NOTE ADULT - ASSESSMENT
76 yo male with CAD, ICM with EF 40-45%  h/o AF, PPM, HTN, UC, newly Dx colon CA, HTN, COPD (long smoking Hx), obesity and hyperlipidemia found to have a colon mass and CA and admitted on 10/06/22  for resection.   10/06- s/p total proctocolectomy with J- pouch creation, open diverting loop ileostomy, 2 VINNY drains placed   Surgery complicated by acute hypoxic respiratory failure secondary to L sided pneumonia which improved with NIPPV, septic shock requiring pressors due to fecal spillage peritonitis, and JOSE likely from ATN. Was started on Zosyn. Off pressors 10/9. On 10/10 patient was transfused 2u pRBC after drop in h/h and noted with ?coffee ground content in ileostomy. Improved respiratory status, weaned to supplemental O2 via NC.   10/17 - s/p cystoscopy and urethral catheter placement in OR by urology for urinary retention found to have bladder neck contracture.   10/19 - s/p IR procedure for  LLQ abd drain placement, found to have fluid collection, with 8cc aspirate, culture has been negative. Parenteral nutrition started on 10/17/22. Patient downgraded from ICU 10/21/22. Hospitalist consulted for continued medical management.   10/24 - started on IV lasix for CHF   10/26 - lasix stopped    Colon mass consistent with adenocarcinoma  -S/P RCP-IPAA (restorative proctocolectomy with ileal pouch anal anastomosis), diverting loop ileostomy 10/6  - diet and pain management as per surgery team  - pathology reports noted - adenocarcinoma   - consider oncology consult for further management and monitoring of cancer     Acute hypoxic respiratory failure , multifactorial   - HCAP PNA, probable gram negative rods, septic shock  -  Fecal spillage peritonitis with abdominal fluids collections s/p drainage   - Funguria   - COPD  on  4 L home O2   -Continue Prednisone 4mg daily with PPI bid , Symbicort, Spiriva Albuterol HFA PRN   -Off pressors, given stress dose steroids, now tapering, on prednisone 4mg daily   -c/w zosyn  for PNA and intraabdominal collections, consider ID consult   -S/p IR drainage of fluid collection on 10/19, actually restarted zosyn 10/20, (cultures have been negative >48hrs)  -s/p  PPN 10/17 - 10/22   - 10/30 - reconsult ID - budding yeast in UA , can not send the culture s/p Alfaro , ? need for fluconasole, add probiotics  - 10/31 - start fluconasole 100 qd for Funguria     Acute on chronic systolic HF , resolved   Chronic  AFIB s/p PPM    -Continue Eliquis 5mg q12h  - s/p IV lasix 10/24-10/26   - 2 d echo 10/7 - EF 40-45%, diastolic dysfunction, 2 + MR , LV hypokinesis   - cardiology consult noted optimize meds - no ACEI ARBS and BB due to renal fxn and low BPs   - suspect drop in CHF In the setting of septic shock but will need repeat ECHO in 6 weeks   - follow up Dr Kim 1-2 weeks, monitor labs, low Na diet and daily weights     JOSE with baseline Cr 1.1   Urinary retention s/p Alfaro 10/17/22   Mild left hydronephrosis , Right nephrolithiasis  - s/p IV lasix, than IV fluids , will stop IV fluids   - 11/1- s/1 1 unit PRBC  -Continue tamsulosin 0.8mg qhs, finasteride 5mg daily   -Urology consult appreciated: d/c home with alfaro    Normocytic Anemia, multifactorial   - s/p 4 units PRBC 10/9, 10/10 , 10/12, 10/24   - Monitor closely while on Eliquis   - check iron studies, B12, folate , FOBT neg   - Hb 8.5--> 7.8 --> 7.5   - 11/1 - 1 unit PRBC     HTN overcontrolled -Off antihypertensives here, bp stable  (  was On amlodipine 2.5mg, Metoprolol ER 25mg daily   HLD -Continue Lipitor 20mg qhs    VTE prophylaxis  -On eliquis

## 2022-11-02 NOTE — PROGRESS NOTE ADULT - SUBJECTIVE AND OBJECTIVE BOX
NEPHROLOGY INTERVAL HPI/OVERNIGHT EVENTS:    Date of Service: 22 @ 10:55    --  - feels well, no new events  10/31-no new events, alfaro 2300 ml, ostomy 1400 ml  10/30--Feeling well.  Reports increased po intake.  UO increased to 2 L ostomy 1.5L  10/29--Resting comfortably.  Denies SOB.  Eating a little better today.  O>>I with 1.6 L UO and 1.1L ostomy output.    HPI:  78 yo wm presented for colon mass for ca   hx of cad, CM with ef 40-45%, off diuretics for 5 months   AF, PPM , htn   COPD with hx of smoking  10/06- s/p total proctocolectomy with J- pouch creation, open diverting loop ileostomy, 2 VINNY drains placed   Surgery complicated by acute hypoxic respiratory failure secondary to L sided pneumonia which improved with NIPPV, septic shock requiring pressors due to fecal spillage peritonitis, and JOSE likely from ATN. Was started on Zosyn. Off pressors 10/9. On 10/10 patient was transfused 2u pRBC after drop in h/h and noted with ?coffee ground content in ileostomy. Improved respiratory status, weaned to supplemental O2 via NC.   10/17 - s/p cystoscopy and urethral catheter placement in OR by urology for urinary retention found to have bladder neck contracture.   10/19 - s/p IR procedure for  LLQ abd drain placement, found to have fluid collection, with 8cc aspirate, culture has been negative. Parenteral nutrition started on 10/17/22.  10/17-21 ICU tx for PNA and NIV at night for his hx of FLORA   tx with zosyn    Patient downgraded from ICU 10/21/22.  10/25 - lasix held   10/26 - no lasix and ivf   10/27 continued rise in scr and renal consult requested   pt with 3L neg fluid balance with po intake " very good" as per pt   no nsaid  no ivc  only zosyn for abx, no vanc given     PAST MEDICAL & SURGICAL HISTORY:  - UC  - recent dx of colon ca s/p resection 10/6  - flora on cpap at home   - htn   - afib   - ppm   - hld  - cpod hx of smoking   - hernia repair  - urethral stricture now with alfaro in place   - b/l tkr     MEDICATIONS  (STANDING):  acetaminophen     Tablet .. 650 milliGRAM(s) Oral every 6 hours  acetaminophen   IVPB .. 1000 milliGRAM(s) IV Intermittent once  apixaban 5 milliGRAM(s) Oral every 12 hours  atorvastatin 20 milliGRAM(s) Oral at bedtime  budesonide 160 MICROgram(s)/formoterol 4.5 MICROgram(s) Inhaler 2 Puff(s) Inhalation two times a day  chlorhexidine 4% Liquid 1 Application(s) Topical <User Schedule>  cholecalciferol 2000 Unit(s) Oral daily  cholestyramine Powder (Sugar-Free) 4 Gram(s) Oral daily  dextrose 5%. 1000 milliLiter(s) (50 mL/Hr) IV Continuous <Continuous>  dextrose 5%. 1000 milliLiter(s) (100 mL/Hr) IV Continuous <Continuous>  dextrose 50% Injectable 25 Gram(s) IV Push once  dextrose 50% Injectable 12.5 Gram(s) IV Push once  dextrose 50% Injectable 25 Gram(s) IV Push once  dextrose Oral Gel 15 Gram(s) Oral once  finasteride 5 milliGRAM(s) Oral daily  fluconAZOLE   Tablet 100 milliGRAM(s) Oral daily  glucagon  Injectable 1 milliGRAM(s) IntraMuscular once  influenza  Vaccine (HIGH DOSE) 0.7 milliLiter(s) IntraMuscular once  insulin glargine Injectable (LANTUS) 3 Unit(s) SubCutaneous at bedtime  insulin lispro (ADMELOG) corrective regimen sliding scale   SubCutaneous three times a day before meals  insulin lispro (ADMELOG) corrective regimen sliding scale   SubCutaneous at bedtime  insulin lispro Injectable (ADMELOG) 1 Unit(s) SubCutaneous three times a day before meals  lactobacillus acidophilus 1 Tablet(s) Oral three times a day with meals  loperamide 4 milliGRAM(s) Oral <User Schedule>  melatonin 5 milliGRAM(s) Oral at bedtime  opium Tincture 6 milliGRAM(s) Oral three times a day  pantoprazole    Tablet 40 milliGRAM(s) Oral every 12 hours  predniSONE   Tablet 4 milliGRAM(s) Oral daily  psyllium Powder 1 Packet(s) Oral two times a day  sodium chloride 0.9% lock flush 3 milliLiter(s) IV Push every 8 hours  tamsulosin 0.8 milliGRAM(s) Oral at bedtime  tiotropium 18 MICROgram(s) Capsule 1 Capsule(s) Inhalation daily    MEDICATIONS  (PRN):  acetaminophen     Tablet .. 650 milliGRAM(s) Oral every 6 hours PRN Mild Pain (1 - 3)  ALBUTerol    90 MICROgram(s) HFA Inhaler 2 Puff(s) Inhalation every 6 hours PRN Shortness of Breath and/or Wheezing  albuterol/ipratropium for Nebulization 3 milliLiter(s) Nebulizer every 6 hours PRN Shortness of Breath and/or Wheezing  naloxone Injectable 0.1 milliGRAM(s) IV Push every 3 minutes PRN For ANY of the following changes in patient status:  A. RR LESS THAN 10 breaths per minute, B. Oxygen saturation LESS THAN 90%, C. Sedation score of 6  ondansetron Injectable 4 milliGRAM(s) IV Push every 6 hours PRN Nausea and/or Vomiting  oxybutynin 5 milliGRAM(s) Oral daily PRN Bladder spasms  oxyCODONE    IR 5 milliGRAM(s) Oral every 4 hours PRN Moderate Pain (4 - 6)  oxyCODONE    IR 10 milliGRAM(s) Oral every 4 hours PRN Severe Pain (7 - 10)    Vital Signs Last 24 Hrs  T(C): 36.5 (2022 09:09), Max: 37 (2022 12:49)  T(F): 97.7 (2022 09:09), Max: 98.6 (2022 12:49)  HR: 75 (2022 09:09) (59 - 87)  BP: 121/67 (2022 09:09) (100/79 - 135/70)  BP(mean): --  RR: 18 (2022 09:09) (18 - 19)  SpO2: 93% (2022 09:09) (93% - 99%)    Parameters below as of 2022 09:09  Patient On (Oxygen Delivery Method): room air      Daily     Daily Weight in k.3 (2022 05:28)     @ 07:01  -   @ 07:00  --------------------------------------------------------  IN: 0 mL / OUT: 5400 mL / NET: -5400 mL    PHYSICAL EXAM:  GENERAL:   CHEST/LUNG:   HEART:   ABDOMEN:   EXTREMITIES:   SKIN:     LABS:                        8.5    5.60  )-----------( 164      ( 2022 07:38 )             28.8         136  |  106  |  24<H>  ----------------------------<  117<H>  5.2   |  25  |  1.99<H>    Ca    9.1      2022 07:38  Phos  3.2       Mg     1.6               Magnesium, Serum: 1.6 mg/dL ( @ 07:38)  Phosphorus Level, Serum: 3.2 mg/dL ( @ 07:38)          RADIOLOGY & ADDITIONAL TESTS:   NEPHROLOGY INTERVAL HPI/OVERNIGHT EVENTS:    Date of Service: 22 @ 10:55    --Feels well.  No new complaints.  Ostomy output much decreased.  Eating and drinking well.  - feels well, no new events  10/31-no new events, alfaro 2300 ml, ostomy 1400 ml  10/30--Feeling well.  Reports increased po intake.  UO increased to 2 L ostomy 1.5L  10/29--Resting comfortably.  Denies SOB.  Eating a little better today.  O>>I with 1.6 L UO and 1.1L ostomy output.    HPI:  76 yo wm presented for colon mass for ca   hx of cad, CM with ef 40-45%, off diuretics for 5 months   AF, PPM , htn   COPD with hx of smoking  10/06- s/p total proctocolectomy with J- pouch creation, open diverting loop ileostomy, 2 VINNY drains placed   Surgery complicated by acute hypoxic respiratory failure secondary to L sided pneumonia which improved with NIPPV, septic shock requiring pressors due to fecal spillage peritonitis, and JOSE likely from ATN. Was started on Zosyn. Off pressors 10/9. On 10/10 patient was transfused 2u pRBC after drop in h/h and noted with ?coffee ground content in ileostomy. Improved respiratory status, weaned to supplemental O2 via NC.   10/17 - s/p cystoscopy and urethral catheter placement in OR by urology for urinary retention found to have bladder neck contracture.   10/19 - s/p IR procedure for  LLQ abd drain placement, found to have fluid collection, with 8cc aspirate, culture has been negative. Parenteral nutrition started on 10/17/22.  10/17-21 ICU tx for PNA and NIV at night for his hx of FLORA   tx with zosyn    Patient downgraded from ICU 10/21/22.  10/25 - lasix held   10/26 - no lasix and ivf   10/27 continued rise in scr and renal consult requested   pt with 3L neg fluid balance with po intake " very good" as per pt   no nsaid  no ivc  only zosyn for abx, no vanc given     PAST MEDICAL & SURGICAL HISTORY:  - UC  - recent dx of colon ca s/p resection 10/6  - flora on cpap at home   - htn   - afib   - ppm   - hld  - cpod hx of smoking   - hernia repair  - urethral stricture now with alfaro in place   - b/l tkr     MEDICATIONS  (STANDING):  acetaminophen     Tablet .. 650 milliGRAM(s) Oral every 6 hours  acetaminophen   IVPB .. 1000 milliGRAM(s) IV Intermittent once  apixaban 5 milliGRAM(s) Oral every 12 hours  atorvastatin 20 milliGRAM(s) Oral at bedtime  budesonide 160 MICROgram(s)/formoterol 4.5 MICROgram(s) Inhaler 2 Puff(s) Inhalation two times a day  chlorhexidine 4% Liquid 1 Application(s) Topical <User Schedule>  cholecalciferol 2000 Unit(s) Oral daily  cholestyramine Powder (Sugar-Free) 4 Gram(s) Oral daily  dextrose 5%. 1000 milliLiter(s) (50 mL/Hr) IV Continuous <Continuous>  dextrose 5%. 1000 milliLiter(s) (100 mL/Hr) IV Continuous <Continuous>  dextrose 50% Injectable 25 Gram(s) IV Push once  dextrose 50% Injectable 12.5 Gram(s) IV Push once  dextrose 50% Injectable 25 Gram(s) IV Push once  dextrose Oral Gel 15 Gram(s) Oral once  finasteride 5 milliGRAM(s) Oral daily  fluconAZOLE   Tablet 100 milliGRAM(s) Oral daily  glucagon  Injectable 1 milliGRAM(s) IntraMuscular once  influenza  Vaccine (HIGH DOSE) 0.7 milliLiter(s) IntraMuscular once  insulin glargine Injectable (LANTUS) 3 Unit(s) SubCutaneous at bedtime  insulin lispro (ADMELOG) corrective regimen sliding scale   SubCutaneous three times a day before meals  insulin lispro (ADMELOG) corrective regimen sliding scale   SubCutaneous at bedtime  insulin lispro Injectable (ADMELOG) 1 Unit(s) SubCutaneous three times a day before meals  lactobacillus acidophilus 1 Tablet(s) Oral three times a day with meals  loperamide 4 milliGRAM(s) Oral <User Schedule>  melatonin 5 milliGRAM(s) Oral at bedtime  opium Tincture 6 milliGRAM(s) Oral three times a day  pantoprazole    Tablet 40 milliGRAM(s) Oral every 12 hours  predniSONE   Tablet 4 milliGRAM(s) Oral daily  psyllium Powder 1 Packet(s) Oral two times a day  sodium chloride 0.9% lock flush 3 milliLiter(s) IV Push every 8 hours  tamsulosin 0.8 milliGRAM(s) Oral at bedtime  tiotropium 18 MICROgram(s) Capsule 1 Capsule(s) Inhalation daily    MEDICATIONS  (PRN):  acetaminophen     Tablet .. 650 milliGRAM(s) Oral every 6 hours PRN Mild Pain (1 - 3)  ALBUTerol    90 MICROgram(s) HFA Inhaler 2 Puff(s) Inhalation every 6 hours PRN Shortness of Breath and/or Wheezing  albuterol/ipratropium for Nebulization 3 milliLiter(s) Nebulizer every 6 hours PRN Shortness of Breath and/or Wheezing  naloxone Injectable 0.1 milliGRAM(s) IV Push every 3 minutes PRN For ANY of the following changes in patient status:  A. RR LESS THAN 10 breaths per minute, B. Oxygen saturation LESS THAN 90%, C. Sedation score of 6  ondansetron Injectable 4 milliGRAM(s) IV Push every 6 hours PRN Nausea and/or Vomiting  oxybutynin 5 milliGRAM(s) Oral daily PRN Bladder spasms  oxyCODONE    IR 5 milliGRAM(s) Oral every 4 hours PRN Moderate Pain (4 - 6)  oxyCODONE    IR 10 milliGRAM(s) Oral every 4 hours PRN Severe Pain (7 - 10)    Vital Signs Last 24 Hrs  T(C): 36.5 (2022 09:09), Max: 37 (2022 12:49)  T(F): 97.7 (2022 09:09), Max: 98.6 (2022 12:49)  HR: 75 (2022 09:09) (59 - 87)  BP: 121/67 (2022 09:09) (100/79 - 135/70)  BP(mean): --  RR: 18 (2022 09:09) (18 - 19)  SpO2: 93% (2022 09:09) (93% - 99%)    Parameters below as of 2022 09:09  Patient On (Oxygen Delivery Method): room air      Daily Weight in k.3 (2022 05:28)     @ 07:01  -   @ 07:00  --------------------------------------------------------  IN: 0 mL / OUT: 5400 mL / NET: -5400 mL    PHYSICAL EXAM:  GENERAL: no distress.  CHEST/LUNG: clear to aus  HEART: S1S2 RRR  ABDOMEN: soft  EXTREMITIES: trace edema  SKIN:     LABS:                        8.5    5.60  )-----------( 164      ( 2022 07:38 )             28.8         136  |  106  |  24<H>  ----------------------------<  117<H>  5.2   |  25  |  1.99<H>    Ca    9.1      2022 07:38  Phos  3.2       Mg     1.6               Magnesium, Serum: 1.6 mg/dL ( @ 07:38)  Phosphorus Level, Serum: 3.2 mg/dL ( @ 07:38)          RADIOLOGY & ADDITIONAL TESTS:

## 2022-11-02 NOTE — PROGRESS NOTE ADULT - SUBJECTIVE AND OBJECTIVE BOX
Patient was seen and examined at the bedside this morning  No acute events overnight  Pain is better controlled  No nausea or vomiting  Tolerating diet and having function through his ostomy    O/E:  T(C): 36.5 (11-02-22 @ 09:09), Max: 37 (11-01-22 @ 12:49)  HR: 75 (11-02-22 @ 09:09) (59 - 87)  BP: 121/67 (11-02-22 @ 09:09) (100/79 - 135/70)  RR: 18 (11-02-22 @ 09:09) (18 - 19)  SpO2: 93% (11-02-22 @ 09:09) (93% - 99%)  Alert, conscious, oriented  No pallor, jaundice or cyanosis  Not in pain or distress  Chest clear, equal air entry bilaterally  Abdomen soft and lax, no tenderness, incisions clean and dry. Ostomy pink with semisolid stool, total output 1 L. Zhao in place  Extremities: No swelling or tenderness    11-01-22 @ 07:01  -  11-02-22 @ 07:00  --------------------------------------------------------  IN: 0 mL / OUT: 5400 mL / NET: -5400 mL                          8.5    5.60  )-----------( 164      ( 02 Nov 2022 07:38 )             28.8   11-02    136  |  106  |  24<H>  ----------------------------<  117<H>  5.2   |  25  |  1.99<H>    Ca    9.1      02 Nov 2022 07:38  Phos  3.2     11-02  Mg     1.6     11-02    MEDICATIONS  (STANDING):  acetaminophen     Tablet .. 650 milliGRAM(s) Oral every 6 hours  acetaminophen   IVPB .. 1000 milliGRAM(s) IV Intermittent once  apixaban 5 milliGRAM(s) Oral every 12 hours  atorvastatin 20 milliGRAM(s) Oral at bedtime  budesonide 160 MICROgram(s)/formoterol 4.5 MICROgram(s) Inhaler 2 Puff(s) Inhalation two times a day  chlorhexidine 4% Liquid 1 Application(s) Topical <User Schedule>  cholecalciferol 2000 Unit(s) Oral daily  cholestyramine Powder (Sugar-Free) 4 Gram(s) Oral daily  dextrose 5%. 1000 milliLiter(s) (100 mL/Hr) IV Continuous <Continuous>  dextrose 5%. 1000 milliLiter(s) (50 mL/Hr) IV Continuous <Continuous>  dextrose 50% Injectable 25 Gram(s) IV Push once  dextrose 50% Injectable 12.5 Gram(s) IV Push once  dextrose 50% Injectable 25 Gram(s) IV Push once  dextrose Oral Gel 15 Gram(s) Oral once  finasteride 5 milliGRAM(s) Oral daily  fluconAZOLE   Tablet 100 milliGRAM(s) Oral daily  glucagon  Injectable 1 milliGRAM(s) IntraMuscular once  influenza  Vaccine (HIGH DOSE) 0.7 milliLiter(s) IntraMuscular once  insulin glargine Injectable (LANTUS) 3 Unit(s) SubCutaneous at bedtime  insulin lispro (ADMELOG) corrective regimen sliding scale   SubCutaneous three times a day before meals  insulin lispro (ADMELOG) corrective regimen sliding scale   SubCutaneous at bedtime  insulin lispro Injectable (ADMELOG) 1 Unit(s) SubCutaneous three times a day before meals  lactobacillus acidophilus 1 Tablet(s) Oral three times a day with meals  loperamide 4 milliGRAM(s) Oral <User Schedule>  melatonin 5 milliGRAM(s) Oral at bedtime  opium Tincture 6 milliGRAM(s) Oral three times a day  pantoprazole    Tablet 40 milliGRAM(s) Oral every 12 hours  predniSONE   Tablet 4 milliGRAM(s) Oral daily  psyllium Powder 1 Packet(s) Oral two times a day  sodium chloride 0.9% lock flush 3 milliLiter(s) IV Push every 8 hours  tamsulosin 0.8 milliGRAM(s) Oral at bedtime  tiotropium 18 MICROgram(s) Capsule 1 Capsule(s) Inhalation daily    MEDICATIONS  (PRN):  acetaminophen     Tablet .. 650 milliGRAM(s) Oral every 6 hours PRN Mild Pain (1 - 3)  ALBUTerol    90 MICROgram(s) HFA Inhaler 2 Puff(s) Inhalation every 6 hours PRN Shortness of Breath and/or Wheezing  albuterol/ipratropium for Nebulization 3 milliLiter(s) Nebulizer every 6 hours PRN Shortness of Breath and/or Wheezing  naloxone Injectable 0.1 milliGRAM(s) IV Push every 3 minutes PRN For ANY of the following changes in patient status:  A. RR LESS THAN 10 breaths per minute, B. Oxygen saturation LESS THAN 90%, C. Sedation score of 6  ondansetron Injectable 4 milliGRAM(s) IV Push every 6 hours PRN Nausea and/or Vomiting  oxybutynin 5 milliGRAM(s) Oral daily PRN Bladder spasms  oxyCODONE    IR 5 milliGRAM(s) Oral every 4 hours PRN Moderate Pain (4 - 6)  oxyCODONE    IR 10 milliGRAM(s) Oral every 4 hours PRN Severe Pain (7 - 10)

## 2022-11-02 NOTE — PROGRESS NOTE ADULT - NUTRITIONAL ASSESSMENT
This patient has been assessed with a concern for Malnutrition and has been determined to have a diagnosis/diagnoses of Severe protein-calorie malnutrition.    This patient is being managed with:   Diet Consistent Carbohydrate/No Snacks-  Entered: Oct 23 2022 10:30AM    
This patient has been assessed with a concern for Malnutrition and has been determined to have a diagnosis/diagnoses of Severe protein-calorie malnutrition.    This patient is being managed with:   Diet Consistent Carbohydrate/No Snacks-  1500mL Fluid Restriction ([id number])  Entered: Oct 27 2022 10:15PM    
This patient has been assessed with a concern for Malnutrition and has been determined to have a diagnosis/diagnoses of Severe protein-calorie malnutrition.    This patient is being managed with:   Diet Consistent Carbohydrate/No Snacks-  Entered: Oct 28 2022  2:31PM    
This patient has been assessed with a concern for Malnutrition and has been determined to have a diagnosis/diagnoses of Severe protein-calorie malnutrition.    This patient is being managed with:   Diet Consistent Carbohydrate/No Snacks-  Entered: Oct 28 2022  2:31PM    
This patient has been assessed with a concern for Malnutrition and has been determined to have a diagnosis/diagnoses of Severe protein-calorie malnutrition.    This patient is being managed with:   Parenteral Nutrition - Adult-  Entered: Oct 18 2022 10:00PM    Parenteral Nutrition - Adult-  Entered: Oct 17 2022 10:00PM    Diet NPO-  Except Medications  Entered: Oct 16 2022  8:23AM    
This patient has been assessed with a concern for Malnutrition and has been determined to have a diagnosis/diagnoses of Severe protein-calorie malnutrition.    This patient is being managed with:   Diet Consistent Carbohydrate/No Snacks-  Entered: Oct 23 2022 10:30AM    
This patient has been assessed with a concern for Malnutrition and has been determined to have a diagnosis/diagnoses of Severe protein-calorie malnutrition.    This patient is being managed with:   Diet Consistent Carbohydrate/No Snacks-  Entered: Oct 28 2022  2:31PM    
This patient has been assessed with a concern for Malnutrition and has been determined to have a diagnosis/diagnoses of Severe protein-calorie malnutrition.    This patient is being managed with:   Diet Consistent Carbohydrate/No Snacks-  Entered: Oct 23 2022 10:30AM    
This patient has been assessed with a concern for Malnutrition and has been determined to have a diagnosis/diagnoses of Severe protein-calorie malnutrition.    This patient is being managed with:   Diet Consistent Carbohydrate/No Snacks-  Entered: Oct 28 2022  2:31PM    
This patient has been assessed with a concern for Malnutrition and has been determined to have a diagnosis/diagnoses of Severe protein-calorie malnutrition.    This patient is being managed with:   Parenteral Nutrition - Adult-  Entered: Oct 18 2022 10:00PM    Parenteral Nutrition - Adult-  Entered: Oct 17 2022 10:00PM    Diet NPO-  Except Medications  Entered: Oct 16 2022  8:23AM    
This patient has been assessed with a concern for Malnutrition and has been determined to have a diagnosis/diagnoses of Severe protein-calorie malnutrition.    This patient is being managed with:   Parenteral Nutrition - Adult-  Entered: Oct 17 2022  5:00PM    Parenteral Nutrition - Adult-  Entered: Oct 17 2022  5:00PM    Diet NPO-  Except Medications  Entered: Oct 16 2022  8:23AM    
This patient has been assessed with a concern for Malnutrition and has been determined to have a diagnosis/diagnoses of Severe protein-calorie malnutrition.    This patient is being managed with:   Parenteral Nutrition - Adult-  Entered: Oct 21 2022  5:00PM    fat emulsion (Fish Oil and Plant Based) 20% Infusion-[Known as SMOFLIPID 20% Infusion]  99.62 Gram(s) in IV Solution 498.1 milliLiter(s) infuse at 41.5 mL/Hr  Dose Rate: 0.79 Gm/kG/Day Infuse Over: 12 Hours; Stop After 12 Hours  Administration Instructions: Use 1.2 micron in-line filter  Entered: Oct 21 2022  5:00PM    Parenteral Nutrition - Adult-  Entered: Oct 20 2022 10:00PM    fat emulsion (Fish Oil and Plant Based) 20% Infusion-[Known as SMOFLIPID 20% Infusion]  100 Gram(s) in IV Solution 500 milliLiter(s) infuse at 41.67 mL/Hr  Dose Rate: 0.8 Gm/kG/Day Infuse Over: 12 Hours; Stop After 12 Hours  Administration Instructions: Use 1.2 micron in-line filter  Entered: Oct 20 2022 10:00PM    Diet NPO-  Except Medications  Entered: Oct 16 2022  8:23AM    The following pending diet order is being considered for treatment of Severe protein-calorie malnutrition:null
This patient has been assessed with a concern for Malnutrition and has been determined to have a diagnosis/diagnoses of Severe protein-calorie malnutrition.    This patient is being managed with:   fat emulsion (Fish Oil and Plant Based) 20% Infusion-[Known as SMOFLIPID 20% Infusion]  100 Gram(s) in IV Solution 500 milliLiter(s) infuse at 41.67 mL/Hr  Dose Rate: 0.794 Gm/kG/Day Infuse Over: 12 Hours; Stop After 12 Hours  Administration Instructions: Use 1.2 micron in-line filter  Entered: Oct 19 2022 10:00PM    Parenteral Nutrition - Adult-  Entered: Oct 19 2022 10:00PM    Parenteral Nutrition - Adult-  Entered: Oct 18 2022 10:00PM    Diet NPO-  Except Medications  Entered: Oct 16 2022  8:23AM    
This patient has been assessed with a concern for Malnutrition and has been determined to have a diagnosis/diagnoses of Severe protein-calorie malnutrition.    This patient is being managed with:   Parenteral Nutrition - Adult-  Entered: Oct 18 2022 10:00PM    Parenteral Nutrition - Adult-  Entered: Oct 17 2022 10:00PM    Diet NPO-  Except Medications  Entered: Oct 16 2022  8:23AM    
This patient has been assessed with a concern for Malnutrition and has been determined to have a diagnosis/diagnoses of Severe protein-calorie malnutrition.    This patient is being managed with:   Parenteral Nutrition - Adult-  Entered: Oct 20 2022  5:00PM    fat emulsion (Fish Oil and Plant Based) 20% Infusion-[Known as SMOFLIPID 20% Infusion]  100.88 Gram(s) in IV Solution 504.4 milliLiter(s) infuse at 42 mL/Hr  Dose Rate: 0.8 Gm/kG/Day Infuse Over: 12 Hours; Stop After 12 Hours  Administration Instructions: Use 1.2 micron in-line filter  Entered: Oct 20 2022  5:00PM    fat emulsion (Fish Oil and Plant Based) 20% Infusion-[Known as SMOFLIPID 20% Infusion]  100 Gram(s) in IV Solution 500 milliLiter(s) infuse at 41.67 mL/Hr  Dose Rate: 0.794 Gm/kG/Day Infuse Over: 12 Hours; Stop After 12 Hours  Administration Instructions: Use 1.2 micron in-line filter  Entered: Oct 19 2022 10:00PM    Parenteral Nutrition - Adult-  Entered: Oct 19 2022 10:00PM    Diet NPO-  Except Medications  Entered: Oct 16 2022  8:23AM

## 2022-11-02 NOTE — PROGRESS NOTE ADULT - ATTENDING COMMENTS
Patient seen and examined at bedside. He is doing well, tolerating a diet, pain is controlled having appropriate ostomy output. Abdomen is soft, nondistended, appropriately tender.    I&O's Detail    01 Nov 2022 07:01  -  02 Nov 2022 07:00  --------------------------------------------------------  IN:  Total IN: 0 mL    OUT:    Colostomy (mL): 400 mL    Ileostomy (mL): 600 mL    Indwelling Catheter - Urethral (mL): 4400 mL  Total OUT: 5400 mL    Total NET: -5400 mL      02 Nov 2022 07:01  -  02 Nov 2022 13:57  --------------------------------------------------------  IN:  Total IN: 0 mL    OUT:    Ileostomy (mL): 300 mL    Indwelling Catheter - Urethral (mL): 950 mL  Total OUT: 1250 mL    Total NET: -1250 mL                          8.5    5.60  )-----------( 164      ( 02 Nov 2022 07:38 )             28.8   11-02    136  |  106  |  24<H>  ----------------------------<  117<H>  5.2   |  25  |  1.99<H>    Ca    9.1      02 Nov 2022 07:38  Phos  3.2     11-02  Mg     1.6     11-02      A/P  Improving JOSE, ok for DC from nephrology POV  Plan for discharge today with the appropriate follow up with CRS and Nephrology  Plans for outpatient labs to trend Cr

## 2022-11-02 NOTE — PROGRESS NOTE ADULT - ASSESSMENT
Patient is S/p proctocolectomy, IPAA, DLI for UC/Sigmoid CA, IR drain for collection. S/p cystoscopy w/ stent placement and [place] tip Zhao.   High ileostomy output  JOSE    Plan:  Pain control  Regular diet  Continue Eliquis  Appreciate cards recc  Appreciate nephrology reccs, Follow up regarding discharge recommendations  Appreciate hospitalist reccs  Appreciate ID reccs, continue on IV antibiotics, follow up urine cultures  Monitor ileostomy output, decreased - on loperamide, metamucil, lomotil, cholestyramine, tincture of opium  Trend labs, monitor Cr  Appreciate  reccs - pt to be DC'd with leg bag  Dispo PHILLIP vs PT with home oxygen  CM for discharge planning  encourage OOB/ambulation    Plan discussed with the colorectal surgery team

## 2022-11-02 NOTE — PROGRESS NOTE ADULT - ATTENDING SUPERVISION STATEMENT
Resident
Resident/Student
Resident

## 2022-11-04 PROBLEM — I10 ESSENTIAL (PRIMARY) HYPERTENSION: Chronic | Status: ACTIVE | Noted: 2022-09-29

## 2022-11-04 PROBLEM — U07.1 COVID-19: Chronic | Status: ACTIVE | Noted: 2022-09-29

## 2022-11-04 PROBLEM — E66.9 OBESITY, UNSPECIFIED: Chronic | Status: ACTIVE | Noted: 2022-09-29

## 2022-11-04 PROBLEM — K51.90 ULCERATIVE COLITIS, UNSPECIFIED, WITHOUT COMPLICATIONS: Chronic | Status: ACTIVE | Noted: 2022-09-29

## 2022-11-04 PROBLEM — N40.0 BENIGN PROSTATIC HYPERPLASIA WITHOUT LOWER URINARY TRACT SYMPTOMS: Chronic | Status: ACTIVE | Noted: 2022-09-29

## 2022-11-04 PROBLEM — C18.9 MALIGNANT NEOPLASM OF COLON, UNSPECIFIED: Chronic | Status: ACTIVE | Noted: 2022-09-29

## 2022-11-04 PROBLEM — G47.33 OBSTRUCTIVE SLEEP APNEA (ADULT) (PEDIATRIC): Chronic | Status: ACTIVE | Noted: 2022-09-29

## 2022-11-04 PROBLEM — E78.5 HYPERLIPIDEMIA, UNSPECIFIED: Chronic | Status: ACTIVE | Noted: 2022-09-29

## 2022-11-04 PROBLEM — J44.9 CHRONIC OBSTRUCTIVE PULMONARY DISEASE, UNSPECIFIED: Chronic | Status: ACTIVE | Noted: 2022-09-29

## 2022-11-04 PROBLEM — I48.91 UNSPECIFIED ATRIAL FIBRILLATION: Chronic | Status: ACTIVE | Noted: 2022-09-29

## 2022-11-04 PROBLEM — Z87.01 PERSONAL HISTORY OF PNEUMONIA (RECURRENT): Chronic | Status: ACTIVE | Noted: 2022-09-29

## 2022-11-07 DIAGNOSIS — E66.01 MORBID (SEVERE) OBESITY DUE TO EXCESS CALORIES: ICD-10-CM

## 2022-11-07 DIAGNOSIS — A41.9 SEPSIS, UNSPECIFIED ORGANISM: ICD-10-CM

## 2022-11-07 DIAGNOSIS — E83.42 HYPOMAGNESEMIA: ICD-10-CM

## 2022-11-07 DIAGNOSIS — Z96.653 PRESENCE OF ARTIFICIAL KNEE JOINT, BILATERAL: ICD-10-CM

## 2022-11-07 DIAGNOSIS — R73.9 HYPERGLYCEMIA, UNSPECIFIED: ICD-10-CM

## 2022-11-07 DIAGNOSIS — Z86.16 PERSONAL HISTORY OF COVID-19: ICD-10-CM

## 2022-11-07 DIAGNOSIS — J69.0 PNEUMONITIS DUE TO INHALATION OF FOOD AND VOMIT: ICD-10-CM

## 2022-11-07 DIAGNOSIS — R71.0 PRECIPITOUS DROP IN HEMATOCRIT: ICD-10-CM

## 2022-11-07 DIAGNOSIS — K56.609 UNSPECIFIED INTESTINAL OBSTRUCTION, UNSPECIFIED AS TO PARTIAL VERSUS COMPLETE OBSTRUCTION: ICD-10-CM

## 2022-11-07 DIAGNOSIS — E43 UNSPECIFIED SEVERE PROTEIN-CALORIE MALNUTRITION: ICD-10-CM

## 2022-11-07 DIAGNOSIS — R33.9 RETENTION OF URINE, UNSPECIFIED: ICD-10-CM

## 2022-11-07 DIAGNOSIS — J96.01 ACUTE RESPIRATORY FAILURE WITH HYPOXIA: ICD-10-CM

## 2022-11-07 DIAGNOSIS — E78.5 HYPERLIPIDEMIA, UNSPECIFIED: ICD-10-CM

## 2022-11-07 DIAGNOSIS — Z79.01 LONG TERM (CURRENT) USE OF ANTICOAGULANTS: ICD-10-CM

## 2022-11-07 DIAGNOSIS — G47.33 OBSTRUCTIVE SLEEP APNEA (ADULT) (PEDIATRIC): ICD-10-CM

## 2022-11-07 DIAGNOSIS — J98.11 ATELECTASIS: ICD-10-CM

## 2022-11-07 DIAGNOSIS — K51.90 ULCERATIVE COLITIS, UNSPECIFIED, WITHOUT COMPLICATIONS: ICD-10-CM

## 2022-11-07 DIAGNOSIS — J44.9 CHRONIC OBSTRUCTIVE PULMONARY DISEASE, UNSPECIFIED: ICD-10-CM

## 2022-11-07 DIAGNOSIS — Z95.0 PRESENCE OF CARDIAC PACEMAKER: ICD-10-CM

## 2022-11-07 DIAGNOSIS — N17.0 ACUTE KIDNEY FAILURE WITH TUBULAR NECROSIS: ICD-10-CM

## 2022-11-07 DIAGNOSIS — I48.20 CHRONIC ATRIAL FIBRILLATION, UNSPECIFIED: ICD-10-CM

## 2022-11-07 DIAGNOSIS — I25.10 ATHEROSCLEROTIC HEART DISEASE OF NATIVE CORONARY ARTERY WITHOUT ANGINA PECTORIS: ICD-10-CM

## 2022-11-07 DIAGNOSIS — I13.0 HYPERTENSIVE HEART AND CHRONIC KIDNEY DISEASE WITH HEART FAILURE AND STAGE 1 THROUGH STAGE 4 CHRONIC KIDNEY DISEASE, OR UNSPECIFIED CHRONIC KIDNEY DISEASE: ICD-10-CM

## 2022-11-07 DIAGNOSIS — N32.89 OTHER SPECIFIED DISORDERS OF BLADDER: ICD-10-CM

## 2022-11-07 DIAGNOSIS — K65.8 OTHER PERITONITIS: ICD-10-CM

## 2022-11-07 DIAGNOSIS — C18.7 MALIGNANT NEOPLASM OF SIGMOID COLON: ICD-10-CM

## 2022-11-07 DIAGNOSIS — E87.6 HYPOKALEMIA: ICD-10-CM

## 2022-11-07 DIAGNOSIS — N18.30 CHRONIC KIDNEY DISEASE, STAGE 3 UNSPECIFIED: ICD-10-CM

## 2022-11-07 DIAGNOSIS — R65.21 SEVERE SEPSIS WITH SEPTIC SHOCK: ICD-10-CM

## 2022-11-07 DIAGNOSIS — Z87.891 PERSONAL HISTORY OF NICOTINE DEPENDENCE: ICD-10-CM

## 2022-11-07 DIAGNOSIS — N99.0 POSTPROCEDURAL (ACUTE) (CHRONIC) KIDNEY FAILURE: ICD-10-CM

## 2022-11-07 DIAGNOSIS — N13.6 PYONEPHROSIS: ICD-10-CM

## 2022-11-07 DIAGNOSIS — I50.21 ACUTE SYSTOLIC (CONGESTIVE) HEART FAILURE: ICD-10-CM

## 2022-11-07 DIAGNOSIS — K62.89 OTHER SPECIFIED DISEASES OF ANUS AND RECTUM: ICD-10-CM

## 2022-11-08 ENCOUNTER — NON-APPOINTMENT (OUTPATIENT)
Age: 77
End: 2022-11-08

## 2022-11-09 ENCOUNTER — RX CHANGE (OUTPATIENT)
Age: 77
End: 2022-11-09

## 2022-11-11 ENCOUNTER — APPOINTMENT (OUTPATIENT)
Dept: COLORECTAL SURGERY | Facility: CLINIC | Age: 77
End: 2022-11-11

## 2022-11-11 ENCOUNTER — OUTPATIENT (OUTPATIENT)
Dept: OUTPATIENT SERVICES | Facility: HOSPITAL | Age: 77
LOS: 1 days | Discharge: HOME | End: 2022-11-11

## 2022-11-11 VITALS
SYSTOLIC BLOOD PRESSURE: 102 MMHG | BODY MASS INDEX: 35.42 KG/M2 | HEIGHT: 71 IN | WEIGHT: 253 LBS | DIASTOLIC BLOOD PRESSURE: 64 MMHG | HEART RATE: 103 BPM

## 2022-11-11 DIAGNOSIS — C18.9 MALIGNANT NEOPLASM OF COLON, UNSPECIFIED: ICD-10-CM

## 2022-11-11 DIAGNOSIS — C18.7 MALIGNANT NEOPLASM OF SIGMOID COLON: ICD-10-CM

## 2022-11-11 DIAGNOSIS — Z95.0 PRESENCE OF CARDIAC PACEMAKER: Chronic | ICD-10-CM

## 2022-11-11 DIAGNOSIS — Z98.890 OTHER SPECIFIED POSTPROCEDURAL STATES: Chronic | ICD-10-CM

## 2022-11-11 DIAGNOSIS — C77.2 MALIGNANT NEOPLASM OF SIGMOID COLON: ICD-10-CM

## 2022-11-11 DIAGNOSIS — Z98.49 CATARACT EXTRACTION STATUS, UNSPECIFIED EYE: Chronic | ICD-10-CM

## 2022-11-11 DIAGNOSIS — Z96.653 PRESENCE OF ARTIFICIAL KNEE JOINT, BILATERAL: Chronic | ICD-10-CM

## 2022-11-11 PROCEDURE — 99024 POSTOP FOLLOW-UP VISIT: CPT

## 2022-11-11 RX ORDER — METOPROLOL SUCCINATE 25 MG/1
25 TABLET, EXTENDED RELEASE ORAL
Qty: 90 | Refills: 0 | Status: ACTIVE | COMMUNITY
Start: 2022-09-09

## 2022-11-11 RX ORDER — LOPERAMIDE HYDROCHLORIDE 2 MG/1
2 CAPSULE ORAL
Qty: 240 | Refills: 0 | Status: ACTIVE | COMMUNITY
Start: 2022-11-02

## 2022-11-11 RX ORDER — AMOXICILLIN AND CLAVULANATE POTASSIUM 875; 125 MG/1; MG/1
875-125 TABLET, COATED ORAL
Qty: 20 | Refills: 0 | Status: COMPLETED | COMMUNITY
Start: 2022-08-02

## 2022-11-11 RX ORDER — APIXABAN 5 MG/1
5 TABLET, FILM COATED ORAL
Qty: 180 | Refills: 0 | Status: ACTIVE | COMMUNITY
Start: 2022-07-05

## 2022-11-11 RX ORDER — HYDROCHLOROTHIAZIDE 12.5 MG/1
12.5 TABLET ORAL
Qty: 30 | Refills: 0 | Status: ACTIVE | COMMUNITY
Start: 2022-06-13

## 2022-11-11 RX ORDER — FLUTICASONE FUROATE 200 UG/1
200 POWDER RESPIRATORY (INHALATION)
Qty: 30 | Refills: 0 | Status: ACTIVE | COMMUNITY
Start: 2022-06-14

## 2022-11-11 RX ORDER — CHLORHEXIDINE GLUCONATE, 0.12% ORAL RINSE 1.2 MG/ML
0.12 SOLUTION DENTAL
Qty: 473 | Refills: 0 | Status: ACTIVE | COMMUNITY
Start: 2022-08-02

## 2022-11-11 RX ORDER — OXYBUTYNIN CHLORIDE 5 MG/1
5 TABLET ORAL
Qty: 30 | Refills: 0 | Status: ACTIVE | COMMUNITY
Start: 2022-11-02

## 2022-11-11 RX ORDER — OXYCODONE AND ACETAMINOPHEN 5; 325 MG/1; MG/1
5-325 TABLET ORAL
Qty: 20 | Refills: 0 | Status: ACTIVE | COMMUNITY
Start: 2022-11-02

## 2022-11-11 RX ORDER — TAMSULOSIN HYDROCHLORIDE 0.4 MG/1
0.4 CAPSULE ORAL
Qty: 30 | Refills: 0 | Status: ACTIVE | COMMUNITY
Start: 2022-11-02

## 2022-11-11 RX ORDER — POLYETHYLENE GLYCOL-3350 AND ELECTROLYTES 236; 6.74; 5.86; 2.97; 22.74 G/274.31G; G/274.31G; G/274.31G; G/274.31G; G/274.31G
236 POWDER, FOR SOLUTION ORAL
Qty: 4000 | Refills: 0 | Status: ACTIVE | COMMUNITY
Start: 2022-08-17

## 2022-11-11 RX ORDER — ATORVASTATIN CALCIUM 20 MG/1
20 TABLET, FILM COATED ORAL
Qty: 90 | Refills: 0 | Status: ACTIVE | COMMUNITY
Start: 2022-09-15

## 2022-11-11 RX ORDER — FUROSEMIDE 20 MG/1
20 TABLET ORAL
Qty: 30 | Refills: 0 | Status: ACTIVE | COMMUNITY
Start: 2022-05-11

## 2022-11-11 RX ORDER — AMLODIPINE BESYLATE 2.5 MG/1
2.5 TABLET ORAL
Qty: 180 | Refills: 0 | Status: ACTIVE | COMMUNITY
Start: 2022-08-13

## 2022-11-11 RX ORDER — FINASTERIDE 5 MG/1
5 TABLET, FILM COATED ORAL
Qty: 30 | Refills: 0 | Status: ACTIVE | COMMUNITY
Start: 2022-11-02

## 2022-11-11 RX ORDER — ERYTHROMYCIN 500 MG/1
500 TABLET, FILM COATED ORAL
Qty: 6 | Refills: 0 | Status: COMPLETED | COMMUNITY
Start: 2022-09-28 | End: 2022-11-11

## 2022-11-11 RX ORDER — TIOTROPIUM BROMIDE INHALATION SPRAY 3.12 UG/1
2.5 SPRAY, METERED RESPIRATORY (INHALATION)
Qty: 4 | Refills: 0 | Status: ACTIVE | COMMUNITY
Start: 2022-06-13

## 2022-11-11 RX ORDER — BALSALAZIDE DISODIUM 750 MG/1
750 CAPSULE ORAL
Qty: 270 | Refills: 0 | Status: ACTIVE | COMMUNITY
Start: 2022-06-13

## 2022-11-11 RX ORDER — PREDNISONE 10 MG/1
10 TABLET ORAL
Qty: 18 | Refills: 0 | Status: ACTIVE | COMMUNITY
Start: 2022-05-11

## 2022-11-11 RX ORDER — CHOLESTYRAMINE 4 G/9G
4 POWDER, FOR SUSPENSION ORAL
Qty: 378 | Refills: 0 | Status: ACTIVE | COMMUNITY
Start: 2022-11-02

## 2022-11-11 RX ORDER — DIPHENOXYLATE HYDROCHLORIDE AND ATROPINE SULFATE 2.5; .025 MG/1; MG/1
2.5-0.025 TABLET ORAL
Qty: 240 | Refills: 0 | Status: ACTIVE | COMMUNITY
Start: 2022-11-02

## 2022-11-11 RX ORDER — DOXYCYCLINE HYCLATE 100 MG/1
100 CAPSULE ORAL
Qty: 28 | Refills: 0 | Status: ACTIVE | COMMUNITY
Start: 2022-05-23

## 2022-11-11 RX ORDER — FLUCONAZOLE 100 MG/1
100 TABLET ORAL
Qty: 7 | Refills: 0 | Status: ACTIVE | COMMUNITY
Start: 2022-11-02

## 2022-11-11 RX ORDER — MERCAPTOPURINE 50 MG/1
50 TABLET ORAL
Qty: 120 | Refills: 0 | Status: ACTIVE | COMMUNITY
Start: 2022-08-17

## 2022-11-11 RX ORDER — PANTOPRAZOLE 40 MG/1
40 TABLET, DELAYED RELEASE ORAL
Qty: 9 | Refills: 0 | Status: ACTIVE | COMMUNITY
Start: 2022-05-11

## 2022-11-11 RX ORDER — METRONIDAZOLE 500 MG/1
500 TABLET ORAL
Qty: 3 | Refills: 0 | Status: COMPLETED | COMMUNITY
Start: 2022-09-28 | End: 2022-11-11

## 2022-11-11 RX ORDER — PREDNISONE 5 MG/1
5 TABLET ORAL
Qty: 318 | Refills: 0 | Status: ACTIVE | COMMUNITY
Start: 2022-08-17

## 2022-11-11 NOTE — PHYSICAL EXAM
[Respiratory Effort] : normal respiratory effort [Calm] : calm [de-identified] : Soft, nontender, nondistended.  Well-healed midline incision healthy ileostomy with stool output. [de-identified] : Well-appearing, in no distress [de-identified] : Normocephalic, atraumatic [de-identified] : In wheelchair [de-identified] : Alert and oriented x3 [de-identified] : Warm and dry

## 2022-11-11 NOTE — HISTORY OF PRESENT ILLNESS
[FreeTextEntry1] : 77-year-old male who presents for postoperative visit after undergoing a proctocolectomy with J-pouch creation and diverting loop ileostomy for colon cancer and ulcerative colitis.  He had a prolonged hospital course complicated with JOSE and need for transfusions, high ostomy output and ICU stay but is now doing quite well.  He is eating well and ileostomy is functioning well.  He recently stopped tincture of opium because of dizziness.  He feels the ostomy output is well controlled with the remaining Imodium and Lomotil as well as cholestyramine.  Denies abdominal pain, fevers or chills.  He had issues with ostomy leakage and pouching but this is now improving.

## 2022-11-14 ENCOUNTER — NON-APPOINTMENT (OUTPATIENT)
Age: 77
End: 2022-11-14

## 2022-11-14 NOTE — HISTORY OF PRESENT ILLNESS
[T: ___] : T[unfilled] [N: ___] : N[unfilled] [de-identified] : 78 yo M with h/o HTN, obesity, BPH and UC who was diagnosed with colon cancer in September 2022.\par \par Due to his h/o ulcerative pancolitis and recent change in bowel habits, patient had a colonoscopy on 9/19/22 which showed a benign appearing intrinsic severe stenosis at 25 cm proximal to the anus. Pathology c/w mucinous adenocarcinoma. Follow up CT showed an ~10 cm segment of circumferential wall thickening and surrounding mild hazziness along the sigmoid colon.\par \par He next saw Dr. Eduin Nguyen who performed a proctocolectomy with J-pouch creation and diverting loop ileostomy. 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  LVI present. 27 of 50 metastatic LNs with extracapsular spread and multiple ill-defined additional tumor deposits. Margins negative.  MLH-1, MSH-2, MSH-6, and PMS2 are all expressed\par \par \par \par

## 2022-11-15 ENCOUNTER — APPOINTMENT (OUTPATIENT)
Dept: HEMATOLOGY ONCOLOGY | Facility: CLINIC | Age: 77
End: 2022-11-15

## 2022-11-18 ENCOUNTER — APPOINTMENT (OUTPATIENT)
Dept: UROLOGY | Facility: CLINIC | Age: 77
End: 2022-11-18

## 2023-01-04 NOTE — PROGRESS NOTE ADULT - ASSESSMENT
Left detailed message for the pt A/P: 77 male S/P RCP-IPAA (restorative proctocolectomy with ileal pouch anal anastomosis), diverting loop ileostomy with Acute Hypoxic Respiratory Failure from a L sided Pneumonia and JOSE likely from ATN  FLORA  Obesity  AFIB  COPD    Plan:  ICU    PULM: Completed Antibiotics for L PNA.  Wean Fio2.  NIV QHS for FLORA.     Cardio:  Continue AC    Renal: In JOSE likely from ATN, continue LR at 77.  JOSE improving    GI:  NPO, PPI BID.  Continue NGT to suction, D/Adarsh Iomotil and metamucil, may need a CT with PO contrast.  Awaiting CRS decision    DVT prophylaxis with DOAC